# Patient Record
Sex: FEMALE | Race: BLACK OR AFRICAN AMERICAN | NOT HISPANIC OR LATINO | Employment: UNEMPLOYED | URBAN - METROPOLITAN AREA
[De-identification: names, ages, dates, MRNs, and addresses within clinical notes are randomized per-mention and may not be internally consistent; named-entity substitution may affect disease eponyms.]

---

## 2017-04-16 ENCOUNTER — APPOINTMENT (EMERGENCY)
Dept: CT IMAGING | Facility: HOSPITAL | Age: 37
End: 2017-04-16
Payer: COMMERCIAL

## 2017-04-16 ENCOUNTER — HOSPITAL ENCOUNTER (EMERGENCY)
Facility: HOSPITAL | Age: 37
Discharge: HOME | End: 2017-04-16
Admitting: EMERGENCY MEDICINE
Payer: COMMERCIAL

## 2017-04-16 VITALS
OXYGEN SATURATION: 100 % | HEART RATE: 100 BPM | DIASTOLIC BLOOD PRESSURE: 79 MMHG | BODY MASS INDEX: 44.6 KG/M2 | WEIGHT: 293 LBS | TEMPERATURE: 97.6 F | SYSTOLIC BLOOD PRESSURE: 157 MMHG | RESPIRATION RATE: 16 BRPM

## 2017-04-16 DIAGNOSIS — N39.0 UTI (URINARY TRACT INFECTION): Primary | ICD-10-CM

## 2017-04-16 LAB
BACTERIA UR QL AUTO: ABNORMAL /HPF
BILIRUB UR QL STRIP: ABNORMAL
CLARITY UR: ABNORMAL
CLARITY, POC: NORMAL
COLOR UR: ABNORMAL
COLOR, POC: NORMAL
GLUCOSE UR STRIP-MCNC: NEGATIVE MG/DL
HCG UR QL: NEGATIVE
HGB UR QL STRIP.AUTO: ABNORMAL
KETONES UR STRIP-MCNC: NEGATIVE MG/DL
LEUKOCYTE ESTERASE UR QL STRIP: ABNORMAL
NITRITE UR QL STRIP: NEGATIVE
NON-SQ EPI CELLS URNS QL MICRO: ABNORMAL /HPF
PH UR STRIP.AUTO: 5.5 [PH] (ref 4.5–8)
PROT UR STRIP-MCNC: >=300 MG/DL
RBC #/AREA URNS AUTO: ABNORMAL /HPF
SP GR UR STRIP.AUTO: >=1.03 (ref 1–1.03)
UROBILINOGEN UR QL STRIP.AUTO: 1 E.U./DL
WBC #/AREA URNS AUTO: ABNORMAL /HPF

## 2017-04-16 PROCEDURE — 74176 CT ABD & PELVIS W/O CONTRAST: CPT

## 2017-04-16 PROCEDURE — 81025 URINE PREGNANCY TEST: CPT | Performed by: PHYSICIAN ASSISTANT

## 2017-04-16 PROCEDURE — 99284 EMERGENCY DEPT VISIT MOD MDM: CPT

## 2017-04-16 PROCEDURE — 81002 URINALYSIS NONAUTO W/O SCOPE: CPT | Performed by: PHYSICIAN ASSISTANT

## 2017-04-16 PROCEDURE — 87086 URINE CULTURE/COLONY COUNT: CPT

## 2017-04-16 PROCEDURE — 87491 CHLMYD TRACH DNA AMP PROBE: CPT | Performed by: PHYSICIAN ASSISTANT

## 2017-04-16 PROCEDURE — 81001 URINALYSIS AUTO W/SCOPE: CPT

## 2017-04-16 PROCEDURE — 87591 N.GONORRHOEAE DNA AMP PROB: CPT | Performed by: PHYSICIAN ASSISTANT

## 2017-04-16 RX ORDER — CEPHALEXIN 500 MG/1
500 CAPSULE ORAL 2 TIMES DAILY
Qty: 20 CAPSULE | Refills: 0 | Status: SHIPPED | OUTPATIENT
Start: 2017-04-16 | End: 2017-04-26 | Stop reason: HOSPADM

## 2017-04-17 LAB
CHLAMYDIA DNA CVX QL NAA+PROBE: NORMAL
N GONORRHOEA DNA GENITAL QL NAA+PROBE: NORMAL

## 2017-04-18 LAB — BACTERIA UR CULT: NORMAL

## 2017-04-25 ENCOUNTER — APPOINTMENT (EMERGENCY)
Dept: CT IMAGING | Facility: HOSPITAL | Age: 37
DRG: 139 | End: 2017-04-25
Payer: COMMERCIAL

## 2017-04-25 ENCOUNTER — HOSPITAL ENCOUNTER (INPATIENT)
Facility: HOSPITAL | Age: 37
LOS: 1 days | Discharge: HOME | DRG: 139 | End: 2017-04-26
Attending: EMERGENCY MEDICINE | Admitting: INTERNAL MEDICINE
Payer: COMMERCIAL

## 2017-04-25 DIAGNOSIS — R06.02 SOB (SHORTNESS OF BREATH): ICD-10-CM

## 2017-04-25 DIAGNOSIS — J45.901 ASTHMA EXACERBATION: Primary | ICD-10-CM

## 2017-04-25 DIAGNOSIS — R06.83 SNORING: ICD-10-CM

## 2017-04-25 DIAGNOSIS — J18.9 PNEUMONIA: ICD-10-CM

## 2017-04-25 LAB
ALBUMIN SERPL BCP-MCNC: 3.4 G/DL (ref 3.5–5)
ALP SERPL-CCNC: 63 U/L (ref 46–116)
ALT SERPL W P-5'-P-CCNC: 29 U/L (ref 12–78)
ANION GAP SERPL CALCULATED.3IONS-SCNC: 9 MMOL/L (ref 4–13)
ARTERIAL PATENCY WRIST A: YES
AST SERPL W P-5'-P-CCNC: 27 U/L (ref 5–45)
BASE EXCESS BLDA CALC-SCNC: -3.7 MMOL/L
BASOPHILS # BLD MANUAL: 0 THOUSAND/UL (ref 0–0.1)
BASOPHILS NFR MAR MANUAL: 0 % (ref 0–1)
BILIRUB SERPL-MCNC: 0.29 MG/DL (ref 0.2–1)
BUN SERPL-MCNC: 7 MG/DL (ref 5–25)
CALCIUM SERPL-MCNC: 8.6 MG/DL (ref 8.3–10.1)
CHLORIDE SERPL-SCNC: 104 MMOL/L (ref 100–108)
CO2 SERPL-SCNC: 26 MMOL/L (ref 21–32)
CREAT SERPL-MCNC: 0.88 MG/DL (ref 0.6–1.3)
EOSINOPHIL # BLD MANUAL: 0 THOUSAND/UL (ref 0–0.4)
EOSINOPHIL NFR BLD MANUAL: 0 % (ref 0–6)
ERYTHROCYTE [DISTWIDTH] IN BLOOD BY AUTOMATED COUNT: 13.6 % (ref 11.6–15.1)
GFR SERPL CREATININE-BSD FRML MDRD: >60 ML/MIN/1.73SQ M
GLUCOSE SERPL-MCNC: 130 MG/DL (ref 65–140)
HCG SERPL QL: NEGATIVE
HCO3 BLDA-SCNC: 18.3 MMOL/L (ref 22–28)
HCT VFR BLD AUTO: 34.7 % (ref 34.8–46.1)
HGB BLD-MCNC: 11.7 G/DL (ref 11.5–15.4)
L PNEUMO1 AG UR QL IA.RAPID: NEGATIVE
LACTATE SERPL-SCNC: 2.1 MMOL/L (ref 0.5–2)
LYMPHOCYTES # BLD AUTO: 18 % (ref 14–44)
LYMPHOCYTES # BLD AUTO: 2.08 THOUSAND/UL (ref 0.6–4.47)
MCH RBC QN AUTO: 31.5 PG (ref 26.8–34.3)
MCHC RBC AUTO-ENTMCNC: 33.7 G/DL (ref 31.4–37.4)
MCV RBC AUTO: 93 FL (ref 82–98)
MONOCYTES # BLD AUTO: 0.58 THOUSAND/UL (ref 0–1.22)
MONOCYTES NFR BLD: 5 % (ref 4–12)
NEUTROPHILS # BLD MANUAL: 8.68 THOUSAND/UL (ref 1.85–7.62)
NEUTS SEG NFR BLD AUTO: 75 % (ref 43–75)
NON VENT ROOM AIR: 21 %
NRBC BLD AUTO-RTO: 0 /100 WBCS
O2 CT BLDA-SCNC: 17.1 ML/DL (ref 16–23)
OXYHGB MFR BLDA: 95.5 % (ref 94–97)
PCO2 BLDA: 25.3 MM HG (ref 36–44)
PH BLDA: 7.48 [PH] (ref 7.35–7.45)
PLATELET # BLD AUTO: 314 THOUSANDS/UL (ref 149–390)
PLATELET BLD QL SMEAR: ADEQUATE
PMV BLD AUTO: 9.6 FL (ref 8.9–12.7)
PO2 BLDA: 89 MM HG (ref 75–129)
POTASSIUM SERPL-SCNC: 3.2 MMOL/L (ref 3.5–5.3)
PROT SERPL-MCNC: 7.5 G/DL (ref 6.4–8.2)
RBC # BLD AUTO: 3.72 MILLION/UL (ref 3.81–5.12)
RBC MORPH BLD: NORMAL
S PNEUM AG UR QL: NEGATIVE
SODIUM SERPL-SCNC: 139 MMOL/L (ref 136–145)
SPECIMEN SOURCE: ABNORMAL
TOTAL CELLS COUNTED SPEC: 100
VARIANT LYMPHS # BLD AUTO: 2 %
WBC # BLD AUTO: 11.57 THOUSAND/UL (ref 4.31–10.16)

## 2017-04-25 PROCEDURE — 82805 BLOOD GASES W/O2 SATURATION: CPT | Performed by: EMERGENCY MEDICINE

## 2017-04-25 PROCEDURE — 83605 ASSAY OF LACTIC ACID: CPT | Performed by: EMERGENCY MEDICINE

## 2017-04-25 PROCEDURE — 96365 THER/PROPH/DIAG IV INF INIT: CPT

## 2017-04-25 PROCEDURE — 96375 TX/PRO/DX INJ NEW DRUG ADDON: CPT

## 2017-04-25 PROCEDURE — 36600 WITHDRAWAL OF ARTERIAL BLOOD: CPT

## 2017-04-25 PROCEDURE — 85007 BL SMEAR W/DIFF WBC COUNT: CPT | Performed by: EMERGENCY MEDICINE

## 2017-04-25 PROCEDURE — 87147 CULTURE TYPE IMMUNOLOGIC: CPT | Performed by: PHYSICIAN ASSISTANT

## 2017-04-25 PROCEDURE — 87449 NOS EACH ORGANISM AG IA: CPT | Performed by: PHYSICIAN ASSISTANT

## 2017-04-25 PROCEDURE — 94640 AIRWAY INHALATION TREATMENT: CPT

## 2017-04-25 PROCEDURE — 36415 COLL VENOUS BLD VENIPUNCTURE: CPT | Performed by: EMERGENCY MEDICINE

## 2017-04-25 PROCEDURE — 85027 COMPLETE CBC AUTOMATED: CPT | Performed by: EMERGENCY MEDICINE

## 2017-04-25 PROCEDURE — 99285 EMERGENCY DEPT VISIT HI MDM: CPT

## 2017-04-25 PROCEDURE — 87205 SMEAR GRAM STAIN: CPT | Performed by: PHYSICIAN ASSISTANT

## 2017-04-25 PROCEDURE — 80053 COMPREHEN METABOLIC PANEL: CPT | Performed by: EMERGENCY MEDICINE

## 2017-04-25 PROCEDURE — 87147 CULTURE TYPE IMMUNOLOGIC: CPT | Performed by: EMERGENCY MEDICINE

## 2017-04-25 PROCEDURE — 87040 BLOOD CULTURE FOR BACTERIA: CPT | Performed by: EMERGENCY MEDICINE

## 2017-04-25 PROCEDURE — 96361 HYDRATE IV INFUSION ADD-ON: CPT

## 2017-04-25 PROCEDURE — 87070 CULTURE OTHR SPECIMN AEROBIC: CPT | Performed by: PHYSICIAN ASSISTANT

## 2017-04-25 PROCEDURE — 71275 CT ANGIOGRAPHY CHEST: CPT

## 2017-04-25 PROCEDURE — 84703 CHORIONIC GONADOTROPIN ASSAY: CPT | Performed by: EMERGENCY MEDICINE

## 2017-04-25 RX ORDER — MAGNESIUM SULFATE HEPTAHYDRATE 40 MG/ML
2 INJECTION, SOLUTION INTRAVENOUS ONCE
Status: COMPLETED | OUTPATIENT
Start: 2017-04-25 | End: 2017-04-25

## 2017-04-25 RX ORDER — METHOCARBAMOL 500 MG/1
750 TABLET, FILM COATED ORAL EVERY 6 HOURS PRN
Status: DISCONTINUED | OUTPATIENT
Start: 2017-04-25 | End: 2017-04-26 | Stop reason: HOSPADM

## 2017-04-25 RX ORDER — IPRATROPIUM BROMIDE AND ALBUTEROL SULFATE 2.5; .5 MG/3ML; MG/3ML
SOLUTION RESPIRATORY (INHALATION)
Status: COMPLETED
Start: 2017-04-25 | End: 2017-04-25

## 2017-04-25 RX ORDER — IPRATROPIUM BROMIDE AND ALBUTEROL SULFATE 2.5; .5 MG/3ML; MG/3ML
3 SOLUTION RESPIRATORY (INHALATION) EVERY 6 HOURS PRN
Status: DISCONTINUED | OUTPATIENT
Start: 2017-04-25 | End: 2017-04-26 | Stop reason: HOSPADM

## 2017-04-25 RX ORDER — IPRATROPIUM BROMIDE AND ALBUTEROL SULFATE 2.5; .5 MG/3ML; MG/3ML
3 SOLUTION RESPIRATORY (INHALATION)
Status: DISCONTINUED | OUTPATIENT
Start: 2017-04-25 | End: 2017-04-25

## 2017-04-25 RX ORDER — LEVALBUTEROL INHALATION SOLUTION 0.63 MG/3ML
0.63 SOLUTION RESPIRATORY (INHALATION) EVERY 8 HOURS PRN
Status: DISCONTINUED | OUTPATIENT
Start: 2017-04-25 | End: 2017-04-25

## 2017-04-25 RX ORDER — LEVOFLOXACIN 5 MG/ML
750 INJECTION, SOLUTION INTRAVENOUS EVERY 24 HOURS
Status: DISCONTINUED | OUTPATIENT
Start: 2017-04-26 | End: 2017-04-26 | Stop reason: SDUPTHER

## 2017-04-25 RX ORDER — POTASSIUM CHLORIDE 20 MEQ/1
40 TABLET, EXTENDED RELEASE ORAL ONCE
Status: COMPLETED | OUTPATIENT
Start: 2017-04-25 | End: 2017-04-25

## 2017-04-25 RX ORDER — LEVOFLOXACIN 5 MG/ML
750 INJECTION, SOLUTION INTRAVENOUS ONCE
Status: COMPLETED | OUTPATIENT
Start: 2017-04-25 | End: 2017-04-25

## 2017-04-25 RX ORDER — METHYLPREDNISOLONE SODIUM SUCCINATE 40 MG/ML
40 INJECTION, POWDER, LYOPHILIZED, FOR SOLUTION INTRAMUSCULAR; INTRAVENOUS EVERY 12 HOURS SCHEDULED
Status: DISCONTINUED | OUTPATIENT
Start: 2017-04-25 | End: 2017-04-25

## 2017-04-25 RX ORDER — ONDANSETRON 2 MG/ML
4 INJECTION INTRAMUSCULAR; INTRAVENOUS ONCE AS NEEDED
Status: DISCONTINUED | OUTPATIENT
Start: 2017-04-25 | End: 2017-04-26 | Stop reason: HOSPADM

## 2017-04-25 RX ORDER — ACETAMINOPHEN 325 MG/1
650 TABLET ORAL EVERY 6 HOURS PRN
Status: DISCONTINUED | OUTPATIENT
Start: 2017-04-25 | End: 2017-04-26 | Stop reason: HOSPADM

## 2017-04-25 RX ORDER — LORAZEPAM 1 MG/1
1 TABLET ORAL EVERY 8 HOURS PRN
Status: DISCONTINUED | OUTPATIENT
Start: 2017-04-25 | End: 2017-04-26 | Stop reason: HOSPADM

## 2017-04-25 RX ORDER — METHYLPREDNISOLONE SODIUM SUCCINATE 125 MG/2ML
125 INJECTION, POWDER, LYOPHILIZED, FOR SOLUTION INTRAMUSCULAR; INTRAVENOUS ONCE
Status: COMPLETED | OUTPATIENT
Start: 2017-04-25 | End: 2017-04-25

## 2017-04-25 RX ORDER — KETOROLAC TROMETHAMINE 30 MG/ML
30 INJECTION, SOLUTION INTRAMUSCULAR; INTRAVENOUS ONCE
Status: COMPLETED | OUTPATIENT
Start: 2017-04-25 | End: 2017-04-25

## 2017-04-25 RX ORDER — ALBUTEROL SULFATE 2.5 MG/3ML
10 SOLUTION RESPIRATORY (INHALATION) ONCE
Status: COMPLETED | OUTPATIENT
Start: 2017-04-25 | End: 2017-04-25

## 2017-04-25 RX ORDER — TRAMADOL HYDROCHLORIDE 50 MG/1
50 TABLET ORAL EVERY 6 HOURS PRN
Status: DISCONTINUED | OUTPATIENT
Start: 2017-04-25 | End: 2017-04-26 | Stop reason: HOSPADM

## 2017-04-25 RX ADMIN — ALBUTEROL SULFATE 10 MG: 2.5 SOLUTION RESPIRATORY (INHALATION) at 02:20

## 2017-04-25 RX ADMIN — MAGNESIUM SULFATE HEPTAHYDRATE 2 G: 40 INJECTION, SOLUTION INTRAVENOUS at 02:18

## 2017-04-25 RX ADMIN — IPRATROPIUM BROMIDE 1 MG: 0.5 SOLUTION RESPIRATORY (INHALATION) at 02:20

## 2017-04-25 RX ADMIN — IPRATROPIUM BROMIDE AND ALBUTEROL SULFATE 3 ML: .5; 3 SOLUTION RESPIRATORY (INHALATION) at 20:08

## 2017-04-25 RX ADMIN — IPRATROPIUM BROMIDE AND ALBUTEROL SULFATE 3 ML: .5; 3 SOLUTION RESPIRATORY (INHALATION) at 01:53

## 2017-04-25 RX ADMIN — IOHEXOL 85 ML: 350 INJECTION, SOLUTION INTRAVENOUS at 04:17

## 2017-04-25 RX ADMIN — FLUTICASONE PROPIONATE AND SALMETEROL 1 PUFF: 50; 250 POWDER RESPIRATORY (INHALATION) at 21:59

## 2017-04-25 RX ADMIN — SODIUM CHLORIDE 1000 ML: 0.9 INJECTION, SOLUTION INTRAVENOUS at 02:18

## 2017-04-25 RX ADMIN — POTASSIUM CHLORIDE 40 MEQ: 1500 TABLET, EXTENDED RELEASE ORAL at 03:39

## 2017-04-25 RX ADMIN — IPRATROPIUM BROMIDE AND ALBUTEROL SULFATE 3 ML: .5; 3 SOLUTION RESPIRATORY (INHALATION) at 08:30

## 2017-04-25 RX ADMIN — FLUTICASONE PROPIONATE AND SALMETEROL 1 PUFF: 50; 250 POWDER RESPIRATORY (INHALATION) at 11:58

## 2017-04-25 RX ADMIN — KETOROLAC TROMETHAMINE 30 MG: 30 INJECTION, SOLUTION INTRAMUSCULAR at 03:39

## 2017-04-25 RX ADMIN — METHYLPREDNISOLONE SODIUM SUCCINATE 125 MG: 125 INJECTION, POWDER, FOR SOLUTION INTRAMUSCULAR; INTRAVENOUS at 02:18

## 2017-04-25 RX ADMIN — METHYLPREDNISOLONE SODIUM SUCCINATE 40 MG: 40 INJECTION, POWDER, FOR SOLUTION INTRAMUSCULAR; INTRAVENOUS at 08:55

## 2017-04-25 RX ADMIN — ENOXAPARIN SODIUM 40 MG: 40 INJECTION SUBCUTANEOUS at 09:02

## 2017-04-25 RX ADMIN — METHOCARBAMOL 750 MG: 500 TABLET ORAL at 10:47

## 2017-04-25 RX ADMIN — LEVOFLOXACIN 750 MG: 5 INJECTION, SOLUTION INTRAVENOUS at 05:23

## 2017-04-25 RX ADMIN — ACETAMINOPHEN 650 MG: 325 TABLET, FILM COATED ORAL at 12:58

## 2017-04-25 RX ADMIN — LORAZEPAM 1 MG: 1 TABLET ORAL at 22:18

## 2017-04-26 VITALS
HEART RATE: 84 BPM | BODY MASS INDEX: 35.87 KG/M2 | TEMPERATURE: 98 F | SYSTOLIC BLOOD PRESSURE: 157 MMHG | WEIGHT: 250 LBS | DIASTOLIC BLOOD PRESSURE: 78 MMHG | RESPIRATION RATE: 21 BRPM | OXYGEN SATURATION: 100 %

## 2017-04-26 LAB
ANION GAP SERPL CALCULATED.3IONS-SCNC: 7 MMOL/L (ref 4–13)
BUN SERPL-MCNC: 10 MG/DL (ref 5–25)
CALCIUM SERPL-MCNC: 8.4 MG/DL (ref 8.3–10.1)
CHLORIDE SERPL-SCNC: 107 MMOL/L (ref 100–108)
CO2 SERPL-SCNC: 25 MMOL/L (ref 21–32)
CREAT SERPL-MCNC: 0.81 MG/DL (ref 0.6–1.3)
GFR SERPL CREATININE-BSD FRML MDRD: >60 ML/MIN/1.73SQ M
GLUCOSE SERPL-MCNC: 128 MG/DL (ref 65–140)
MAGNESIUM SERPL-MCNC: 2.1 MG/DL (ref 1.6–2.6)
POTASSIUM SERPL-SCNC: 3.8 MMOL/L (ref 3.5–5.3)
SODIUM SERPL-SCNC: 139 MMOL/L (ref 136–145)

## 2017-04-26 PROCEDURE — 94762 N-INVAS EAR/PLS OXIMTRY CONT: CPT

## 2017-04-26 PROCEDURE — 94640 AIRWAY INHALATION TREATMENT: CPT

## 2017-04-26 PROCEDURE — 80048 BASIC METABOLIC PNL TOTAL CA: CPT | Performed by: INTERNAL MEDICINE

## 2017-04-26 PROCEDURE — 83735 ASSAY OF MAGNESIUM: CPT | Performed by: INTERNAL MEDICINE

## 2017-04-26 RX ORDER — LEVOFLOXACIN 750 MG/1
750 TABLET ORAL EVERY 24 HOURS
Status: DISCONTINUED | OUTPATIENT
Start: 2017-04-27 | End: 2017-04-26 | Stop reason: HOSPADM

## 2017-04-26 RX ORDER — ALBUTEROL SULFATE 90 UG/1
2 AEROSOL, METERED RESPIRATORY (INHALATION) EVERY 6 HOURS PRN
Qty: 1 INHALER | Refills: 0 | Status: SHIPPED | OUTPATIENT
Start: 2017-04-26 | End: 2017-04-26 | Stop reason: HOSPADM

## 2017-04-26 RX ORDER — NICOTINE 21 MG/24HR
1 PATCH, TRANSDERMAL 24 HOURS TRANSDERMAL EVERY 24 HOURS
Qty: 30 PATCH | Refills: 0 | Status: SHIPPED | OUTPATIENT
Start: 2017-04-26 | End: 2017-10-05 | Stop reason: ALTCHOICE

## 2017-04-26 RX ORDER — LEVOFLOXACIN 750 MG/1
750 TABLET ORAL EVERY 24 HOURS
Qty: 10 TABLET | Refills: 0 | Status: SHIPPED | OUTPATIENT
Start: 2017-04-27 | End: 2017-05-07

## 2017-04-26 RX ADMIN — LEVOFLOXACIN 750 MG: 5 INJECTION, SOLUTION INTRAVENOUS at 04:31

## 2017-04-26 RX ADMIN — FLUTICASONE PROPIONATE AND SALMETEROL 1 PUFF: 50; 250 POWDER RESPIRATORY (INHALATION) at 08:01

## 2017-04-26 RX ADMIN — IPRATROPIUM BROMIDE AND ALBUTEROL SULFATE 3 ML: .5; 3 SOLUTION RESPIRATORY (INHALATION) at 08:10

## 2017-04-26 RX ADMIN — ENOXAPARIN SODIUM 40 MG: 40 INJECTION SUBCUTANEOUS at 08:01

## 2017-04-27 LAB
BACTERIA SPT RESP CULT: NORMAL
GRAM STN SPEC: NORMAL

## 2017-04-28 LAB
BACTERIA BLD CULT: NORMAL
BACTERIA BLD CULT: NORMAL
GRAM STN SPEC: NORMAL

## 2017-04-30 LAB — BACTERIA BLD CULT: NORMAL

## 2017-07-12 ENCOUNTER — HOSPITAL ENCOUNTER (EMERGENCY)
Facility: HOSPITAL | Age: 37
Discharge: HOME | End: 2017-07-13
Payer: COMMERCIAL

## 2017-07-12 VITALS
OXYGEN SATURATION: 100 % | RESPIRATION RATE: 18 BRPM | TEMPERATURE: 98.5 F | HEART RATE: 89 BPM | SYSTOLIC BLOOD PRESSURE: 138 MMHG | DIASTOLIC BLOOD PRESSURE: 84 MMHG

## 2017-07-12 DIAGNOSIS — L02.419 ABSCESS, AXILLA: Primary | ICD-10-CM

## 2017-07-12 RX ORDER — LIDOCAINE HYDROCHLORIDE AND EPINEPHRINE 10; 10 MG/ML; UG/ML
1 INJECTION, SOLUTION INFILTRATION; PERINEURAL ONCE
Status: COMPLETED | OUTPATIENT
Start: 2017-07-13 | End: 2017-07-12

## 2017-07-12 RX ADMIN — LIDOCAINE HYDROCHLORIDE,EPINEPHRINE BITARTRATE 1 ML: 10; .01 INJECTION, SOLUTION INFILTRATION; PERINEURAL at 23:49

## 2017-07-13 PROCEDURE — 99282 EMERGENCY DEPT VISIT SF MDM: CPT

## 2017-07-13 RX ORDER — OXYCODONE HYDROCHLORIDE AND ACETAMINOPHEN 5; 325 MG/1; MG/1
1 TABLET ORAL EVERY 4 HOURS PRN
Qty: 15 TABLET | Refills: 0 | Status: SHIPPED | OUTPATIENT
Start: 2017-07-13 | End: 2017-10-05 | Stop reason: ALTCHOICE

## 2017-07-13 RX ORDER — SULFAMETHOXAZOLE AND TRIMETHOPRIM 800; 160 MG/1; MG/1
1 TABLET ORAL EVERY 12 HOURS SCHEDULED
Qty: 20 TABLET | Refills: 0 | Status: SHIPPED | OUTPATIENT
Start: 2017-07-13 | End: 2017-07-23

## 2017-07-13 RX ORDER — OXYCODONE HYDROCHLORIDE AND ACETAMINOPHEN 5; 325 MG/1; MG/1
1 TABLET ORAL ONCE
Status: COMPLETED | OUTPATIENT
Start: 2017-07-13 | End: 2017-07-13

## 2017-07-13 RX ORDER — CEPHALEXIN 250 MG/1
250 CAPSULE ORAL EVERY 6 HOURS SCHEDULED
Qty: 40 CAPSULE | Refills: 0 | Status: SHIPPED | OUTPATIENT
Start: 2017-07-13 | End: 2017-07-23

## 2017-07-13 RX ADMIN — OXYCODONE HYDROCHLORIDE AND ACETAMINOPHEN 1 TABLET: 5; 325 TABLET ORAL at 00:14

## 2017-10-05 ENCOUNTER — APPOINTMENT (EMERGENCY)
Dept: CT IMAGING | Facility: HOSPITAL | Age: 37
End: 2017-10-05
Payer: COMMERCIAL

## 2017-10-05 ENCOUNTER — HOSPITAL ENCOUNTER (EMERGENCY)
Facility: HOSPITAL | Age: 37
Discharge: HOME | End: 2017-10-05
Attending: EMERGENCY MEDICINE | Admitting: EMERGENCY MEDICINE
Payer: COMMERCIAL

## 2017-10-05 VITALS
TEMPERATURE: 99.6 F | HEART RATE: 95 BPM | RESPIRATION RATE: 20 BRPM | SYSTOLIC BLOOD PRESSURE: 150 MMHG | OXYGEN SATURATION: 99 % | BODY MASS INDEX: 31.57 KG/M2 | WEIGHT: 220 LBS | DIASTOLIC BLOOD PRESSURE: 73 MMHG

## 2017-10-05 DIAGNOSIS — J45.901 ASTHMA EXACERBATION: Primary | ICD-10-CM

## 2017-10-05 DIAGNOSIS — R07.9 CHEST PAIN: ICD-10-CM

## 2017-10-05 DIAGNOSIS — R09.1 PLEURISY: ICD-10-CM

## 2017-10-05 LAB
ALBUMIN SERPL BCP-MCNC: 3.6 G/DL (ref 3.5–5)
ALP SERPL-CCNC: 66 U/L (ref 46–116)
ALT SERPL W P-5'-P-CCNC: 25 U/L (ref 12–78)
ANION GAP SERPL CALCULATED.3IONS-SCNC: 6 MMOL/L (ref 4–13)
APTT PPP: 28 SECONDS (ref 23–35)
AST SERPL W P-5'-P-CCNC: 29 U/L (ref 5–45)
BACTERIA UR QL AUTO: ABNORMAL /HPF
BASOPHILS # BLD AUTO: 0.03 THOUSANDS/ΜL (ref 0–0.1)
BASOPHILS NFR BLD AUTO: 0 % (ref 0–1)
BILIRUB SERPL-MCNC: 0.15 MG/DL (ref 0.2–1)
BILIRUB UR QL STRIP: NEGATIVE
BUN SERPL-MCNC: 7 MG/DL (ref 5–25)
CALCIUM SERPL-MCNC: 9.2 MG/DL (ref 8.3–10.1)
CHLORIDE SERPL-SCNC: 104 MMOL/L (ref 100–108)
CLARITY UR: CLEAR
CO2 SERPL-SCNC: 28 MMOL/L (ref 21–32)
COLOR UR: YELLOW
CREAT SERPL-MCNC: 0.9 MG/DL (ref 0.6–1.3)
EOSINOPHIL # BLD AUTO: 0.33 THOUSAND/ΜL (ref 0–0.61)
EOSINOPHIL NFR BLD AUTO: 5 % (ref 0–6)
ERYTHROCYTE [DISTWIDTH] IN BLOOD BY AUTOMATED COUNT: 14.3 % (ref 11.6–15.1)
EXT PREG TEST URINE: NEGATIVE
GFR SERPL CREATININE-BSD FRML MDRD: 94 ML/MIN/1.73SQ M
GLUCOSE SERPL-MCNC: 121 MG/DL (ref 65–140)
GLUCOSE UR STRIP-MCNC: NEGATIVE MG/DL
HCG SERPL QL: NEGATIVE
HCT VFR BLD AUTO: 37.8 % (ref 34.8–46.1)
HGB BLD-MCNC: 12.6 G/DL (ref 11.5–15.4)
HGB UR QL STRIP.AUTO: NEGATIVE
INR PPP: 0.91 (ref 0.86–1.16)
KETONES UR STRIP-MCNC: NEGATIVE MG/DL
LACTATE SERPL-SCNC: 0.8 MMOL/L (ref 0.5–2)
LEUKOCYTE ESTERASE UR QL STRIP: ABNORMAL
LYMPHOCYTES # BLD AUTO: 2.19 THOUSANDS/ΜL (ref 0.6–4.47)
LYMPHOCYTES NFR BLD AUTO: 32 % (ref 14–44)
MCH RBC QN AUTO: 30.7 PG (ref 26.8–34.3)
MCHC RBC AUTO-ENTMCNC: 33.3 G/DL (ref 31.4–37.4)
MCV RBC AUTO: 92 FL (ref 82–98)
MONOCYTES # BLD AUTO: 0.37 THOUSAND/ΜL (ref 0.17–1.22)
MONOCYTES NFR BLD AUTO: 5 % (ref 4–12)
MUCOUS THREADS UR QL AUTO: ABNORMAL
NEUTROPHILS # BLD AUTO: 3.95 THOUSANDS/ΜL (ref 1.85–7.62)
NEUTS SEG NFR BLD AUTO: 58 % (ref 43–75)
NITRITE UR QL STRIP: NEGATIVE
NON-SQ EPI CELLS URNS QL MICRO: ABNORMAL /HPF
NRBC BLD AUTO-RTO: 0 /100 WBCS
PH UR STRIP.AUTO: 5.5 [PH] (ref 4.5–8)
PLATELET # BLD AUTO: 328 THOUSANDS/UL (ref 149–390)
PMV BLD AUTO: 9.7 FL (ref 8.9–12.7)
POTASSIUM SERPL-SCNC: 3.4 MMOL/L (ref 3.5–5.3)
PROT SERPL-MCNC: 8 G/DL (ref 6.4–8.2)
PROT UR STRIP-MCNC: NEGATIVE MG/DL
PROTHROMBIN TIME: 12.3 SECONDS (ref 12.1–14.4)
RBC # BLD AUTO: 4.11 MILLION/UL (ref 3.81–5.12)
RBC #/AREA URNS AUTO: ABNORMAL /HPF
SODIUM SERPL-SCNC: 138 MMOL/L (ref 136–145)
SP GR UR STRIP.AUTO: 1.02 (ref 1–1.03)
UROBILINOGEN UR QL STRIP.AUTO: 0.2 E.U./DL
WBC # BLD AUTO: 6.87 THOUSAND/UL (ref 4.31–10.16)
WBC #/AREA URNS AUTO: ABNORMAL /HPF

## 2017-10-05 PROCEDURE — 96374 THER/PROPH/DIAG INJ IV PUSH: CPT

## 2017-10-05 PROCEDURE — 36415 COLL VENOUS BLD VENIPUNCTURE: CPT | Performed by: EMERGENCY MEDICINE

## 2017-10-05 PROCEDURE — 81001 URINALYSIS AUTO W/SCOPE: CPT

## 2017-10-05 PROCEDURE — 94640 AIRWAY INHALATION TREATMENT: CPT

## 2017-10-05 PROCEDURE — 96375 TX/PRO/DX INJ NEW DRUG ADDON: CPT

## 2017-10-05 PROCEDURE — 85730 THROMBOPLASTIN TIME PARTIAL: CPT | Performed by: EMERGENCY MEDICINE

## 2017-10-05 PROCEDURE — 81025 URINE PREGNANCY TEST: CPT | Performed by: EMERGENCY MEDICINE

## 2017-10-05 PROCEDURE — 99284 EMERGENCY DEPT VISIT MOD MDM: CPT

## 2017-10-05 PROCEDURE — 81002 URINALYSIS NONAUTO W/O SCOPE: CPT | Performed by: EMERGENCY MEDICINE

## 2017-10-05 PROCEDURE — 71275 CT ANGIOGRAPHY CHEST: CPT

## 2017-10-05 PROCEDURE — 96361 HYDRATE IV INFUSION ADD-ON: CPT

## 2017-10-05 PROCEDURE — 80053 COMPREHEN METABOLIC PANEL: CPT | Performed by: EMERGENCY MEDICINE

## 2017-10-05 PROCEDURE — 85025 COMPLETE CBC W/AUTO DIFF WBC: CPT | Performed by: EMERGENCY MEDICINE

## 2017-10-05 PROCEDURE — 87040 BLOOD CULTURE FOR BACTERIA: CPT | Performed by: EMERGENCY MEDICINE

## 2017-10-05 PROCEDURE — 83605 ASSAY OF LACTIC ACID: CPT | Performed by: EMERGENCY MEDICINE

## 2017-10-05 PROCEDURE — 84703 CHORIONIC GONADOTROPIN ASSAY: CPT | Performed by: EMERGENCY MEDICINE

## 2017-10-05 PROCEDURE — 85610 PROTHROMBIN TIME: CPT | Performed by: EMERGENCY MEDICINE

## 2017-10-05 RX ORDER — METHYLPREDNISOLONE SODIUM SUCCINATE 125 MG/2ML
125 INJECTION, POWDER, LYOPHILIZED, FOR SOLUTION INTRAMUSCULAR; INTRAVENOUS ONCE
Status: COMPLETED | OUTPATIENT
Start: 2017-10-05 | End: 2017-10-05

## 2017-10-05 RX ORDER — ALBUTEROL SULFATE 2.5 MG/3ML
2.5 SOLUTION RESPIRATORY (INHALATION) EVERY 4 HOURS PRN
Qty: 75 ML | Refills: 0 | Status: SHIPPED | OUTPATIENT
Start: 2017-10-05 | End: 2017-11-04

## 2017-10-05 RX ORDER — KETOROLAC TROMETHAMINE 30 MG/ML
30 INJECTION, SOLUTION INTRAMUSCULAR; INTRAVENOUS ONCE
Status: COMPLETED | OUTPATIENT
Start: 2017-10-05 | End: 2017-10-05

## 2017-10-05 RX ORDER — ALBUTEROL SULFATE 2.5 MG/3ML
10 SOLUTION RESPIRATORY (INHALATION) ONCE
Status: COMPLETED | OUTPATIENT
Start: 2017-10-05 | End: 2017-10-05

## 2017-10-05 RX ORDER — PREDNISONE 20 MG/1
40 TABLET ORAL DAILY
Qty: 10 TABLET | Refills: 0 | Status: SHIPPED | OUTPATIENT
Start: 2017-10-05 | End: 2017-10-10

## 2017-10-05 RX ADMIN — IPRATROPIUM BROMIDE 1 MG: 0.5 SOLUTION RESPIRATORY (INHALATION) at 01:12

## 2017-10-05 RX ADMIN — ALBUTEROL SULFATE 10 MG: 2.5 SOLUTION RESPIRATORY (INHALATION) at 01:13

## 2017-10-05 RX ADMIN — IOHEXOL 85 ML: 350 INJECTION, SOLUTION INTRAVENOUS at 02:37

## 2017-10-05 RX ADMIN — METHYLPREDNISOLONE SODIUM SUCCINATE 125 MG: 125 INJECTION, POWDER, FOR SOLUTION INTRAMUSCULAR; INTRAVENOUS at 01:10

## 2017-10-05 RX ADMIN — KETOROLAC TROMETHAMINE 30 MG: 30 INJECTION, SOLUTION INTRAMUSCULAR at 02:44

## 2017-10-05 RX ADMIN — SODIUM CHLORIDE 1000 ML: 0.9 INJECTION, SOLUTION INTRAVENOUS at 01:09

## 2017-10-05 NOTE — ED NOTES
Pt unable to provide urine sample at this time  Toradol held until urine preg can be preformed  Dr Ashley Latif aware        Kathy Scherer, RN  10/05/17 3816

## 2017-10-05 NOTE — ED PROVIDER NOTES
History  Chief Complaint   Patient presents with    Asthma     upper back pain  "asthma is acting up as well, but with this pain i feel like its more than that, and like my pump at home helped but i still feel tight" recient pnuemonia admission     42 yo female with well controlled asthma presents stating her asthma has been acting up for past 3 days, today she used her alb pump with minimal relief in am but then began with pleuritic L sided CP that "shoots from the front to the same spot in the back"  + loose nonproductive cough  History provided by:  Patient   used: No    Asthma   Severity:  Moderate  Onset quality:  Gradual  Duration:  3 days  Timing:  Constant  Progression:  Worsening  Chronicity:  New  Associated symptoms: congestion, cough, fatigue, shortness of breath and wheezing    Associated symptoms: no abdominal pain, no diarrhea, no headaches, no nausea, no rash, no sore throat and no vomiting  Chest pain: tightness  Fever: subjective with chills  Prior to Admission Medications   Prescriptions Last Dose Informant Patient Reported? Taking? albuterol (2 5 mg/3 mL) 0 083 % nebulizer solution   No Yes   Sig: Take 3 mL by nebulization every 6 (six) hours as needed for wheezing   albuterol (PROVENTIL HFA,VENTOLIN HFA) 90 mcg/act inhaler   Yes Yes   Sig: Inhale 2 puffs every 6 (six) hours as needed for wheezing        Facility-Administered Medications: None       Past Medical History:   Diagnosis Date    Asthma     Dental abscess     Hypertension        Past Surgical History:   Procedure Laterality Date    ECTOPIC PREGNANCY SURGERY      INCISION AND DRAINAGE INTRA ORAL ABSCESS Left 3/21/2016    Procedure: INCISION AND DRAINAGE  (I&D) WOUND ORAL Left  Space;  Surgeon: Chano Puga MD;  [facility];  Service:     MULTIPLE TOOTH EXTRACTIONS N/A 3/21/2016    Procedure: EXTRACTION TEETH MULTIPLE; 17,18,19,11,12,4,5,28,32;  Surgeon: Antonella Mckoy Xavier Leung MD;  [facility];  Service:     SALPINGECTOMY         Family History   Problem Relation Age of Onset    Diabetes Mother     Heart failure Mother     Cancer Neg Hx      I have reviewed and agree with the history as documented  Social History   Substance Use Topics    Smoking status: Current Every Day Smoker     Packs/day: 0 50     Years: 10 00    Smokeless tobacco: Never Used    Alcohol use Yes      Comment: Social        Review of Systems   Constitutional: Positive for fatigue  Negative for appetite change and chills  Fever: subjective with chills  HENT: Positive for congestion  Negative for sore throat  Eyes: Negative for visual disturbance  Respiratory: Positive for cough, shortness of breath and wheezing  Cardiovascular: Negative for palpitations  Chest pain: tightness  Gastrointestinal: Negative for abdominal pain, diarrhea, nausea and vomiting  Genitourinary: Negative for dysuria, frequency, vaginal bleeding and vaginal discharge  Musculoskeletal: Negative for back pain, neck pain and neck stiffness  Skin: Negative for pallor and rash  Allergic/Immunologic: Negative for immunocompromised state  Neurological: Negative for light-headedness and headaches  Psychiatric/Behavioral: Negative for confusion  All other systems reviewed and are negative  Physical Exam  ED Triage Vitals [10/05/17 0034]   Temperature Pulse Respirations Blood Pressure SpO2   99 6 °F (37 6 °C) 95 (!) 24 (!) 174/91 99 %      Temp Source Heart Rate Source Patient Position - Orthostatic VS BP Location FiO2 (%)   Oral Monitor Sitting Right arm --      Pain Score       7           Physical Exam   Constitutional: She is oriented to person, place, and time  She appears well-developed and well-nourished  No distress  obese   HENT:   Head: Normocephalic and atraumatic  Mouth/Throat: Oropharynx is clear and moist    Eyes: EOM are normal  Pupils are equal, round, and reactive to light  Neck: Normal range of motion  Neck supple  Cardiovascular: Normal rate and regular rhythm  No murmur heard  Pulmonary/Chest: She is in respiratory distress (mild tachypnea, decreased throughout with mild end exp wheezes nd prolonged I:E ratio)  She exhibits no tenderness  Abdominal: Soft  Bowel sounds are normal    Musculoskeletal: Normal range of motion  Neurological: She is alert and oriented to person, place, and time  Skin: Skin is warm  No rash noted  No pallor  Psychiatric: She has a normal mood and affect  Her behavior is normal    Nursing note and vitals reviewed  ED Medications  Medications   sodium chloride 0 9 % bolus 1,000 mL (0 mL Intravenous Stopped 10/5/17 0247)   ketorolac (TORADOL) 30 mg/mL injection 30 mg (30 mg Intravenous Given 10/5/17 0244)   methylPREDNISolone sodium succinate (Solu-MEDROL) injection 125 mg (125 mg Intravenous Given 10/5/17 0110)   albuterol inhalation solution 10 mg (10 mg Nebulization Given 10/5/17 0113)   ipratropium (ATROVENT) 0 02 % inhalation solution 1 mg (1 mg Nebulization Given 10/5/17 0112)   iohexol (OMNIPAQUE) 350 MG/ML injection (SINGLE-DOSE) 85 mL (85 mL Intravenous Given 10/5/17 0237)       Diagnostic Studies  Labs Reviewed   COMPREHENSIVE METABOLIC PANEL - Abnormal        Result Value Ref Range Status    Potassium 3 4 (*) 3 5 - 5 3 mmol/L Final    Total Bilirubin 0 15 (*) 0 20 - 1 00 mg/dL Final    Sodium 138  136 - 145 mmol/L Final    Chloride 104  100 - 108 mmol/L Final    CO2 28  21 - 32 mmol/L Final    Anion Gap 6  4 - 13 mmol/L Final    BUN 7  5 - 25 mg/dL Final    Creatinine 0 90  0 60 - 1 30 mg/dL Final    Comment: Standardized to IDMS reference method    Glucose 121  65 - 140 mg/dL Final    Comment:   If the patient is fasting, the ADA then defines impaired fasting glucose as > 100 mg/dL and diabetes as > or equal to 123 mg/dL    Specimen collection should occur prior to Sulfasalazine administration due to the potential for falsely depressed results  Specimen collection should occur prior to Sulfapyridine administration due to the potential for falsely elevated results  Calcium 9 2  8 3 - 10 1 mg/dL Final    AST 29  5 - 45 U/L Final    Comment:   Specimen collection should occur prior to Sulfasalazine administration due to the potential for falsely depressed results  ALT 25  12 - 78 U/L Final    Comment:   Specimen collection should occur prior to Sulfasalazine administration due to the potential for falsely depressed results  Alkaline Phosphatase 66  46 - 116 U/L Final    Total Protein 8 0  6 4 - 8 2 g/dL Final    Albumin 3 6  3 5 - 5 0 g/dL Final    eGFR 94  ml/min/1 73sq m Final    Narrative:     National Kidney Disease Education Program recommendations are as follows:  GFR calculation is accurate only with a steady state creatinine  Chronic Kidney disease less than 60 ml/min/1 73 sq  meters  Kidney failure less than 15 ml/min/1 73 sq  meters     CBC AND DIFFERENTIAL - Normal    WBC 6 87  4 31 - 10 16 Thousand/uL Final    RBC 4 11  3 81 - 5 12 Million/uL Final    Hemoglobin 12 6  11 5 - 15 4 g/dL Final    Hematocrit 37 8  34 8 - 46 1 % Final    MCV 92  82 - 98 fL Final    MCH 30 7  26 8 - 34 3 pg Final    MCHC 33 3  31 4 - 37 4 g/dL Final    RDW 14 3  11 6 - 15 1 % Final    MPV 9 7  8 9 - 12 7 fL Final    Platelets 248  265 - 390 Thousands/uL Final    nRBC 0  /100 WBCs Final    Neutrophils Relative 58  43 - 75 % Final    Lymphocytes Relative 32  14 - 44 % Final    Monocytes Relative 5  4 - 12 % Final    Eosinophils Relative 5  0 - 6 % Final    Basophils Relative 0  0 - 1 % Final    Neutrophils Absolute 3 95  1 85 - 7 62 Thousands/µL Final    Lymphocytes Absolute 2 19  0 60 - 4 47 Thousands/µL Final    Monocytes Absolute 0 37  0 17 - 1 22 Thousand/µL Final    Eosinophils Absolute 0 33  0 00 - 0 61 Thousand/µL Final    Basophils Absolute 0 03  0 00 - 0 10 Thousands/µL Final   LACTIC ACID, PLASMA - Normal    LACTIC ACID 0 8  0 5 - 2 0 mmol/L Final    Narrative:     Result may be elevated if tourniquet was used during collection  PROTIME-INR - Normal    Protime 12 3  12 1 - 14 4 seconds Final    INR 0 91  0 86 - 1 16 Final   APTT - Normal    PTT 28  23 - 35 seconds Final    Narrative: Therapeutic Heparin Range = 60-90 seconds   PREGNANCY TEST (HCG QUALITATIVE) - Normal    Preg, Serum Negative  Negative Final   BLOOD CULTURE   BLOOD CULTURE   POCT URINALYSIS DIPSTICK   POCT PREGNANCY, URINE       CTA ED chest PE study    (Results Pending)       Procedures  Procedures      Phone Contacts  ED Phone Contact    ED Course  ED Course as of Oct 05 0321   Thu Oct 05, 2017   0033 Pt seen and examined  44 yo female with well controlled asthma presents stating her asthma has been acting up for past 3 days, today she used her alb pump with minimal relief in am but then began with pleuritic L sided CP that "shoots from the front to the same spot in the back"  + loose nonproductive cough  Plan to give IVF, RUIZ neb, solumedrol, toradol and check labs, CT chest r/o PE      0113 Pt medicated, RUIZ neb started  Awaiting lab results  0216 Pt feeling better after RUIZ neb  Moving better air with more wheezing  Awaiting HCG and if neg will go for CT chest     0311 CT neg for PE or consolidation  Pt feels great and wants to go home  Requests more alb sol'n  MDM  CritCare Time    Disposition  Final diagnoses:   Asthma exacerbation   Chest pain   Pleurisy     ED Disposition     ED Disposition Condition Comment    Discharge  [facility] discharge to home/self care  Condition at discharge: Good        Follow-up Information     Follow up With Specialties Details Why Mayte Navarro MD  Call  [address]    [address]          Patient's Medications   Discharge Prescriptions    ALBUTEROL (2 5 MG/3 ML) 0 083 % NEBULIZER SOLUTION    Take 3 mL by nebulization every 4 (four) hours as needed for wheezing for up to 30 days       Start Date: 10/5/2017 End Date: 11/4/2017       Order Dose: 2 5 mg       Quantity: 75 mL    Refills: 0    PREDNISONE 20 MG TABLET    Take 2 tablets by mouth daily for 5 days       Start Date: 10/5/2017 End Date: 10/10/2017       Order Dose: 40 mg       Quantity: 10 tablet    Refills: 0     No discharge procedures on file      ED Provider  Electronically Signed by       Linda Mendez DO  10/05/17 6846

## 2017-10-05 NOTE — DISCHARGE INSTRUCTIONS
Asthma   WHAT YOU NEED TO KNOW:   Asthma is a lung disease that makes breathing difficult  Chronic inflammation and reactions to triggers narrow the airways in the lungs  Asthma can become life-threatening if it is not managed  DISCHARGE INSTRUCTIONS:   Seek care immediately if:   · You have severe shortness of breath  · Your lips or nails turn blue or gray  · The skin around your neck and ribs pulls in with each breath  · You have shortness of breath, even after you take your short-term medicine as directed  · Your peak flow numbers are in the red zone of your AAP  Contact your healthcare provider if:   · You run out of medicine before your next refill is due  · Your symptoms get worse  · You need to take more medicine than usual to control your symptoms  · You have questions or concerns about your condition or care  Medicines:   · Medicines  decrease inflammation, open airways, and make it easier to breathe  Medicines may be inhaled, taken as a pill, or injected  Short-term medicines relieve your symptoms quickly  Long-term medicines are used to prevent future attacks  You may also need medicine to help control your allergies  Ask your healthcare provider for more information about the medicine you are given and how to take it safely  · Take your medicine as directed  Contact your healthcare provider if you think your medicine is not helping or if you have side effects  Tell him or her if you are allergic to any medicine  Keep a list of the medicines, vitamins, and herbs you take  Include the amounts, and when and why you take them  Bring the list or the pill bottles to follow-up visits  Carry your medicine list with you in case of an emergency  Follow up with your healthcare provider as directed: You will need to return to make sure your medicine is working and your symptoms are controlled   You may be referred to an asthma specialist  [facility] may be asked to keep a record of your peak flow values and bring it with you to your appointments  Write down your questions so you remember to ask them  Manage your symptoms and prevent future attacks:   · Follow your Asthma Action Plan (AAP)  This is a written plan that you and your healthcare provider create  It explains which medicine you need and when to change doses if necessary  It also explains how you can monitor symptoms and use a peak flow meter  The meter measures how well your lungs are working  · Manage other health conditions , such as allergies, acid reflux, and sleep apnea  · Identify and avoid triggers  These may include pets, dust mites, mold, and cockroaches  · Do not smoke or be around others who smoke  Nicotine and other chemicals in cigarettes and cigars can cause lung damage  Ask your healthcare provider for information if you currently smoke and need help to quit  E-cigarettes or smokeless tobacco still contain nicotine  Talk to your healthcare provider before you use these products  · Ask about the flu vaccine  The flu can make your asthma worse  You may need a yearly flu shot  © 2017 [address] [facility] Information is for End User's use only and may not be sold, redistributed or otherwise used for commercial purposes  All illustrations and images included in CareNotes® are the copyrighted property of [employer]  or Ritchie Garay  The above information is an  only  It is not intended as medical advice for individual conditions or treatments  Talk to your doctor, nurse or pharmacist before following any medical regimen to see if it is safe and effective for you

## 2017-10-10 LAB
BACTERIA BLD CULT: NORMAL
BACTERIA BLD CULT: NORMAL

## 2017-10-13 ENCOUNTER — HOSPITAL ENCOUNTER (EMERGENCY)
Facility: HOSPITAL | Age: 37
Discharge: HOME | End: 2017-10-13
Attending: EMERGENCY MEDICINE | Admitting: EMERGENCY MEDICINE
Payer: COMMERCIAL

## 2017-10-13 VITALS
DIASTOLIC BLOOD PRESSURE: 88 MMHG | WEIGHT: 293 LBS | OXYGEN SATURATION: 100 % | RESPIRATION RATE: 18 BRPM | SYSTOLIC BLOOD PRESSURE: 162 MMHG | BODY MASS INDEX: 43.37 KG/M2 | HEART RATE: 106 BPM | TEMPERATURE: 99.4 F

## 2017-10-13 DIAGNOSIS — N61.0 MASTITIS: Primary | ICD-10-CM

## 2017-10-13 PROCEDURE — 99283 EMERGENCY DEPT VISIT LOW MDM: CPT

## 2017-10-13 RX ORDER — IBUPROFEN 600 MG/1
600 TABLET ORAL EVERY 6 HOURS PRN
Qty: 30 TABLET | Refills: 0 | Status: SHIPPED | OUTPATIENT
Start: 2017-10-13 | End: 2019-04-19

## 2017-10-13 RX ORDER — SULFAMETHOXAZOLE AND TRIMETHOPRIM 800; 160 MG/1; MG/1
1 TABLET ORAL ONCE
Status: COMPLETED | OUTPATIENT
Start: 2017-10-13 | End: 2017-10-13

## 2017-10-13 RX ORDER — TRAMADOL HYDROCHLORIDE 50 MG/1
50 TABLET ORAL EVERY 6 HOURS PRN
Qty: 30 TABLET | Refills: 0 | Status: SHIPPED | OUTPATIENT
Start: 2017-10-13 | End: 2017-10-23

## 2017-10-13 RX ORDER — SULFAMETHOXAZOLE AND TRIMETHOPRIM 800; 160 MG/1; MG/1
1 TABLET ORAL 2 TIMES DAILY
Qty: 13 TABLET | Refills: 0 | Status: SHIPPED | OUTPATIENT
Start: 2017-10-13 | End: 2017-10-20

## 2017-10-13 RX ADMIN — SULFAMETHOXAZOLE AND TRIMETHOPRIM 1 TABLET: 800; 160 TABLET ORAL at 18:36

## 2017-10-13 NOTE — ED NOTES
States x2-3 days ago started with right breast tenderness later developed a lump, did a have milky discharge from nipple when it first started c/o lump, redness and swelling near nipple     Kallie Guzman RN  10/13/17 0223

## 2017-10-13 NOTE — ED PROVIDER NOTES
History  Chief Complaint   Patient presents with    Breast Pain     Pt reports lump R breast near nipple, states redness, tender to palpation, denies discharge, worsening x 3 days, subjective fever  43 y o F presents for evaluation of right breast pain  3 days ago she noticed redness and swelling above the nipple  It has progressively gotten worse  Reports nausea with vomiting and subjective fever  Denies breast feeding, recent abx, or trauma  Milky discharge from the nipple yesterday  No personal history of breast malignancy  History provided by:  Patient      Prior to Admission Medications   Prescriptions Last Dose Informant Patient Reported? Taking? albuterol (2 5 mg/3 mL) 0 083 % nebulizer solution   No No   Sig: Take 3 mL by nebulization every 6 (six) hours as needed for wheezing   albuterol (2 5 mg/3 mL) 0 083 % nebulizer solution   No No   Sig: Take 3 mL by nebulization every 4 (four) hours as needed for wheezing for up to 30 days   albuterol (PROVENTIL HFA,VENTOLIN HFA) 90 mcg/act inhaler   Yes No   Sig: Inhale 2 puffs every 6 (six) hours as needed for wheezing        Facility-Administered Medications: None       Past Medical History:   Diagnosis Date    Asthma     Dental abscess     Hypertension        Past Surgical History:   Procedure Laterality Date    CHOLECYSTECTOMY      ECTOPIC PREGNANCY SURGERY      INCISION AND DRAINAGE INTRA ORAL ABSCESS Left 3/21/2016    Procedure: INCISION AND DRAINAGE  (I&D) WOUND ORAL Left  Space;  Surgeon: Vito Meehan MD;  [facility];  Service:     MULTIPLE TOOTH EXTRACTIONS N/A 3/21/2016    Procedure: EXTRACTION TEETH MULTIPLE; 17,18,19,11,12,4,5,28,32;  Surgeon: Vito Meehan MD;  [facility];  Service:     SALPINGECTOMY         Family History   Problem Relation Age of Onset    Diabetes Mother     Heart failure Mother     Cancer Neg Hx      I have reviewed and agree with the history as documented  Social History   Substance Use Topics    Smoking status: Current Every Day Smoker     Packs/day: 0 50     Years: 10 00     Types: Cigarettes    Smokeless tobacco: Never Used    Alcohol use Yes      Comment: Social        Review of Systems   Constitutional: Positive for fever  Negative for chills  Respiratory: Negative for shortness of breath  Skin: Positive for color change  All other systems reviewed and are negative  Physical Exam  ED Triage Vitals [10/13/17 1750]   Temperature Pulse Respirations Blood Pressure SpO2   99 4 °F (37 4 °C) (!) 106 18 162/88 100 %      Temp Source Heart Rate Source Patient Position - Orthostatic VS BP Location FiO2 (%)   Oral Monitor Sitting Right arm --      Pain Score       5           Physical Exam   Constitutional: She is oriented to person, place, and time  She appears well-developed and well-nourished  HENT:   Head: Normocephalic and atraumatic  Eyes: EOM are normal    Cardiovascular: Normal rate, regular rhythm and normal heart sounds  Pulmonary/Chest: Effort normal  She has wheezes  Musculoskeletal: She exhibits no edema  Neurological: She is alert and oriented to person, place, and time  Skin: Skin is warm  There is erythema  Right breast redness 3 cm by 6 cm  From 9 to 3 oclock  Indurated, warm, and tender  No abscess present  Psychiatric: She has a normal mood and affect  Nursing note and vitals reviewed        ED Medications  Medications   sulfamethoxazole-trimethoprim (BACTRIM DS) 800-160 mg per tablet 1 tablet (1 tablet Oral Given 10/13/17 1836)       Diagnostic Studies  Labs Reviewed - No data to display    No orders to display       Procedures  Procedures      Phone Consults  ED Phone Contact    ED Course  ED Course                                MDM  CritCare Time    Disposition  Final diagnoses:   Mastitis     ED Disposition     ED Disposition Condition Comment    Discharge  [facility] discharge to home/self care     Condition at discharge: Good        Follow-up Information     Follow up With Specialties Details Why [address]  Schedule an appointment as soon as possible for a visit in 1 week  [address] Lindsey Mullen MD    [address] [facility]  [address]           Discharge Medication List as of 10/13/2017  6:38 PM      START taking these medications    Details   ibuprofen (MOTRIN) 600 mg tablet Take 1 tablet by mouth every 6 (six) hours as needed for mild pain, Starting Fri 10/13/2017, Print      sulfamethoxazole-trimethoprim (BACTRIM DS) 800-160 mg per tablet Take 1 tablet by mouth 2 (two) times a day for 7 days smx-tmp DS (BACTRIM) 800-160 mg tabs (1tab q12 D10), Starting Fri 10/13/2017, Until Fri 10/20/2017, Print      traMADol (ULTRAM) 50 mg tablet Take 1 tablet by mouth every 6 (six) hours as needed for moderate pain for up to 10 days, Starting Fri 10/13/2017, Until Mon 10/23/2017, Print         CONTINUE these medications which have NOT CHANGED    Details   !! albuterol (2 5 mg/3 mL) 0 083 % nebulizer solution Take 3 mL by nebulization every 6 (six) hours as needed for wheezing, Starting 11/8/2016, Until Discontinued, Print      !! albuterol (2 5 mg/3 mL) 0 083 % nebulizer solution Take 3 mL by nebulization every 4 (four) hours as needed for wheezing for up to 30 days, Starting u 10/5/2017, Until Sat 11/4/2017, Print      albuterol (PROVENTIL HFA,VENTOLIN HFA) 90 mcg/act inhaler Inhale 2 puffs every 6 (six) hours as needed for wheezing , Until Discontinued, Historical Med       !! - Potential duplicate medications found  Please discuss with provider  No discharge procedures on file  ED Provider  Attending physically available and evaluated Judy Rhodes  FLETCHER managed the patient along with the ED Attending      Electronically Signed by       Anil Jiménez MD  Resident  10/13/17 Danny Motta

## 2017-10-13 NOTE — DISCHARGE INSTRUCTIONS
Mastitis   AMBULATORY CARE:   Mastitis  is an infection of breast tissue that most often occurs in women who breastfeed  It can happen any time during breastfeeding, but usually occurs within the first 3 months after giving birth  Usually only one breast is affected  Common signs and symptoms include the following:   · Chills and fever    · Breast swelling, redness, warmth, and tenderness     · Tenderness under your arm    · Fatigue and body aches  Contact your healthcare provider if:   · Your symptoms do not get better within 2 days  · You have a painful lump in your breast      · You have swollen and tender lymph nodes in your armpit on the same side as the affected breast     · You have questions or concerns about your condition or care  Treatment:  You can continue to breastfeed your baby while you are being treated for mastitis  Breastfeeding when you have mastitis may help speed your recovery  You may need any of the following:  · Antibiotics  help treat or prevent a bacterial infection  · Acetaminophen  decreases pain and fever  It is available without a doctor's order  Ask how much to take and how often to take it  Follow directions  Acetaminophen can cause liver damage if not taken correctly  · NSAIDs , such as ibuprofen, help decrease swelling, pain, and fever  This medicine is available with or without a doctor's order  NSAIDs can cause stomach bleeding or kidney problems in certain people  If you take blood thinner medicine, always ask your healthcare provider if NSAIDs are safe for you  Always read the medicine label and follow directions  · Incision and drainage  may be needed if the swelling does not go away and an abscess forms  Your healthcare provider will make a small incision in your breast to drain the pus  Manage your symptoms:   · Continue to breastfeed from the affected breast   This will help to prevent an abscess from forming   Breastfeed your baby on the affected side first  Apply a warm, wet cloth on your breast or take a warm shower before you feed your baby  This can help increase your milk flow  If it is painful when you breastfeed from the affected breast, feed your baby from the other breast first  Pump the affected side to completely drain your breast after breastfeeding, if needed  You may save the pumped milk to feed your baby  · Use different positions to breastfeed  Change the position of your baby during feedings  This may help to relieve your discomfort  · Apply heat on your breast for 20 to 30 minutes every 2 hours for as many days as directed  Heat helps decrease pain  · Apply ice after feedings  Apply ice on your breast for 15 to 20 minutes every hour or as directed  Use an ice pack, or put crushed ice in a plastic bag  Cover it with a towel  Ice helps prevent tissue damage and decreases swelling and pain  · Massage your breast   Gently massage your breast before and during breastfeeding to help drain your milk  · Drink liquids as directed  Ask how much liquid to drink each day and which liquids are best for you  · Rest as needed  Do not sleep on your stomach until your infection is gone  Prevent mastitis:   · Breastfeed every 2 or 3 hours to prevent engorgement  Breast engorgement develops when too much milk builds up in your breast  Take your time when you breastfeed to allow your baby to empty your breast  Try not to switch breasts too early  Express or pump after you breastfeed if your baby is not emptying your breasts when he feeds  · Prevent sore and cracked nipples  A good latch prevents sore and cracked nipples  If you have sore nipples after breastfeeding, your baby may not be latched on properly  Gently break suction and reposition if your baby is only sucking on the nipple  Talk to a lactation consultant if you need help with your baby's latch  · Care for your breasts    Keep your nipples clean and dry between feedings  Check them for cracks, blisters, or other irritated areas  Ask a lactation specialist or your healthcare provider how to treat sore and cracked nipples  Wash your hands before and after you breastfeed your baby or pump your breasts  Wear a comfortable nursing bra that supports your breasts but is not too tight  Follow up with your healthcare provider as directed:  Write down your questions so you remember to ask them during your visits  © 2017 [facility] Information is for End User's use only and may not be sold, redistributed or otherwise used for commercial purposes  All illustrations and images included in CareNotes® are the copyrighted property of A D A M , Inc  or Ritchie Garay  The above information is an  only  It is not intended as medical advice for individual conditions or treatments  Talk to your doctor, nurse or pharmacist before following any medical regimen to see if it is safe and effective for you

## 2017-10-13 NOTE — ED ATTENDING ATTESTATION
Olegario Foster MD, saw and evaluated the patient  I have discussed the patient with the resident/non-physician practitioner and agree with the resident's/non-physician practitioner's findings, Plan of Care, and MDM as documented in the resident's/non-physician practitioner's note, except where noted  All available labs and Radiology studies were reviewed  At this point I agree with the current assessment done in the Emergency Department  I have conducted an independent evaluation of this patient a history and physical is as follows:      Critical Care Time  CritCare Time     C/o R breast pain for 3 days  She had some discharge yest  From the nipple  +subjective fever, +nausea, pt  Hasn't  in a year       Well-appearing, no distress, RRR, exp  wheezing, R breast redness around 3-4cm from 9 0'clock to about 3 o'clock, no abscess, indurated , tender

## 2018-09-30 ENCOUNTER — APPOINTMENT (EMERGENCY)
Dept: RADIOLOGY | Facility: HOSPITAL | Age: 38
DRG: 133 | End: 2018-09-30
Payer: COMMERCIAL

## 2018-09-30 ENCOUNTER — HOSPITAL ENCOUNTER (INPATIENT)
Facility: HOSPITAL | Age: 38
LOS: 3 days | Discharge: HOME | DRG: 133 | End: 2018-10-03
Attending: EMERGENCY MEDICINE | Admitting: INTERNAL MEDICINE
Payer: COMMERCIAL

## 2018-09-30 DIAGNOSIS — Z72.0 TOBACCO ABUSE: ICD-10-CM

## 2018-09-30 DIAGNOSIS — J45.901 ASTHMA EXACERBATION: Primary | ICD-10-CM

## 2018-09-30 DIAGNOSIS — J96.01 ACUTE RESPIRATORY FAILURE WITH HYPOXIA (HCC): ICD-10-CM

## 2018-09-30 DIAGNOSIS — J45.51 SEVERE PERSISTENT ASTHMA WITH EXACERBATION: ICD-10-CM

## 2018-09-30 LAB
ALBUMIN SERPL BCP-MCNC: 3.6 G/DL (ref 3.5–5)
ALP SERPL-CCNC: 68 U/L (ref 46–116)
ALT SERPL W P-5'-P-CCNC: 23 U/L (ref 12–78)
ANION GAP SERPL CALCULATED.3IONS-SCNC: 10 MMOL/L (ref 4–13)
AST SERPL W P-5'-P-CCNC: 15 U/L (ref 5–45)
ATRIAL RATE: 98 BPM
BASOPHILS # BLD AUTO: 0.03 THOUSANDS/ΜL (ref 0–0.1)
BASOPHILS NFR BLD AUTO: 0 % (ref 0–1)
BILIRUB DIRECT SERPL-MCNC: 0.1 MG/DL (ref 0–0.2)
BILIRUB SERPL-MCNC: 0.3 MG/DL (ref 0.2–1)
BUN SERPL-MCNC: 6 MG/DL (ref 5–25)
CALCIUM SERPL-MCNC: 9.1 MG/DL (ref 8.3–10.1)
CHLORIDE SERPL-SCNC: 104 MMOL/L (ref 100–108)
CO2 SERPL-SCNC: 26 MMOL/L (ref 21–32)
CREAT SERPL-MCNC: 0.9 MG/DL (ref 0.6–1.3)
EOSINOPHIL # BLD AUTO: 0.33 THOUSAND/ΜL (ref 0–0.61)
EOSINOPHIL NFR BLD AUTO: 3 % (ref 0–6)
ERYTHROCYTE [DISTWIDTH] IN BLOOD BY AUTOMATED COUNT: 13.1 % (ref 11.6–15.1)
GFR SERPL CREATININE-BSD FRML MDRD: 93 ML/MIN/1.73SQ M
GLUCOSE SERPL-MCNC: 114 MG/DL (ref 65–140)
HCT VFR BLD AUTO: 38.1 % (ref 34.8–46.1)
HGB BLD-MCNC: 12.5 G/DL (ref 11.5–15.4)
IMM GRANULOCYTES # BLD AUTO: 0.06 THOUSAND/UL (ref 0–0.2)
IMM GRANULOCYTES NFR BLD AUTO: 1 % (ref 0–2)
LACTATE SERPL-SCNC: 5.8 MMOL/L (ref 0.5–2)
LACTATE SERPL-SCNC: 6.1 MMOL/L (ref 0.5–2)
LYMPHOCYTES # BLD AUTO: 2.57 THOUSANDS/ΜL (ref 0.6–4.47)
LYMPHOCYTES NFR BLD AUTO: 26 % (ref 14–44)
MAGNESIUM SERPL-MCNC: 2 MG/DL (ref 1.6–2.6)
MCH RBC QN AUTO: 31.2 PG (ref 26.8–34.3)
MCHC RBC AUTO-ENTMCNC: 32.8 G/DL (ref 31.4–37.4)
MCV RBC AUTO: 95 FL (ref 82–98)
MONOCYTES # BLD AUTO: 0.5 THOUSAND/ΜL (ref 0.17–1.22)
MONOCYTES NFR BLD AUTO: 5 % (ref 4–12)
NEUTROPHILS # BLD AUTO: 6.44 THOUSANDS/ΜL (ref 1.85–7.62)
NEUTS SEG NFR BLD AUTO: 65 % (ref 43–75)
NRBC BLD AUTO-RTO: 0 /100 WBCS
P AXIS: 76 DEGREES
PLATELET # BLD AUTO: 368 THOUSANDS/UL (ref 149–390)
PMV BLD AUTO: 9 FL (ref 8.9–12.7)
POTASSIUM SERPL-SCNC: 3.6 MMOL/L (ref 3.5–5.3)
PR INTERVAL: 122 MS
PROCALCITONIN SERPL-MCNC: <0.05 NG/ML
PROT SERPL-MCNC: 8.5 G/DL (ref 6.4–8.2)
QRS AXIS: 40 DEGREES
QRSD INTERVAL: 94 MS
QT INTERVAL: 364 MS
QTC INTERVAL: 464 MS
RBC # BLD AUTO: 4.01 MILLION/UL (ref 3.81–5.12)
SODIUM SERPL-SCNC: 140 MMOL/L (ref 136–145)
T WAVE AXIS: 60 DEGREES
VENTRICULAR RATE: 98 BPM
WBC # BLD AUTO: 9.93 THOUSAND/UL (ref 4.31–10.16)

## 2018-09-30 PROCEDURE — 96365 THER/PROPH/DIAG IV INF INIT: CPT

## 2018-09-30 PROCEDURE — 99285 EMERGENCY DEPT VISIT HI MDM: CPT

## 2018-09-30 PROCEDURE — 84145 PROCALCITONIN (PCT): CPT | Performed by: INTERNAL MEDICINE

## 2018-09-30 PROCEDURE — 80076 HEPATIC FUNCTION PANEL: CPT | Performed by: EMERGENCY MEDICINE

## 2018-09-30 PROCEDURE — 96372 THER/PROPH/DIAG INJ SC/IM: CPT

## 2018-09-30 PROCEDURE — 36415 COLL VENOUS BLD VENIPUNCTURE: CPT | Performed by: EMERGENCY MEDICINE

## 2018-09-30 PROCEDURE — 80048 BASIC METABOLIC PNL TOTAL CA: CPT | Performed by: EMERGENCY MEDICINE

## 2018-09-30 PROCEDURE — 87449 NOS EACH ORGANISM AG IA: CPT | Performed by: INTERNAL MEDICINE

## 2018-09-30 PROCEDURE — 83735 ASSAY OF MAGNESIUM: CPT | Performed by: EMERGENCY MEDICINE

## 2018-09-30 PROCEDURE — 87040 BLOOD CULTURE FOR BACTERIA: CPT | Performed by: EMERGENCY MEDICINE

## 2018-09-30 PROCEDURE — 94645 CONT INHLJ TX EACH ADDL HOUR: CPT

## 2018-09-30 PROCEDURE — 83605 ASSAY OF LACTIC ACID: CPT | Performed by: INTERNAL MEDICINE

## 2018-09-30 PROCEDURE — 87631 RESP VIRUS 3-5 TARGETS: CPT | Performed by: INTERNAL MEDICINE

## 2018-09-30 PROCEDURE — 94644 CONT INHLJ TX 1ST HOUR: CPT

## 2018-09-30 PROCEDURE — 99222 1ST HOSP IP/OBS MODERATE 55: CPT | Performed by: INTERNAL MEDICINE

## 2018-09-30 PROCEDURE — 71045 X-RAY EXAM CHEST 1 VIEW: CPT

## 2018-09-30 PROCEDURE — 85025 COMPLETE CBC W/AUTO DIFF WBC: CPT | Performed by: EMERGENCY MEDICINE

## 2018-09-30 PROCEDURE — 93005 ELECTROCARDIOGRAM TRACING: CPT

## 2018-09-30 PROCEDURE — 93010 ELECTROCARDIOGRAM REPORT: CPT | Performed by: INTERNAL MEDICINE

## 2018-09-30 PROCEDURE — 96375 TX/PRO/DX INJ NEW DRUG ADDON: CPT

## 2018-09-30 RX ORDER — METHYLPREDNISOLONE SODIUM SUCCINATE 40 MG/ML
40 INJECTION, POWDER, LYOPHILIZED, FOR SOLUTION INTRAMUSCULAR; INTRAVENOUS EVERY 8 HOURS SCHEDULED
Status: DISCONTINUED | OUTPATIENT
Start: 2018-09-30 | End: 2018-10-02

## 2018-09-30 RX ORDER — METHYLPREDNISOLONE SODIUM SUCCINATE 40 MG/ML
40 INJECTION, POWDER, LYOPHILIZED, FOR SOLUTION INTRAMUSCULAR; INTRAVENOUS EVERY 8 HOURS SCHEDULED
Status: DISCONTINUED | OUTPATIENT
Start: 2018-10-01 | End: 2018-09-30

## 2018-09-30 RX ORDER — EPINEPHRINE 1 MG/ML
0.3 INJECTION, SOLUTION, CONCENTRATE INTRAVENOUS ONCE
Status: COMPLETED | OUTPATIENT
Start: 2018-09-30 | End: 2018-09-30

## 2018-09-30 RX ORDER — ALBUTEROL SULFATE 2.5 MG/3ML
5 SOLUTION RESPIRATORY (INHALATION) ONCE
Status: COMPLETED | OUTPATIENT
Start: 2018-09-30 | End: 2018-09-30

## 2018-09-30 RX ORDER — LEVALBUTEROL 1.25 MG/.5ML
1.25 SOLUTION, CONCENTRATE RESPIRATORY (INHALATION)
Status: DISCONTINUED | OUTPATIENT
Start: 2018-10-01 | End: 2018-10-03 | Stop reason: HOSPADM

## 2018-09-30 RX ORDER — METHYLPREDNISOLONE SODIUM SUCCINATE 125 MG/2ML
125 INJECTION, POWDER, LYOPHILIZED, FOR SOLUTION INTRAMUSCULAR; INTRAVENOUS ONCE
Status: COMPLETED | OUTPATIENT
Start: 2018-09-30 | End: 2018-09-30

## 2018-09-30 RX ORDER — ACETAMINOPHEN 325 MG/1
650 TABLET ORAL EVERY 6 HOURS PRN
Status: DISCONTINUED | OUTPATIENT
Start: 2018-09-30 | End: 2018-10-02

## 2018-09-30 RX ORDER — ALPRAZOLAM 0.25 MG/1
0.25 TABLET ORAL 3 TIMES DAILY PRN
Status: DISCONTINUED | OUTPATIENT
Start: 2018-09-30 | End: 2018-10-03 | Stop reason: HOSPADM

## 2018-09-30 RX ORDER — METHYLPREDNISOLONE SODIUM SUCCINATE 125 MG/2ML
INJECTION, POWDER, LYOPHILIZED, FOR SOLUTION INTRAMUSCULAR; INTRAVENOUS
Status: COMPLETED
Start: 2018-09-30 | End: 2018-09-30

## 2018-09-30 RX ORDER — ALBUTEROL SULFATE 90 UG/1
2 AEROSOL, METERED RESPIRATORY (INHALATION) EVERY 4 HOURS PRN
Status: DISCONTINUED | OUTPATIENT
Start: 2018-09-30 | End: 2018-10-03 | Stop reason: HOSPADM

## 2018-09-30 RX ORDER — MAGNESIUM SULFATE HEPTAHYDRATE 40 MG/ML
2 INJECTION, SOLUTION INTRAVENOUS ONCE
Status: COMPLETED | OUTPATIENT
Start: 2018-09-30 | End: 2018-09-30

## 2018-09-30 RX ORDER — SODIUM CHLORIDE FOR INHALATION 0.9 %
12 VIAL, NEBULIZER (ML) INHALATION ONCE
Status: COMPLETED | OUTPATIENT
Start: 2018-09-30 | End: 2018-09-30

## 2018-09-30 RX ORDER — LEVALBUTEROL 1.25 MG/.5ML
SOLUTION, CONCENTRATE RESPIRATORY (INHALATION)
Status: DISPENSED
Start: 2018-09-30 | End: 2018-10-01

## 2018-09-30 RX ORDER — SODIUM CHLORIDE 9 MG/ML
100 INJECTION, SOLUTION INTRAVENOUS CONTINUOUS
Status: DISCONTINUED | OUTPATIENT
Start: 2018-09-30 | End: 2018-10-01

## 2018-09-30 RX ORDER — BENZONATATE 100 MG/1
100 CAPSULE ORAL 3 TIMES DAILY PRN
Status: DISCONTINUED | OUTPATIENT
Start: 2018-09-30 | End: 2018-10-02

## 2018-09-30 RX ORDER — IPRATROPIUM BROMIDE AND ALBUTEROL SULFATE 2.5; .5 MG/3ML; MG/3ML
3 SOLUTION RESPIRATORY (INHALATION)
Status: DISCONTINUED | OUTPATIENT
Start: 2018-09-30 | End: 2018-09-30

## 2018-09-30 RX ORDER — ONDANSETRON 2 MG/ML
4 INJECTION INTRAMUSCULAR; INTRAVENOUS EVERY 6 HOURS PRN
Status: DISCONTINUED | OUTPATIENT
Start: 2018-09-30 | End: 2018-10-03 | Stop reason: HOSPADM

## 2018-09-30 RX ORDER — ALBUTEROL SULFATE 2.5 MG/3ML
SOLUTION RESPIRATORY (INHALATION)
Status: COMPLETED
Start: 2018-09-30 | End: 2018-09-30

## 2018-09-30 RX ADMIN — EPINEPHRINE 0.3 MG: 1 INJECTION, SOLUTION, CONCENTRATE INTRAVENOUS at 13:59

## 2018-09-30 RX ADMIN — IPRATROPIUM BROMIDE 1 MG: 0.5 SOLUTION RESPIRATORY (INHALATION) at 15:46

## 2018-09-30 RX ADMIN — ALBUTEROL SULFATE 10 MG: 2.5 SOLUTION RESPIRATORY (INHALATION) at 12:24

## 2018-09-30 RX ADMIN — ALBUTEROL SULFATE 10 MG: 2.5 SOLUTION RESPIRATORY (INHALATION) at 13:54

## 2018-09-30 RX ADMIN — ALBUTEROL SULFATE 10 MG: 2.5 SOLUTION RESPIRATORY (INHALATION) at 15:46

## 2018-09-30 RX ADMIN — METHYLPREDNISOLONE SODIUM SUCCINATE 125 MG: 125 INJECTION, POWDER, LYOPHILIZED, FOR SOLUTION INTRAMUSCULAR; INTRAVENOUS at 12:18

## 2018-09-30 RX ADMIN — ISODIUM CHLORIDE 12 ML: 0.03 SOLUTION RESPIRATORY (INHALATION) at 15:46

## 2018-09-30 RX ADMIN — ISODIUM CHLORIDE 12 ML: 0.03 SOLUTION RESPIRATORY (INHALATION) at 12:25

## 2018-09-30 RX ADMIN — METHYLPREDNISOLONE SODIUM SUCCINATE 125 MG: 125 INJECTION, POWDER, FOR SOLUTION INTRAMUSCULAR; INTRAVENOUS at 12:18

## 2018-09-30 RX ADMIN — ALBUTEROL SULFATE 5 MG: 2.5 SOLUTION RESPIRATORY (INHALATION) at 12:11

## 2018-09-30 RX ADMIN — ALPRAZOLAM 0.25 MG: 0.25 TABLET ORAL at 20:23

## 2018-09-30 RX ADMIN — ISODIUM CHLORIDE 3 ML: 0.03 SOLUTION RESPIRATORY (INHALATION) at 13:54

## 2018-09-30 RX ADMIN — IPRATROPIUM BROMIDE 1 MG: 0.5 SOLUTION RESPIRATORY (INHALATION) at 12:25

## 2018-09-30 RX ADMIN — SODIUM CHLORIDE 2500 ML: 0.9 INJECTION, SOLUTION INTRAVENOUS at 21:34

## 2018-09-30 RX ADMIN — Medication 0.5 MG: at 12:11

## 2018-09-30 RX ADMIN — ACETAMINOPHEN 650 MG: 325 TABLET, FILM COATED ORAL at 17:31

## 2018-09-30 RX ADMIN — AZITHROMYCIN MONOHYDRATE 500 MG: 500 INJECTION, POWDER, LYOPHILIZED, FOR SOLUTION INTRAVENOUS at 17:39

## 2018-09-30 RX ADMIN — BENZONATATE 100 MG: 100 CAPSULE ORAL at 17:31

## 2018-09-30 RX ADMIN — ALBUTEROL SULFATE 2 PUFF: 90 AEROSOL, METERED RESPIRATORY (INHALATION) at 22:37

## 2018-09-30 RX ADMIN — SODIUM CHLORIDE 100 ML/HR: 0.9 INJECTION, SOLUTION INTRAVENOUS at 18:49

## 2018-09-30 RX ADMIN — METHYLPREDNISOLONE SODIUM SUCCINATE 40 MG: 40 INJECTION, POWDER, FOR SOLUTION INTRAMUSCULAR; INTRAVENOUS at 20:23

## 2018-09-30 RX ADMIN — ACETAMINOPHEN 650 MG: 325 TABLET, FILM COATED ORAL at 23:33

## 2018-09-30 RX ADMIN — CEFTRIAXONE 1000 MG: 1 INJECTION, POWDER, FOR SOLUTION INTRAMUSCULAR; INTRAVENOUS at 16:44

## 2018-09-30 RX ADMIN — MAGNESIUM SULFATE HEPTAHYDRATE 2 G: 40 INJECTION, SOLUTION INTRAVENOUS at 12:21

## 2018-09-30 RX ADMIN — IPRATROPIUM BROMIDE 0.5 MG: 0.5 SOLUTION RESPIRATORY (INHALATION) at 12:11

## 2018-09-30 RX ADMIN — IPRATROPIUM BROMIDE 1 MG: 0.5 SOLUTION RESPIRATORY (INHALATION) at 13:53

## 2018-09-30 RX ADMIN — SODIUM CHLORIDE 1000 ML: 0.9 INJECTION, SOLUTION INTRAVENOUS at 12:20

## 2018-09-30 NOTE — ED NOTES
After breathing treatment, pt remains tachypnic with work of breathing similar to when she arrived  Leopoldo Grumbling made aware  Second RUIZ neb treatment ordered, respiratory aware  Pt's O2 saturation maintaining  Per pt, never intubated, and last admission was to ICU within the past 6 months        Britney Villalobos RN  09/30/18 7473

## 2018-09-30 NOTE — ED NOTES
Pt's lung sounds slightly improved, however, when breathing treatment is completed, pt begins having difficulty again        Margaux Wade, SAMY  09/30/18 5474

## 2018-09-30 NOTE — LETTER
[address]  Dept: [phone]    October 3, 2018     Patient: Veronica Castillo   YOB: 1980   Date of Visit: 9/30/2018       To Whom it May Concern:    Esa Gonzalez is under our professional care  She was seen in the hospital from 9/30/2018   to 10/03/18  If you have any questions or concerns, please don't hesitate to call           Sincerely,          Hayden Khoury  [phone]

## 2018-09-30 NOTE — ED PROVIDER NOTES
History  Chief Complaint   Patient presents with    Shortness of Breath     SOB for two days  Has gotten progressively worse  History of asthma  Tried taking her pump but was not helpful  43 YO female with Hx of asthma presents with worsening shortness of breath for the last 2 days  States she has had increased wheezing, chest tightness  Pt has been using her albuterol (pump and nebulizer) without alleviation of her symptoms  Pt states this is similar to previous asthma exacerbations  She denies fever or chills, has had a productive cough  Denies sick contacts currently  Pt states her breathing has progressed to the point where she needed to come to the ED  States she was admitted a few months ago for same  Pt denies F/C/N/V/D/C, no dysuria, burning on urination or blood in urine  History provided by:  Patient   used: No    Shortness of Breath   Severity:  Severe  Onset quality:  Gradual  Duration:  3 days  Timing:  Constant  Progression:  Worsening  Chronicity:  Recurrent  Relieved by:  Nothing  Worsened by:  Nothing  Ineffective treatments:  Inhaler  Associated symptoms: cough and wheezing    Associated symptoms: no abdominal pain, no chest pain, no diaphoresis, no fever, no rash, no sore throat, no syncope and no vomiting    Cough:     Cough characteristics:  Productive    Sputum characteristics:  Nondescript    Severity:  Moderate    Onset quality:  Gradual    Duration:  3 days    Timing:  Constant    Progression:  Worsening    Chronicity:  Recurrent  Wheezing:     Severity:  Moderate    Onset quality:  Gradual    Duration:  3 days    Timing:  Constant    Progression:  Worsening    Chronicity:  Recurrent      Prior to Admission Medications   Prescriptions Last Dose Informant Patient Reported? Taking?    albuterol (2 5 mg/3 mL) 0 083 % nebulizer solution   No Yes   Sig: Take 3 mL by nebulization every 6 (six) hours as needed for wheezing   albuterol (PROVENTIL HFA,VENTOLIN HFA) 90 mcg/act inhaler   Yes Yes   Sig: Inhale 2 puffs every 6 (six) hours as needed for wheezing  ibuprofen (MOTRIN) 600 mg tablet   No No   Sig: Take 1 tablet by mouth every 6 (six) hours as needed for mild pain      Facility-Administered Medications: None       Past Medical History:   Diagnosis Date    Asthma     Dental abscess     Hypertension        Past Surgical History:   Procedure Laterality Date    CHOLECYSTECTOMY      ECTOPIC PREGNANCY SURGERY      INCISION AND DRAINAGE INTRA ORAL ABSCESS Left 3/21/2016    Procedure: INCISION AND DRAINAGE  (I&D) WOUND ORAL Left  Space;  Surgeon: Bonny Cabot, MD;  Location: [facility];  Service:     MULTIPLE TOOTH EXTRACTIONS N/A 3/21/2016    Procedure: EXTRACTION TEETH MULTIPLE; 17,18,19,11,12,4,5,28,32;  Surgeon: Bonny Cabot, MD;  Location: [facility];  Service:     SALPINGECTOMY         Family History   Problem Relation Age of Onset    Diabetes Mother     Heart failure Mother     Cancer Neg Hx      I have reviewed and agree with the history as documented  Social History   Substance Use Topics    Smoking status: Current Every Day Smoker     Packs/day: 0 50     Years: 10 00     Types: Cigarettes    Smokeless tobacco: Never Used    Alcohol use Yes      Comment: Social        Review of Systems   Constitutional: Negative for chills, diaphoresis, fatigue and fever  HENT: Negative for dental problem and sore throat  Eyes: Negative for visual disturbance  Respiratory: Positive for cough, shortness of breath and wheezing  Cardiovascular: Negative for chest pain and syncope  Gastrointestinal: Negative for abdominal pain, diarrhea and vomiting  Genitourinary: Negative for dysuria and frequency  Musculoskeletal: Negative for arthralgias  Skin: Negative for rash  Neurological: Negative for dizziness, weakness and light-headedness  Psychiatric/Behavioral: Negative for agitation, behavioral problems and confusion     All other systems reviewed and are negative  Physical Exam  Physical Exam   Constitutional: She is oriented to person, place, and time  She appears well-developed and well-nourished  HENT:   Head: Normocephalic and atraumatic  Eyes: Pupils are equal, round, and reactive to light  EOM are normal    Neck: Normal range of motion  Cardiovascular: Normal rate, regular rhythm and normal heart sounds  Pulmonary/Chest: She is in respiratory distress  She has wheezes  Abdominal: Soft  Musculoskeletal: Normal range of motion  Neurological: She is alert and oriented to person, place, and time  Skin: Skin is warm and dry  Psychiatric: She has a normal mood and affect  Her behavior is normal  Thought content normal    Nursing note and vitals reviewed        Vital Signs  ED Triage Vitals [09/30/18 1205]   Temperature Pulse Respirations Blood Pressure SpO2   98 7 °F (37 1 °C) 102 (!) 38 150/93 98 %      Temp src Heart Rate Source Patient Position - Orthostatic VS BP Location FiO2 (%)   -- Monitor Sitting Right leg --      Pain Score       5           Vitals:    09/30/18 1205 09/30/18 1348 09/30/18 1615 09/30/18 1715   BP: 150/93 137/69 147/80 131/96   Pulse: 102 93 (!) 112 (!) 120   Patient Position - Orthostatic VS: Sitting Sitting Sitting Sitting       Visual Acuity      ED Medications  Medications   ondansetron (ZOFRAN) injection 4 mg (not administered)   nicotine (NICODERM CQ) 7 mg/24hr TD 24 hr patch 1 patch (not administered)   enoxaparin (LOVENOX) subcutaneous injection 40 mg (not administered)   acetaminophen (TYLENOL) tablet 650 mg (650 mg Oral Given 9/30/18 1731)   cefTRIAXone (ROCEPHIN) 1,000 mg in dextrose 5 % 50 mL IVPB (not administered)   azithromycin (ZITHROMAX) 500 mg in sodium chloride 0 9 % 250 mL IVPB (500 mg Intravenous New Bag 9/30/18 1739)   benzonatate (TESSALON PERLES) capsule 100 mg (100 mg Oral Given 9/30/18 1731)   methylPREDNISolone sodium succinate (Solu-MEDROL) injection 40 mg (not administered)   ipratropium (ATROVENT) 0 02 % inhalation solution 0 5 mg (not administered)   levalbuterol (XOPENEX) inhalation solution 1 25 mg (not administered)   albuterol (PROVENTIL HFA,VENTOLIN HFA) inhaler 2 puff (not administered)   sodium chloride 0 9 % infusion (100 mL/hr Intravenous New Bag 9/30/18 1849)   albuterol inhalation solution 5 mg (5 mg Nebulization Given 9/30/18 1211)   ipratropium (ATROVENT) 0 02 % inhalation solution 0 5 mg (0 5 mg Nebulization Given 9/30/18 1211)   magnesium sulfate 2 g/50 mL IVPB (premix) 2 g (0 g Intravenous Stopped 9/30/18 1335)   albuterol inhalation solution 10 mg (10 mg Nebulization Given 9/30/18 1224)   ipratropium (ATROVENT) 0 02 % inhalation solution 1 mg (1 mg Nebulization Given 9/30/18 1225)   sodium chloride 0 9 % inhalation solution 12 mL (12 mL Nebulization Given 9/30/18 1225)   sodium chloride 0 9 % bolus 1,000 mL (0 mL Intravenous Stopped 9/30/18 1401)   methylPREDNISolone sodium succinate (Solu-MEDROL) injection 125 mg (125 mg Intravenous Given 9/30/18 1218)   albuterol inhalation solution 10 mg (10 mg Nebulization Given 9/30/18 1354)   ipratropium (ATROVENT) 0 02 % inhalation solution 1 mg (1 mg Nebulization Given 9/30/18 1353)   sodium chloride 0 9 % inhalation solution 12 mL (3 mL Nebulization Given 9/30/18 1354)   EPINEPHrine PF (ADRENALIN) 1 mg/mL injection 0 3 mg (0 3 mg Subcutaneous Given 9/30/18 1359)   albuterol inhalation solution 10 mg (10 mg Nebulization Given 9/30/18 1546)   ipratropium (ATROVENT) 0 02 % inhalation solution 1 mg (1 mg Nebulization Given 9/30/18 1546)   sodium chloride 0 9 % inhalation solution 12 mL (12 mL Nebulization Given 9/30/18 1546)   ceftriaxone (ROCEPHIN) 1 g/50 mL in dextrose IVPB (1,000 mg Intravenous New Bag 9/30/18 1644)       Diagnostic Studies  Results Reviewed     Procedure Component Value Units Date/Time    Lactic acid, plasma [[id number]]  (Abnormal) Collected:  09/30/18 1147    Lab Status:  Final result Specimen: Blood from Arm, Left Updated:  09/30/18 1818     LACTIC ACID 5 8 (HH) mmol/L     Narrative:         Result may be elevated if tourniquet was used during collection  Procalcitonin [[id number]] Collected:  09/30/18 1741    Lab Status: In process Specimen:  Blood from Arm, Left Updated:  09/30/18 1749    Influenza A/B and RSV by PCR (Indicated for patients > 2 mo of age) [[id number]] Collected:  09/30/18 1742    Lab Status: In process Specimen:  Nasopharyngeal from Nasopharyngeal Swab Updated:  09/30/18 1749    Blood culture #1 [[id number]] Collected:  09/30/18 1643    Lab Status: In process Specimen:  Blood from Arm, Left Updated:  09/30/18 1648    Blood culture #2 [[id number]] Collected:  09/30/18 1636    Lab Status:   In process Specimen:  Blood from Hand, Left Updated:  09/30/18 1648    Legionella antigen, urine [[id number]]     Lab Status:  No result Specimen:  Urine     Strep Pneumoniae, Urine [[id number]]     Lab Status:  No result Specimen:  Urine     Sputum culture and Gram stain [[id number]]     Lab Status:  No result Specimen:  Sputum     Hepatic function panel [[id number]]  (Abnormal) Collected:  09/30/18 1218    Lab Status:  Final result Specimen:  Blood from Arm, Right Updated:  09/30/18 1238     Total Bilirubin 0 30 mg/dL      Bilirubin, Direct 0 10 mg/dL      Alkaline Phosphatase 68 U/L      AST 15 U/L      ALT 23 U/L      Total Protein 8 5 (H) g/dL      Albumin 3 6 g/dL     Basic metabolic panel [[id number]] Collected:  09/30/18 1218    Lab Status:  Final result Specimen:  Blood from Arm, Right Updated:  09/30/18 1238     Sodium 140 mmol/L      Potassium 3 6 mmol/L      Chloride 104 mmol/L      CO2 26 mmol/L      ANION GAP 10 mmol/L      BUN 6 mg/dL      Creatinine 0 90 mg/dL      Glucose 114 mg/dL      Calcium 9 1 mg/dL      eGFR 93 ml/min/1 73sq m     Narrative:         National Kidney Disease Education Program recommendations are as follows:  GFR calculation is accurate only with a steady state creatinine  Chronic Kidney disease less than 60 ml/min/1 73 sq  meters  Kidney failure less than 15 ml/min/1 73 sq  meters      Magnesium [[id number]]  (Normal) Collected:  09/30/18 1218    Lab Status:  Final result Specimen:  Blood from Arm, Right Updated:  09/30/18 1238     Magnesium 2 0 mg/dL     CBC and differential [[id number]] Collected:  09/30/18 1218    Lab Status:  Final result Specimen:  Blood from Arm, Right Updated:  09/30/18 1223     WBC 9 93 Thousand/uL      RBC 4 01 Million/uL      Hemoglobin 12 5 g/dL      Hematocrit 38 1 %      MCV 95 fL      MCH 31 2 pg      MCHC 32 8 g/dL      RDW 13 1 %      MPV 9 0 fL      Platelets 242 Thousands/uL      nRBC 0 /100 WBCs      Neutrophils Relative 65 %      Immat GRANS % 1 %      Lymphocytes Relative 26 %      Monocytes Relative 5 %      Eosinophils Relative 3 %      Basophils Relative 0 %      Neutrophils Absolute 6 44 Thousands/µL      Immature Grans Absolute 0 06 Thousand/uL      Lymphocytes Absolute 2 57 Thousands/µL      Monocytes Absolute 0 50 Thousand/µL      Eosinophils Absolute 0 33 Thousand/µL      Basophils Absolute 0 03 Thousands/µL                  XR chest 1 view portable    (Results Pending)              Procedures  CriticalCare Time  Performed by: Blake Galan  Authorized by: Aleja FERMIN     Critical care provider statement:     Critical care time (minutes):  35    Critical care time was exclusive of:  Separately billable procedures and treating other patients and teaching time    Critical care was time spent personally by me on the following activities:  Blood draw for specimens, obtaining history from patient or surrogate, development of treatment plan with patient or surrogate, discussions with consultants, evaluation of patient's response to treatment, examination of patient, interpretation of cardiac output measurements, ordering and performing treatments and interventions, ordering and review of laboratory studies, ordering and review of radiographic studies, re-evaluation of patient's condition and review of old charts  Comments:      Hypoxic respiratory failure require oxygen and RUIZ nebs  Phone Contacts  ED Phone Contact    ED Course  ED Course as of Sep 30 1851   Sun Sep 30, 2018   1254 Pt appears better, states feeling better at this time  1407 Pt again with difficulty breathing, will repeat RUIZ neb and give epi  Initial Sepsis Screening     Row Name 09/30/18 8642                Is the patient's history suggestive of a new or worsening infection? No   Similar to previous asthma exacerbations  -        Suspected source of infection          Are two or more of the following signs & symptoms of infection both present and new to the patient?         Indicate SIRS criteria          If the answer is yes to both questions, suspicion of sepsis is present          If severe sepsis is present AND tissue hypoperfusion perists in the hour after fluid resuscitation or lactate > 4, the patient meets criteria for SEPTIC SHOCK          Are any of the following organ dysfunction criteria present within 6 hours of suspected infection and SIRS criteria that are NOT considered to be chronic conditions?         Organ dysfunction          Date of presentation of severe sepsis          Time of presentation of severe sepsis          Tissue hypoperfusion persists in the hour after crystalloid fluid administration, evidenced, by either:          Was hypotension present within one hour of the conclusion of crystalloid fluid administration?         Date of presentation of septic shock          Time of presentation of septic shock            User Key  (r) = Recorded By, (t) = Taken By, (c) = Cosigned By    [address] Name Provider Type    Michael Gold MD Physician                  MDM  Number of Diagnoses or Management Options  Asthma exacerbation: new and requires workup  Diagnosis management comments: 1  Asthma exacerbation - Pt in distress on entering room, she does show improvement with albuterol Tx that is in progress however  Will check CXR, RUIZ neb, Mg and solumedrol, reassess  Amount and/or Complexity of Data Reviewed  Clinical lab tests: ordered and reviewed  Tests in the radiology section of CPT®: ordered and reviewed  Discuss the patient with other providers: yes  Independent visualization of images, tracings, or specimens: yes    Patient Progress  Patient progress: stable    CritCare Time    Disposition  Final diagnoses:   Asthma exacerbation     Time reflects when diagnosis was documented in both MDM as applicable and the Disposition within this note     Time User Action Codes Description Comment    9/30/2018  3:45 PM [address], [address] Asthma exacerbation       ED Disposition     ED Disposition Condition Comment    Admit  Case was discussed with Dr Rubio Jacobs and the patient's admission status was agreed to be Admission Status: observation status to the service of Dr Rubio Jacobs   Follow-up Information    None         Current Discharge Medication List      CONTINUE these medications which have NOT CHANGED    Details   albuterol (2 5 mg/3 mL) 0 083 % nebulizer solution Take 3 mL by nebulization every 6 (six) hours as needed for wheezing  Qty: 75 mL, Refills: 0      albuterol (PROVENTIL HFA,VENTOLIN HFA) 90 mcg/act inhaler Inhale 2 puffs every 6 (six) hours as needed for wheezing  ibuprofen (MOTRIN) 600 mg tablet Take 1 tablet by mouth every 6 (six) hours as needed for mild pain  Qty: 30 tablet, Refills: 0           No discharge procedures on file      ED Provider  Electronically Signed by           Maria Antonia Elizondo MD  09/30/18 9737

## 2018-09-30 NOTE — LETTER
[address] [place] [address]  Dept: [phone]    October 2, 2018     Patient: Ramona Otto   YOB: 1980   Date of Visit: 9/30/2018       To Whom it May Concern:    Román Mimed is under our professional care  She was seen in the hospital from 9/30/2018   to 10/02/18  If you have any questions or concerns, please don't hesitate to call           Sincerely,          Hayden Khoury     [phone]

## 2018-10-01 PROBLEM — J96.01 ACUTE RESPIRATORY FAILURE WITH HYPOXIA (HCC): Status: ACTIVE | Noted: 2018-10-01

## 2018-10-01 PROBLEM — G44.89 OTHER HEADACHE SYNDROME: Status: ACTIVE | Noted: 2018-10-01

## 2018-10-01 PROBLEM — Z72.0 TOBACCO ABUSE: Status: ACTIVE | Noted: 2018-10-01

## 2018-10-01 LAB
ALBUMIN SERPL BCP-MCNC: 3 G/DL (ref 3.5–5)
ALP SERPL-CCNC: 57 U/L (ref 46–116)
ALT SERPL W P-5'-P-CCNC: 18 U/L (ref 12–78)
ANION GAP SERPL CALCULATED.3IONS-SCNC: 12 MMOL/L (ref 4–13)
ANION GAP SERPL CALCULATED.3IONS-SCNC: 9 MMOL/L (ref 4–13)
AST SERPL W P-5'-P-CCNC: 10 U/L (ref 5–45)
BASOPHILS # BLD AUTO: 0.01 THOUSANDS/ΜL (ref 0–0.1)
BASOPHILS NFR BLD AUTO: 0 % (ref 0–1)
BILIRUB SERPL-MCNC: 0.19 MG/DL (ref 0.2–1)
BUN SERPL-MCNC: 6 MG/DL (ref 5–25)
BUN SERPL-MCNC: 6 MG/DL (ref 5–25)
CALCIUM SERPL-MCNC: 7.9 MG/DL (ref 8.3–10.1)
CALCIUM SERPL-MCNC: 8 MG/DL (ref 8.3–10.1)
CHLORIDE SERPL-SCNC: 106 MMOL/L (ref 100–108)
CHLORIDE SERPL-SCNC: 107 MMOL/L (ref 100–108)
CO2 SERPL-SCNC: 22 MMOL/L (ref 21–32)
CO2 SERPL-SCNC: 22 MMOL/L (ref 21–32)
CREAT SERPL-MCNC: 0.74 MG/DL (ref 0.6–1.3)
CREAT SERPL-MCNC: 0.86 MG/DL (ref 0.6–1.3)
EOSINOPHIL # BLD AUTO: 0 THOUSAND/ΜL (ref 0–0.61)
EOSINOPHIL NFR BLD AUTO: 0 % (ref 0–6)
ERYTHROCYTE [DISTWIDTH] IN BLOOD BY AUTOMATED COUNT: 13.1 % (ref 11.6–15.1)
FLUAV AG SPEC QL: NORMAL
FLUBV AG SPEC QL: NORMAL
GFR SERPL CREATININE-BSD FRML MDRD: 118 ML/MIN/1.73SQ M
GFR SERPL CREATININE-BSD FRML MDRD: 98 ML/MIN/1.73SQ M
GLUCOSE SERPL-MCNC: 172 MG/DL (ref 65–140)
GLUCOSE SERPL-MCNC: 176 MG/DL (ref 65–140)
HCT VFR BLD AUTO: 33.8 % (ref 34.8–46.1)
HGB BLD-MCNC: 11.1 G/DL (ref 11.5–15.4)
IMM GRANULOCYTES # BLD AUTO: 0.17 THOUSAND/UL (ref 0–0.2)
IMM GRANULOCYTES NFR BLD AUTO: 1 % (ref 0–2)
L PNEUMO1 AG UR QL IA.RAPID: NEGATIVE
LACTATE SERPL-SCNC: 3.4 MMOL/L (ref 0.5–2)
LYMPHOCYTES # BLD AUTO: 0.94 THOUSANDS/ΜL (ref 0.6–4.47)
LYMPHOCYTES NFR BLD AUTO: 7 % (ref 14–44)
MCH RBC QN AUTO: 31.1 PG (ref 26.8–34.3)
MCHC RBC AUTO-ENTMCNC: 32.8 G/DL (ref 31.4–37.4)
MCV RBC AUTO: 95 FL (ref 82–98)
MONOCYTES # BLD AUTO: 0.31 THOUSAND/ΜL (ref 0.17–1.22)
MONOCYTES NFR BLD AUTO: 2 % (ref 4–12)
NEUTROPHILS # BLD AUTO: 11.88 THOUSANDS/ΜL (ref 1.85–7.62)
NEUTS SEG NFR BLD AUTO: 90 % (ref 43–75)
NRBC BLD AUTO-RTO: 0 /100 WBCS
PLATELET # BLD AUTO: 335 THOUSANDS/UL (ref 149–390)
PMV BLD AUTO: 9.3 FL (ref 8.9–12.7)
POTASSIUM SERPL-SCNC: 3.8 MMOL/L (ref 3.5–5.3)
POTASSIUM SERPL-SCNC: 4.1 MMOL/L (ref 3.5–5.3)
PROT SERPL-MCNC: 7.5 G/DL (ref 6.4–8.2)
RBC # BLD AUTO: 3.57 MILLION/UL (ref 3.81–5.12)
RSV B RNA SPEC QL NAA+PROBE: NORMAL
S PNEUM AG UR QL: NEGATIVE
SODIUM SERPL-SCNC: 138 MMOL/L (ref 136–145)
SODIUM SERPL-SCNC: 140 MMOL/L (ref 136–145)
WBC # BLD AUTO: 13.31 THOUSAND/UL (ref 4.31–10.16)

## 2018-10-01 PROCEDURE — 80048 BASIC METABOLIC PNL TOTAL CA: CPT | Performed by: NURSE PRACTITIONER

## 2018-10-01 PROCEDURE — 85025 COMPLETE CBC W/AUTO DIFF WBC: CPT | Performed by: INTERNAL MEDICINE

## 2018-10-01 PROCEDURE — 80053 COMPREHEN METABOLIC PANEL: CPT | Performed by: INTERNAL MEDICINE

## 2018-10-01 PROCEDURE — 83605 ASSAY OF LACTIC ACID: CPT | Performed by: INTERNAL MEDICINE

## 2018-10-01 PROCEDURE — 94640 AIRWAY INHALATION TREATMENT: CPT

## 2018-10-01 PROCEDURE — 99232 SBSQ HOSP IP/OBS MODERATE 35: CPT | Performed by: FAMILY MEDICINE

## 2018-10-01 RX ORDER — ALBUTEROL SULFATE 90 UG/1
2 AEROSOL, METERED RESPIRATORY (INHALATION) EVERY 6 HOURS PRN
Status: DISCONTINUED | OUTPATIENT
Start: 2018-10-01 | End: 2018-10-02

## 2018-10-01 RX ORDER — ALBUTEROL SULFATE 2.5 MG/3ML
2.5 SOLUTION RESPIRATORY (INHALATION) EVERY 6 HOURS PRN
Status: DISCONTINUED | OUTPATIENT
Start: 2018-10-01 | End: 2018-10-03 | Stop reason: HOSPADM

## 2018-10-01 RX ORDER — IBUPROFEN 600 MG/1
600 TABLET ORAL EVERY 6 HOURS PRN
Status: DISCONTINUED | OUTPATIENT
Start: 2018-10-01 | End: 2018-10-02

## 2018-10-01 RX ORDER — BUTALBITAL, ACETAMINOPHEN AND CAFFEINE 50; 325; 40 MG/1; MG/1; MG/1
1 TABLET ORAL EVERY 4 HOURS PRN
Status: DISCONTINUED | OUTPATIENT
Start: 2018-10-01 | End: 2018-10-02

## 2018-10-01 RX ADMIN — IPRATROPIUM BROMIDE 0.5 MG: 0.5 SOLUTION RESPIRATORY (INHALATION) at 13:29

## 2018-10-01 RX ADMIN — METHYLPREDNISOLONE SODIUM SUCCINATE 40 MG: 40 INJECTION, POWDER, FOR SOLUTION INTRAMUSCULAR; INTRAVENOUS at 05:45

## 2018-10-01 RX ADMIN — IPRATROPIUM BROMIDE 0.5 MG: 0.5 SOLUTION RESPIRATORY (INHALATION) at 00:01

## 2018-10-01 RX ADMIN — BENZONATATE 100 MG: 100 CAPSULE ORAL at 00:18

## 2018-10-01 RX ADMIN — LEVALBUTEROL HYDROCHLORIDE 1.25 MG: 1.25 SOLUTION, CONCENTRATE RESPIRATORY (INHALATION) at 19:15

## 2018-10-01 RX ADMIN — LEVALBUTEROL HYDROCHLORIDE 1.25 MG: 1.25 SOLUTION, CONCENTRATE RESPIRATORY (INHALATION) at 13:29

## 2018-10-01 RX ADMIN — BUTALBITAL, ACETAMINOPHEN, AND CAFFEINE 1 TABLET: 50; 325; 40 TABLET ORAL at 23:16

## 2018-10-01 RX ADMIN — LEVALBUTEROL HYDROCHLORIDE 1.25 MG: 1.25 SOLUTION, CONCENTRATE RESPIRATORY (INHALATION) at 07:20

## 2018-10-01 RX ADMIN — SODIUM CHLORIDE 100 ML/HR: 0.9 INJECTION, SOLUTION INTRAVENOUS at 04:22

## 2018-10-01 RX ADMIN — BUTALBITAL, ACETAMINOPHEN, AND CAFFEINE 1 TABLET: 50; 325; 40 TABLET ORAL at 18:38

## 2018-10-01 RX ADMIN — BUTALBITAL, ACETAMINOPHEN, AND CAFFEINE 1 TABLET: 50; 325; 40 TABLET ORAL at 06:51

## 2018-10-01 RX ADMIN — METHYLPREDNISOLONE SODIUM SUCCINATE 40 MG: 40 INJECTION, POWDER, FOR SOLUTION INTRAMUSCULAR; INTRAVENOUS at 23:16

## 2018-10-01 RX ADMIN — LEVALBUTEROL HYDROCHLORIDE 1.25 MG: 1.25 SOLUTION, CONCENTRATE RESPIRATORY (INHALATION) at 00:02

## 2018-10-01 RX ADMIN — IPRATROPIUM BROMIDE 0.5 MG: 0.5 SOLUTION RESPIRATORY (INHALATION) at 19:15

## 2018-10-01 RX ADMIN — METHYLPREDNISOLONE SODIUM SUCCINATE 40 MG: 40 INJECTION, POWDER, FOR SOLUTION INTRAMUSCULAR; INTRAVENOUS at 15:30

## 2018-10-01 RX ADMIN — IPRATROPIUM BROMIDE 0.5 MG: 0.5 SOLUTION RESPIRATORY (INHALATION) at 07:20

## 2018-10-01 RX ADMIN — ENOXAPARIN SODIUM 40 MG: 40 INJECTION SUBCUTANEOUS at 08:46

## 2018-10-01 NOTE — SOCIAL WORK
CM met with the patient to do a general SW assessment  The patient lives in a two story home with her fiance and children  She is independent with adls and ambulation  She drives  No hx of VNA or STR  PCP identified as Dr Alma Delia Perkins  [facility] uses [facility] on [address] for rx needs  She works at the [employer]  She requests an excuse note for work - will type one up on the day of discharge  Following for any discharge needs

## 2018-10-01 NOTE — PLAN OF CARE
METABOLIC, FLUID AND ELECTROLYTES - ADULT     Electrolytes maintained within normal limits Progressing     Fluid balance maintained Progressing     Glucose maintained within target range Progressing        RESPIRATORY - ADULT     Achieves optimal ventilation and oxygenation Progressing

## 2018-10-01 NOTE — PLAN OF CARE

## 2018-10-01 NOTE — ASSESSMENT & PLAN NOTE
Improving  Continue IV Solu-Medrol 40 mg IV Q 8 hours, Xopenex/Atrovent nebs q 6 hours, O2 by nasal cannula p r n  Consider adding Breo Ellipta daily  Patient will need outpatient pulmonology follow-up upon discharge  Will transfer the patient out of step-down to medical-surgical unit today  Antibiotics were discontinued  Will recheck chest x-ray PA and lateral tomorrow kristen Kwan

## 2018-10-01 NOTE — ASSESSMENT & PLAN NOTE
Patient complaining of headaches due to high-dose nebs given to the patient overnight  Continue Fioricet p r n

## 2018-10-01 NOTE — PROGRESS NOTES
Progress Note - Jillian Pascal 1980, 42 y o  female MRN: [id number]    Unit/Bed#: ICU 03 Encounter: [id number]    Primary Care Provider: Laci Negrete MD   Date and time admitted to hospital: 9/30/2018 12:02 PM        * Acute respiratory failure with hypoxia [facility])   Assessment & Plan    Improving with IV steroids, Xopenex/Atrovent nebs, O2 by nasal cannula as needed  Patient will also need to quit smoking and this was discussed with the patient  Severe asthma with exacerbation   Assessment & Plan    Improving  Continue IV Solu-Medrol 40 mg IV Q 8 hours, Xopenex/Atrovent nebs q 6 hours, O2 by nasal cannula p r n  Consider adding Breo Ellipta daily  Patient will need outpatient pulmonology follow-up upon discharge  Will transfer the patient out of step-down to medical-surgical unit today  Antibiotics were discontinued  Will recheck chest x-ray PA and lateral tomorrow a m  Other headache syndrome   Assessment & Plan    Patient complaining of headaches due to high-dose nebs given to the patient overnight  Continue Fioricet p r n  Tobacco abuse   Assessment & Plan    Continue nicotine patches  Patient states that she has quit smoking as of this admission  VTE Pharmacologic Prophylaxis:   Pharmacologic: Enoxaparin (Lovenox)  Mechanical VTE Prophylaxis in Place: No    Patient Centered Rounds: I have performed bedside rounds with nursing staff today  Discussions with Specialists or Other Care Team Provider:  Yes    Education and Discussions with Family / Patient:  Yes    Time Spent for Care: 20 minutes  More than 50% of total time spent on counseling and coordination of care as described above  Current Length of Stay: 1 day(s)    Current Patient Status: Inpatient   Certification Statement: The patient will continue to require additional inpatient hospital stay due to Severe asthma exacerbation    Discharge Plan:  Pending resolution of severe asthma exacerbation      Code Status: Level 1 - Full Code      Subjective:   Patient states that her shortness of breath is much better  She denies any cough or wheezing currently  She is however complaining of a bad headache which resolved with Fioricet but is now returning  Objective:     Vitals:   Temp (24hrs), Av 1 °F (36 7 °C), Min:96 5 °F (35 8 °C), Max:99 1 °F (37 3 °C)    HR:  [] 84  Resp:  [14-27] 23  BP: (119-156)/(62-89) 119/63  SpO2:  [98 %-100 %] 100 %  Body mass index is 42 56 kg/m²  Input and Output Summary (last 24 hours): Intake/Output Summary (Last 24 hours) at 10/01/18 1728  Last data filed at 10/01/18 0600   Gross per 24 hour   Intake          3618 33 ml   Output              800 ml   Net          2818 33 ml       Physical Exam:     Physical Exam   Constitutional: She is oriented to person, place, and time  She appears well-developed and well-nourished  No distress  Morbidly obese  HENT:   Head: Normocephalic and atraumatic  Eyes: Pupils are equal, round, and reactive to light  Neck: Normal range of motion  Neck supple  Cardiovascular: Normal rate, regular rhythm and normal heart sounds  Pulmonary/Chest: Breath sounds normal  No respiratory distress  She has no wheezes  Abdominal: Soft  Bowel sounds are normal    Musculoskeletal: She exhibits no edema  Neurological: She is alert and oriented to person, place, and time  Skin: Skin is warm and dry  She is not diaphoretic         Additional Data:     Labs:      Results from last 7 days  Lab Units 10/01/18  0544   WBC Thousand/uL 13 31*   HEMOGLOBIN g/dL 11 1*   HEMATOCRIT % 33 8*   PLATELETS Thousands/uL 335   NEUTROS PCT % 90*   LYMPHS PCT % 7*   MONOS PCT % 2*   EOS PCT % 0       Results from last 7 days  Lab Units 10/01/18  0544   SODIUM mmol/L 138   POTASSIUM mmol/L 4 1   CHLORIDE mmol/L 107   CO2 mmol/L 22   BUN mg/dL 6   CREATININE mg/dL 0 74   ANION GAP mmol/L 9   CALCIUM mg/dL 7 9*   ALBUMIN g/dL 3 0*   TOTAL BILIRUBIN mg/dL 0 19*   ALK PHOS U/L 57   ALT U/L 18   AST U/L 10                   Results from last 7 days  Lab Units 10/01/18  0014 09/30/18 2006 09/30/18  1741   LACTIC ACID mmol/L 3 4* 6 1* 5 8*   PROCALCITONIN ng/ml  --   --  <0 05           * I Have Reviewed All Lab Data Listed Above  * Additional Pertinent Lab Tests Reviewed: [address] Admission Reviewed    Imaging:    Imaging Reports Reviewed Today Include:  Portable chest x-ray  Imaging Personally Reviewed by Myself Includes:  None    Recent Cultures (last 7 days):       Results from last 7 days  Lab Units 09/30/18  2132 09/30/18  1742   INFLUENZA A PCR   --  None Detected   INFLUENZA B PCR   --  None Detected   RSV PCR   --  None Detected   LEGIONELLA URINARY ANTIGEN  Negative  --        Last 24 Hours Medication List:     Current Facility-Administered Medications:  acetaminophen 650 mg Oral Q6H PRN Estephania Maldonado MD   albuterol 2 puff Inhalation Q4H PRN Estephania Maldonado MD   ALPRAZolam 0 25 mg Oral TID PRN NICO Cobb   benzonatate 100 mg Oral TID PRN Estephania Maldonado MD   butalbital-acetaminophen-caffeine 1 tablet Oral Q4H PRN NICO Cobb   enoxaparin 40 mg Subcutaneous Daily Estephania Maldonado MD   ipratropium 0 5 mg Nebulization Q6H Estephania Maldonado MD   levalbuterol 1 25 mg Nebulization Q6H Estephania Maldonado MD   methylPREDNISolone sodium succinate 40 mg Intravenous Q8H Rehan Tatum MD   nicotine 1 patch Transdermal Daily Estephania Maldonado MD   ondansetron 4 mg Intravenous Q6H PRN Estephania Maldonado MD        Today, Patient Was Seen By: Maddie Valenzuela MD    ** Please Note: Dictation voice to text software may have been used in the creation of this document   **

## 2018-10-01 NOTE — ASSESSMENT & PLAN NOTE
Improving with IV steroids, Xopenex/Atrovent nebs, O2 by nasal cannula as needed  Patient will also need to quit smoking and this was discussed with the patient

## 2018-10-01 NOTE — CASE MANAGEMENT
Initial Clinical Review    Admission: Date/Time/Statement: 9/30/18 @ 1619     Orders Placed This Encounter   Procedures    Inpatient Admission (expected length of stay for this patient is greater than two midnights)     Standing Status:   Standing     Number of Occurrences:   1     Order Specific Question:   Admitting Physician     Answer:   Brennan Angela     Order Specific Question:   Level of Care     Answer:   Level 1 Stepdown [13]     Order Specific Question:   Estimated length of stay     Answer:   More than 2 Midnights     Order Specific Question:   Certification     Answer:   I certify that inpatient services are medically necessary for this patient for a duration of greater than two midnights  See H&P and MD Progress Notes for additional information about the patient's course of treatment  ED: Date/Time/Mode of Arrival:   ED Arrival Information     Expected Arrival Acuity Means of Arrival Escorted By Service Admission Type    - 9/30/2018 12:00 Emergent Walk-In Self Hospitalist Elective    Arrival Complaint    SOB          Chief Complaint:   Chief Complaint   Patient presents with    Shortness of Breath     SOB for two days  Has gotten progressively worse  History of asthma  Tried taking her pump but was not helpful  History of Illness: Sandra Ryan is a 42 y o  female who presents with dyspnea  Patient has past medical significant for asthma on albuterol as needed at home patient states that she only needed occasionally and sometimes has weeks to months for she does not use her inhaler at all  Patient is since Friday she has been feeling worsening dyspnea, chest tightness  She has been using her albuterol without relief  She states she denies 0 to fever and chills at home  She also had a productive cough of green phlegm  She also complains of nausea but denies any vomiting  Complaining of diffuse bilateral chest pain, worse with deep inspiration and coughing    Denies any dysuria, hematuria    Upon arrival to the ED, patient found to be tachycardic, tachypneic, treated with Solu-Medrol 125 mg IV, Mag sulfate 2 g IV, nebulizer treatments x5, epinephrine without significant improvement    ED Vital Signs:   ED Triage Vitals   Temperature Pulse Respirations Blood Pressure SpO2   09/30/18 1205 09/30/18 1205 09/30/18 1205 09/30/18 1205 09/30/18 1205   98 7 °F (37 1 °C) 102 (!) 38 150/93 98 %      Temp Source Heart Rate Source Patient Position - Orthostatic VS BP Location FiO2 (%)   09/30/18 2021 09/30/18 1205 09/30/18 1205 09/30/18 1205 --   Temporal Monitor Sitting Right leg       Pain Score       09/30/18 1205       5        Wt Readings from Last 1 Encounters:   10/01/18 (!) 138 kg (305 lb 1 9 oz)       Vital Signs (abnormal):   above    Abnormal Labs/Diagnostic Test Results:   Labs:  Unremarkable  CXR;  NAD    ED Treatment:   Medication Administration from 09/30/2018 1200 to 09/30/2018 1714       Date/Time Order Dose Route Action Action by Comments     09/30/2018 1211 albuterol inhalation solution 5 mg 5 mg Nebulization Given Nadeem Jules, SAMY      09/30/2018 1211 ipratropium (ATROVENT) 0 02 % inhalation solution 0 5 mg 0 5 mg Nebulization Given Nadeem Jules, SAMY      09/30/2018 1335 magnesium sulfate 2 g/50 mL IVPB (premix) 2 g 0 g Intravenous Stopped Immanuel Deleon RN      09/30/2018 1221 magnesium sulfate 2 g/50 mL IVPB (premix) 2 g 2 g Intravenous [address] Nadeem Jules, SAMY      09/30/2018 1224 albuterol inhalation solution 10 mg 10 mg Nebulization Given Pamula Terry, RT      09/30/2018 1225 ipratropium (ATROVENT) 0 02 % inhalation solution 1 mg 1 mg Nebulization Given Pamula Terry, RT      09/30/2018 1225 sodium chloride 0 9 % inhalation solution 12 mL 12 mL Nebulization Given Pamula Terry, RT      09/30/2018 1401 sodium chloride 0 9 % bolus 1,000 mL 0 mL Intravenous Stopped Immanuel Deleon RN      09/30/2018 1220 sodium chloride 0 9 % bolus 1,000 mL 1,000 mL Intravenous [address] Zunilda Paul RN      09/30/2018 1218 methylPREDNISolone sodium succinate (Solu-MEDROL) injection 125 mg 125 mg Intravenous Given Nelly Loving RN      09/30/2018 1354 albuterol inhalation solution 10 mg 10 mg Nebulization Given Elizabeth Nagel, RT      09/30/2018 1353 ipratropium (ATROVENT) 0 02 % inhalation solution 1 mg 1 mg Nebulization Given Elizabeth Nagel, RT      09/30/2018 1354 sodium chloride 0 9 % inhalation solution 12 mL 3 mL Nebulization Given Elizabeth Nagel, RT      09/30/2018 1359 EPINEPHrine PF (ADRENALIN) 1 mg/mL injection 0 3 mg 0 3 mg Subcutaneous Given Nelly Loving RN      09/30/2018 1546 albuterol inhalation solution 10 mg 10 mg Nebulization Given Elizabeth Nagel, RT      09/30/2018 1546 ipratropium (ATROVENT) 0 02 % inhalation solution 1 mg 1 mg Nebulization Given Elizabeth Nagel, RT      09/30/2018 1546 sodium chloride 0 9 % inhalation solution 12 mL 12 mL Nebulization Given Elizabeth Nagel, RT      09/30/2018 1714 ceftriaxone (ROCEPHIN) 1 g/50 mL in dextrose IVPB 0 mg Intravenous Stopped Jaimee Parry RN      09/30/2018 1644 ceftriaxone (ROCEPHIN) 1 g/50 mL in dextrose IVPB 1,000 mg Intravenous New Bag Nelly Loving RN           Past Medical/Surgical History:    Active Ambulatory Problems     Diagnosis Date Noted    Severe asthma with exacerbation 03/19/2016    Dental abscess 03/19/2016    Pneumonia 04/25/2017     Resolved Ambulatory Problems     Diagnosis Date Noted    Facial cellulitis 03/19/2016    Dental abscess      Past Medical History:   Diagnosis Date    Asthma     Dental abscess     Hypertension        Admitting Diagnosis: Asthma [J45 909]  Asthma exacerbation [J45 901]    Age/Sex: 42 y o  female    Assessment/Plan: Acute respiratory failure secondary to severe asthma exacerbation  -possibly secondary to community-acquired pneumonia   -will start patient on empiric antibiotics ceftriaxone and Zithromax  -follow-up sputum culture, urine Legionella, urine strep pneumo, influenza  -Solu-Medrol 40 mg IV q 8 hours  -nebulizers  -close monitoring in step-down  -discussed with ICU team, patient will need close monitoring for possible need for intubation     2 ) Sinus Tachycardia  -secondary to dyspnea     3 ) Tobacco abuse  -nicotine patch  -counseled on smoking cessation  Anticipated Length of Stay:  Patient will be admitted on an Inpatient basis with an anticipated length of stay of  Greater than 2 midnights  Justification for Hospital Stay: Asthma exacerbation       Admission Orders:    IP  9/30  @   1620  Scheduled Meds:   Current Facility-Administered Medications:  acetaminophen 650 mg Oral Q6H PRN Annmarie Swift MD    albuterol 2 puff Inhalation Q4H PRN Annmarie Swift MD    ALPRAZolam 0 25 mg Oral TID PRN NICO Lynn    azithromycin 500 mg Intravenous Q24H Annmarie Swift MD Last Rate: 500 mg (09/30/18 1739)   benzonatate 100 mg Oral TID PRN Annmarie Swift MD    butalbital-acetaminophen-caffeine 1 tablet Oral Q4H PRN NICO Lynn    cefTRIAXone 1,000 mg Intravenous Q24H Annmarie Swift MD    enoxaparin 40 mg Subcutaneous Daily Annmarie Swift MD    ipratropium 0 5 mg Nebulization Q6H Annmarie Swift MD    levalbuterol        levalbuterol 1 25 mg Nebulization Q6H Annmarie Swift MD    methylPREDNISolone sodium succinate 40 mg Intravenous Q8H [address] Annmarie Swift MD    nicotine 1 patch Transdermal Daily Annmarie Swift MD    ondansetron 4 mg Intravenous Q6H PRN Annmarie Swift MD    sodium chloride 100 mL/hr Intravenous Continuous Annmarie Swift MD Last Rate: 100 mL/hr (10/01/18 0422)     Continuous Infusions:   sodium chloride 100 mL/hr Last Rate: 100 mL/hr (10/01/18 0422)     PRN Meds:   acetaminophen    albuterol    ALPRAZolam    benzonatate    butalbital-acetaminophen-caffeine    ondansetron     Tele  BC  Sputum c/s  Reg  Diet    Thank you,  [address] Utilization Review Department  Phone: [phone];  Fax [phone]  ATTENTION: Please call with any questions or concerns to [phone]  and carefully follow the prompts so that you are directed to the right person  Send all requests for admission clinical reviews, approved or denied determinations and any other requests to fax [phone]   All voicemails are confidential

## 2018-10-02 PROBLEM — E87.20 LACTIC ACIDOSIS: Status: ACTIVE | Noted: 2018-10-02

## 2018-10-02 PROBLEM — E66.01 MORBID OBESITY WITH BMI OF 40.0-44.9, ADULT (HCC): Status: ACTIVE | Noted: 2018-10-02

## 2018-10-02 PROBLEM — E87.2 LACTIC ACIDOSIS: Status: ACTIVE | Noted: 2018-10-02

## 2018-10-02 LAB
EST. AVERAGE GLUCOSE BLD GHB EST-MCNC: 126 MG/DL
HBA1C MFR BLD: 6 % (ref 4.2–6.3)
LACTATE SERPL-SCNC: 1.9 MMOL/L (ref 0.5–2)

## 2018-10-02 PROCEDURE — 99255 IP/OBS CONSLTJ NEW/EST HI 80: CPT | Performed by: INTERNAL MEDICINE

## 2018-10-02 PROCEDURE — 99232 SBSQ HOSP IP/OBS MODERATE 35: CPT | Performed by: FAMILY MEDICINE

## 2018-10-02 PROCEDURE — 83605 ASSAY OF LACTIC ACID: CPT | Performed by: PHYSICIAN ASSISTANT

## 2018-10-02 PROCEDURE — 94760 N-INVAS EAR/PLS OXIMETRY 1: CPT

## 2018-10-02 PROCEDURE — 83036 HEMOGLOBIN GLYCOSYLATED A1C: CPT | Performed by: PHYSICIAN ASSISTANT

## 2018-10-02 PROCEDURE — 94640 AIRWAY INHALATION TREATMENT: CPT

## 2018-10-02 RX ORDER — KETOROLAC TROMETHAMINE 30 MG/ML
30 INJECTION, SOLUTION INTRAMUSCULAR; INTRAVENOUS EVERY 6 HOURS SCHEDULED
Status: COMPLETED | OUTPATIENT
Start: 2018-10-02 | End: 2018-10-03

## 2018-10-02 RX ORDER — BENZONATATE 100 MG/1
200 CAPSULE ORAL 3 TIMES DAILY
Status: DISCONTINUED | OUTPATIENT
Start: 2018-10-02 | End: 2018-10-03 | Stop reason: HOSPADM

## 2018-10-02 RX ORDER — HYDROCODONE POLISTIREX AND CHLORPHENIRAMINE POLISTIREX 10; 8 MG/5ML; MG/5ML
5 SUSPENSION, EXTENDED RELEASE ORAL EVERY 12 HOURS PRN
Status: DISCONTINUED | OUTPATIENT
Start: 2018-10-02 | End: 2018-10-03 | Stop reason: HOSPADM

## 2018-10-02 RX ORDER — METHYLPREDNISOLONE SODIUM SUCCINATE 40 MG/ML
40 INJECTION, POWDER, LYOPHILIZED, FOR SOLUTION INTRAMUSCULAR; INTRAVENOUS EVERY 12 HOURS SCHEDULED
Status: DISCONTINUED | OUTPATIENT
Start: 2018-10-02 | End: 2018-10-03

## 2018-10-02 RX ORDER — BUDESONIDE 0.5 MG/2ML
0.5 INHALANT ORAL
Status: DISCONTINUED | OUTPATIENT
Start: 2018-10-02 | End: 2018-10-03 | Stop reason: HOSPADM

## 2018-10-02 RX ORDER — GUAIFENESIN 600 MG
600 TABLET, EXTENDED RELEASE 12 HR ORAL EVERY 12 HOURS SCHEDULED
Status: DISCONTINUED | OUTPATIENT
Start: 2018-10-02 | End: 2018-10-03 | Stop reason: HOSPADM

## 2018-10-02 RX ORDER — AZITHROMYCIN 250 MG/1
250 TABLET, FILM COATED ORAL DAILY
Status: DISCONTINUED | OUTPATIENT
Start: 2018-10-02 | End: 2018-10-03 | Stop reason: HOSPADM

## 2018-10-02 RX ORDER — BUTALBITAL, ACETAMINOPHEN AND CAFFEINE 50; 325; 40 MG/1; MG/1; MG/1
1 TABLET ORAL EVERY 4 HOURS PRN
Status: DISCONTINUED | OUTPATIENT
Start: 2018-10-02 | End: 2018-10-03 | Stop reason: HOSPADM

## 2018-10-02 RX ADMIN — AZITHROMYCIN MONOHYDRATE 250 MG: 250 TABLET ORAL at 12:01

## 2018-10-02 RX ADMIN — KETOROLAC TROMETHAMINE 30 MG: 30 INJECTION, SOLUTION INTRAMUSCULAR at 11:44

## 2018-10-02 RX ADMIN — LEVALBUTEROL HYDROCHLORIDE 1.25 MG: 1.25 SOLUTION, CONCENTRATE RESPIRATORY (INHALATION) at 01:15

## 2018-10-02 RX ADMIN — LEVALBUTEROL HYDROCHLORIDE 1.25 MG: 1.25 SOLUTION, CONCENTRATE RESPIRATORY (INHALATION) at 13:03

## 2018-10-02 RX ADMIN — METHYLPREDNISOLONE SODIUM SUCCINATE 40 MG: 40 INJECTION, POWDER, FOR SOLUTION INTRAMUSCULAR; INTRAVENOUS at 20:14

## 2018-10-02 RX ADMIN — IPRATROPIUM BROMIDE 0.5 MG: 0.5 SOLUTION RESPIRATORY (INHALATION) at 19:31

## 2018-10-02 RX ADMIN — BENZONATATE 200 MG: 100 CAPSULE ORAL at 20:13

## 2018-10-02 RX ADMIN — KETOROLAC TROMETHAMINE 30 MG: 30 INJECTION, SOLUTION INTRAMUSCULAR at 18:12

## 2018-10-02 RX ADMIN — ENOXAPARIN SODIUM 40 MG: 40 INJECTION SUBCUTANEOUS at 08:07

## 2018-10-02 RX ADMIN — BENZONATATE 200 MG: 100 CAPSULE ORAL at 17:07

## 2018-10-02 RX ADMIN — LEVALBUTEROL HYDROCHLORIDE 1.25 MG: 1.25 SOLUTION, CONCENTRATE RESPIRATORY (INHALATION) at 07:50

## 2018-10-02 RX ADMIN — GUAIFENESIN 600 MG: 600 TABLET, EXTENDED RELEASE ORAL at 20:13

## 2018-10-02 RX ADMIN — METHYLPREDNISOLONE SODIUM SUCCINATE 40 MG: 40 INJECTION, POWDER, FOR SOLUTION INTRAMUSCULAR; INTRAVENOUS at 05:53

## 2018-10-02 RX ADMIN — ALBUTEROL SULFATE 2 PUFF: 90 AEROSOL, METERED RESPIRATORY (INHALATION) at 05:53

## 2018-10-02 RX ADMIN — BUTALBITAL, ACETAMINOPHEN, AND CAFFEINE 1 TABLET: 50; 325; 40 TABLET ORAL at 20:13

## 2018-10-02 RX ADMIN — BUTALBITAL, ACETAMINOPHEN, AND CAFFEINE 1 TABLET: 50; 325; 40 TABLET ORAL at 08:06

## 2018-10-02 RX ADMIN — BUDESONIDE 0.5 MG: 0.5 INHALANT RESPIRATORY (INHALATION) at 19:31

## 2018-10-02 RX ADMIN — BENZONATATE 200 MG: 100 CAPSULE ORAL at 11:44

## 2018-10-02 RX ADMIN — IPRATROPIUM BROMIDE 0.5 MG: 0.5 SOLUTION RESPIRATORY (INHALATION) at 13:03

## 2018-10-02 RX ADMIN — GUAIFENESIN 600 MG: 600 TABLET, EXTENDED RELEASE ORAL at 11:45

## 2018-10-02 RX ADMIN — IPRATROPIUM BROMIDE 0.5 MG: 0.5 SOLUTION RESPIRATORY (INHALATION) at 07:50

## 2018-10-02 RX ADMIN — IPRATROPIUM BROMIDE 0.5 MG: 0.5 SOLUTION RESPIRATORY (INHALATION) at 01:15

## 2018-10-02 RX ADMIN — LEVALBUTEROL HYDROCHLORIDE 1.25 MG: 1.25 SOLUTION, CONCENTRATE RESPIRATORY (INHALATION) at 19:31

## 2018-10-02 NOTE — ASSESSMENT & PLAN NOTE
· Continue IV Solu-Medrol but weaned down to 40 mg IV Q 12 hours, continue Xopenex/Atrovent nebs q 6 hours  · Since patient has never been established with pulmonologist and was ill enough to be hospitalized, I would recommend an inpatient pulmonology consultation    Patient will need outpatient pulmonology follow-up upon discharge for pulmonary function tests as she may also have some component of tobacco related lung disease rather than straightforward asthma  · At discharge patient will require new prescriptions for nebulizers and inhalers  · I will also augment her cough medication regimen as she is still noting significant chest discomfort

## 2018-10-02 NOTE — ASSESSMENT & PLAN NOTE
· BMI 42 56  · Recommend outpatient assessment for sleep apnea  · Recommend A1c to assess for undiagnosed diabetes

## 2018-10-02 NOTE — ASSESSMENT & PLAN NOTE
· Patient does not feel she needs nicotine patches  · Patient states that she has quit smoking as of this admission

## 2018-10-02 NOTE — ASSESSMENT & PLAN NOTE
· Patient complaining of headache during this admission but states that she does not typically experience headaches prior to arrival and is not requiring any frequent medications for such at home  · Would add 3 doses of IV Toradol  · Continue Fioricet p r n --explained to the patient that this can be used sparingly so as to avoid rebound headache    She does report it was quite effective when provided once yesterday

## 2018-10-02 NOTE — PROGRESS NOTES
Progress Note - Marco Mckeon 1980, 42 y o  female MRN: [id number]    Unit/Bed#: E5 -01 Encounter: [id number]    Primary Care Provider: Marielos Bates MD   Date and time admitted to hospital: 9/30/2018 12:02 PM    * Acute respiratory failure with hypoxia ([facility] [address] )   Assessment & Plan    · Patient admitted with dyspnea associated with tachypnea, tachycardia, wheezing and cough--felt to be due to asthma exacerbation  · Clinically improving, can discontinue telemetry  · Procalcitonin level normal and chest x-ray without any evidence of infiltrate  Influenza negative  No indication for any antibiotics or antiviral treatment at this time    · see below for further recommendations     Severe asthma with exacerbation   Assessment & Plan    · Continue IV Solu-Medrol but weaned down to 40 mg IV Q 12 hours, continue Xopenex/Atrovent nebs q 6 hours  · Since patient has never been established with pulmonologist and was ill enough to be hospitalized, I would recommend an inpatient pulmonology consultation  Patient will need outpatient pulmonology follow-up upon discharge for pulmonary function tests as she may also have some component of tobacco related lung disease rather than straightforward asthma  · At discharge patient will require new prescriptions for nebulizers and inhalers  · I will also augment her cough medication regimen as she is still noting significant chest discomfort       Tobacco abuse   Assessment & Plan    · Patient does not feel she needs nicotine patches  · Patient states that she has quit smoking as of this admission       Other headache syndrome   Assessment & Plan    · Patient complaining of headache during this admission but states that she does not typically experience headaches prior to arrival and is not requiring any frequent medications for such at home  · Would add 3 doses of IV Toradol  · Continue Fioricet p r n --explained to the patient that this can be used sparingly so as to avoid rebound headache  She does report it was quite effective when provided once yesterday     Morbid obesity with BMI of 40 0-44 9, adult ([facility] )   Assessment & Plan    · BMI 42 56  · Recommend outpatient assessment for sleep apnea  · Recommend A1c to assess for undiagnosed diabetes     Lactic acidosis   Assessment & Plan    · Was noted to be improving but should be recheck to ensure normalization         VTE Pharmacologic Prophylaxis:   Pharmacologic: Enoxaparin (Lovenox)  Mechanical VTE Prophylaxis in Place: No    Patient Centered Rounds:    Discussions with Specialists or Other Care Team Provider:     Education and Discussions with Family / Patient:     Time Spent for Care: 30 minutes  More than 50% of total time spent on counseling and coordination of care as described above  Current Length of Stay: 2 day(s)    Current Patient Status: Inpatient   Certification Statement: The patient will continue to require additional inpatient hospital stay due to Need for pulmonary consultation, IV steroids, and treatment for headache and chest discomfort    Discharge Plan:  Hopefully discharge to home tomorrow; patient will require a work note and also refills of her medications as she ran out of her nebulizer medication/inhalers    Code Status: Level 1 - Full Code      Subjective:   Patient reports she is not 100% better but does feel about 75% better overall  Her biggest issue at this time is she is still having headache which does not go away with Tylenol  She states when she got a dose of Fioricet yesterday it did help quite a lot  She does not typically get headaches or require any frequent medications at home  She reports that she is still having quite a lot of chest discomfort when she takes a deep breath and tightness and cough  She has some wheezing but less overall      Regarding her past medical history she states she has never been established with a pulmonologist   She has smoked for about 20 years with intermittent times of quitting and feels completely ready to quit at this time because she was quite scared when she came into the hospital   She has never been intubated before but was admitted 1 other time for similar circumstances which were associated with pneumonia as well at that time  Objective:     Vitals:   Temp (24hrs), Av 1 °F (36 7 °C), Min:97 5 °F (36 4 °C), Max:99 1 °F (37 3 °C)    HR:  [65-84] 65  Resp:  [18-29] 18  BP: (119-157)/(62-94) 157/74  SpO2:  [98 %-100 %] 99 %  Body mass index is 42 56 kg/m²  Input and Output Summary (last 24 hours): Intake/Output Summary (Last 24 hours) at 10/02/18 1051  Last data filed at 10/01/18 2101   Gross per 24 hour   Intake          1491 67 ml   Output             1000 ml   Net           491 67 ml       Physical Exam:     Physical Exam   Constitutional: She appears well-developed and well-nourished  No distress  HENT:   Head: Normocephalic and atraumatic  Eyes: Conjunctivae are normal  Right eye exhibits no discharge  Left eye exhibits no discharge  No scleral icterus  Cardiovascular: Normal rate, regular rhythm and normal heart sounds  Exam reveals no gallop and no friction rub  No murmur heard  Mild sinus tachycardia in the 90 range   Pulmonary/Chest: Effort normal  No stridor  No respiratory distress  She has wheezes (Faint wheezes in the right upper lobe)  She has no rales  Patient with decreased distant breath sounds in all lung fields  One episode of tight coughing noted  Otherwise no tachypnea or dyspnea noted during my evaluation  Not requiring any supplemental oxygen at this time and not noted to be in any distress   Abdominal: Soft  Soft obese abdomen   Musculoskeletal: She exhibits no edema  Neurological: She is alert  Awake alert and interactive she is a good historian with no focal deficits noted  No dysarthria or aphasia  Skin: Skin is warm and dry  No rash noted  She is not diaphoretic  No erythema  No pallor  Psychiatric: She has a normal mood and affect  Her behavior is normal  Judgment and thought content normal    Extremely pleasant and cooperative   Vitals reviewed  Additional Data:     Labs:      Results from last 7 days  Lab Units 10/01/18  0544   WBC Thousand/uL 13 31*   HEMOGLOBIN g/dL 11 1*   HEMATOCRIT % 33 8*   PLATELETS Thousands/uL 335   NEUTROS PCT % 90*   LYMPHS PCT % 7*   MONOS PCT % 2*   EOS PCT % 0       Results from last 7 days  Lab Units 10/01/18  0544   SODIUM mmol/L 138   POTASSIUM mmol/L 4 1   CHLORIDE mmol/L 107   CO2 mmol/L 22   BUN mg/dL 6   CREATININE mg/dL 0 74   ANION GAP mmol/L 9   CALCIUM mg/dL 7 9*   ALBUMIN g/dL 3 0*   TOTAL BILIRUBIN mg/dL 0 19*   ALK PHOS U/L 57   ALT U/L 18   AST U/L 10                   Results from last 7 days  Lab Units 10/01/18  0014 09/30/18  2006 09/30/18  1741   LACTIC ACID mmol/L 3 4* 6 1* 5 8*   PROCALCITONIN ng/ml  --   --  <0 05     * I Have Reviewed All Lab Data Listed Above  * Additional Pertinent Lab Tests Reviewed: [address] Admission Reviewed    Imaging:    Imaging Reports Reviewed Today Include:  Chest x-ray  Imaging Personally Reviewed by Myself Includes:      Recent Cultures (last 7 days):       Results from last 7 days  Lab Units 09/30/18  2132 09/30/18  1742 09/30/18  1643 09/30/18  1636   BLOOD CULTURE   --   --  No Growth at 24 hrs  No Growth at 24 hrs     INFLUENZA A PCR   --  None Detected  --   --    INFLUENZA B PCR   --  None Detected  --   --    RSV PCR   --  None Detected  --   --    LEGIONELLA URINARY ANTIGEN  Negative  --   --   --        Last 24 Hours Medication List:     Current Facility-Administered Medications:  albuterol 2 puff Inhalation Q4H PRN Ainsley Hatchet, MD   albuterol 2 5 mg Nebulization Q6H PRN NICO Carney   ALPRAZolam 0 25 mg Oral TID PRN NICO Carney   benzonatate 200 mg Oral TID Beba Tavera PA-C   butalbital-acetaminophen-caffeine 1 tablet Oral Q4H PRN Angela Verduzco PA-C enoxaparin 40 mg Subcutaneous Daily Janneth Daley MD   guaiFENesin 600 mg Oral Q12H [address] Beba Tavera PA-C   hydrocodone-chlorpheniramine polistirex 5 mL Oral Q12H PRN Beba Tavera PA-C   ipratropium 0 5 mg Nebulization Q6H Janneth Daley MD   ketorolac 30 mg Intravenous Q6H [address] Beba Tavera PA-C   levalbuterol 1 25 mg Nebulization Q6H Janneth Daley MD   methylPREDNISolone sodium succinate 40 mg Intravenous Q12H [address] Beba Tavera PA-C   nicotine 1 patch Transdermal Daily Janneth Daley MD   ondansetron 4 mg Intravenous Q6H PRN Janneth Daley MD        Today, Patient Was Seen By: Justin Wheat PA-C    ** Please Note: Dictation voice to text software may have been used in the creation of this document   **

## 2018-10-02 NOTE — CONSULTS
Pulmonary Consultation   Lee Villaseñor 42 y o  female MRN: [id number]  Unit/Bed#: E5 -01 Encounter: [id number]      Reason for consultation: severe persistent asthma with exacerbation, acute respiratory failure with hypoxia, tobacco abuse    Requesting physician:     Carlos Enrique Turner    Impressions/Recommendations:    1  Respiratory insufficiency  1  No documentation of hypoxia since admission  2  Continue titrate oxygen to maintain SpO2 greater than equal to 90%  3  Pulmonary toilet:  Increasing ambulation as tolerated  2  Mild intermittent asthma with acute exacerbation likely secondary to acute bronchitis  1  Will add zithromax 250mg x 4 days to complete course  2  CXR negative for PNA  3  Add budesonide BID  4  Continue xopenex/atrovent Q 6 hrs  5  Continue mucinex for secretion clearance  6  Would likely benefit from Symbicort BID at time of discharge, albuterol MDI and albuterol nebulizer Q 6 hrs PRN-prescriptions placed in discharge   7  Recommend outpatient follow up  3  Obesity  4  Headache-likely secondary to increased cough  1  Tylenol PRN  2  Tessalon scheduled, Tussinex PRN  3  Fioricet PRN  5  Tobacco abuse  1  Complete cessation encouraged-[facility] appears ready and determined to have complete cessation  2  Refused NRT    History of Present Illness   HPI:  Lee Villaseñor is a 42 y o  female who presented to the ED with increasing shortness of breath, chest tightness and wheezing  She has past medical history significant for asthma diagnosed as a child, obesity and hypertension  She reports her symptoms began on 09/25 which time she she was developing a cold with increasing shortness of breath and fatigue  Her symptoms progressed and she presented to the ED on 09/30 with severe chest tightness, shortness of breath and wheezing  She also reported an increasing cough with light yellow to green sputum production, perceived fevers and chills        She was started on bronchodilators, Solu-Medrol initially cover for possible pneumonia  Chest x-ray, procalcitonin and white blood cell count negative  From pulmonary standpoint she does not follow patient with a pulmonologist, but does have a diagnosis of asthma diagnosed as a child  She does not have a daily inhaler nebulizer regimen  She reports her last exacerbation was was in April of 2017 which time she was admitted to the [facility] ICU secondary to pneumonia and asthma exacerbation  She denies prior intubations due to her asthma  April 2017 with the last time she required use of her nebulizer or rescue inhaler  Prior to admission she did use her albuterol nebulizer rescue inhaler multiple times a day without perceived benefit  She denies symptoms of dysphagia, obstructive sleep apnea or GERD  Denies recent sick contacts or travel  She does report to having 1 cat at home  She currently reports minimal improvement in symptoms  Continues to report chest tightness, cough with green/yellow sputum, and severe headache  Currently denies: fevers, chills night sweats, nausea, vomiting, diarrhea, dizziness, bronchospasm, or hemoptysis      Review of systems:  12 point review of systems was completed and was otherwise negative except as listed in HPI        Historical Information   Past Medical History:   Diagnosis Date    Asthma     Dental abscess     Hypertension      Past Surgical History:   Procedure Laterality Date    CHOLECYSTECTOMY      ECTOPIC PREGNANCY SURGERY      INCISION AND DRAINAGE INTRA ORAL ABSCESS Left 3/21/2016    Procedure: INCISION AND DRAINAGE  (I&D) WOUND ORAL Left  Space;  Surgeon: Mellisa Schilling MD;  Location: [facility];  Service:     MULTIPLE TOOTH EXTRACTIONS N/A 3/21/2016    Procedure: EXTRACTION TEETH MULTIPLE; 17,18,19,11,12,4,5,28,32;  Surgeon: Mellisa Schilling MD;  Location: [facility];  Service:     SALPINGECTOMY       Family History   Problem Relation Age of Onset    Diabetes Mother  Heart failure Mother     Cancer Neg Hx        Occupational history: employee at [employer]    Tobacco history: current 1/2 ppd smoker with 5 pack year smoking history    Meds/Allergies   Current Facility-Administered Medications   Medication Dose Route Frequency    albuterol (PROVENTIL HFA,VENTOLIN HFA) inhaler 2 puff  2 puff Inhalation Q4H PRN    albuterol inhalation solution 2 5 mg  2 5 mg Nebulization Q6H PRN    ALPRAZolam (XANAX) tablet 0 25 mg  0 25 mg Oral TID PRN    benzonatate (TESSALON PERLES) capsule 200 mg  200 mg Oral TID    butalbital-acetaminophen-caffeine (FIORICET,ESGIC) -40 mg per tablet 1 tablet  1 tablet Oral Q4H PRN    enoxaparin (LOVENOX) subcutaneous injection 40 mg  40 mg Subcutaneous Daily    guaiFENesin (MUCINEX) 12 hr tablet 600 mg  600 mg Oral Q12H [facility]    hydrocodone-chlorpheniramine polistirex (TUSSIONEX) 10-8 mg/5 mL ER suspension 5 mL  5 mL Oral Q12H PRN    ipratropium (ATROVENT) 0 02 % inhalation solution 0 5 mg  0 5 mg Nebulization Q6H    ketorolac (TORADOL) injection 30 mg  30 mg Intravenous Q6H ADAL    levalbuterol (XOPENEX) inhalation solution 1 25 mg  1 25 mg Nebulization Q6H    methylPREDNISolone sodium succinate (Solu-MEDROL) injection 40 mg  40 mg Intravenous Q12H ADAL    nicotine (NICODERM CQ) 7 mg/24hr TD 24 hr patch 1 patch  1 patch Transdermal Daily    ondansetron (ZOFRAN) injection 4 mg  4 mg Intravenous Q6H PRN     Prescriptions Prior to Admission   Medication    albuterol (2 5 mg/3 mL) 0 083 % nebulizer solution    albuterol (PROVENTIL HFA,VENTOLIN HFA) 90 mcg/act inhaler    ibuprofen (MOTRIN) 600 mg tablet     No Known Allergies    Vitals: Blood pressure 157/74, pulse 65, temperature 97 6 °F (36 4 °C), temperature source Temporal, resp  rate 18, height 5' 11" (1 803 m), weight (!) 138 kg (305 lb 1 9 oz), SpO2 99 % , RA, Body mass index is 42 56 kg/m²        Intake/Output Summary (Last 24 hours) at 10/02/18 1137  Last data filed at 10/01/18 2101   Gross per 24 hour   Intake          1491 67 ml   Output             1000 ml   Net           491 67 ml       Physical exam:    General Appearance:    Alert, cooperative, no conversational dyspnea or accessory     muscle use       Head/eyes:    Normocephalic, without obvious abnormality, atraumatic,         PERRL, extraocular muscles intact, no scleral icterus    Nose:   Nares normal, septum midline, mucosa normal, no drainage    or sinus tenderness   Throat:   Moist mucous membranes, no thrush   Neck:   Supple, trachea midline, no adenopathy; no carotid    bruit or JVD   Lungs:     Decreased air movement with expiratory wheezes, no rhonchi or rales   Chest Wall:    No tenderness or deformity    Heart:    Regular rate and rhythm, S1 and S2 normal, no murmur, rub   or gallop   Abdomen:     Obese, Soft, non-tender, bowel sounds active all four quadrants,     no masses, no organomegaly   Extremities:   Extremities normal, atraumatic, no cyanosis or edema   Skin:   Warm, dry, turgor normal, no rashes or lesions   Lymph nodes:   Cervical and supraclavicular nodes normal   Neurologic:   CNII-XII intact, normal strength, non-focal         Labs: I have personally reviewed pertinent lab results  ,  None today    Imaging and other studies: I have personally reviewed pertinent reports  and I have personally reviewed pertinent films in PACS     CXR 9/30/2018: No acute cardiopulmonary disease    Pulmonary function testing: none      EKG, Pathology, and Other Studies: I have personally reviewed pertinent reports       EKG 10/2/2018: NSR    Code Status: Level 1 - Full Code      NICO Dickson

## 2018-10-02 NOTE — ASSESSMENT & PLAN NOTE
· Patient admitted with dyspnea associated with tachypnea, tachycardia, wheezing and cough--felt to be due to asthma exacerbation  · Clinically improving, can discontinue telemetry  · Procalcitonin level normal and chest x-ray without any evidence of infiltrate  Influenza negative    No indication for any antibiotics or antiviral treatment at this time    · see below for further recommendations

## 2018-10-02 NOTE — PROGRESS NOTES
Report given to Harper Martinez RN  Patient transported via wheelchair on room air by nursing staff with belongings  Patient offers no complaints at this time

## 2018-10-03 VITALS
HEIGHT: 71 IN | TEMPERATURE: 97.6 F | HEART RATE: 82 BPM | DIASTOLIC BLOOD PRESSURE: 90 MMHG | SYSTOLIC BLOOD PRESSURE: 163 MMHG | WEIGHT: 293 LBS | BODY MASS INDEX: 41.02 KG/M2 | OXYGEN SATURATION: 98 % | RESPIRATION RATE: 18 BRPM

## 2018-10-03 PROBLEM — E87.2 LACTIC ACIDOSIS: Status: RESOLVED | Noted: 2018-10-02 | Resolved: 2018-10-03

## 2018-10-03 PROBLEM — E87.20 LACTIC ACIDOSIS: Status: RESOLVED | Noted: 2018-10-02 | Resolved: 2018-10-03

## 2018-10-03 LAB
ANION GAP SERPL CALCULATED.3IONS-SCNC: 9 MMOL/L (ref 4–13)
BUN SERPL-MCNC: 18 MG/DL (ref 5–25)
CALCIUM SERPL-MCNC: 8.8 MG/DL (ref 8.3–10.1)
CHLORIDE SERPL-SCNC: 103 MMOL/L (ref 100–108)
CO2 SERPL-SCNC: 25 MMOL/L (ref 21–32)
CREAT SERPL-MCNC: 0.83 MG/DL (ref 0.6–1.3)
GFR SERPL CREATININE-BSD FRML MDRD: 103 ML/MIN/1.73SQ M
GLUCOSE SERPL-MCNC: 142 MG/DL (ref 65–140)
POTASSIUM SERPL-SCNC: 4 MMOL/L (ref 3.5–5.3)
SODIUM SERPL-SCNC: 137 MMOL/L (ref 136–145)

## 2018-10-03 PROCEDURE — 94760 N-INVAS EAR/PLS OXIMETRY 1: CPT

## 2018-10-03 PROCEDURE — 90686 IIV4 VACC NO PRSV 0.5 ML IM: CPT | Performed by: FAMILY MEDICINE

## 2018-10-03 PROCEDURE — 99239 HOSP IP/OBS DSCHRG MGMT >30: CPT | Performed by: FAMILY MEDICINE

## 2018-10-03 PROCEDURE — 80048 BASIC METABOLIC PNL TOTAL CA: CPT | Performed by: FAMILY MEDICINE

## 2018-10-03 PROCEDURE — 99232 SBSQ HOSP IP/OBS MODERATE 35: CPT | Performed by: NURSE PRACTITIONER

## 2018-10-03 PROCEDURE — 94640 AIRWAY INHALATION TREATMENT: CPT

## 2018-10-03 RX ORDER — GUAIFENESIN 600 MG
600 TABLET, EXTENDED RELEASE 12 HR ORAL EVERY 12 HOURS SCHEDULED
Qty: 14 TABLET | Refills: 0 | Status: SHIPPED | OUTPATIENT
Start: 2018-10-03 | End: 2019-04-19

## 2018-10-03 RX ORDER — AZITHROMYCIN 250 MG/1
250 TABLET, FILM COATED ORAL DAILY
Qty: 2 TABLET | Refills: 0 | Status: SHIPPED | OUTPATIENT
Start: 2018-10-04 | End: 2018-10-06

## 2018-10-03 RX ORDER — PREDNISONE 20 MG/1
40 TABLET ORAL DAILY
Status: DISCONTINUED | OUTPATIENT
Start: 2018-10-04 | End: 2018-10-03 | Stop reason: HOSPADM

## 2018-10-03 RX ORDER — ALBUTEROL SULFATE 90 UG/1
2 AEROSOL, METERED RESPIRATORY (INHALATION) EVERY 6 HOURS PRN
Qty: 1 INHALER | Refills: 0 | Status: ON HOLD | OUTPATIENT
Start: 2018-10-03 | End: 2021-10-11 | Stop reason: SDUPTHER

## 2018-10-03 RX ORDER — PREDNISONE 10 MG/1
TABLET ORAL
Qty: 30 TABLET | Refills: 0 | Status: SHIPPED | OUTPATIENT
Start: 2018-10-03 | End: 2021-10-11 | Stop reason: HOSPADM

## 2018-10-03 RX ORDER — ALBUTEROL SULFATE 2.5 MG/3ML
2.5 SOLUTION RESPIRATORY (INHALATION) EVERY 6 HOURS PRN
Qty: 75 ML | Refills: 0 | Status: ON HOLD | OUTPATIENT
Start: 2018-10-03 | End: 2021-10-11 | Stop reason: SDUPTHER

## 2018-10-03 RX ORDER — FLUTICASONE PROPIONATE AND SALMETEROL 113; 14 UG/1; UG/1
1 POWDER, METERED RESPIRATORY (INHALATION) DAILY
Qty: 1 EACH | Refills: 0 | Status: SHIPPED | OUTPATIENT
Start: 2018-10-03 | End: 2019-04-19

## 2018-10-03 RX ADMIN — LEVALBUTEROL HYDROCHLORIDE 1.25 MG: 1.25 SOLUTION, CONCENTRATE RESPIRATORY (INHALATION) at 01:36

## 2018-10-03 RX ADMIN — ENOXAPARIN SODIUM 40 MG: 40 INJECTION SUBCUTANEOUS at 08:27

## 2018-10-03 RX ADMIN — LEVALBUTEROL HYDROCHLORIDE 1.25 MG: 1.25 SOLUTION, CONCENTRATE RESPIRATORY (INHALATION) at 08:10

## 2018-10-03 RX ADMIN — GUAIFENESIN 600 MG: 600 TABLET, EXTENDED RELEASE ORAL at 08:27

## 2018-10-03 RX ADMIN — IPRATROPIUM BROMIDE 0.5 MG: 0.5 SOLUTION RESPIRATORY (INHALATION) at 08:10

## 2018-10-03 RX ADMIN — METHYLPREDNISOLONE SODIUM SUCCINATE 40 MG: 40 INJECTION, POWDER, FOR SOLUTION INTRAMUSCULAR; INTRAVENOUS at 08:27

## 2018-10-03 RX ADMIN — INFLUENZA VIRUS VACCINE 0.5 ML: 15; 15; 15; 15 SUSPENSION INTRAMUSCULAR at 12:01

## 2018-10-03 RX ADMIN — BUDESONIDE 0.5 MG: 0.5 INHALANT RESPIRATORY (INHALATION) at 08:09

## 2018-10-03 RX ADMIN — IPRATROPIUM BROMIDE 0.5 MG: 0.5 SOLUTION RESPIRATORY (INHALATION) at 01:36

## 2018-10-03 RX ADMIN — BUTALBITAL, ACETAMINOPHEN, AND CAFFEINE 1 TABLET: 50; 325; 40 TABLET ORAL at 08:30

## 2018-10-03 RX ADMIN — AZITHROMYCIN MONOHYDRATE 250 MG: 250 TABLET ORAL at 08:27

## 2018-10-03 RX ADMIN — KETOROLAC TROMETHAMINE 30 MG: 30 INJECTION, SOLUTION INTRAMUSCULAR at 00:01

## 2018-10-03 RX ADMIN — BENZONATATE 200 MG: 100 CAPSULE ORAL at 08:27

## 2018-10-03 NOTE — DISCHARGE INSTRUCTIONS
Continue taking your prednisone until you have no more  Continue taking your azithromycin until you have no more  You will need to follow up with the pulmonologist in the office  You will be given prescriptions for albuterol nebulizer treatments, albuterol inhaler, and Symbicort inhaler  Continue taking Mucinex to help decrease secretions

## 2018-10-03 NOTE — ASSESSMENT & PLAN NOTE
· Patient admitted with dyspnea associated with tachypnea, tachycardia, wheezing and cough--felt to be due to asthma exacerbation  · Initially required monitoring in ICU as there was concern if patient would need intubation  · Fortunately patient was not intubated and improved with IV steroids, nebulizers, and cough regimen  Patient was transferred to Med surge  · Procalcitonin level normal and chest x-ray without any evidence of infiltrate  · Influenza negative

## 2018-10-03 NOTE — PLAN OF CARE
DISCHARGE PLANNING - CARE MANAGEMENT     Discharge to post-acute care or home with appropriate resources Progressing        METABOLIC, FLUID AND ELECTROLYTES - ADULT     Electrolytes maintained within normal limits Progressing     Fluid balance maintained Progressing     Glucose maintained within target range Progressing        Potential for Falls     Patient will remain free of falls Progressing        RESPIRATORY - ADULT     Achieves optimal ventilation and oxygenation Progressing

## 2018-10-03 NOTE — PROGRESS NOTES
Progress Note - Pulmonary   Lee Rank 42 y o  female MRN: [id number]  Unit/Bed#: E5 -01 Encounter: [id number]      Assessment/Plan:    1  Mild intermittent asthma with acute exacerbation-improving  1  Will discontinue Solu-Medrol today and transition to prednisone taper tomorrow, will start at 40 mg x3 days with reduction by 10 mg every 3 days  2  Continue budesonide b i d  while hospitalized, can transition to Symbicort inhaler discharge  3  Continue Xopenex/Atrovent q 6 hours  4  Continue Mucinex for secretion clearance  5  Outpatient follow-up  6  Outpatient regimen will consist of Symbicort b i d , with albuterol in nebulizer q 6 hours p r n  And albuterol MDI PRN  2  Morbid obesity  1  Weight loss encouraged  3  Tobacco abuse  1  Complete cessation again encouraged  4  Headache  1  Tylenol, Fioricet PRN  2  Tessalon and Tussinex   5  Costochondritis  1  Heating pad and PRN NSAIDS      * appropriate for discharge from pulmonary standpoint, will check with case management concerning follow-up as patient has [employer] which is not covered in our outpatient office      Subjective:     Gregorio Fox was seen lying in bed upon entering the room  She denies acute overnight events  She reports that her breathing is mildly improved today  She  continues to have a nonproductive cough, with likely costochondritis and pain with deep inspiration and coughing  She denies fevers, chills, night sweats, nausea, vomiting diarrhea, headache, dizziness bronchospasm hemoptysis    Objective:         Vitals: Blood pressure 163/90, pulse 82, temperature 97 6 °F (36 4 °C), temperature source Temporal, resp  rate 18, height 5' 11" (1 803 m), weight (!) 138 kg (305 lb 1 9 oz), SpO2 98 % , RA, Body mass index is 42 56 kg/m²      No intake or output data in the 24 hours ending 10/03/18 0933      Physical Exam  Gen: Awake, alert, oriented x 3, no acute distress  HEENT: Mucous membranes moist, no oral lesions, no thrush  NECK: No accessory muscle use, JVP not elevated  Cardiac: Regular, single S1, single S2, no murmurs, no rubs, no gallops  Lungs: decreased lung sounds bilaterally  Abdomen: normoactive bowel sounds, soft nontender, nondistended, no rebound or rigidity, no guarding  Extremities: no cyanosis, no clubbing, no edema    Labs: I have personally reviewed pertinent lab results  , CBC: No results found for: WBC, HGB, HCT, MCV, PLT, ADJUSTEDWBC, MCH, MCHC, RDW, MPV, NRBC, CMP:   Lab Results   Component Value Date     10/03/2018    K 4 0 10/03/2018     10/03/2018    CO2 25 10/03/2018    BUN 18 10/03/2018    CREATININE 0 83 10/03/2018    CALCIUM 8 8 10/03/2018    EGFR 103 10/03/2018     Imaging and other studies: none      Olena Guevara

## 2018-10-03 NOTE — DISCHARGE SUMMARY
Discharge- Miquel Jean 1980, 42 y o  female MRN: [id number]    Unit/Bed#: E5 -01 Encounter: [id number]    Primary Care Provider: Alan Britton MD   Date and time admitted to hospital: 9/30/2018 12:02 PM   Discharge Date: 10/3/18        Severe asthma with exacerbation   Assessment & Plan    · Improved on IV Solu-Medrol but weaned down to 40 mg IV Q 12 hours  · Received Xopenex/Atrovent nebs q 6 hours, azithromycin  · Since patient has never been established with pulmonologist and was ill enough to be hospitalized, patient was seen and evaluated by pulmonology while inpatient  · Will need outpatient pulmonology follow-up upon discharge for pulmonary function tests as she may also have some component of tobacco related lung disease rather than straightforward asthma  · Discharge medication includes prednisone taper, azithromycin x5 day course, albuterol nebulizer treatment, albuterol meter dose inhaler, advair       * Acute respiratory failure with hypoxia ([address] )   Assessment & Plan    · Patient admitted with dyspnea associated with tachypnea, tachycardia, wheezing and cough--felt to be due to asthma exacerbation  · Initially required monitoring in ICU as there was concern if patient would need intubation  · Fortunately patient was not intubated and improved with IV steroids, nebulizers, and cough regimen  Patient was transferred to Med surge  · Procalcitonin level normal and chest x-ray without any evidence of infiltrate  · Influenza negative  Lactic acid acidosis   Assessment & Plan    · Resolved     Morbid obesity with BMI of 40 0-44 9, adult (HCC)   Assessment & Plan    · BMI 42 56  · Recommend outpatient assessment for sleep apnea  · Recommend A1c to assess for undiagnosed diabetes  · Follow up with primary care doctor to address     Tobacco abuse   Assessment & Plan    · Patient does not feel she needs nicotine patches    · Patient states that she has quit smoking as of this admission  · Counseled on cessation  · Offered nicotine patches for discharge however patient declined         Discharge Summary - [facility]    Patient Information: Lee Villaseñor 42 y o  female MRN: [id number]  Unit/Bed#: E5 -01 Encounter: [id number]    Discharging Physician / Practitioner: Boom Abrams PA-C  PCP: Zechariah Rodriguez MD  Admission Date: 9/30/2018  Discharge Date: 10/03/18    Disposition:     Home    Reason for Admission:  Acute asthma exacerbation / respiratory failure with hypoxia    Discharge Diagnoses:     Principal Problem:    Acute respiratory failure with hypoxia ([facility] )  Active Problems:    Severe asthma with exacerbation    Other headache syndrome    Tobacco abuse    Morbid obesity with BMI of 40 0-44 9, adult ([facility] )  Resolved Problems:    * No resolved hospital problems  *      Consultations During Hospital Stay:  · Pulmonology-Dr John Olmstead    Procedures Performed:   · 9/30/18:  Chest x-ray:  No acute cardiopulmonary disease  Significant Findings / Test Results:   · Acute asthma exacerbation  · Acute respiratory failure with hypoxia secondary to above    Incidental Findings:   · None     Test Results Pending at Discharge (will require follow up): · None     Outpatient follow-up Requested:  · PCP-1 week  · Pulmonology -2 weeks    Complications:  None    Discharge summary:    Lee Villaseñor is a 42 y o  female patient who originally presented to the hospital on 9/30/2018 due to shortness of breath, wheezing, and cough  She was found to have an acute asthma exacerbation and placed in ICU for further monitoring  Fortunately patient did not require intubation and was transferred to [facility] floor  Patient improved clinically on IV Solu-Medrol, Atrovent and Xopenex nebulizers, albuterol inhaler, cough regimen, supplemental oxygen, and course of azithromycin    Patient will be discharged with prescriptions for albuterol nebulizer treatments, albuterol inhaler, and Symbicort  While inpatient she did complain of chest pain however this was felt to be musculoskeletal as it was tender to palpation and improved with NSAIDs, heating pad  Troponin normal and no evidence of ischemia on telemetry  She will need to follow up with a pulmonologist in the office as an outpatient upon discharge  She will be offered a follow-up with PCP for further health maintenance  In conclusion, patient is stable for discharge at this time  Please see hospital course as listed above for further details  Condition at Discharge: stable     Discharge Day Visit / Exam:     Subjective:  Patient resting comfortably in bed upon arrival   Currently on room air  She notes her breathing has improved since arrival   She is able to walk without becoming short of breath  No additional wheezing  She feels well on her current breathing regimen  She will be given scripts for albuterol nebulizer treatments and inhalers  She will be asked to follow up with PCP as well as a pulmonologist as an outpatient  Denies any chest pain, chest pressure, palpitations, nausea vomiting  No fevers or chills  Vitals: Blood Pressure: 163/90 (10/03/18 0734)  Pulse: 82 (10/03/18 0734)  Temperature: 97 6 °F (36 4 °C) (10/03/18 0734)  Temp Source: Temporal (10/03/18 0734)  Respirations: 18 (10/03/18 0734)  Height: 5' 11" (180 3 cm) (09/30/18 2125)  Weight - Scale: (!) 138 kg (305 lb 1 9 oz) (10/01/18 0547)  SpO2: 98 % (10/03/18 0810)  Exam:   Physical Exam   Constitutional: She is oriented to person, place, and time  She appears well-developed and well-nourished  HENT:   Head: Normocephalic and atraumatic  Eyes: Conjunctivae are normal    Cardiovascular: Normal rate, regular rhythm and normal heart sounds  Pulmonary/Chest: Effort normal and breath sounds normal  No respiratory distress  She has no wheezes  She has no rales  She exhibits no tenderness  Abdominal: Soft   Bowel sounds are normal  She exhibits no distension and no mass  There is no tenderness  There is no rebound and no guarding  Morbidly obese   Musculoskeletal: She exhibits no edema  Neurological: She is alert and oriented to person, place, and time  Skin: Skin is warm and dry  Psychiatric: She has a normal mood and affect  Her behavior is normal    Nursing note and vitals reviewed  Discharge instructions/Information to patient and family:   See after visit summary for information provided to patient and family  Provisions for Follow-Up Care:  See after visit summary for information related to follow-up care and any pertinent home health orders  Planned Readmission: no     Discharge Statement:  I spent 45 minutes discharging the patient  This time was spent on the day of discharge  I had direct contact with the patient on the day of discharge  Greater than 50% of the total time was spent examining patient, answering all patient questions, arranging and discussing plan of care with patient as well as directly providing post-discharge instructions  Additional time then spent on discharge activities  Discharge Medications:  See after visit summary for reconciled discharge medications provided to patient and family        ** Please Note: This note has been constructed using a voice recognition system **

## 2018-10-03 NOTE — ASSESSMENT & PLAN NOTE
· Improved on IV Solu-Medrol but weaned down to 40 mg IV Q 12 hours  · Received Xopenex/Atrovent nebs q 6 hours, azithromycin  · Since patient has never been established with pulmonologist and was ill enough to be hospitalized, patient was seen and evaluated by pulmonology while inpatient      · Will need outpatient pulmonology follow-up upon discharge for pulmonary function tests as she may also have some component of tobacco related lung disease rather than straightforward asthma  · Discharge medication includes prednisone taper, azithromycin x5 day course, albuterol nebulizer treatment, albuterol meter dose inhaler, advair

## 2018-10-03 NOTE — ASSESSMENT & PLAN NOTE
· BMI 42 56  · Recommend outpatient assessment for sleep apnea  · Recommend A1c to assess for undiagnosed diabetes  · Follow up with primary care doctor to address

## 2018-10-03 NOTE — SOCIAL WORK
CM provided pt with a [facility] to have her schedule a follow up appointment since we are not able to accept her insurance  Cm also provided pt with a Dr  Note to excuse her from work

## 2018-10-03 NOTE — ASSESSMENT & PLAN NOTE
· Patient does not feel she needs nicotine patches  · Patient states that she has quit smoking as of this admission  · Counseled on cessation    · Offered nicotine patches for discharge however patient declined

## 2018-10-05 LAB
BACTERIA BLD CULT: NORMAL
BACTERIA BLD CULT: NORMAL

## 2019-04-19 ENCOUNTER — HOSPITAL ENCOUNTER (EMERGENCY)
Facility: HOSPITAL | Age: 39
Discharge: HOME | End: 2019-04-19
Attending: EMERGENCY MEDICINE | Admitting: EMERGENCY MEDICINE
Payer: COMMERCIAL

## 2019-04-19 VITALS
OXYGEN SATURATION: 100 % | HEART RATE: 85 BPM | DIASTOLIC BLOOD PRESSURE: 98 MMHG | TEMPERATURE: 98 F | RESPIRATION RATE: 16 BRPM | WEIGHT: 293 LBS | SYSTOLIC BLOOD PRESSURE: 163 MMHG | BODY MASS INDEX: 42.43 KG/M2

## 2019-04-19 DIAGNOSIS — N39.0 UTI (URINARY TRACT INFECTION): Primary | ICD-10-CM

## 2019-04-19 LAB
BACTERIA UR QL AUTO: ABNORMAL /HPF
BILIRUB UR QL STRIP: NEGATIVE
CLARITY UR: ABNORMAL
CLARITY, POC: NORMAL
COLOR UR: YELLOW
COLOR, POC: YELLOW
EXT PREG TEST URINE: NEGATIVE
GLUCOSE UR STRIP-MCNC: NEGATIVE MG/DL
HGB UR QL STRIP.AUTO: ABNORMAL
KETONES UR STRIP-MCNC: NEGATIVE MG/DL
LEUKOCYTE ESTERASE UR QL STRIP: ABNORMAL
NITRITE UR QL STRIP: POSITIVE
NON-SQ EPI CELLS URNS QL MICRO: ABNORMAL /HPF
PH UR STRIP.AUTO: 6 [PH] (ref 4.5–8)
PROT UR STRIP-MCNC: ABNORMAL MG/DL
RBC #/AREA URNS AUTO: ABNORMAL /HPF
SP GR UR STRIP.AUTO: >=1.03 (ref 1–1.03)
UROBILINOGEN UR QL STRIP.AUTO: 0.2 E.U./DL
WBC #/AREA URNS AUTO: ABNORMAL /HPF

## 2019-04-19 PROCEDURE — 99283 EMERGENCY DEPT VISIT LOW MDM: CPT | Performed by: EMERGENCY MEDICINE

## 2019-04-19 PROCEDURE — 87077 CULTURE AEROBIC IDENTIFY: CPT

## 2019-04-19 PROCEDURE — 81025 URINE PREGNANCY TEST: CPT | Performed by: EMERGENCY MEDICINE

## 2019-04-19 PROCEDURE — 87186 SC STD MICRODIL/AGAR DIL: CPT

## 2019-04-19 PROCEDURE — 87086 URINE CULTURE/COLONY COUNT: CPT

## 2019-04-19 PROCEDURE — 81001 URINALYSIS AUTO W/SCOPE: CPT

## 2019-04-19 PROCEDURE — 99283 EMERGENCY DEPT VISIT LOW MDM: CPT

## 2019-04-19 RX ORDER — CEPHALEXIN 500 MG/1
500 CAPSULE ORAL 4 TIMES DAILY
Qty: 40 CAPSULE | Refills: 0 | Status: SHIPPED | OUTPATIENT
Start: 2019-04-19 | End: 2019-04-29

## 2019-04-19 RX ORDER — PHENAZOPYRIDINE HYDROCHLORIDE 100 MG/1
100 TABLET, FILM COATED ORAL ONCE
Status: COMPLETED | OUTPATIENT
Start: 2019-04-19 | End: 2019-04-19

## 2019-04-19 RX ORDER — CEPHALEXIN 250 MG/1
500 CAPSULE ORAL ONCE
Status: COMPLETED | OUTPATIENT
Start: 2019-04-19 | End: 2019-04-19

## 2019-04-19 RX ORDER — PHENAZOPYRIDINE HYDROCHLORIDE 200 MG/1
200 TABLET, FILM COATED ORAL 3 TIMES DAILY
Qty: 6 TABLET | Refills: 0 | Status: SHIPPED | OUTPATIENT
Start: 2019-04-19

## 2019-04-19 RX ADMIN — CEPHALEXIN 500 MG: 250 CAPSULE ORAL at 01:30

## 2019-04-19 RX ADMIN — PHENAZOPYRIDINE HYDROCHLORIDE 100 MG: 100 TABLET ORAL at 01:31

## 2019-04-21 LAB — BACTERIA UR CULT: ABNORMAL

## 2020-10-19 ENCOUNTER — HOSPITAL ENCOUNTER (EMERGENCY)
Facility: HOSPITAL | Age: 40
Discharge: HOME | End: 2020-10-19
Attending: EMERGENCY MEDICINE
Payer: COMMERCIAL

## 2020-10-19 VITALS
SYSTOLIC BLOOD PRESSURE: 156 MMHG | WEIGHT: 293 LBS | OXYGEN SATURATION: 100 % | TEMPERATURE: 98.8 F | RESPIRATION RATE: 18 BRPM | BODY MASS INDEX: 44.89 KG/M2 | HEART RATE: 79 BPM | DIASTOLIC BLOOD PRESSURE: 86 MMHG

## 2020-10-19 DIAGNOSIS — R51.9 ACUTE HEADACHE: Primary | ICD-10-CM

## 2020-10-19 DIAGNOSIS — R03.0 ELEVATED BLOOD PRESSURE READING: ICD-10-CM

## 2020-10-19 PROCEDURE — 96374 THER/PROPH/DIAG INJ IV PUSH: CPT

## 2020-10-19 PROCEDURE — 99284 EMERGENCY DEPT VISIT MOD MDM: CPT | Performed by: EMERGENCY MEDICINE

## 2020-10-19 PROCEDURE — 99283 EMERGENCY DEPT VISIT LOW MDM: CPT

## 2020-10-19 PROCEDURE — 96375 TX/PRO/DX INJ NEW DRUG ADDON: CPT

## 2020-10-19 RX ORDER — METOCLOPRAMIDE HYDROCHLORIDE 5 MG/ML
10 INJECTION INTRAMUSCULAR; INTRAVENOUS ONCE
Status: COMPLETED | OUTPATIENT
Start: 2020-10-19 | End: 2020-10-19

## 2020-10-19 RX ORDER — KETOROLAC TROMETHAMINE 30 MG/ML
15 INJECTION, SOLUTION INTRAMUSCULAR; INTRAVENOUS ONCE
Status: COMPLETED | OUTPATIENT
Start: 2020-10-19 | End: 2020-10-19

## 2020-10-19 RX ADMIN — KETOROLAC TROMETHAMINE 15 MG: 30 INJECTION, SOLUTION INTRAMUSCULAR at 18:47

## 2020-10-19 RX ADMIN — METOCLOPRAMIDE 10 MG: 5 INJECTION, SOLUTION INTRAMUSCULAR; INTRAVENOUS at 18:48

## 2021-10-10 ENCOUNTER — APPOINTMENT (OUTPATIENT)
Dept: CT IMAGING | Facility: HOSPITAL | Age: 41
End: 2021-10-10
Payer: COMMERCIAL

## 2021-10-10 ENCOUNTER — HOSPITAL ENCOUNTER (OUTPATIENT)
Facility: HOSPITAL | Age: 41
Setting detail: OBSERVATION
Discharge: HOME | End: 2021-10-11
Attending: EMERGENCY MEDICINE | Admitting: STUDENT IN AN ORGANIZED HEALTH CARE EDUCATION/TRAINING PROGRAM
Payer: COMMERCIAL

## 2021-10-10 ENCOUNTER — APPOINTMENT (EMERGENCY)
Dept: RADIOLOGY | Facility: HOSPITAL | Age: 41
End: 2021-10-10
Payer: COMMERCIAL

## 2021-10-10 DIAGNOSIS — I10 PRIMARY HYPERTENSION: ICD-10-CM

## 2021-10-10 DIAGNOSIS — J45.51 SEVERE PERSISTENT ASTHMA WITH EXACERBATION: ICD-10-CM

## 2021-10-10 DIAGNOSIS — J45.901 ASTHMA EXACERBATION: Primary | ICD-10-CM

## 2021-10-10 LAB
ALBUMIN SERPL BCP-MCNC: 4.2 G/DL (ref 3–5.2)
ALP SERPL-CCNC: 71 U/L (ref 43–122)
ALT SERPL W P-5'-P-CCNC: 20 U/L
ANION GAP SERPL CALCULATED.3IONS-SCNC: 5 MMOL/L (ref 5–14)
AST SERPL W P-5'-P-CCNC: 27 U/L (ref 14–36)
BASOPHILS # BLD AUTO: 0 THOUSANDS/ΜL (ref 0–0.1)
BASOPHILS NFR BLD AUTO: 1 % (ref 0–1)
BILIRUB SERPL-MCNC: 0.44 MG/DL
BUN SERPL-MCNC: 6 MG/DL (ref 5–25)
CALCIUM SERPL-MCNC: 9.2 MG/DL (ref 8.4–10.2)
CHLORIDE SERPL-SCNC: 105 MMOL/L (ref 97–108)
CO2 SERPL-SCNC: 29 MMOL/L (ref 22–30)
CREAT SERPL-MCNC: 0.71 MG/DL (ref 0.6–1.2)
EOSINOPHIL # BLD AUTO: 0.4 THOUSAND/ΜL (ref 0–0.4)
EOSINOPHIL NFR BLD AUTO: 5 % (ref 0–6)
ERYTHROCYTE [DISTWIDTH] IN BLOOD BY AUTOMATED COUNT: 15.3 %
GFR SERPL CREATININE-BSD FRML MDRD: 121 ML/MIN/1.73SQ M
GLUCOSE SERPL-MCNC: 131 MG/DL (ref 70–99)
HCT VFR BLD AUTO: 37.3 % (ref 36–46)
HGB BLD-MCNC: 12.4 G/DL (ref 12–16)
LYMPHOCYTES # BLD AUTO: 1.5 THOUSANDS/ΜL (ref 0.5–4)
LYMPHOCYTES NFR BLD AUTO: 20 % (ref 25–45)
MAGNESIUM SERPL-MCNC: 1.9 MG/DL (ref 1.6–2.3)
MCH RBC QN AUTO: 30.5 PG (ref 26–34)
MCHC RBC AUTO-ENTMCNC: 33.3 G/DL (ref 31–36)
MCV RBC AUTO: 92 FL (ref 80–100)
MONOCYTES # BLD AUTO: 0.4 THOUSAND/ΜL (ref 0.2–0.9)
MONOCYTES NFR BLD AUTO: 5 % (ref 1–10)
NEUTROPHILS # BLD AUTO: 5.3 THOUSANDS/ΜL (ref 1.8–7.8)
NEUTS SEG NFR BLD AUTO: 70 % (ref 45–65)
NT-PROBNP SERPL-MCNC: 50.2 PG/ML (ref 0–299)
PLATELET # BLD AUTO: 356 THOUSANDS/UL (ref 150–450)
PMV BLD AUTO: 7.8 FL (ref 8.9–12.7)
POTASSIUM SERPL-SCNC: 3.5 MMOL/L (ref 3.6–5)
PROT SERPL-MCNC: 7.9 G/DL (ref 5.9–8.4)
RBC # BLD AUTO: 4.07 MILLION/UL (ref 4–5.2)
SARS-COV-2 RNA RESP QL NAA+PROBE: NEGATIVE
SODIUM SERPL-SCNC: 139 MMOL/L (ref 137–147)
TROPONIN I SERPL-MCNC: <0.01 NG/ML (ref 0–0.03)
WBC # BLD AUTO: 7.6 THOUSAND/UL (ref 4.5–11)

## 2021-10-10 PROCEDURE — 71045 X-RAY EXAM CHEST 1 VIEW: CPT

## 2021-10-10 PROCEDURE — 94760 N-INVAS EAR/PLS OXIMETRY 1: CPT

## 2021-10-10 PROCEDURE — 99285 EMERGENCY DEPT VISIT HI MDM: CPT | Performed by: EMERGENCY MEDICINE

## 2021-10-10 PROCEDURE — 94640 AIRWAY INHALATION TREATMENT: CPT

## 2021-10-10 PROCEDURE — 36415 COLL VENOUS BLD VENIPUNCTURE: CPT | Performed by: EMERGENCY MEDICINE

## 2021-10-10 PROCEDURE — 93005 ELECTROCARDIOGRAM TRACING: CPT

## 2021-10-10 PROCEDURE — 96365 THER/PROPH/DIAG IV INF INIT: CPT

## 2021-10-10 PROCEDURE — 83735 ASSAY OF MAGNESIUM: CPT | Performed by: EMERGENCY MEDICINE

## 2021-10-10 PROCEDURE — 85025 COMPLETE CBC W/AUTO DIFF WBC: CPT | Performed by: EMERGENCY MEDICINE

## 2021-10-10 PROCEDURE — 84484 ASSAY OF TROPONIN QUANT: CPT | Performed by: EMERGENCY MEDICINE

## 2021-10-10 PROCEDURE — U0005 INFEC AGEN DETEC AMPLI PROBE: HCPCS | Performed by: EMERGENCY MEDICINE

## 2021-10-10 PROCEDURE — 80053 COMPREHEN METABOLIC PANEL: CPT | Performed by: EMERGENCY MEDICINE

## 2021-10-10 PROCEDURE — 99285 EMERGENCY DEPT VISIT HI MDM: CPT

## 2021-10-10 PROCEDURE — 83880 ASSAY OF NATRIURETIC PEPTIDE: CPT | Performed by: EMERGENCY MEDICINE

## 2021-10-10 PROCEDURE — 71275 CT ANGIOGRAPHY CHEST: CPT

## 2021-10-10 PROCEDURE — U0003 INFECTIOUS AGENT DETECTION BY NUCLEIC ACID (DNA OR RNA); SEVERE ACUTE RESPIRATORY SYNDROME CORONAVIRUS 2 (SARS-COV-2) (CORONAVIRUS DISEASE [COVID-19]), AMPLIFIED PROBE TECHNIQUE, MAKING USE OF HIGH THROUGHPUT TECHNOLOGIES AS DESCRIBED BY CMS-2020-01-R: HCPCS | Performed by: EMERGENCY MEDICINE

## 2021-10-10 PROCEDURE — 74174 CTA ABD&PLVS W/CONTRAST: CPT

## 2021-10-10 PROCEDURE — 99219 PR INITIAL OBSERVATION CARE/DAY 50 MINUTES: CPT | Performed by: STUDENT IN AN ORGANIZED HEALTH CARE EDUCATION/TRAINING PROGRAM

## 2021-10-10 RX ORDER — MAGNESIUM SULFATE HEPTAHYDRATE 40 MG/ML
2 INJECTION, SOLUTION INTRAVENOUS ONCE
Status: COMPLETED | OUTPATIENT
Start: 2021-10-10 | End: 2021-10-10

## 2021-10-10 RX ORDER — ALBUTEROL SULFATE 90 UG/1
2 AEROSOL, METERED RESPIRATORY (INHALATION) EVERY 6 HOURS PRN
Status: DISCONTINUED | OUTPATIENT
Start: 2021-10-10 | End: 2021-10-11 | Stop reason: HOSPADM

## 2021-10-10 RX ORDER — ACETAMINOPHEN 325 MG/1
650 TABLET ORAL EVERY 6 HOURS PRN
Status: DISCONTINUED | OUTPATIENT
Start: 2021-10-10 | End: 2021-10-11 | Stop reason: HOSPADM

## 2021-10-10 RX ORDER — IBUPROFEN 400 MG/1
400 TABLET ORAL EVERY 6 HOURS PRN
Status: DISCONTINUED | OUTPATIENT
Start: 2021-10-10 | End: 2021-10-11 | Stop reason: HOSPADM

## 2021-10-10 RX ORDER — LEVALBUTEROL 1.25 MG/.5ML
1.25 SOLUTION, CONCENTRATE RESPIRATORY (INHALATION)
Status: DISCONTINUED | OUTPATIENT
Start: 2021-10-10 | End: 2021-10-10

## 2021-10-10 RX ORDER — HYDRALAZINE HYDROCHLORIDE 20 MG/ML
10 INJECTION INTRAMUSCULAR; INTRAVENOUS EVERY 6 HOURS PRN
Status: DISCONTINUED | OUTPATIENT
Start: 2021-10-10 | End: 2021-10-11 | Stop reason: HOSPADM

## 2021-10-10 RX ORDER — METHYLPREDNISOLONE SODIUM SUCCINATE 125 MG/2ML
80 INJECTION, POWDER, LYOPHILIZED, FOR SOLUTION INTRAMUSCULAR; INTRAVENOUS ONCE
Status: COMPLETED | OUTPATIENT
Start: 2021-10-10 | End: 2021-10-10

## 2021-10-10 RX ORDER — MAGNESIUM SULFATE 1 G/100ML
1 INJECTION INTRAVENOUS ONCE
Status: COMPLETED | OUTPATIENT
Start: 2021-10-10 | End: 2021-10-10

## 2021-10-10 RX ORDER — IPRATROPIUM BROMIDE AND ALBUTEROL SULFATE 2.5; .5 MG/3ML; MG/3ML
3 SOLUTION RESPIRATORY (INHALATION)
Status: DISCONTINUED | OUTPATIENT
Start: 2021-10-10 | End: 2021-10-11

## 2021-10-10 RX ORDER — LABETALOL 20 MG/4 ML (5 MG/ML) INTRAVENOUS SYRINGE
10 EVERY 4 HOURS PRN
Status: DISCONTINUED | OUTPATIENT
Start: 2021-10-10 | End: 2021-10-10

## 2021-10-10 RX ORDER — PHENAZOPYRIDINE HYDROCHLORIDE 100 MG/1
200 TABLET, FILM COATED ORAL 3 TIMES DAILY
Status: DISCONTINUED | OUTPATIENT
Start: 2021-10-10 | End: 2021-10-11 | Stop reason: HOSPADM

## 2021-10-10 RX ORDER — IPRATROPIUM BROMIDE AND ALBUTEROL SULFATE 2.5; .5 MG/3ML; MG/3ML
SOLUTION RESPIRATORY (INHALATION)
Status: COMPLETED
Start: 2021-10-10 | End: 2021-10-10

## 2021-10-10 RX ORDER — IPRATROPIUM BROMIDE AND ALBUTEROL SULFATE 2.5; .5 MG/3ML; MG/3ML
3 SOLUTION RESPIRATORY (INHALATION)
Status: DISCONTINUED | OUTPATIENT
Start: 2021-10-10 | End: 2021-10-10

## 2021-10-10 RX ORDER — PREDNISONE 20 MG/1
40 TABLET ORAL DAILY
Status: DISCONTINUED | OUTPATIENT
Start: 2021-10-11 | End: 2021-10-11 | Stop reason: HOSPADM

## 2021-10-10 RX ORDER — NICOTINE 21 MG/24HR
1 PATCH, TRANSDERMAL 24 HOURS TRANSDERMAL DAILY
Status: DISCONTINUED | OUTPATIENT
Start: 2021-10-11 | End: 2021-10-11 | Stop reason: HOSPADM

## 2021-10-10 RX ORDER — EPINEPHRINE 1 MG/ML
0.3 INJECTION, SOLUTION, CONCENTRATE INTRAVENOUS ONCE
Status: COMPLETED | OUTPATIENT
Start: 2021-10-10 | End: 2021-10-10

## 2021-10-10 RX ORDER — SODIUM CHLORIDE FOR INHALATION 0.9 %
12 VIAL, NEBULIZER (ML) INHALATION ONCE
Status: COMPLETED | OUTPATIENT
Start: 2021-10-10 | End: 2021-10-10

## 2021-10-10 RX ADMIN — DEXAMETHASONE SODIUM PHOSPHATE 10 MG: 10 INJECTION, SOLUTION INTRAMUSCULAR; INTRAVENOUS at 11:41

## 2021-10-10 RX ADMIN — IPRATROPIUM BROMIDE AND ALBUTEROL SULFATE 3 ML: .5; 3 SOLUTION RESPIRATORY (INHALATION) at 19:22

## 2021-10-10 RX ADMIN — MAGNESIUM SULFATE IN WATER 2 G: 40 INJECTION, SOLUTION INTRAVENOUS at 17:26

## 2021-10-10 RX ADMIN — IPRATROPIUM BROMIDE 1 MG: 0.5 SOLUTION RESPIRATORY (INHALATION) at 11:42

## 2021-10-10 RX ADMIN — ALBUTEROL SULFATE 10 MG: 2.5 SOLUTION RESPIRATORY (INHALATION) at 11:42

## 2021-10-10 RX ADMIN — EPINEPHRINE 0.3 MG: 1 INJECTION, SOLUTION, CONCENTRATE INTRAVENOUS at 17:21

## 2021-10-10 RX ADMIN — IPRATROPIUM BROMIDE 1 MG: 0.5 SOLUTION RESPIRATORY (INHALATION) at 16:10

## 2021-10-10 RX ADMIN — MAGNESIUM SULFATE 1 G: 1 INJECTION INTRAVENOUS at 12:30

## 2021-10-10 RX ADMIN — ALBUTEROL SULFATE 10 MG: 2.5 SOLUTION RESPIRATORY (INHALATION) at 16:10

## 2021-10-10 RX ADMIN — ENOXAPARIN SODIUM 40 MG: 40 INJECTION SUBCUTANEOUS at 18:43

## 2021-10-10 RX ADMIN — ISODIUM CHLORIDE 12 ML: 0.03 SOLUTION RESPIRATORY (INHALATION) at 16:10

## 2021-10-10 RX ADMIN — METHYLPREDNISOLONE SODIUM SUCCINATE 80 MG: 125 INJECTION, POWDER, FOR SOLUTION INTRAMUSCULAR; INTRAVENOUS at 16:09

## 2021-10-10 RX ADMIN — IBUPROFEN 400 MG: 400 TABLET ORAL at 23:06

## 2021-10-10 RX ADMIN — ACETAMINOPHEN 650 MG: 325 TABLET ORAL at 19:55

## 2021-10-10 RX ADMIN — ISODIUM CHLORIDE 12 ML: 0.03 SOLUTION RESPIRATORY (INHALATION) at 11:42

## 2021-10-10 RX ADMIN — IOHEXOL 125 ML: 350 INJECTION, SOLUTION INTRAVENOUS at 17:02

## 2021-10-11 VITALS
OXYGEN SATURATION: 98 % | BODY MASS INDEX: 48.82 KG/M2 | HEIGHT: 65 IN | RESPIRATION RATE: 18 BRPM | DIASTOLIC BLOOD PRESSURE: 90 MMHG | WEIGHT: 293 LBS | TEMPERATURE: 96.7 F | HEART RATE: 85 BPM | SYSTOLIC BLOOD PRESSURE: 140 MMHG

## 2021-10-11 LAB
ATRIAL RATE: 102 BPM
ATRIAL RATE: 103 BPM
P AXIS: 66 DEGREES
P AXIS: 68 DEGREES
PR INTERVAL: 134 MS
PR INTERVAL: 134 MS
QRS AXIS: 1 DEGREES
QRS AXIS: 7 DEGREES
QRSD INTERVAL: 102 MS
QRSD INTERVAL: 98 MS
QT INTERVAL: 370 MS
QT INTERVAL: 376 MS
QTC INTERVAL: 482 MS
QTC INTERVAL: 492 MS
T WAVE AXIS: 42 DEGREES
T WAVE AXIS: 68 DEGREES
VENTRICULAR RATE: 102 BPM
VENTRICULAR RATE: 103 BPM

## 2021-10-11 PROCEDURE — 94640 AIRWAY INHALATION TREATMENT: CPT

## 2021-10-11 PROCEDURE — 93010 ELECTROCARDIOGRAM REPORT: CPT

## 2021-10-11 PROCEDURE — 94760 N-INVAS EAR/PLS OXIMETRY 1: CPT

## 2021-10-11 PROCEDURE — 94664 DEMO&/EVAL PT USE INHALER: CPT

## 2021-10-11 PROCEDURE — 94150 VITAL CAPACITY TEST: CPT

## 2021-10-11 PROCEDURE — 99217 PR OBSERVATION CARE DISCHARGE MANAGEMENT: CPT | Performed by: INTERNAL MEDICINE

## 2021-10-11 RX ORDER — LEVALBUTEROL 1.25 MG/.5ML
1.25 SOLUTION, CONCENTRATE RESPIRATORY (INHALATION)
Status: DISCONTINUED | OUTPATIENT
Start: 2021-10-11 | End: 2021-10-11 | Stop reason: HOSPADM

## 2021-10-11 RX ORDER — BENZONATATE 200 MG/1
200 CAPSULE ORAL 3 TIMES DAILY PRN
Qty: 30 CAPSULE | Refills: 0 | Status: SHIPPED | OUTPATIENT
Start: 2021-10-11

## 2021-10-11 RX ORDER — IPRATROPIUM BROMIDE AND ALBUTEROL SULFATE 2.5; .5 MG/3ML; MG/3ML
3 SOLUTION RESPIRATORY (INHALATION) EVERY 4 HOURS PRN
Status: DISCONTINUED | OUTPATIENT
Start: 2021-10-11 | End: 2021-10-11 | Stop reason: HOSPADM

## 2021-10-11 RX ORDER — AMLODIPINE BESYLATE 10 MG/1
10 TABLET ORAL DAILY
Qty: 60 TABLET | Refills: 1 | Status: SHIPPED | OUTPATIENT
Start: 2021-10-11

## 2021-10-11 RX ORDER — ALBUTEROL SULFATE 90 UG/1
2 AEROSOL, METERED RESPIRATORY (INHALATION) EVERY 6 HOURS PRN
Qty: 18 G | Refills: 2 | Status: SHIPPED | OUTPATIENT
Start: 2021-10-11

## 2021-10-11 RX ORDER — POTASSIUM CHLORIDE 20 MEQ/1
40 TABLET, EXTENDED RELEASE ORAL ONCE
Status: COMPLETED | OUTPATIENT
Start: 2021-10-11 | End: 2021-10-11

## 2021-10-11 RX ORDER — PREDNISONE 20 MG/1
40 TABLET ORAL DAILY
Qty: 10 TABLET | Refills: 0 | Status: SHIPPED | OUTPATIENT
Start: 2021-10-11 | End: 2021-10-16

## 2021-10-11 RX ORDER — ALBUTEROL SULFATE 2.5 MG/3ML
2.5 SOLUTION RESPIRATORY (INHALATION) EVERY 6 HOURS PRN
Qty: 75 ML | Refills: 2 | Status: SHIPPED | OUTPATIENT
Start: 2021-10-11

## 2021-10-11 RX ADMIN — ENOXAPARIN SODIUM 40 MG: 40 INJECTION SUBCUTANEOUS at 08:30

## 2021-10-11 RX ADMIN — PREDNISONE 40 MG: 20 TABLET ORAL at 08:30

## 2021-10-11 RX ADMIN — IBUPROFEN 400 MG: 400 TABLET ORAL at 08:29

## 2021-10-11 RX ADMIN — HYDRALAZINE HYDROCHLORIDE 10 MG: 20 INJECTION INTRAMUSCULAR; INTRAVENOUS at 09:13

## 2021-10-11 RX ADMIN — ACETAMINOPHEN 650 MG: 325 TABLET ORAL at 11:45

## 2021-10-11 RX ADMIN — POTASSIUM CHLORIDE 40 MEQ: 1500 TABLET, EXTENDED RELEASE ORAL at 08:30

## 2021-10-11 RX ADMIN — PHENAZOPYRIDINE HYDROCHLORIDE 200 MG: 100 TABLET ORAL at 08:29

## 2021-10-11 RX ADMIN — IPRATROPIUM BROMIDE AND ALBUTEROL SULFATE 3 ML: .5; 3 SOLUTION RESPIRATORY (INHALATION) at 07:08

## 2021-10-11 RX ADMIN — IPRATROPIUM BROMIDE AND ALBUTEROL SULFATE 3 ML: .5; 3 SOLUTION RESPIRATORY (INHALATION) at 00:50

## 2022-04-12 ENCOUNTER — HOSPITAL ENCOUNTER (EMERGENCY)
Facility: HOSPITAL | Age: 42
Discharge: HOME | End: 2022-04-12
Attending: EMERGENCY MEDICINE
Payer: COMMERCIAL

## 2022-04-12 VITALS
HEART RATE: 83 BPM | TEMPERATURE: 97.9 F | WEIGHT: 293 LBS | BODY MASS INDEX: 51.8 KG/M2 | DIASTOLIC BLOOD PRESSURE: 80 MMHG | OXYGEN SATURATION: 99 % | RESPIRATION RATE: 20 BRPM | SYSTOLIC BLOOD PRESSURE: 144 MMHG

## 2022-04-12 DIAGNOSIS — E86.0 DEHYDRATION: ICD-10-CM

## 2022-04-12 DIAGNOSIS — E11.9 DIABETES (HCC): Primary | ICD-10-CM

## 2022-04-12 DIAGNOSIS — N39.0 UTI (URINARY TRACT INFECTION): ICD-10-CM

## 2022-04-12 LAB
ALBUMIN SERPL BCP-MCNC: 4.3 G/DL (ref 3–5.2)
ALP SERPL-CCNC: 89 U/L (ref 43–122)
ALT SERPL W P-5'-P-CCNC: 16 U/L
ANION GAP SERPL CALCULATED.3IONS-SCNC: 9 MMOL/L (ref 5–14)
AST SERPL W P-5'-P-CCNC: 20 U/L (ref 14–36)
ATRIAL RATE: 87 BPM
B-HCG SERPL-ACNC: <3 MIU/ML
BACTERIA UR QL AUTO: ABNORMAL /HPF
BASE EX.OXY STD BLDV CALC-SCNC: 38.1 % (ref 60–80)
BASE EXCESS BLDV CALC-SCNC: -0.2 MMOL/L
BILIRUB SERPL-MCNC: 0.54 MG/DL
BILIRUB UR QL STRIP: NEGATIVE
BUN SERPL-MCNC: 10 MG/DL (ref 5–25)
CALCIUM SERPL-MCNC: 9.3 MG/DL (ref 8.4–10.2)
CARDIAC TROPONIN I PNL SERPL HS: 4 NG/L
CHLORIDE SERPL-SCNC: 99 MMOL/L (ref 97–108)
CLARITY UR: ABNORMAL
CO2 SERPL-SCNC: 28 MMOL/L (ref 22–30)
COLOR UR: ABNORMAL
CREAT SERPL-MCNC: 0.69 MG/DL (ref 0.6–1.2)
ERYTHROCYTE [DISTWIDTH] IN BLOOD BY AUTOMATED COUNT: 12.8 % (ref 11.6–15.1)
EST. AVERAGE GLUCOSE BLD GHB EST-MCNC: 240 MG/DL
EXT PREG TEST URINE: NEGATIVE
EXT. CONTROL ED NAV: NORMAL
GFR SERPL CREATININE-BSD FRML MDRD: 106 ML/MIN/1.73SQ M
GLUCOSE SERPL-MCNC: 345 MG/DL (ref 65–140)
GLUCOSE SERPL-MCNC: 414 MG/DL (ref 70–99)
GLUCOSE SERPL-MCNC: 415 MG/DL (ref 65–140)
GLUCOSE UR STRIP-MCNC: ABNORMAL MG/DL
HBA1C MFR BLD: 10 %
HCO3 BLDV-SCNC: 26.3 MMOL/L (ref 24–30)
HCT VFR BLD AUTO: 40.1 % (ref 34.8–46.1)
HGB BLD-MCNC: 12.7 G/DL (ref 11.5–15.4)
HGB UR QL STRIP.AUTO: 25
KETONES UR STRIP-MCNC: ABNORMAL MG/DL
LEUKOCYTE ESTERASE UR QL STRIP: 100
LIPASE SERPL-CCNC: 200 U/L (ref 23–300)
MCH RBC QN AUTO: 29.8 PG (ref 26.8–34.3)
MCHC RBC AUTO-ENTMCNC: 31.7 G/DL (ref 31.4–37.4)
MCV RBC AUTO: 94 FL (ref 82–98)
NITRITE UR QL STRIP: POSITIVE
NON-SQ EPI CELLS URNS QL MICRO: ABNORMAL /HPF
O2 CT BLDV-SCNC: 7.1 ML/DL
P AXIS: 61 DEGREES
PCO2 BLDV: 50.4 MM HG (ref 42–50)
PH BLDV: 7.33 [PH] (ref 7.3–7.4)
PH UR STRIP.AUTO: 5 [PH]
PLATELET # BLD AUTO: 333 THOUSANDS/UL (ref 149–390)
PMV BLD AUTO: 9.8 FL (ref 8.9–12.7)
PO2 BLDV: 25.1 MM HG (ref 35–45)
POTASSIUM SERPL-SCNC: 3.8 MMOL/L (ref 3.6–5)
PR INTERVAL: 136 MS
PROT SERPL-MCNC: 8.3 G/DL (ref 5.9–8.4)
PROT UR STRIP-MCNC: NEGATIVE MG/DL
QRS AXIS: 6 DEGREES
QRSD INTERVAL: 104 MS
QT INTERVAL: 396 MS
QTC INTERVAL: 476 MS
RBC # BLD AUTO: 4.26 MILLION/UL (ref 3.81–5.12)
RBC #/AREA URNS AUTO: ABNORMAL /HPF
SODIUM SERPL-SCNC: 136 MMOL/L (ref 137–147)
SP GR UR STRIP.AUTO: 1.02 (ref 1–1.04)
T WAVE AXIS: 39 DEGREES
UROBILINOGEN UA: NEGATIVE MG/DL
VENTRICULAR RATE: 87 BPM
WBC # BLD AUTO: 6.36 THOUSAND/UL (ref 4.31–10.16)
WBC #/AREA URNS AUTO: ABNORMAL /HPF

## 2022-04-12 PROCEDURE — 87660 TRICHOMONAS VAGIN DIR PROBE: CPT

## 2022-04-12 PROCEDURE — 93005 ELECTROCARDIOGRAM TRACING: CPT

## 2022-04-12 PROCEDURE — 93010 ELECTROCARDIOGRAM REPORT: CPT | Performed by: INTERNAL MEDICINE

## 2022-04-12 PROCEDURE — 96360 HYDRATION IV INFUSION INIT: CPT

## 2022-04-12 PROCEDURE — 99284 EMERGENCY DEPT VISIT MOD MDM: CPT

## 2022-04-12 PROCEDURE — 82948 REAGENT STRIP/BLOOD GLUCOSE: CPT

## 2022-04-12 PROCEDURE — 96361 HYDRATE IV INFUSION ADD-ON: CPT

## 2022-04-12 PROCEDURE — 84702 CHORIONIC GONADOTROPIN TEST: CPT

## 2022-04-12 PROCEDURE — 36415 COLL VENOUS BLD VENIPUNCTURE: CPT

## 2022-04-12 PROCEDURE — 81025 URINE PREGNANCY TEST: CPT

## 2022-04-12 PROCEDURE — 80053 COMPREHEN METABOLIC PANEL: CPT

## 2022-04-12 PROCEDURE — 83690 ASSAY OF LIPASE: CPT

## 2022-04-12 PROCEDURE — 81001 URINALYSIS AUTO W/SCOPE: CPT

## 2022-04-12 PROCEDURE — 81003 URINALYSIS AUTO W/O SCOPE: CPT

## 2022-04-12 PROCEDURE — 84484 ASSAY OF TROPONIN QUANT: CPT

## 2022-04-12 PROCEDURE — 87480 CANDIDA DNA DIR PROBE: CPT

## 2022-04-12 PROCEDURE — 83036 HEMOGLOBIN GLYCOSYLATED A1C: CPT

## 2022-04-12 PROCEDURE — 85027 COMPLETE CBC AUTOMATED: CPT

## 2022-04-12 PROCEDURE — 82805 BLOOD GASES W/O2 SATURATION: CPT

## 2022-04-12 PROCEDURE — 87510 GARDNER VAG DNA DIR PROBE: CPT

## 2022-04-12 RX ORDER — SODIUM CHLORIDE 9 MG/ML
500 INJECTION, SOLUTION INTRAVENOUS CONTINUOUS
Status: DISCONTINUED | OUTPATIENT
Start: 2022-04-12 | End: 2022-04-12 | Stop reason: HOSPADM

## 2022-04-12 RX ORDER — SODIUM CHLORIDE 9 MG/ML
3 INJECTION INTRAVENOUS
Status: DISCONTINUED | OUTPATIENT
Start: 2022-04-12 | End: 2022-04-12 | Stop reason: HOSPADM

## 2022-04-12 RX ORDER — SODIUM CHLORIDE 9 MG/ML
250 INJECTION, SOLUTION INTRAVENOUS CONTINUOUS
Status: DISCONTINUED | OUTPATIENT
Start: 2022-04-12 | End: 2022-04-12 | Stop reason: HOSPADM

## 2022-04-12 RX ORDER — CEPHALEXIN 500 MG/1
500 CAPSULE ORAL ONCE
Status: COMPLETED | OUTPATIENT
Start: 2022-04-12 | End: 2022-04-12

## 2022-04-12 RX ORDER — CEPHALEXIN 500 MG/1
500 CAPSULE ORAL EVERY 12 HOURS SCHEDULED
Qty: 14 CAPSULE | Refills: 0 | Status: SHIPPED | OUTPATIENT
Start: 2022-04-12 | End: 2022-04-12 | Stop reason: SDUPTHER

## 2022-04-12 RX ORDER — SODIUM CHLORIDE 9 MG/ML
2000 INJECTION, SOLUTION INTRAVENOUS CONTINUOUS
Status: DISPENSED | OUTPATIENT
Start: 2022-04-12 | End: 2022-04-12

## 2022-04-12 RX ORDER — CEPHALEXIN 500 MG/1
500 CAPSULE ORAL EVERY 12 HOURS SCHEDULED
Qty: 14 CAPSULE | Refills: 0 | Status: SHIPPED | OUTPATIENT
Start: 2022-04-12 | End: 2022-04-19

## 2022-04-12 RX ORDER — FLUCONAZOLE 200 MG/1
200 TABLET ORAL ONCE
Qty: 1 TABLET | Refills: 0 | Status: SHIPPED | OUTPATIENT
Start: 2022-04-12 | End: 2022-04-12

## 2022-04-12 RX ADMIN — SODIUM CHLORIDE 250 ML/HR: 0.9 INJECTION, SOLUTION INTRAVENOUS at 14:03

## 2022-04-12 RX ADMIN — CEPHALEXIN 500 MG: 500 CAPSULE ORAL at 12:21

## 2022-04-12 RX ADMIN — SODIUM CHLORIDE 500 ML/HR: 0.9 INJECTION, SOLUTION INTRAVENOUS at 11:40

## 2022-04-12 RX ADMIN — SODIUM CHLORIDE 250 ML/HR: 0.9 INJECTION, SOLUTION INTRAVENOUS at 12:43

## 2022-04-12 RX ADMIN — SODIUM CHLORIDE 2000 ML/HR: 0.9 INJECTION, SOLUTION INTRAVENOUS at 10:33

## 2022-04-12 NOTE — ED PROVIDER NOTES
History  Chief Complaint   Patient presents with    Dizziness     Pt c/o dizziness, frequent urination, constant thirst, vaginal itching, and nausea without vomiting  Blood sugar in triage is 415  Patient is a 39-year-old female coming in for complaint of dizziness, frequent urination, constant thirst, vaginal itching, nausea  Patient states that she is not diabetic, however she does have family members who are  Patient was able to walk into the emergency department with no issues  Patient has no chest pain, shortness of breath      History provided by:  Patient   used: No    Dizziness  Quality:  Lightheadedness  Severity:  Mild  Timing:  Constant  Chronicity:  New  Associated symptoms: no chest pain, no diarrhea, no headaches, no shortness of breath, no vomiting and no weakness        Prior to Admission Medications   Prescriptions Last Dose Informant Patient Reported? Taking?    albuterol (2 5 mg/3 mL) 0 083 % nebulizer solution Unknown at Unknown time  No No   Sig: Take 3 mL (2 5 mg total) by nebulization every 6 (six) hours as needed for wheezing or shortness of breath   Patient not taking: Reported on 4/12/2022    albuterol (PROVENTIL HFA,VENTOLIN HFA) 90 mcg/act inhaler Unknown at Unknown time  No No   Sig: Inhale 2 puffs every 6 (six) hours as needed for wheezing or shortness of breath   Patient not taking: Reported on 4/12/2022    amLODIPine (NORVASC) 10 mg tablet 4/11/2022 at Unknown time  No Yes   Sig: Take 1 tablet (10 mg total) by mouth daily   benzonatate (TESSALON) 200 MG capsule Not Taking at Unknown time  No No   Sig: Take 1 capsule (200 mg total) by mouth 3 (three) times a day as needed for cough   Patient not taking: Reported on 4/12/2022    phenazopyridine (PYRIDIUM) 200 mg tablet Not Taking at Unknown time  No No   Sig: Take 1 tablet (200 mg total) by mouth 3 (three) times a day   Patient not taking: Reported on 4/12/2022       Facility-Administered Medications: None Past Medical History:   Diagnosis Date    Asthma     Dental abscess     Hypertension        Past Surgical History:   Procedure Laterality Date    CHOLECYSTECTOMY      ECTOPIC PREGNANCY SURGERY      INCISION AND DRAINAGE INTRA ORAL ABSCESS Left 3/21/2016    Procedure: INCISION AND DRAINAGE  (I&D) WOUND ORAL Left  Space;  Surgeon: Orville Acosta MD;  Location: [facility];  Service:     MULTIPLE TOOTH EXTRACTIONS N/A 3/21/2016    Procedure: EXTRACTION TEETH MULTIPLE; 17,18,19,11,12,4,5,28,32;  Surgeon: Orville Acosta MD;  Location: [facility];  Service:     SALPINGECTOMY         Family History   Problem Relation Age of Onset    Diabetes Mother     Heart failure Mother     Cancer Neg Hx      I have reviewed and agree with the history as documented  E-Cigarette/Vaping     E-Cigarette/Vaping Substances     Social History     Tobacco Use    Smoking status: Current Every Day Smoker     Packs/day: 0 50     Years: 10 00     Pack years: 5 00     Types: Cigarettes    Smokeless tobacco: Never Used    Tobacco comment: PT was educated on smoking and the effect is has on her asthma  Substance Use Topics    Alcohol use: Yes     Comment: Social    Drug use: No       Review of Systems   Constitutional: Negative  Negative for activity change, appetite change, fatigue and fever  HENT: Negative  Negative for ear pain  Eyes: Negative for pain, redness and visual disturbance  Respiratory: Negative  Negative for chest tightness and shortness of breath  Cardiovascular: Negative  Negative for chest pain  Gastrointestinal: Negative  Negative for abdominal pain, diarrhea and vomiting  Endocrine: Positive for polydipsia and polyuria  Genitourinary: Negative  Negative for difficulty urinating and dysuria  Vaginal itching   Musculoskeletal: Negative  Skin: Negative  Neurological: Positive for dizziness  Negative for weakness and headaches     Psychiatric/Behavioral: Negative  Physical Exam  Physical Exam  Vitals reviewed  Constitutional:       Appearance: Normal appearance  She is normal weight  HENT:      Head: Normocephalic and atraumatic  Right Ear: External ear normal       Left Ear: External ear normal       Nose: Nose normal    Eyes:      General:         Right eye: No discharge  Left eye: No discharge  Conjunctiva/sclera: Conjunctivae normal    Cardiovascular:      Rate and Rhythm: Normal rate  Pulses: Normal pulses  Pulmonary:      Effort: Pulmonary effort is normal    Abdominal:      Palpations: Abdomen is soft  Tenderness: There is no abdominal tenderness  There is no guarding  Musculoskeletal:         General: Normal range of motion  Cervical back: Normal range of motion  Skin:     General: Skin is warm and dry  Neurological:      Mental Status: She is alert           Vital Signs  ED Triage Vitals   Temperature Pulse Respirations Blood Pressure SpO2   04/12/22 1016 04/12/22 1016 04/12/22 1016 04/12/22 1016 04/12/22 1016   97 9 °F (36 6 °C) 95 20 146/84 98 %      Temp Source Heart Rate Source Patient Position - Orthostatic VS BP Location FiO2 (%)   04/12/22 1016 04/12/22 1016 04/12/22 1016 04/12/22 1016 --   Oral Monitor Lying Left arm       Pain Score       04/12/22 1100       No Pain           Vitals:    04/12/22 1130 04/12/22 1200 04/12/22 1230 04/12/22 1315   BP:  133/84 144/80    Pulse: 88 84 86 83   Patient Position - Orthostatic VS:  Lying Lying          Visual Acuity      ED Medications  Medications   sodium chloride (PF) 0 9 % injection 3 mL (has no administration in time range)   sodium chloride 0 9 % infusion (0 mL/hr Intravenous Stopped 4/12/22 1219)     Followed by   sodium chloride 0 9 % infusion (0 mL/hr Intravenous Stopped 4/12/22 1242)     Followed by   sodium chloride 0 9 % infusion (0 mL/hr Intravenous Stopped 4/12/22 1417)   cephalexin (KEFLEX) capsule 500 mg (500 mg Oral Given 4/12/22 1221) Diagnostic Studies  Results Reviewed     Procedure Component Value Units Date/Time    Fingerstick Glucose (POCT) [[id number]]  (Abnormal) Collected: 04/12/22 1403    Lab Status: Final result Updated: 04/12/22 1405     POC Glucose 345 mg/dl     VAGINOSIS DNA PROBE (AFFIRM) [[id number]] Collected: 04/12/22 1257    Lab Status:  In process Specimen: Genital from Vaginal Updated: 04/12/22 1311    HS Troponin 0hr (reflex protocol) [[id number]]  (Normal) Collected: 04/12/22 1031    Lab Status: Final result Specimen: Blood from Arm, Right Updated: 04/12/22 1151     hs TnI 0hr 4 ng/L     pregnancy hCG, quantitative [[id number]]  (Normal) Collected: 04/12/22 1031    Lab Status: Final result Specimen: Blood from Arm, Right Updated: 04/12/22 1112     HCG, Quant <3 mIU/mL     Narrative:       Expected Ranges:     Approximate               Approximate HCG  Gestation age          Concentration ( mIU/mL)  _____________          ______________________   [place]                      HCG values  0 2-1                       5-50  1-2                           2-3                         100-5000  3-4                         500-95713  4-5                         1000-67804  5-6                         [id number]  6-8                         49985-186132  8-12                        94484-361590      Urine Microscopic [[id number]]  (Abnormal) Collected: 04/12/22 1037    Lab Status: Final result Specimen: Urine, Clean Catch Updated: 04/12/22 1101     RBC, UA 0-1 /hpf      WBC, UA 2-4 /hpf      Epithelial Cells Moderate /hpf      Bacteria, UA Moderate /hpf     Lipase [[id number]]  (Normal) Collected: 04/12/22 1031    Lab Status: Final result Specimen: Blood from Arm, Right Updated: 04/12/22 1056     Lipase 200 u/L     Comprehensive metabolic panel [[id number]]  (Abnormal) Collected: 04/12/22 1031    Lab Status: Final result Specimen: Blood from Arm, Right Updated: 04/12/22 1055     Sodium 136 mmol/L      Potassium 3 8 mmol/L Chloride 99 mmol/L      CO2 28 mmol/L      ANION GAP 9 mmol/L      BUN 10 mg/dL      Creatinine 0 69 mg/dL      Glucose 414 mg/dL      Calcium 9 3 mg/dL      AST 20 U/L      ALT 16 U/L      Alkaline Phosphatase 89 U/L      Total Protein 8 3 g/dL      Albumin 4 3 g/dL      Total Bilirubin 0 54 mg/dL      eGFR 106 ml/min/1 73sq m     Narrative:      [place] guidelines for Chronic Kidney Disease (CKD):     Stage 1 with normal or high GFR (GFR > 90 mL/min/1 73 square meters)    Stage 2 Mild CKD (GFR = 60-89 mL/min/1 73 square meters)    Stage 3A Moderate CKD (GFR = 45-59 mL/min/1 73 square meters)    Stage 3B Moderate CKD (GFR = 30-44 mL/min/1 73 square meters)    Stage 4 Severe CKD (GFR = 15-29 mL/min/1 73 square meters)    Stage 5 End Stage CKD (GFR <15 mL/min/1 73 square meters)  Note: GFR calculation is accurate only with a steady state creatinine    UA w Reflex to Microscopic w Reflex to Culture [[id number]]  (Abnormal) Collected: 04/12/22 1037    Lab Status: Final result Specimen: Urine, Clean Catch Updated: 04/12/22 1053     Color, UA Straw     Clarity, UA Slightly Cloudy     Specific [place], UA 1 020     pH, UA 5 0     Leukocytes,  0     Nitrite, UA Positive     Protein, UA Negative mg/dl      Glucose, UA >=1000 (1%) mg/dl      Ketones, UA 5 (Trace) mg/dl      Bilirubin, UA Negative     Blood, UA 25 0     UROBILINOGEN UA Negative mg/dL     Blood gas, venous [[id number]]  (Abnormal) Collected: 04/12/22 1031    Lab Status: Final result Specimen: Blood from Arm, Right Updated: 04/12/22 1047     pH, [place] 7 335     pCO2, [place] 50 4 mm Hg      pO2, [place] 25 1 mm Hg      HCO3, [place] 26 3 mmol/L      Base Excess, [place] -0 2 mmol/L      O2 Content, [place] 7 1 ml/dL      O2 HGB, VENOUS 38 1 %     CBC [[id number]]  (Normal) Collected: 04/12/22 1031    Lab Status: Final result Specimen: Blood from Arm, Right Updated: 04/12/22 1044     WBC 6 36 Thousand/uL      RBC 4 26 Million/uL      Hemoglobin 12 7 g/dL      Hematocrit 40 1 %      MCV 94 fL      MCH 29 8 pg      MCHC 31 7 g/dL      RDW 12 8 %      Platelets 771 Thousands/uL      MPV 9 8 fL     Hemoglobin A1c w/EAG Estimation [[id number]] Collected: 04/12/22 1031    Lab Status: In process Specimen: Blood from Arm, Right Updated: 04/12/22 1041    POCT pregnancy, urine [[id number]]  (Normal) Resulted: 04/12/22 1039    Lab Status: Final result Specimen: Urine Updated: 04/12/22 1040     EXT PREG TEST UR (Ref: Negative) negative     Control valid    Fingerstick Glucose (POCT) [[id number]]  (Abnormal) Collected: 04/12/22 1013    Lab Status: Final result Updated: 04/12/22 1015     POC Glucose 415 mg/dl                  No orders to display              Procedures  Procedures         ED Course  ED Course as of 04/12/22 1430   Tue Apr 12, 2022   1102 Bacteria, UA(!): Moderate   1102 Nitrite, UA(!): Positive   1119 HCG QUANTITATIVE: <3   1149 Ventricular rate 87 beats per minute  KS interval 136 milliseconds  QRS duration 104 milliseconds  QT//476 milliseconds  PRT axes 61, 6, 39,    ECG interpretation-"normal sinus rhythm, possible left atrial enlargement, incomplete right bundle-branch block, cannot rule rule out anterior infarct, age undetermined, abnormal ECG"     1152 hs TnI 0hr: 4   1314 Patient states she feels roughly the same                               SBIRT 20yo+      Most Recent Value   SBIRT (22 yo +)    In order to provide better care to our patients, we are screening all of our patients for alcohol and drug use  Would it be okay to ask you these screening questions? No Filed at: 04/12/2022 1019                    MDM  Number of Diagnoses or Management Options  Dehydration: new and does not require workup  Diabetes [facility]): new and requires workup  UTI (urinary tract infection): new and does not require workup  Diagnosis management comments: Patient presents with polyuria polydipsia over last days as well as dizziness    Patient did have an elevated glucose on exam, EKG was normal, troponin is less than 5, heart score less than 3  Patient no acute distress, dizziness most likely due to dehydration from diabetes  Patient was given almost 3 L emergency department and discharged    Counseling: I had a detailed discussion with the patient and/or guardian regarding: the historical points, exam findings, and any diagnostic results supporting the discharge diagnosis, lab results, radiology results, discharge instructions reviewed with patient and/or family/caregiver and understanding was verbalized  Instructions given to return to the emergency department if symptoms worsen or persist, or if there are any questions or concerns that arise at home       All labs reviewed and utilized in the medical decision making process     All radiology studies independently viewed by me and interpreted by the radiologist     Portions of the record may have been created with voice recognition software   Occasional wrong word or "sound a like" substitutions may have occurred due to the inherent limitations of voice recognition software   Read the chart carefully and recognize, using context, where substitutions have occurred           Amount and/or Complexity of Data Reviewed  Clinical lab tests: ordered and reviewed    Risk of Complications, Morbidity, and/or Mortality  Presenting problems: low  Diagnostic procedures: minimal  Management options: minimal    Patient Progress  Patient progress: stable      Disposition  Final diagnoses:   Diabetes ([facility] [email or web address] )   Dehydration   UTI (urinary tract infection)     Time reflects when diagnosis was documented in both MDM as applicable and the Disposition within this note     Time User Action Codes Description Comment    4/12/2022  2:05 PM Franciso Stain Add [E11 9] Diabetes ([address] )     4/12/2022  2:05 PM Franciso Stain Add [E86 0] Dehydration     4/12/2022  2:06 PM Franciso Stain Add [N39 0] UTI (urinary tract infection)       ED Disposition ED Disposition Condition Date/Time Comment    Discharge Stable Tue Apr 12, 2022  2:05 PM Shashank Cornell discharge to home/self care  Follow-up Information     Follow up With Specialties Details Why Contact Info Additional Information    Edith Mondragon MD Family Medicine   [facility] [address]  [address]  [place]  [address]  [phone]  [address] [facility] Emergency Medicine  As needed, If symptoms worsen [address] [phone] [address] [facility] Emergency Department          Discharge Medication List as of 4/12/2022  2:08 PM      START taking these medications    Details   cephalexin (KEFLEX) 500 mg capsule Take 1 capsule (500 mg total) by mouth every 12 (twelve) hours for 7 days, Starting Tue 4/12/2022, Until Tue 4/19/2022, Normal         CONTINUE these medications which have NOT CHANGED    Details   amLODIPine (NORVASC) 10 mg tablet Take 1 tablet (10 mg total) by mouth daily, Starting Mon 10/11/2021, Normal      albuterol (2 5 mg/3 mL) 0 083 % nebulizer solution Take 3 mL (2 5 mg total) by nebulization every 6 (six) hours as needed for wheezing or shortness of breath, Starting Mon 10/11/2021, Normal      albuterol (PROVENTIL HFA,VENTOLIN HFA) 90 mcg/act inhaler Inhale 2 puffs every 6 (six) hours as needed for wheezing or shortness of breath, Starting Mon 10/11/2021, Normal      benzonatate (TESSALON) 200 MG capsule Take 1 capsule (200 mg total) by mouth 3 (three) times a day as needed for cough, Starting Mon 10/11/2021, Normal      phenazopyridine (PYRIDIUM) 200 mg tablet Take 1 tablet (200 mg total) by mouth 3 (three) times a day, Starting Fri 4/19/2019, Print             No discharge procedures on file      PDMP Review     None          ED Provider  Electronically Signed by           Artie Ferrara PA-C  04/12/22 8930

## 2022-04-12 NOTE — Clinical Note
Eva Ca was seen and treated in our emergency department on 4/12/2022  No restrictions            Diagnosis:     Janine    She may return on this date: If you have any questions or concerns, please don't hesitate to call        Sheree Cerda PA-C    ______________________________           _______________          _______________  Hospital Representative                              Date                                Time

## 2022-04-13 LAB
CANDIDA RRNA VAG QL PROBE: NEGATIVE
G VAGINALIS RRNA GENITAL QL PROBE: POSITIVE
T VAGINALIS RRNA GENITAL QL PROBE: POSITIVE

## 2022-10-19 ENCOUNTER — HOSPITAL ENCOUNTER (EMERGENCY)
Facility: HOSPITAL | Age: 42
Discharge: HOME | End: 2022-10-19
Attending: EMERGENCY MEDICINE
Payer: COMMERCIAL

## 2022-10-19 VITALS
TEMPERATURE: 99.9 F | RESPIRATION RATE: 18 BRPM | BODY MASS INDEX: 52.61 KG/M2 | WEIGHT: 293 LBS | SYSTOLIC BLOOD PRESSURE: 143 MMHG | OXYGEN SATURATION: 99 % | DIASTOLIC BLOOD PRESSURE: 73 MMHG | HEART RATE: 96 BPM

## 2022-10-19 DIAGNOSIS — K04.7 DENTAL ABSCESS: Primary | ICD-10-CM

## 2022-10-19 PROBLEM — E78.00 HIGH CHOLESTEROL: Status: ACTIVE | Noted: 2022-10-19

## 2022-10-19 PROCEDURE — 99282 EMERGENCY DEPT VISIT SF MDM: CPT

## 2022-10-19 PROCEDURE — 99284 EMERGENCY DEPT VISIT MOD MDM: CPT

## 2022-10-19 RX ORDER — AMOXICILLIN AND CLAVULANATE POTASSIUM 875; 125 MG/1; MG/1
1 TABLET, FILM COATED ORAL EVERY 12 HOURS
Qty: 14 TABLET | Refills: 0 | Status: SHIPPED | OUTPATIENT
Start: 2022-10-19 | End: 2022-10-19 | Stop reason: SDUPTHER

## 2022-10-19 RX ORDER — AMOXICILLIN AND CLAVULANATE POTASSIUM 875; 125 MG/1; MG/1
1 TABLET, FILM COATED ORAL ONCE
Status: COMPLETED | OUTPATIENT
Start: 2022-10-19 | End: 2022-10-19

## 2022-10-19 RX ORDER — AMOXICILLIN AND CLAVULANATE POTASSIUM 875; 125 MG/1; MG/1
1 TABLET, FILM COATED ORAL EVERY 12 HOURS
Qty: 14 TABLET | Refills: 0 | Status: SHIPPED | OUTPATIENT
Start: 2022-10-19 | End: 2022-10-26

## 2022-10-19 RX ADMIN — AMOXICILLIN AND CLAVULANATE POTASSIUM 1 TABLET: 875; 125 TABLET, FILM COATED ORAL at 20:21

## 2022-10-19 NOTE — Clinical Note
Elizabeth Peraza was seen and treated in our emergency department on 10/19/2022  No restrictions            Diagnosis:     Janine    She may return on this date: If you have any questions or concerns, please don't hesitate to call        Rehan Delaney PA-C    ______________________________           _______________          _______________  Hospital Representative                              Date                                Time

## 2022-10-20 NOTE — ED PROVIDER NOTES
History  Chief Complaint   Patient presents with   • Abscess     Pt states they have an abscess on left side of mouth for 3 days      Patient is a 49-year-old female coming in for complaint of abscess on the left side of her mouth last 3 days, patient does not currently see a dentist, does have some facial swelling and tenderness  No nausea, fever, or any other systemic symptoms at this time      History provided by:  Patient   used: No    Abscess  Location:  Mouth  Mouth abscess location:  L inner cheek  Size:  2 cm  Abscess quality: painful and redness    Abscess quality: not draining and no fluctuance    Duration:  3 days  Progression:  Worsening  Chronicity:  New  Associated symptoms: no anorexia, no fatigue, no fever, no headaches, no nausea and no vomiting        Prior to Admission Medications   Prescriptions Last Dose Informant Patient Reported? Taking?    albuterol (2 5 mg/3 mL) 0 083 % nebulizer solution Not Taking at Unknown time  No No   Sig: Take 3 mL (2 5 mg total) by nebulization every 6 (six) hours as needed for wheezing or shortness of breath   Patient not taking: No sig reported   albuterol (PROVENTIL HFA,VENTOLIN HFA) 90 mcg/act inhaler Not Taking at Unknown time  No No   Sig: Inhale 2 puffs every 6 (six) hours as needed for wheezing or shortness of breath   Patient not taking: No sig reported   amLODIPine (NORVASC) 10 mg tablet Unknown at Unknown time  No No   Sig: Take 1 tablet (10 mg total) by mouth daily   benzonatate (TESSALON) 200 MG capsule Not Taking at Unknown time  No No   Sig: Take 1 capsule (200 mg total) by mouth 3 (three) times a day as needed for cough   Patient not taking: No sig reported   phenazopyridine (PYRIDIUM) 200 mg tablet Not Taking at Unknown time  No No   Sig: Take 1 tablet (200 mg total) by mouth 3 (three) times a day   Patient not taking: No sig reported      Facility-Administered Medications: None       Past Medical History:   Diagnosis Date   • Asthma    • Dental abscess    • Diabetes mellitus ([facility] [address] )    • High cholesterol    • Hypertension        Past Surgical History:   Procedure Laterality Date   • CHOLECYSTECTOMY     • ECTOPIC PREGNANCY SURGERY     • INCISION AND DRAINAGE INTRA ORAL ABSCESS Left 3/21/2016    Procedure: INCISION AND DRAINAGE  (I&D) WOUND ORAL Left  Space;  Surgeon: Yoandy Blood MD;  Location: [facility];  Service:    • MULTIPLE TOOTH EXTRACTIONS N/A 3/21/2016    Procedure: EXTRACTION TEETH MULTIPLE; 17,18,19,11,12,4,5,28,32;  Surgeon: Yoandy Blood MD;  Location: [facility];  Service:    • SALPINGECTOMY         Family History   Problem Relation Age of Onset   • Diabetes Mother    • Heart failure Mother    • Cancer Neg Hx      I have reviewed and agree with the history as documented  E-Cigarette/Vaping     E-Cigarette/Vaping Substances     Social History     Tobacco Use   • Smoking status: Current Every Day Smoker     Packs/day: 0 50     Years: 10 00     Pack years: 5 00     Types: Cigarettes   • Smokeless tobacco: Never Used   • Tobacco comment: PT was educated on smoking and the effect is has on her asthma  Substance Use Topics   • Alcohol use: Yes     Comment: Social   • Drug use: No       Review of Systems   Constitutional: Negative  Negative for chills, fatigue and fever  HENT: Positive for dental problem  Negative for drooling and ear discharge  Eyes: Negative  Respiratory: Negative  Cardiovascular: Negative  Gastrointestinal: Negative  Negative for anorexia, nausea and vomiting  Genitourinary: Negative  Musculoskeletal: Negative  Skin: Negative  Neurological: Negative  Negative for headaches  Psychiatric/Behavioral: Negative  Physical Exam  Physical Exam  Vitals reviewed  Constitutional:       Appearance: Normal appearance  She is normal weight  HENT:      Head: Normocephalic and atraumatic        Right Ear: External ear normal       Left Ear: External ear normal  Nose: Nose normal       Mouth/Throat:      Dentition: Gingival swelling, dental caries and dental abscesses present  Comments: Patient does have dental caries, in the above indicated to, patient does have swelling on the lateral side of the gum, as well as some erythema  No discernable drainable abscess at this time  Eyes:      Conjunctiva/sclera: Conjunctivae normal    Cardiovascular:      Rate and Rhythm: Normal rate  Pulmonary:      Effort: Pulmonary effort is normal    Musculoskeletal:         General: Normal range of motion  Cervical back: Normal range of motion  Skin:     General: Skin is warm and dry  Neurological:      Mental Status: She is alert  Vital Signs  ED Triage Vitals [10/19/22 1958]   Temperature Pulse Respirations Blood Pressure SpO2   99 9 °F (37 7 °C) 96 18 143/73 99 %      Temp Source Heart Rate Source Patient Position - Orthostatic VS BP Location FiO2 (%)   Tympanic Monitor Sitting Left arm --      Pain Score       --           Vitals:    10/19/22 1958   BP: 143/73   Pulse: 96   Patient Position - Orthostatic VS: Sitting         Visual Acuity      ED Medications  Medications   amoxicillin-clavulanate (AUGMENTIN) 875-125 mg per tablet 1 tablet (1 tablet Oral Given 10/19/22 2021)       Diagnostic Studies  Results Reviewed     None                 No orders to display              Procedures  Procedures         ED Course                               SBIRT 20yo+    Flowsheet Row Most Recent Value   SBIRT (25 yo +)    In order to provide better care to our patients, we are screening all of our patients for alcohol and drug use  Would it be okay to ask you these screening questions? No Filed at: 10/19/2022 2023                    MDM  Number of Diagnoses or Management Options  Dental abscess: new and does not require workup  Diagnosis management comments: Patient comes in for evaluation of a dental abscess for the last 3 days  Patient is in no acute distress    No systemic symptoms  Patient started on Augmentin, discharged home    Counseling: I had a detailed discussion with the patient and/or guardian regarding: the historical points, exam findings, and any diagnostic results supporting the discharge diagnosis, lab results, radiology results, discharge instructions reviewed with patient and/or family/caregiver and understanding was verbalized  Instructions given to return to the emergency department if symptoms worsen or persist, or if there are any questions or concerns that arise at home       All labs reviewed and utilized in the medical decision making process     All radiology studies independently viewed by me and interpreted by the radiologist     Portions of the record may have been created with voice recognition software   Occasional wrong word or "sound a like" substitutions may have occurred due to the inherent limitations of voice recognition software   Read the chart carefully and recognize, using context, where substitutions have occurred  Risk of Complications, Morbidity, and/or Mortality  Presenting problems: minimal  Diagnostic procedures: minimal  Management options: minimal    Patient Progress  Patient progress: stable      Disposition  Final diagnoses:   Dental abscess     Time reflects when diagnosis was documented in both MDM as applicable and the Disposition within this note     Time User Action Codes Description Comment    10/19/2022  8:18 PM Ginette Saint Add [K04 7] Dental abscess       ED Disposition     ED Disposition   Discharge    Condition   Stable    Date/Time   Wed Oct 19, 2022  8:18 PM    Comment   Zofia Julien discharge to home/self care                 Follow-up Information     Follow up With Specialties Details Why Contact Info Additional 200 Khris Mullen MD Family Medicine   [address] [place] [address]  Marin Arreola   [phone]  [phone]  [facility] Emergency Department Emergency Medicine  As needed, If symptoms worsen [address] [phone] [address] [facility] Emergency Department          Discharge Medication List as of 10/19/2022  8:19 PM      START taking these medications    Details   amoxicillin-clavulanate (AUGMENTIN) 875-125 mg per tablet Take 1 tablet by mouth every 12 (twelve) hours for 7 days, Starting Wed 10/19/2022, Until Wed 10/26/2022, Normal         CONTINUE these medications which have NOT CHANGED    Details   albuterol (2 5 mg/3 mL) 0 083 % nebulizer solution Take 3 mL (2 5 mg total) by nebulization every 6 (six) hours as needed for wheezing or shortness of breath, Starting Mon 10/11/2021, Normal      albuterol (PROVENTIL HFA,VENTOLIN HFA) 90 mcg/act inhaler Inhale 2 puffs every 6 (six) hours as needed for wheezing or shortness of breath, Starting Mon 10/11/2021, Normal      amLODIPine (NORVASC) 10 mg tablet Take 1 tablet (10 mg total) by mouth daily, Starting Mon 10/11/2021, Normal      benzonatate (TESSALON) 200 MG capsule Take 1 capsule (200 mg total) by mouth 3 (three) times a day as needed for cough, Starting Mon 10/11/2021, Normal      phenazopyridine (PYRIDIUM) 200 mg tablet Take 1 tablet (200 mg total) by mouth 3 (three) times a day, Starting Fri 4/19/2019, Print             No discharge procedures on file      PDMP Review     None          ED Provider  Electronically Signed by           Adithya Granados PA-C  10/19/22 2490

## 2022-10-20 NOTE — ED ATTENDING ATTESTATION
I was the attending physician on duty at the time the patient visited the emergency department  The patient was evaluated by the Advanced Practitioner  I was personally available for consultation; however the patient’s care, medical decisions and disposition fell within the Advanced Practitioner’s scope of practice to independently manage the patient, unless otherwise noted      Do Arrieta MD

## 2022-11-25 ENCOUNTER — HOSPITAL ENCOUNTER (INPATIENT)
Facility: HOSPITAL | Age: 42
LOS: 6 days | Discharge: HOME | End: 2022-12-01
Attending: EMERGENCY MEDICINE | Admitting: INTERNAL MEDICINE

## 2022-11-25 ENCOUNTER — APPOINTMENT (EMERGENCY)
Dept: RADIOLOGY | Facility: HOSPITAL | Age: 42
End: 2022-11-25

## 2022-11-25 DIAGNOSIS — R06.2 WHEEZING: ICD-10-CM

## 2022-11-25 DIAGNOSIS — E11.9 TYPE 2 DIABETES MELLITUS, WITHOUT LONG-TERM CURRENT USE OF INSULIN (HCC): ICD-10-CM

## 2022-11-25 DIAGNOSIS — J45.901 ACUTE ASTHMA EXACERBATION: Primary | ICD-10-CM

## 2022-11-25 DIAGNOSIS — R06.00 DYSPNEA: ICD-10-CM

## 2022-11-25 LAB
ANION GAP SERPL CALCULATED.3IONS-SCNC: 10 MMOL/L (ref 4–13)
ATRIAL RATE: 104 BPM
BASE EX.OXY STD BLDV CALC-SCNC: 96.3 % (ref 60–80)
BASE EXCESS BLDV CALC-SCNC: -2.8 MMOL/L
BASOPHILS # BLD AUTO: 0.03 THOUSANDS/ÂΜL (ref 0–0.1)
BASOPHILS NFR BLD AUTO: 0 % (ref 0–1)
BUN SERPL-MCNC: 7 MG/DL (ref 5–25)
CALCIUM SERPL-MCNC: 9.4 MG/DL (ref 8.3–10.1)
CARDIAC TROPONIN I PNL SERPL HS: 2 NG/L
CHLORIDE SERPL-SCNC: 99 MMOL/L (ref 96–108)
CO2 SERPL-SCNC: 28 MMOL/L (ref 21–32)
CREAT SERPL-MCNC: 0.67 MG/DL (ref 0.6–1.3)
EOSINOPHIL # BLD AUTO: 0.19 THOUSAND/ÂΜL (ref 0–0.61)
EOSINOPHIL NFR BLD AUTO: 3 % (ref 0–6)
ERYTHROCYTE [DISTWIDTH] IN BLOOD BY AUTOMATED COUNT: 13.4 % (ref 11.6–15.1)
FLUAV RNA RESP QL NAA+PROBE: NEGATIVE
FLUBV RNA RESP QL NAA+PROBE: NEGATIVE
GFR SERPL CREATININE-BSD FRML MDRD: 107 ML/MIN/1.73SQ M
GLUCOSE SERPL-MCNC: 285 MG/DL (ref 65–140)
GLUCOSE SERPL-MCNC: 343 MG/DL (ref 65–140)
HCO3 BLDV-SCNC: 20.6 MMOL/L (ref 24–30)
HCT VFR BLD AUTO: 38 % (ref 34.8–46.1)
HGB BLD-MCNC: 12.5 G/DL (ref 11.5–15.4)
IMM GRANULOCYTES # BLD AUTO: 0.02 THOUSAND/UL (ref 0–0.2)
IMM GRANULOCYTES NFR BLD AUTO: 0 % (ref 0–2)
LYMPHOCYTES # BLD AUTO: 2.09 THOUSANDS/ÂΜL (ref 0.6–4.47)
LYMPHOCYTES NFR BLD AUTO: 30 % (ref 14–44)
MCH RBC QN AUTO: 30.4 PG (ref 26.8–34.3)
MCHC RBC AUTO-ENTMCNC: 32.9 G/DL (ref 31.4–37.4)
MCV RBC AUTO: 93 FL (ref 82–98)
MONOCYTES # BLD AUTO: 0.25 THOUSAND/ÂΜL (ref 0.17–1.22)
MONOCYTES NFR BLD AUTO: 4 % (ref 4–12)
NEUTROPHILS # BLD AUTO: 4.35 THOUSANDS/ÂΜL (ref 1.85–7.62)
NEUTS SEG NFR BLD AUTO: 63 % (ref 43–75)
NRBC BLD AUTO-RTO: 0 /100 WBCS
O2 CT BLDV-SCNC: 17.1 ML/DL
P AXIS: 72 DEGREES
PCO2 BLDV: 31.4 MM HG (ref 42–50)
PH BLDV: 7.43 [PH] (ref 7.3–7.4)
PLATELET # BLD AUTO: 286 THOUSANDS/UL (ref 149–390)
PMV BLD AUTO: 10.3 FL (ref 8.9–12.7)
PO2 BLDV: 125.2 MM HG (ref 35–45)
POTASSIUM SERPL-SCNC: 3.4 MMOL/L (ref 3.5–5.3)
PR INTERVAL: 124 MS
QRS AXIS: 34 DEGREES
QRSD INTERVAL: 98 MS
QT INTERVAL: 368 MS
QTC INTERVAL: 483 MS
RBC # BLD AUTO: 4.11 MILLION/UL (ref 3.81–5.12)
RSV RNA RESP QL NAA+PROBE: NEGATIVE
SARS-COV-2 RNA RESP QL NAA+PROBE: NEGATIVE
SODIUM SERPL-SCNC: 137 MMOL/L (ref 135–147)
T WAVE AXIS: 43 DEGREES
VENTRICULAR RATE: 104 BPM
WBC # BLD AUTO: 6.93 THOUSAND/UL (ref 4.31–10.16)

## 2022-11-25 PROCEDURE — 5A09357 ASSISTANCE WITH RESPIRATORY VENTILATION, LESS THAN 24 CONSECUTIVE HOURS, CONTINUOUS POSITIVE AIRWAY PRESSURE: ICD-10-PCS | Performed by: INTERNAL MEDICINE

## 2022-11-25 RX ORDER — IPRATROPIUM BROMIDE AND ALBUTEROL SULFATE 2.5; .5 MG/3ML; MG/3ML
SOLUTION RESPIRATORY (INHALATION)
COMMUNITY
Start: 2022-06-09

## 2022-11-25 RX ORDER — INSULIN GLARGINE 100 [IU]/ML
10 INJECTION, SOLUTION SUBCUTANEOUS
Status: DISCONTINUED | OUTPATIENT
Start: 2022-11-25 | End: 2022-11-26

## 2022-11-25 RX ORDER — METHYLPREDNISOLONE SOD SUCC 125 MG
1 VIAL (EA) INJECTION ONCE
Status: COMPLETED | OUTPATIENT
Start: 2022-11-25 | End: 2022-11-25

## 2022-11-25 RX ORDER — SODIUM CHLORIDE FOR INHALATION 0.9 %
3 VIAL, NEBULIZER (ML) INHALATION ONCE
Status: COMPLETED | OUTPATIENT
Start: 2022-11-25 | End: 2022-11-25

## 2022-11-25 RX ORDER — IPRATROPIUM BROMIDE AND ALBUTEROL SULFATE .5; 3 MG/3ML; MG/3ML
2 SOLUTION RESPIRATORY (INHALATION) ONCE
Status: COMPLETED | OUTPATIENT
Start: 2022-11-25 | End: 2022-11-25

## 2022-11-25 RX ORDER — ALBUTEROL SULFATE 2.5 MG/3ML
5 SOLUTION RESPIRATORY (INHALATION) ONCE
Status: COMPLETED | OUTPATIENT
Start: 2022-11-25 | End: 2022-11-25

## 2022-11-25 RX ORDER — INSULIN LISPRO 100 [IU]/ML
2-12 INJECTION, SOLUTION INTRAVENOUS; SUBCUTANEOUS
Status: DISCONTINUED | OUTPATIENT
Start: 2022-11-25 | End: 2022-11-27

## 2022-11-25 RX ORDER — LORAZEPAM 2 MG/ML
0.5 INJECTION INTRAMUSCULAR ONCE
Status: COMPLETED | OUTPATIENT
Start: 2022-11-25 | End: 2022-11-25

## 2022-11-25 RX ORDER — GLIPIZIDE AND METFORMIN HCL 2.5; 5 MG/1; MG/1
2 TABLET, FILM COATED ORAL 2 TIMES DAILY
COMMUNITY
Start: 2022-09-23 | End: 2022-12-22

## 2022-11-25 RX ORDER — MAGNESIUM SULFATE HEPTAHYDRATE 40 MG/ML
2 INJECTION, SOLUTION INTRAVENOUS ONCE
Status: COMPLETED | OUTPATIENT
Start: 2022-11-25 | End: 2022-11-26

## 2022-11-25 RX ORDER — ATORVASTATIN CALCIUM 20 MG/1
20 TABLET, FILM COATED ORAL DAILY
COMMUNITY
Start: 2022-08-03

## 2022-11-25 RX ORDER — ALBUTEROL SULFATE 2.5 MG/3ML
1 SOLUTION RESPIRATORY (INHALATION) ONCE
Status: COMPLETED | OUTPATIENT
Start: 2022-11-25 | End: 2022-11-25

## 2022-11-25 RX ADMIN — INSULIN GLARGINE 10 UNITS: 100 INJECTION, SOLUTION SUBCUTANEOUS at 23:55

## 2022-11-25 RX ADMIN — INSULIN LISPRO 8 UNITS: 100 INJECTION, SOLUTION INTRAVENOUS; SUBCUTANEOUS at 23:55

## 2022-11-25 RX ADMIN — ISODIUM CHLORIDE 3 ML: 0.03 SOLUTION RESPIRATORY (INHALATION) at 20:07

## 2022-11-25 RX ADMIN — IPRATROPIUM BROMIDE 1 MG: 0.5 SOLUTION RESPIRATORY (INHALATION) at 20:30

## 2022-11-25 RX ADMIN — ALBUTEROL SULFATE 10 MG: 2.5 SOLUTION RESPIRATORY (INHALATION) at 19:32

## 2022-11-25 RX ADMIN — LORAZEPAM 0.5 MG: 2 INJECTION INTRAMUSCULAR; INTRAVENOUS at 21:28

## 2022-11-25 RX ADMIN — ALBUTEROL SULFATE 5 MG: 2.5 SOLUTION RESPIRATORY (INHALATION) at 22:41

## 2022-11-25 RX ADMIN — MAGNESIUM SULFATE HEPTAHYDRATE 2 G: 40 INJECTION, SOLUTION INTRAVENOUS at 23:22

## 2022-11-25 RX ADMIN — IPRATROPIUM BROMIDE 0.5 MG: 0.5 SOLUTION RESPIRATORY (INHALATION) at 22:41

## 2022-11-26 LAB
ANION GAP SERPL CALCULATED.3IONS-SCNC: 13 MMOL/L (ref 4–13)
BUN SERPL-MCNC: 9 MG/DL (ref 5–25)
CALCIUM SERPL-MCNC: 9.5 MG/DL (ref 8.3–10.1)
CARDIAC TROPONIN I PNL SERPL HS: <2 NG/L (ref 8–18)
CHLORIDE SERPL-SCNC: 97 MMOL/L (ref 96–108)
CO2 SERPL-SCNC: 24 MMOL/L (ref 21–32)
CREAT SERPL-MCNC: 1.12 MG/DL (ref 0.6–1.3)
ERYTHROCYTE [DISTWIDTH] IN BLOOD BY AUTOMATED COUNT: 13.8 % (ref 11.6–15.1)
GFR SERPL CREATININE-BSD FRML MDRD: 59 ML/MIN/1.73SQ M
GLUCOSE SERPL-MCNC: 346 MG/DL (ref 65–140)
GLUCOSE SERPL-MCNC: 354 MG/DL (ref 65–140)
GLUCOSE SERPL-MCNC: 356 MG/DL (ref 65–140)
GLUCOSE SERPL-MCNC: 476 MG/DL (ref 65–140)
GLUCOSE SERPL-MCNC: 615 MG/DL (ref 65–140)
HCT VFR BLD AUTO: 40.4 % (ref 34.8–46.1)
HGB BLD-MCNC: 12.7 G/DL (ref 11.5–15.4)
MCH RBC QN AUTO: 30.1 PG (ref 26.8–34.3)
MCHC RBC AUTO-ENTMCNC: 31.4 G/DL (ref 31.4–37.4)
MCV RBC AUTO: 96 FL (ref 82–98)
PLATELET # BLD AUTO: 298 THOUSANDS/UL (ref 149–390)
PMV BLD AUTO: 10.4 FL (ref 8.9–12.7)
POTASSIUM SERPL-SCNC: 5 MMOL/L (ref 3.5–5.3)
RBC # BLD AUTO: 4.22 MILLION/UL (ref 3.81–5.12)
SODIUM SERPL-SCNC: 134 MMOL/L (ref 135–147)
WBC # BLD AUTO: 11.72 THOUSAND/UL (ref 4.31–10.16)

## 2022-11-26 RX ORDER — LEVALBUTEROL 1.25 MG/.5ML
1.25 SOLUTION, CONCENTRATE RESPIRATORY (INHALATION)
Status: DISCONTINUED | OUTPATIENT
Start: 2022-11-26 | End: 2022-12-01 | Stop reason: HOSPADM

## 2022-11-26 RX ORDER — GUAIFENESIN 600 MG/1
600 TABLET, EXTENDED RELEASE ORAL EVERY 12 HOURS SCHEDULED
Status: DISCONTINUED | OUTPATIENT
Start: 2022-11-26 | End: 2022-12-01 | Stop reason: HOSPADM

## 2022-11-26 RX ORDER — HYDROXYZINE HYDROCHLORIDE 25 MG/1
25 TABLET, FILM COATED ORAL EVERY 6 HOURS PRN
Status: DISCONTINUED | OUTPATIENT
Start: 2022-11-26 | End: 2022-12-01 | Stop reason: HOSPADM

## 2022-11-26 RX ORDER — ACETAMINOPHEN 325 MG/1
650 TABLET ORAL EVERY 6 HOURS PRN
Status: DISCONTINUED | OUTPATIENT
Start: 2022-11-26 | End: 2022-12-01 | Stop reason: HOSPADM

## 2022-11-26 RX ORDER — FLUTICASONE PROPIONATE 110 UG/1
2 AEROSOL, METERED RESPIRATORY (INHALATION)
Status: DISCONTINUED | OUTPATIENT
Start: 2022-11-26 | End: 2022-12-01 | Stop reason: HOSPADM

## 2022-11-26 RX ORDER — ATORVASTATIN CALCIUM 20 MG/1
20 TABLET, FILM COATED ORAL DAILY
Status: DISCONTINUED | OUTPATIENT
Start: 2022-11-26 | End: 2022-12-01 | Stop reason: HOSPADM

## 2022-11-26 RX ORDER — AMLODIPINE BESYLATE 10 MG/1
10 TABLET ORAL DAILY
Status: DISCONTINUED | OUTPATIENT
Start: 2022-11-26 | End: 2022-12-01 | Stop reason: HOSPADM

## 2022-11-26 RX ORDER — MAGNESIUM HYDROXIDE/ALUMINUM HYDROXICE/SIMETHICONE 120; 1200; 1200 MG/30ML; MG/30ML; MG/30ML
30 SUSPENSION ORAL EVERY 6 HOURS PRN
Status: DISCONTINUED | OUTPATIENT
Start: 2022-11-26 | End: 2022-12-01 | Stop reason: HOSPADM

## 2022-11-26 RX ORDER — INSULIN LISPRO 100 [IU]/ML
5 INJECTION, SOLUTION INTRAVENOUS; SUBCUTANEOUS
Status: DISCONTINUED | OUTPATIENT
Start: 2022-11-26 | End: 2022-11-27

## 2022-11-26 RX ORDER — SODIUM CHLORIDE FOR INHALATION 0.9 %
3 VIAL, NEBULIZER (ML) INHALATION
Status: DISCONTINUED | OUTPATIENT
Start: 2022-11-26 | End: 2022-11-26

## 2022-11-26 RX ORDER — INSULIN LISPRO 100 [IU]/ML
5 INJECTION, SOLUTION INTRAVENOUS; SUBCUTANEOUS ONCE
Status: DISCONTINUED | OUTPATIENT
Start: 2022-11-26 | End: 2022-11-26 | Stop reason: SDUPTHER

## 2022-11-26 RX ORDER — METHYLPREDNISOLONE SODIUM SUCCINATE 40 MG/ML
40 INJECTION, POWDER, LYOPHILIZED, FOR SOLUTION INTRAMUSCULAR; INTRAVENOUS EVERY 6 HOURS SCHEDULED
Status: DISCONTINUED | OUTPATIENT
Start: 2022-11-26 | End: 2022-11-28

## 2022-11-26 RX ORDER — INSULIN GLARGINE 100 [IU]/ML
15 INJECTION, SOLUTION SUBCUTANEOUS
Status: DISCONTINUED | OUTPATIENT
Start: 2022-11-26 | End: 2022-11-27

## 2022-11-26 RX ORDER — LORAZEPAM 2 MG/ML
0.5 INJECTION INTRAMUSCULAR ONCE
Status: COMPLETED | OUTPATIENT
Start: 2022-11-26 | End: 2022-11-26

## 2022-11-26 RX ORDER — ONDANSETRON 2 MG/ML
4 INJECTION INTRAMUSCULAR; INTRAVENOUS EVERY 6 HOURS PRN
Status: DISCONTINUED | OUTPATIENT
Start: 2022-11-26 | End: 2022-12-01 | Stop reason: HOSPADM

## 2022-11-26 RX ORDER — ENOXAPARIN SODIUM 100 MG/ML
40 INJECTION SUBCUTANEOUS DAILY
Status: DISCONTINUED | OUTPATIENT
Start: 2022-11-26 | End: 2022-12-01 | Stop reason: HOSPADM

## 2022-11-26 RX ADMIN — AMLODIPINE BESYLATE 10 MG: 10 TABLET ORAL at 08:05

## 2022-11-26 RX ADMIN — INSULIN LISPRO 8 UNITS: 100 INJECTION, SOLUTION INTRAVENOUS; SUBCUTANEOUS at 12:02

## 2022-11-26 RX ADMIN — HYDROXYZINE HYDROCHLORIDE 25 MG: 25 TABLET ORAL at 21:17

## 2022-11-26 RX ADMIN — METHYLPREDNISOLONE SODIUM SUCCINATE 40 MG: 40 INJECTION, POWDER, FOR SOLUTION INTRAMUSCULAR; INTRAVENOUS at 23:17

## 2022-11-26 RX ADMIN — LEVALBUTEROL 1.25 MG: 1.25 SOLUTION, CONCENTRATE RESPIRATORY (INHALATION) at 16:43

## 2022-11-26 RX ADMIN — IPRATROPIUM BROMIDE 0.5 MG: 0.5 SOLUTION RESPIRATORY (INHALATION) at 20:07

## 2022-11-26 RX ADMIN — IPRATROPIUM BROMIDE 0.5 MG: 0.5 SOLUTION RESPIRATORY (INHALATION) at 01:09

## 2022-11-26 RX ADMIN — INSULIN LISPRO 12 UNITS: 100 INJECTION, SOLUTION INTRAVENOUS; SUBCUTANEOUS at 07:33

## 2022-11-26 RX ADMIN — METHYLPREDNISOLONE SODIUM SUCCINATE 40 MG: 40 INJECTION, POWDER, FOR SOLUTION INTRAMUSCULAR; INTRAVENOUS at 17:02

## 2022-11-26 RX ADMIN — METHYLPREDNISOLONE SODIUM SUCCINATE 40 MG: 40 INJECTION, POWDER, FOR SOLUTION INTRAMUSCULAR; INTRAVENOUS at 12:02

## 2022-11-26 RX ADMIN — ENOXAPARIN SODIUM 40 MG: 40 INJECTION SUBCUTANEOUS at 08:05

## 2022-11-26 RX ADMIN — LORAZEPAM 0.5 MG: 2 INJECTION INTRAMUSCULAR; INTRAVENOUS at 01:45

## 2022-11-26 RX ADMIN — FLUTICASONE PROPIONATE 2 PUFF: 110 AEROSOL, METERED RESPIRATORY (INHALATION) at 21:20

## 2022-11-26 RX ADMIN — INSULIN LISPRO 5 UNITS: 100 INJECTION, SOLUTION INTRAVENOUS; SUBCUTANEOUS at 12:02

## 2022-11-26 RX ADMIN — ATORVASTATIN CALCIUM 20 MG: 20 TABLET, FILM COATED ORAL at 08:05

## 2022-11-26 RX ADMIN — LEVALBUTEROL 1.25 MG: 1.25 SOLUTION, CONCENTRATE RESPIRATORY (INHALATION) at 12:21

## 2022-11-26 RX ADMIN — SODIUM CHLORIDE 500 ML: 0.9 INJECTION, SOLUTION INTRAVENOUS at 09:27

## 2022-11-26 RX ADMIN — LEVALBUTEROL 1.25 MG: 1.25 SOLUTION, CONCENTRATE RESPIRATORY (INHALATION) at 20:07

## 2022-11-26 RX ADMIN — METHYLPREDNISOLONE SODIUM SUCCINATE 40 MG: 40 INJECTION, POWDER, FOR SOLUTION INTRAMUSCULAR; INTRAVENOUS at 05:12

## 2022-11-26 RX ADMIN — INSULIN GLARGINE 15 UNITS: 100 INJECTION, SOLUTION SUBCUTANEOUS at 21:15

## 2022-11-26 RX ADMIN — INSULIN LISPRO 10 UNITS: 100 INJECTION, SOLUTION INTRAVENOUS; SUBCUTANEOUS at 17:02

## 2022-11-26 RX ADMIN — ISODIUM CHLORIDE 3 ML: 0.03 SOLUTION RESPIRATORY (INHALATION) at 01:09

## 2022-11-26 RX ADMIN — ACETAMINOPHEN 650 MG: 325 TABLET, FILM COATED ORAL at 07:33

## 2022-11-26 RX ADMIN — IPRATROPIUM BROMIDE 0.5 MG: 0.5 SOLUTION RESPIRATORY (INHALATION) at 12:21

## 2022-11-26 RX ADMIN — INSULIN LISPRO 5 UNITS: 100 INJECTION, SOLUTION INTRAVENOUS; SUBCUTANEOUS at 09:27

## 2022-11-26 RX ADMIN — GUAIFENESIN 600 MG: 600 TABLET ORAL at 21:10

## 2022-11-26 RX ADMIN — LEVALBUTEROL 1.25 MG: 1.25 SOLUTION, CONCENTRATE RESPIRATORY (INHALATION) at 07:33

## 2022-11-26 RX ADMIN — INSULIN LISPRO 5 UNITS: 100 INJECTION, SOLUTION INTRAVENOUS; SUBCUTANEOUS at 17:04

## 2022-11-26 RX ADMIN — ALUMINUM HYDROXIDE, MAGNESIUM HYDROXIDE, AND DIMETHICONE 30 ML: 200; 20; 200 SUSPENSION ORAL at 07:32

## 2022-11-26 RX ADMIN — IPRATROPIUM BROMIDE 0.5 MG: 0.5 SOLUTION RESPIRATORY (INHALATION) at 16:43

## 2022-11-26 RX ADMIN — INSULIN LISPRO 8 UNITS: 100 INJECTION, SOLUTION INTRAVENOUS; SUBCUTANEOUS at 21:21

## 2022-11-26 RX ADMIN — LEVALBUTEROL 1.25 MG: 1.25 SOLUTION, CONCENTRATE RESPIRATORY (INHALATION) at 01:09

## 2022-11-26 RX ADMIN — GUAIFENESIN 600 MG: 600 TABLET ORAL at 08:05

## 2022-11-26 RX ADMIN — IPRATROPIUM BROMIDE 0.5 MG: 0.5 SOLUTION RESPIRATORY (INHALATION) at 07:33

## 2022-11-26 NOTE — ED NOTES
Pt requesting to eat    Pt bipap removed and pt placed on [address] [place], RN  11/25/22 2691

## 2022-11-26 NOTE — ASSESSMENT & PLAN NOTE
Patient reports with asthma exacerbation  On admission required BiPAP  She was monitored off for about 15 minutes before noticing increased work of breathing and was replaced on BiPAP    Will continue scheduled nebs and IV Solu-Medrol q 6 hours  Will follow-up VBG  Case discussed with critical care on admission okay to remain on step-down level 2  Low threshold for upgraded level of care if patient were to decompensate

## 2022-11-26 NOTE — PROGRESS NOTES
[facility]  Progress Note - Eliseo [address] 1980, 38 y o  female MRN: [id number]  Unit/Bed#: [place] [address] -01 Encounter: [id number]  Primary Care Provider: Christian Clemens MD   Date and time admitted to hospital: 11/25/2022  7:15 PM    Type 2 diabetes mellitus, without long-term current use of insulin [facility])  Assessment & Plan  Lab Results   Component Value Date    HGBA1C 10 0 (H) 04/12/2022       Recent Labs     11/25/22  2330 11/26/22  0728   POCGLU 343* 476*       Blood Sugar Average: Last 72 hrs:  (P) 409 5   Poorly controlled type 2 diabetes  Now elevated in setting of steroid use  Increase Lantus to 15 units q h s  and added 5 units NovoLog t i d  with meals   Sliding scale algorithm for t i d  With meals and before bedtime  Titrate as tolerated  Carb controlled diet    Hypertension  Assessment & Plan  Continue amlodipine    Morbid obesity with BMI of 40 0-44 9, adult ([facility] [address] )  Assessment & Plan  - pt with BMI of  52 6  - pt counseled on risks associated with obesity and it's contribution to other health issues  - advise portion control, healthy food choices, drinking water instead of juice/soda  - advise beginning light exercise program (i e  Walking 30min 4-5x/wk)  - advise keeping food diary to help identify problem foods/times/stressors  - pt advised that weight loss of ~ 1lb/wk is a good goal and not to become frustrated if loss is slower      Acute respiratory failure with hypoxia (HCC)  Assessment & Plan  Currently requiring BiPAP on admission  VBG results noted  Titrate oxygen to maintain saturations greater than 88%    * Asthma exacerbation  Assessment & Plan  Patient reports with asthma exacerbation  On admission required BiPAP  She was monitored off for about 15 minutes before noticing increased work of breathing and was replaced on BiPAP    Will continue scheduled nebs and IV Solu-Medrol q 6 hours  Will follow-up VBG  Step-down to med surge; low threshold to upgrade as needed  Currently saturating well on 3 L; may have some component of anxiety and requests BiPAP sporadically      VTE Pharmacologic Prophylaxis:   Pharmacologic: Enoxaparin (Lovenox)  Mechanical VTE Prophylaxis in Place: Yes    Patient Centered Rounds: I have performed bedside rounds with nursing staff today  Discussions with Specialists or Other Care Team Provider:     Education and Discussions with Family / Patient: Discussed treatment plan with family and patient who agree with current plan; encouraged to ask questions and participate  Time Spent for Care: 45 minutes  More than 50% of total time spent on counseling and coordination of care as described above  Current Length of Stay: 1 day(s)    Current Patient Status: Inpatient   Certification Statement: The patient will continue to require additional inpatient hospital stay due to Treatment of asthma exacerbation    Discharge Plan: To be determined; likely 48-72 hours    Code Status: Level 1 - Full Code      Subjective:   Patient seen examined bedside, was on BiPAP, but noted to have good saturation on 3 L shortly before requesting to be placed on BiPAP  Continue steroids, breathing treatments, and other therapies; monitor for improvement  Have increased insulin regimen as blood sugars elevated in setting of steroid use  Otherwise labs and vital stable; discussed with patient's family at bedside  Hydroxyzine given for anxiety  Objective:     Vitals:   Temp (24hrs), Av 2 °F (36 8 °C), Min:98 1 °F (36 7 °C), Max:98 4 °F (36 9 °C)    Temp:  [98 1 °F (36 7 °C)-98 4 °F (36 9 °C)] 98 1 °F (36 7 °C)  HR:  [] 99  Resp:  [18-40] 22  BP: (116-147)/() 141/84  SpO2:  [92 %-100 %] 98 %  Body mass index is 52 61 kg/m²  Input and Output Summary (last 24 hours):        Intake/Output Summary (Last 24 hours) at 2022 1334  Last data filed at 2022 1112  Gross per 24 hour   Intake 290 ml   Output 1400 ml   Net -1110 ml Physical Exam:     Physical Exam  Vitals and nursing note reviewed  Constitutional:       General: She is not in acute distress  Appearance: She is well-developed  She is obese  HENT:      Head: Normocephalic and atraumatic  Eyes:      Conjunctiva/sclera: Conjunctivae normal    Cardiovascular:      Rate and Rhythm: Normal rate and regular rhythm  Heart sounds: No murmur heard  Pulmonary:      Effort: No respiratory distress  Breath sounds: Wheezing present  Abdominal:      Palpations: Abdomen is soft  Tenderness: There is no abdominal tenderness  Musculoskeletal:         General: No swelling  Cervical back: Neck supple  Skin:     General: Skin is warm and dry  Neurological:      Mental Status: She is alert  Psychiatric:         Mood and Affect: Mood normal       Comments: Anxious           Additional Data:     Labs:    Results from last 7 days   Lab Units 11/26/22  0503 11/25/22  1929   WBC Thousand/uL 11 72* 6 93   HEMOGLOBIN g/dL 12 7 12 5   HEMATOCRIT % 40 4 38 0   PLATELETS Thousands/uL 298 286   NEUTROS PCT %  --  63   LYMPHS PCT %  --  30   MONOS PCT %  --  4   EOS PCT %  --  3     Results from last 7 days   Lab Units 11/26/22  0503   SODIUM mmol/L 134*   POTASSIUM mmol/L 5 0   CHLORIDE mmol/L 97   CO2 mmol/L 24   BUN mg/dL 9   CREATININE mg/dL 1 12   ANION GAP mmol/L 13   CALCIUM mg/dL 9 5   GLUCOSE RANDOM mg/dL 615*         Results from last 7 days   Lab Units 11/26/22  1055 11/26/22  0728 11/25/22  2330   POC GLUCOSE mg/dl 354* 476* 343*                   * I Have Reviewed All Lab Data Listed Above  * Additional Pertinent Lab Tests Reviewed:  All Labs Within Last 24 Hours Reviewed    Imaging:    Imaging Reports Reviewed Today Include:  Chest x-ray  Imaging Personally Reviewed by Myself Includes:      Recent Cultures (last 7 days):           Last 24 Hours Medication List:   Current Facility-Administered Medications   Medication Dose Route Frequency Provider Last Rate   • acetaminophen  650 mg Oral Q6H PRN Meridith Babinski, DO     • aluminum-magnesium hydroxide-simethicone  30 mL Oral Q6H PRN Meridith Babinski, DO     • amLODIPine  10 mg Oral Daily Meridith Babinski, DO     • atorvastatin  20 mg Oral Daily Meridith Babinski, DO     • enoxaparin  40 mg Subcutaneous Daily Meridith Babinski, DO     • fluticasone  2 puff Inhalation BID Meridith Babinski, DO     • guaiFENesin  600 mg Oral Q12H [address] Meridith Babinski, DO     • hydrOXYzine HCL  25 mg Oral Q6H PRN Douglas Davis MD     • insulin glargine  15 Units Subcutaneous HS Douglas Davis MD     • insulin lispro  2-12 Units Subcutaneous 4x Daily (AC & HS) Meridith Babinski, DO     • insulin lispro  5 Units Subcutaneous TID With Meals Douglas Davis MD     • ipratropium  0 5 mg Nebulization 4x Daily Meridith Babinski, DO     • levalbuterol  1 25 mg Nebulization 4x Daily Meridith Babinski, DO     • methylPREDNISolone sodium succinate  40 mg Intravenous Q6H [address], DO     • ondansetron  4 mg Intravenous Q6H PRN Meridith Babinski, DO          Today, Patient Was Seen By: Blanca Chairez MD    ** Please Note: Dictation voice to text software may have been used in the creation of this document   **

## 2022-11-26 NOTE — ASSESSMENT & PLAN NOTE
- pt with BMI of  52 6  - pt counseled on risks associated with obesity and it's contribution to other health issues  - advise portion control, healthy food choices, drinking water instead of juice/soda  - advise beginning light exercise program (i e  Walking 30min 4-5x/wk)  - advise keeping food diary to help identify problem foods/times/stressors  - pt advised that weight loss of ~ 1lb/wk is a good goal and not to become frustrated if loss is slower

## 2022-11-26 NOTE — PLAN OF CARE
Problem: PAIN - ADULT  Goal: Verbalizes/displays adequate comfort level or baseline comfort level  Description: Interventions:  - Encourage patient to monitor pain and request assistance  - Assess pain using appropriate pain scale  - Administer analgesics based on type and severity of pain and evaluate response  - Implement non-pharmacological measures as appropriate and evaluate response  - Consider cultural and social influences on pain and pain management  - Notify physician/advanced practitioner if interventions unsuccessful or patient reports new pain  Outcome: Progressing     Problem: INFECTION - ADULT  Goal: Absence or prevention of progression during hospitalization  Description: INTERVENTIONS:  - Assess and monitor for signs and symptoms of infection  - Monitor lab/diagnostic results  - Monitor all insertion sites, i e  indwelling lines, tubes, and drains  - Monitor endotracheal if appropriate and nasal secretions for changes in amount and color  - [place] appropriate cooling/warming therapies per order  - Administer medications as ordered  - Instruct and encourage patient and family to use good hand hygiene technique  - Identify and instruct in appropriate isolation precautions for identified infection/condition  Outcome: Progressing  Goal: Absence of fever/infection during neutropenic period  Description: INTERVENTIONS:  - Monitor WBC    Outcome: Progressing     Problem: SAFETY ADULT  Goal: Patient will remain free of falls  Description: INTERVENTIONS:  - Educate patient/family on patient safety including physical limitations  - Instruct patient to call for assistance with activity   - Consult OT/PT to assist with strengthening/mobility   - Keep Call bell within reach  - Keep bed low and locked with side rails adjusted as appropriate  - Keep care items and personal belongings within reach  - Initiate and maintain comfort rounds  - Make Fall Risk Sign visible to staff  - Apply yellow socks and bracelet for high fall risk patients  - Consider moving patient to room near nurses station  Outcome: Progressing  Goal: Maintain or return to baseline ADL function  Description: INTERVENTIONS:  -  Assess patient's ability to carry out ADLs; assess patient's baseline for ADL function and identify physical deficits which impact ability to perform ADLs (bathing, care of mouth/teeth, toileting, grooming, dressing, etc )  - Assess/evaluate cause of self-care deficits   - Assess range of motion  - Assess patient's mobility; develop plan if impaired  - Assess patient's need for assistive devices and provide as appropriate  - Encourage maximum independence but intervene and supervise when necessary  - Involve family in performance of ADLs  - Assess for home care needs following discharge   - Consider OT consult to assist with ADL evaluation and planning for discharge  - Provide patient education as appropriate  Outcome: Progressing  Goal: Maintains/Returns to pre admission functional level  Description: INTERVENTIONS:  - Perform BMAT or MOVE assessment daily    - Set and communicate daily mobility goal to care team and patient/family/caregiver  - Collaborate with rehabilitation services on mobility goals if consulted  - Perform Range of Motion 3 times a day  - Reposition patient every 2 hours    - Dangle patient 3 times a day  - Ambulate patient 3 times a day  - Out of bed to chair 3 times a day   - Out of bed for meals 3 times a day  - Out of bed for toileting  - Record patient progress and toleration of activity level   Outcome: Progressing     Problem: DISCHARGE PLANNING  Goal: Discharge to home or other facility with appropriate resources  Description: INTERVENTIONS:  - Identify barriers to discharge w/patient and caregiver  - Arrange for needed discharge resources and transportation as appropriate  - Identify discharge learning needs (meds, wound care, etc )  - Arrange for interpretive services to assist at discharge as needed  - Refer to Case Management Department for coordinating discharge planning if the patient needs post-hospital services based on physician/advanced practitioner order or complex needs related to functional status, cognitive ability, or social support system  Outcome: Progressing     Problem: Knowledge Deficit  Goal: Patient/family/caregiver demonstrates understanding of disease process, treatment plan, medications, and discharge instructions  Description: Complete learning assessment and assess knowledge base  Interventions:  - Provide teaching at level of understanding  - Provide teaching via preferred learning methods  Outcome: Progressing     Problem: RESPIRATORY - ADULT  Goal: Achieves optimal ventilation and oxygenation  Description: INTERVENTIONS:  - Assess for changes in respiratory status  - Assess for changes in mentation and behavior  - Position to facilitate oxygenation and minimize respiratory effort  - Oxygen administered by appropriate delivery if ordered  - Initiate smoking cessation education as indicated  - Encourage broncho-pulmonary hygiene including cough, deep breathe, Incentive Spirometry  - Assess the need for suctioning and aspirate as needed  - Assess and instruct to report SOB or any respiratory difficulty  - Respiratory Therapy support as indicated  Outcome: Progressing     Problem: Nutrition/Hydration-ADULT  Goal: Nutrient/Hydration intake appropriate for improving, restoring or maintaining nutritional needs  Description: Monitor and assess patient's nutrition/hydration status for malnutrition  Collaborate with interdisciplinary team and initiate plan and interventions as ordered  Monitor patient's weight and dietary intake as ordered or per policy  Utilize nutrition screening tool and intervene as necessary  Determine patient's food preferences and provide high-protein, high-caloric foods as appropriate       INTERVENTIONS:  - Monitor oral intake, urinary output, labs, and treatment plans  - Assess nutrition and hydration status and recommend course of action  - Evaluate amount of meals eaten  - Assist patient with eating if necessary   - Allow adequate time for meals  - Recommend/ encourage appropriate diets, oral nutritional supplements, and vitamin/mineral supplements  - Order, calculate, and assess calorie counts as needed  - Recommend, monitor, and adjust tube feedings and TPN/PPN based on assessed needs  - Assess need for intravenous fluids  - Provide specific nutrition/hydration education as appropriate  - Include patient/family/caregiver in decisions related to nutrition  Outcome: Progressing     Problem: Potential for Falls  Goal: Patient will remain free of falls  Description: INTERVENTIONS:  - Educate patient/family on patient safety including physical limitations  - Instruct patient to call for assistance with activity   - Consult OT/PT to assist with strengthening/mobility   - Keep Call bell within reach  - Keep bed low and locked with side rails adjusted as appropriate  - Keep care items and personal belongings within reach  - Initiate and maintain comfort rounds  - Make Fall Risk Sign visible to staff  - Apply yellow socks and bracelet for high fall risk patients  - Consider moving patient to room near nurses station  Outcome: Progressing

## 2022-11-26 NOTE — RESPIRATORY THERAPY NOTE
Pt is requesting to come off the bipap  Pt states she needs a break  Oxygen sat is 99%  Pt is placed on 3L NC by RN  Will continue to monitor

## 2022-11-26 NOTE — PLAN OF CARE
Problem: PAIN - ADULT  Goal: Verbalizes/displays adequate comfort level or baseline comfort level  Description: Interventions:  - Encourage patient to monitor pain and request assistance  - Assess pain using appropriate pain scale  - Administer analgesics based on type and severity of pain and evaluate response  - Implement non-pharmacological measures as appropriate and evaluate response  - Consider cultural and social influences on pain and pain management  - Notify physician/advanced practitioner if interventions unsuccessful or patient reports new pain  Outcome: Progressing     Problem: INFECTION - ADULT  Goal: Absence or prevention of progression during hospitalization  Description: INTERVENTIONS:  - Assess and monitor for signs and symptoms of infection  - Monitor lab/diagnostic results  - Monitor all insertion sites, i e  indwelling lines, tubes, and drains  - Monitor endotracheal if appropriate and nasal secretions for changes in amount and color  - [place] appropriate cooling/warming therapies per order  - Administer medications as ordered  - Instruct and encourage patient and family to use good hand hygiene technique  - Identify and instruct in appropriate isolation precautions for identified infection/condition  Outcome: Progressing  Goal: Absence of fever/infection during neutropenic period  Description: INTERVENTIONS:  - Monitor WBC    Outcome: Progressing     Problem: SAFETY ADULT  Goal: Patient will remain free of falls  Description: INTERVENTIONS:  - Educate patient/family on patient safety including physical limitations  - Instruct patient to call for assistance with activity   - Consult OT/PT to assist with strengthening/mobility   - Keep Call bell within reach  - Keep bed low and locked with side rails adjusted as appropriate  - Keep care items and personal belongings within reach  - Initiate and maintain comfort rounds  - Make Fall Risk Sign visible to staff  - Offer Toileting every  Hours, in advance of need  - Initiate/Maintain alarm  - Obtain necessary fall risk management equipment:   - Apply yellow socks and bracelet for high fall risk patients  - Consider moving patient to room near nurses station  Outcome: Progressing  Goal: Maintain or return to baseline ADL function  Description: INTERVENTIONS:  -  Assess patient's ability to carry out ADLs; assess patient's baseline for ADL function and identify physical deficits which impact ability to perform ADLs (bathing, care of mouth/teeth, toileting, grooming, dressing, etc )  - Assess/evaluate cause of self-care deficits   - Assess range of motion  - Assess patient's mobility; develop plan if impaired  - Assess patient's need for assistive devices and provide as appropriate  - Encourage maximum independence but intervene and supervise when necessary  - Involve family in performance of ADLs  - Assess for home care needs following discharge   - Consider OT consult to assist with ADL evaluation and planning for discharge  - Provide patient education as appropriate  Outcome: Progressing  Goal: Maintains/Returns to pre admission functional level  Description: INTERVENTIONS:  - Perform BMAT or MOVE assessment daily    - Set and communicate daily mobility goal to care team and patient/family/caregiver  - Collaborate with rehabilitation services on mobility goals if consulted  - Perform Range of Motion  times a day  - Reposition patient every  hours    - Dangle patient  times a day  - Stand patient  times a day  - Ambulate patient  times a day  - Out of bed to chair  times a day   - Out of bed for  times a day  - Out of bed for toileting  - Record patient progress and toleration of activity level   Outcome: Progressing     Problem: DISCHARGE PLANNING  Goal: Discharge to home or other facility with appropriate resources  Description: INTERVENTIONS:  - Identify barriers to discharge w/patient and caregiver  - Arrange for needed discharge resources and transportation as appropriate  - Identify discharge learning needs (meds, wound care, etc )  - Arrange for interpretive services to assist at discharge as needed  - Refer to Case Management Department for coordinating discharge planning if the patient needs post-hospital services based on physician/advanced practitioner order or complex needs related to functional status, cognitive ability, or social support system  Outcome: Progressing     Problem: Knowledge Deficit  Goal: Patient/family/caregiver demonstrates understanding of disease process, treatment plan, medications, and discharge instructions  Description: Complete learning assessment and assess knowledge base    Interventions:  - Provide teaching at level of understanding  - Provide teaching via preferred learning methods  Outcome: Progressing     Problem: RESPIRATORY - ADULT  Goal: Achieves optimal ventilation and oxygenation  Description: INTERVENTIONS:  - Assess for changes in respiratory status  - Assess for changes in mentation and behavior  - Position to facilitate oxygenation and minimize respiratory effort  - Oxygen administered by appropriate delivery if ordered  - Initiate smoking cessation education as indicated  - Encourage broncho-pulmonary hygiene including cough, deep breathe, Incentive Spirometry  - Assess the need for suctioning and aspirate as needed  - Assess and instruct to report SOB or any respiratory difficulty  - Respiratory Therapy support as indicated  Outcome: Progressing

## 2022-11-26 NOTE — ASSESSMENT & PLAN NOTE
Lab Results   Component Value Date    HGBA1C 10 0 (H) 04/12/2022       Recent Labs     11/25/22  2330 11/26/22  0728   POCGLU 343* 476*       Blood Sugar Average: Last 72 hrs:  (P) 409 5   Poorly controlled type 2 diabetes  Now elevated in setting of steroid use  Increase Lantus to 15 units q h s  and added 5 units NovoLog t i d  with meals   Sliding scale algorithm for t i d   With meals and before bedtime  Titrate as tolerated  Carb controlled diet

## 2022-11-26 NOTE — ED PROVIDER NOTES
History  Chief Complaint   Patient presents with   • Chest Pain   • Shortness of Breath     Pt with hx of asthma  Pt with symptoms for 4 days     37 y/o female with hx of asthma, HTN, HLD, and DM presents to the ED via EMS for evaluation of dyspnea and wheezing x 4 days  She states that her symptoms feels similar to prior acute asthma exacerbations and she has been trying to manage her symptoms at home with albuterol inhaler and neb treatments  She notes that her symptoms started 4 days ago with subjective fever and cough  She has never been intubated for asthma in the past and was last on steroids several months ago during a previous asthma exacerbation  She received DuoNeb and 125 mg Solu-Medrol from EMS prior to arrival   She also notes chest tightness secondary to her wheezing and dyspnea  No other symptoms or complaints  Prior to Admission Medications   Prescriptions Last Dose Informant Patient Reported? Taking? albuterol (2 5 mg/3 mL) 0 083 % nebulizer solution   No Yes   Sig: Take 3 mL (2 5 mg total) by nebulization every 6 (six) hours as needed for wheezing or shortness of breath   albuterol (PROVENTIL HFA,VENTOLIN HFA) 90 mcg/act inhaler   No Yes   Sig: Inhale 2 puffs every 6 (six) hours as needed for wheezing or shortness of breath   amLODIPine (NORVASC) 10 mg tablet   No Yes   Sig: Take 1 tablet (10 mg total) by mouth daily   atorvastatin (LIPITOR) 20 mg tablet   Yes Yes   Sig: Take 20 mg by mouth daily   glipiZIDE-metFORMIN (METAGLIP) 2 5-500 MG per tablet   Yes Yes   Sig: Take 2 tablets by mouth 2 (two) times a day   ipratropium-albuterol (DUO-NEB) 0 5-2 5 mg/3 mL nebulizer solution   Yes Yes   Sig: INHALE THE CONTENTS OF 1 VIAL VIA NEBULIZER 4 (FOUR) TIMES A DAY AS NEEDED FOR WHEEZING        Facility-Administered Medications: None       Past Medical History:   Diagnosis Date   • Asthma    • Dental abscess    • Diabetes mellitus ([facility] [address] )    • High cholesterol    • Hypertension        Past Surgical History:   Procedure Laterality Date   • CHOLECYSTECTOMY     • ECTOPIC PREGNANCY SURGERY     • INCISION AND DRAINAGE INTRA ORAL ABSCESS Left 3/21/2016    Procedure: INCISION AND DRAINAGE  (I&D) WOUND ORAL Left  Space;  Surgeon: Orville Acosta MD;  Location: [facility];  Service:    • MULTIPLE TOOTH EXTRACTIONS N/A 3/21/2016    Procedure: EXTRACTION TEETH MULTIPLE; 17,18,19,11,12,4,5,28,32;  Surgeon: Orville Acosta MD;  Location: [facility];  Service:    • SALPINGECTOMY         Family History   Problem Relation Age of Onset   • Diabetes Mother    • Heart failure Mother    • Cancer Neg Hx      I have reviewed and agree with the history as documented  E-Cigarette/Vaping     E-Cigarette/Vaping Substances     Social History     Tobacco Use   • Smoking status: Every Day     Packs/day: 0 50     Years: 10 00     Pack years: 5 00     Types: Cigarettes   • Smokeless tobacco: Never   • Tobacco comments:     PT was educated on smoking and the effect is has on her asthma  Substance Use Topics   • Alcohol use: Yes     Comment: Social   • Drug use: No        Review of Systems   Constitutional: Positive for fever (Subjective fever)  Negative for chills  HENT: Negative for congestion and sore throat  Respiratory: Positive for cough, chest tightness, shortness of breath and wheezing  Cardiovascular: Negative for chest pain, palpitations and leg swelling  Gastrointestinal: Negative for abdominal pain, diarrhea, nausea and vomiting  Genitourinary: Negative for dysuria and hematuria  Musculoskeletal: Negative for back pain and neck pain  Neurological: Negative for dizziness, weakness, light-headedness, numbness and headaches  All other systems reviewed and are negative        Physical Exam  ED Triage Vitals   Temperature Pulse Respirations Blood Pressure SpO2   11/25/22 1925 11/25/22 1917 11/25/22 1917 11/25/22 1917 11/25/22 1930   98 4 °F (36 9 °C) 104 (!) 40 147/83 100 %      Temp Source Heart Rate Source Patient Position - Orthostatic VS BP Location FiO2 (%)   11/25/22 1925 -- 11/25/22 1930 -- --   Oral  Lying        Pain Score       --                    Orthostatic Vital Signs  Vitals:    11/25/22 1930 11/25/22 2023 11/25/22 2024 11/25/22 2100   BP:  132/73 132/73 116/66   Pulse: 104 (!) 107 102 90   Patient Position - Orthostatic VS: Lying  Lying        Physical Exam  Vitals and nursing note reviewed  Constitutional:       General: She is in acute distress  Appearance: Normal appearance  She is well-developed  She is obese  Comments: Adult female sitting in bed, appears in mild distress due to respiratory symptoms  HENT:      Head: Normocephalic and atraumatic  Right Ear: External ear normal       Left Ear: External ear normal       Nose: Nose normal  No congestion or rhinorrhea  Mouth/Throat:      Mouth: Mucous membranes are moist       Pharynx: Oropharynx is clear  No oropharyngeal exudate or posterior oropharyngeal erythema  Eyes:      Extraocular Movements: Extraocular movements intact  Conjunctiva/sclera: Conjunctivae normal       Pupils: Pupils are equal, round, and reactive to light  Cardiovascular:      Rate and Rhythm: Normal rate and regular rhythm  Pulses: Normal pulses  Heart sounds: Normal heart sounds  No murmur heard  Pulmonary:      Effort: Tachypnea present  Breath sounds: No stridor  Decreased breath sounds and wheezing present  No rhonchi or rales  Comments: Tachypnea  Short phrase conversational dyspnea  Diminished air movement with bilateral wheezing on auscultation  Chest:      Chest wall: No tenderness  Abdominal:      General: Abdomen is flat  Bowel sounds are normal  There is no distension  Palpations: Abdomen is soft  Tenderness: There is no abdominal tenderness  There is no right CVA tenderness, left CVA tenderness or guarding  Musculoskeletal:         General: No swelling or tenderness  Normal range of motion  Cervical back: Normal range of motion and neck supple  No tenderness  Skin:     General: Skin is warm and dry  Capillary Refill: Capillary refill takes less than 2 seconds  Neurological:      General: No focal deficit present  Mental Status: She is alert and oriented to person, place, and time           ED Medications  Medications   insulin glargine (LANTUS) subcutaneous injection 10 Units 0 1 mL (has no administration in time range)   insulin lispro (HumaLOG) 100 units/mL subcutaneous injection 2-12 Units (has no administration in time range)   magnesium sulfate 2 g/50 mL IVPB (premix) 2 g (has no administration in time range)   albuterol (FOR EMS ONLY) (2 5 mg/3 mL) 0 083 % inhalation solution 2 5 mg (0 mg Does not apply Given to EMS 11/25/22 1920)   ipratropium-albuterol (FOR EMS ONLY) (DUO-NEB) 0 5-2 5 mg/3 mL inhalation solution 6 mL (0 mL Does not apply Given to EMS 11/25/22 1921)   methylPREDNISolone sodium succinate (FOR EMS ONLY) (Solu-MEDROL) 125 MG injection 125 mg (0 mg Does not apply Given to EMS 11/25/22 1921)   albuterol inhalation solution 10 mg (10 mg Nebulization Given 11/25/22 1932)     And   ipratropium (ATROVENT) 0 02 % inhalation solution 1 mg (1 mg Nebulization Given 11/25/22 2030)     And   sodium chloride 0 9 % inhalation solution 3 mL (3 mL Nebulization Given 11/25/22 2007)   LORazepam (ATIVAN) injection 0 5 mg (0 5 mg Intravenous Given 11/25/22 2128)   albuterol inhalation solution 5 mg (5 mg Nebulization Given 11/25/22 2241)   ipratropium (ATROVENT) 0 02 % inhalation solution 0 5 mg (0 5 mg Nebulization Given 11/25/22 2241)       Diagnostic Studies  Results Reviewed     Procedure Component Value Units Date/Time    Blood gas, venous [[id number]]     Lab Status: No result Specimen: Blood     FLU/RSV/COVID - if FLU/RSV clinically relevant [[id number]]  (Normal) Collected: 11/25/22 1929    Lab Status: Final result Specimen: Nares from Nose Updated: 11/25/22 2053     SARS-CoV-2 Negative     INFLUENZA A PCR Negative     INFLUENZA B PCR Negative     RSV PCR Negative    Narrative:      FOR PEDIATRIC PATIENTS - copy/paste COVID Guidelines URL to browser: [email or web address]    SARS-CoV-2 assay is a Nucleic Acid Amplification assay intended for the  qualitative detection of nucleic acid from SARS-CoV-2 in nasopharyngeal  swabs  Results are for the presumptive identification of SARS-CoV-2 RNA  Positive results are indicative of infection with SARS-CoV-2, the virus  causing COVID-19, but do not rule out bacterial infection or co-infection  with other viruses  Laboratories within the [place] and its  territories are required to report all positive results to the appropriate  public health authorities  Negative results do not preclude SARS-CoV-2  infection and should not be used as the sole basis for treatment or other  patient management decisions  Negative results must be combined with  clinical observations, patient history, and epidemiological information  This test has not been FDA cleared or approved  This test has been authorized by FDA under an Emergency Use Authorization  (EUA)  This test is only authorized for the duration of time the  declaration that circumstances exist justifying the authorization of the  emergency use of an in vitro diagnostic tests for detection of SARS-CoV-2  virus and/or diagnosis of COVID-19 infection under section 564(b)(1) of  the Act, 21 U  S C  968DJW-5(O)(3), unless the authorization is terminated  or revoked sooner  The test has been validated but independent review by FDA  and CLIA is pending  Test performed using [employer] GeneXpert: This RT-PCR assay targets N2,  a region unique to SARS-CoV-2  A conserved region in the E-gene was chosen  for pan-Sarbecovirus detection which includes SARS-CoV-2      According to CMS-2020-01-R, this platform meets the definition of high-throughput technology      HS Troponin 0hr (reflex protocol) [[id number]]  (Normal) Collected: 11/25/22 1929    Lab Status: Final result Specimen: Blood from Arm, Right Updated: 11/25/22 2040     hs TnI 0hr 2 ng/L     Basic metabolic panel [[id number]]  (Abnormal) Collected: 11/25/22 1929    Lab Status: Final result Specimen: Blood from Arm, Right Updated: 11/25/22 2025     Sodium 137 mmol/L      Potassium 3 4 mmol/L      Chloride 99 mmol/L      CO2 28 mmol/L      ANION GAP 10 mmol/L      BUN 7 mg/dL      Creatinine 0 67 mg/dL      Glucose 285 mg/dL      Calcium 9 4 mg/dL      eGFR 107 ml/min/1 73sq m     Narrative:      [place] guidelines for Chronic Kidney Disease (CKD):   •  Stage 1 with normal or high GFR (GFR > 90 mL/min/1 73 square meters)  •  Stage 2 Mild CKD (GFR = 60-89 mL/min/1 73 square meters)  •  Stage 3A Moderate CKD (GFR = 45-59 mL/min/1 73 square meters)  •  Stage 3B Moderate CKD (GFR = 30-44 mL/min/1 73 square meters)  •  Stage 4 Severe CKD (GFR = 15-29 mL/min/1 73 square meters)  •  Stage 5 End Stage CKD (GFR <15 mL/min/1 73 square meters)  Note: GFR calculation is accurate only with a steady state creatinine    CBC and differential [[id number]] Collected: 11/25/22 1929    Lab Status: Final result Specimen: Blood from Arm, Right Updated: 11/25/22 2015     WBC 6 93 Thousand/uL      RBC 4 11 Million/uL      Hemoglobin 12 5 g/dL      Hematocrit 38 0 %      MCV 93 fL      MCH 30 4 pg      MCHC 32 9 g/dL      RDW 13 4 %      MPV 10 3 fL      Platelets 791 Thousands/uL      nRBC 0 /100 WBCs      Neutrophils Relative 63 %      Immat GRANS % 0 %      Lymphocytes Relative 30 %      Monocytes Relative 4 %      Eosinophils Relative 3 %      Basophils Relative 0 %      Neutrophils Absolute 4 35 Thousands/µL      Immature Grans Absolute 0 02 Thousand/uL      Lymphocytes Absolute 2 09 Thousands/µL      Monocytes Absolute 0 25 Thousand/µL      Eosinophils Absolute 0 19 Thousand/µL Basophils Absolute 0 03 Thousands/µL                  XR chest 1 view portable   ED Interpretation by Matt Rosenberg MD (11/25 2047)   No acute cardiopulmonary disease process on my interpretation  Procedures  Procedures      ED Course  ED Course as of 11/25/22 2306 Fri Nov 25, 2022   0973 Procedure Note: EKG  Date/Time: 11/25/22 7:23 PM   Interpreted by: Matt Rosenberg MD  Indications / Diagnosis: Dyspnea  ECG reviewed by me, the ED Physician: yes   The EKG demonstrates:  Rhythm: sinus tachycardia 104 bpm  Intervals: normal intervals  Axis: normal axis  QRS/Blocks: normal QRS  ST Changes: No acute ST Changes, no STD/WESTLEY  No STEMI  No significant change compared to ECG from 04/12/2022  SBIRT 22yo+    Flowsheet Row Most Recent Value   SBIRT (23 yo +)    In order to provide better care to our patients, we are screening all of our patients for alcohol and drug use  Would it be okay to ask you these screening questions? Unable to answer at this time Filed at: 11/25/2022 1927                MDM  Number of Diagnoses or Management Options  Acute asthma exacerbation  Dyspnea  Wheezing  Diagnosis management comments: 37 y/o F with hx of asthma presenting for evaluation of worsening dyspnea and wheezing over the last 4 days  Started with a subjective cough and fever 4 days ago, with worsening wheezing over the last 4 days  Used home inhaler/neb without relief  No hx of intubation for asthma  Given DuoNeb and Solumedrol 125 mg pre hospital by EMS  Placed on BiPAP initially on arrival for work of breathing, SpO2 in the upper 90s, anticipate patient being able to be weaned off BiPAP overnight  Suspect component of anxiety as well  CXR negative  Viral testing negative  Case discussed with KOBI for admission        Disposition  Final diagnoses:   Acute asthma exacerbation   Dyspnea   Wheezing     Time reflects when diagnosis was documented in both MDM as applicable and the Disposition within this note     Time User Action Codes Description Comment    11/25/2022 10:30 PM Selina Dates Add [J48 801] Acute asthma exacerbation     11/25/2022 10:30 PM Selina Dates Add [R06 00] Dyspnea     11/25/2022 10:30 PM Selina Dates Add [R06 2] Wheezing       ED Disposition     ED Disposition   Admit    Condition   Stable    Date/Time   Fri Nov 25, 2022 10:29 PM    Comment   Case was discussed with Dr Christie Alvarez, and the patient's admission status was agreed to be Admission Status: inpatient status to the service of [facility]  Follow-up Information    None         Patient's Medications   Discharge Prescriptions    No medications on file     No discharge procedures on file  PDMP Review     None           ED Provider  Attending physically available and evaluated Carie Lam I managed the patient along with the ED Attending      Electronically Signed by         Manual Shone, MD  11/25/22 3321

## 2022-11-26 NOTE — ED NOTES
Pt place back on bipap due to work of breathing and pt request     Olegario Zelaya RN  11/25/22 2110

## 2022-11-26 NOTE — UTILIZATION REVIEW
Initial Clinical Review    Admission: Date/Time/Statement:   Admission Orders (From admission, onward)     Ordered        11/25/22 2230  INPATIENT ADMISSION  Once                      Orders Placed This Encounter   Procedures   • INPATIENT ADMISSION     Standing Status:   Standing     Number of Occurrences:   1     Order Specific Question:   Level of Care     Answer:   Level 2 Stepdown / HOT [14]     Order Specific Question:   Estimated length of stay     Answer:   More than 2 Midnights     Order Specific Question:   Certification     Answer:   I certify that inpatient services are medically necessary for this patient for a duration of greater than two midnights  See H&P and MD Progress Notes for additional information about the patient's course of treatment  ED Arrival Information     Expected   -    Arrival   11/25/2022 19:15    Acuity   Emergent            Means of arrival   Ambulance    Escorted by   [facility] ([facility])    Service   Hospitalist    Admission type   Emergency            Arrival complaint   asthma           Chief Complaint   Patient presents with   • Chest Pain   • Shortness of Breath     Pt with hx of asthma  Pt with symptoms for 4 days       Initial Presentation: 38 y o  female who presented by EMS to [address] [address] ED  Inpatient admission for evaluation and treatment of asthma exacerbation  PMHx: asthma, T2DM, HTN  Presented w/ dyspnea and wheezing for 4 days, noting her children have URIs  On exam, tachycardic, wheezing, increased work of breathing  Requiring BiPAP, trialed off for 15 minutes w/ increased work of breathing, so replaced on BiPAP  Plan: Supplemental O2, weaned as tolerated; nebs, IV solu-medrol q6h, SSI w/ accuchecks ACHS, start lantus 10 units in setting of steroid use, continue PTA meds  Date: 11/26/22   Day 2: Intermittently off BiPAP and on 3L NC  On exam, wheezing, anxiety   Plan: start hydroxyzine for anxiety, increase Lantus to 15 units, SSI w/ 5 units novolog TID w/ meals, continue PTA meds, Supplemental O2, weaned as tolerated; nebs, IV solumedrol  Trend labs, replete electrolytes as needed      ED Triage Vitals   Temperature Pulse Respirations Blood Pressure SpO2   11/25/22 1925 11/25/22 1917 11/25/22 1917 11/25/22 1917 11/25/22 1930   98 4 °F (36 9 °C) 104 (!) 40 147/83 100 %      Temp Source Heart Rate Source Patient Position - Orthostatic VS BP Location FiO2 (%)   11/25/22 1925 -- 11/25/22 1930 11/26/22 0000 11/26/22 0734   Oral  Lying Right leg 35      Pain Score       11/26/22 0104       10 - Worst Possible Pain          Wt Readings from Last 1 Encounters:   10/19/22 (!) 143 kg (316 lb 2 2 oz)     Additional Vital Signs:   Date/Time Temp Pulse Resp BP MAP (mmHg) SpO2 FiO2 (%) Calculated FIO2 (%) - Nasal Cannula Nasal Cannula O2 Flow Rate (L/min) O2 Device O2 Interface Device   11/26/22 21:19:25 97 9 °F (36 6 °C) 95 22 128/83 98 95 % -- 32 3 L/min Nasal cannula --   11/26/22 2011 -- -- -- -- -- 94 % -- -- -- -- --   11/26/22 1647 -- -- -- -- -- -- -- -- -- -- Full face mask   11/26/22 16:24:07 -- 92 21 111/72 85 93 % 35 -- -- BiPAP --   11/26/22 1308 -- -- -- -- -- -- 35 -- -- BiPAP --   11/26/22 10:55:50 -- 99 22 141/84 103 98 % -- -- -- -- --   11/26/22 0734 -- -- -- -- -- -- 35 -- -- BiPAP --   11/26/22 07:23:52 98 1 °F (36 7 °C) 85 18 145/101 Abnormal  116 92 % -- 32 3 L/min Nasal cannula Full face mask   11/26/22 0505 -- -- -- -- -- 99 % -- -- -- -- Full face mask   11/26/22 0449 -- 91 20 -- -- 97 % -- -- -- BiPAP --   11/26/22 0109 -- -- 30 Abnormal  -- -- 99 % -- -- -- -- Full face mask   11/26/22 01:08:40 98 1 °F (36 7 °C) 98 26 Abnormal  142/78 99 99 % -- -- -- -- --   11/26/22 0000 -- 96 22 117/69 86 100 % -- -- -- BiPAP --   11/25/22 2100 -- 90 27 Abnormal  116/66 86 99 % -- -- -- None (Room air) --   11/25/22 2024 -- 102 19 132/73 95 100 % -- -- -- None (Room air) --   11/25/22 2023 -- 107 Abnormal  23 Abnormal  132/73 -- 100 % -- 32 3 L/min Nasal cannula --   11/25/22 1933 -- -- -- -- -- 100 % -- -- -- -- Full face mask   11/25/22 1930 -- 104 28 Abnormal  -- -- 100 % -- -- -- BiPAP --     Pertinent Labs/Diagnostic Test Results:   XR chest 1 view portable   ED Interpretation by Malachi Arazia MD (11/25 2047)   No acute cardiopulmonary disease process on my interpretation  Final Result by Marita De Oliveira MD (11/26 1154)      No acute cardiopulmonary disease                    Workstation performed: [id number]           Results from last 7 days   Lab Units 11/25/22 1929   SARS-COV-2  Negative     Results from last 7 days   Lab Units 11/26/22  0503 11/25/22 1929   WBC Thousand/uL 11 72* 6 93   HEMOGLOBIN g/dL 12 7 12 5   HEMATOCRIT % 40 4 38 0   PLATELETS Thousands/uL 298 286   NEUTROS ABS Thousands/µL  --  4 35         Results from last 7 days   Lab Units 11/26/22  0503 11/25/22 1929   SODIUM mmol/L 134* 137   POTASSIUM mmol/L 5 0 3 4*   CHLORIDE mmol/L 97 99   CO2 mmol/L 24 28   ANION GAP mmol/L 13 10   BUN mg/dL 9 7   CREATININE mg/dL 1 12 0 67   EGFR ml/min/1 73sq m 59 107   CALCIUM mg/dL 9 5 9 4     Results from last 7 days   Lab Units 11/26/22  1619 11/26/22  1055 11/26/22  0728 11/25/22  2330   POC GLUCOSE mg/dl 356* 354* 476* 343*     Results from last 7 days   Lab Units 11/26/22  0503 11/25/22 1929   GLUCOSE RANDOM mg/dL 615* 285*     Results from last 7 days   Lab Units 11/25/22  2320   PH BECCA  7 434*   PCO2 BECCA mm Hg 31 4*   PO2 BECCA mm Hg 125 2*   HCO3 BECCA mmol/L 20 6*   BASE EXC BECCA mmol/L -2 8   O2 CONTENT BECCA ml/dL 17 1   O2 HGB, VENOUS % 96 3*     Results from last 7 days   Lab Units 11/25/22 1929   HS TNI 0HR ng/L 2     Results from last 7 days   Lab Units 11/25/22 1929   INFLUENZA A PCR  Negative   INFLUENZA B PCR  Negative   RSV PCR  Negative       ED Treatment:   Medication Administration from 11/25/2022 1915 to 11/26/2022 0059       Date/Time Order Dose Route Action     11/25/2022 1920 EST albuterol (FOR EMS ONLY) (2 5 mg/3 mL) 0 083 % inhalation solution 2 5 mg 0 mg Does not apply Given to EMS     11/25/2022 1921 EST ipratropium-albuterol (FOR EMS ONLY) (DUO-NEB) 0 5-2 5 mg/3 mL inhalation solution 6 mL 0 mL Does not apply Given to EMS     11/25/2022 1921 EST methylPREDNISolone sodium succinate (FOR EMS ONLY) (Solu-MEDROL) 125 MG injection 125 mg 0 mg Does not apply Given to EMS     11/25/2022 1932 EST albuterol inhalation solution 10 mg 10 mg Nebulization Given     11/25/2022 2030 EST ipratropium (ATROVENT) 0 02 % inhalation solution 1 mg 1 mg Nebulization Given     11/25/2022 2007 EST sodium chloride 0 9 % inhalation solution 3 mL 3 mL Nebulization Given     11/25/2022 2128 EST LORazepam (ATIVAN) injection 0 5 mg 0 5 mg Intravenous Given     11/25/2022 2241 EST albuterol inhalation solution 5 mg 5 mg Nebulization Given     11/25/2022 2241 EST ipratropium (ATROVENT) 0 02 % inhalation solution 0 5 mg 0 5 mg Nebulization Given     11/25/2022 2355 EST insulin glargine (LANTUS) subcutaneous injection 10 Units 0 1 mL 10 Units Subcutaneous Given     11/25/2022 2355 EST insulin lispro (HumaLOG) 100 units/mL subcutaneous injection 2-12 Units 8 Units Subcutaneous Given     11/25/2022 2322 EST magnesium sulfate 2 g/50 mL IVPB (premix) 2 g 2 g Intravenous New Bag        Past Medical History:   Diagnosis Date   • Asthma    • Dental abscess    • [facility] )    • High cholesterol    • Hypertension      Present on Admission:  • Asthma exacerbation  • Acute respiratory failure with hypoxia ([facility] )  • Hypertension      Admitting Diagnosis: Wheezing [R06 2]  Dyspnea [R06 00]  Acute asthma exacerbation [J45 901]  Age/Sex: 38 y o  female  Admission Orders:  Consistent Carbohydrate Diet  Accu-checks ACHS      Scheduled Medications:  amLODIPine, 10 mg, Oral, Daily  atorvastatin, 20 mg, Oral, Daily  enoxaparin, 40 mg, Subcutaneous, Daily  fluticasone, 2 puff, Inhalation, BID  guaiFENesin, 600 mg, Oral, Q12H ADAL  insulin glargine, 15 Units, Subcutaneous, HS  insulin lispro, 2-12 Units, Subcutaneous, 4x Daily (AC & HS)  insulin lispro, 5 Units, Subcutaneous, TID With Meals  ipratropium, 0 5 mg, Nebulization, 4x Daily  levalbuterol, 1 25 mg, Nebulization, 4x Daily  methylPREDNISolone sodium succinate, 40 mg, Intravenous, Q6H [address]    Continuous IV Infusions: none    PRN Meds:  acetaminophen, 650 mg, Oral, Q6H PRN; 11/26 x1  aluminum-magnesium hydroxide-simethicone, 30 mL, Oral, Q6H PRN; 11/26 x1  hydrOXYzine HCL, 25 mg, Oral, Q6H PRN; 11/26 x1  LORazepam, 0 5 mg, Intravenous; 11/26 x1  ondansetron, 4 mg, Intravenous, Q6H PRN  sodium chloride 0 9 %, 500 mL bolus, Intravenous; 11/26 x1      Network Utilization Review Department  ATTENTION: Please call with any questions or concerns to [phone] and carefully listen to the prompts so that you are directed to the right person  All voicemails are confidential   Essie Hatfield all requests for admission clinical reviews, approved or denied determinations and any other requests to dedicated fax number below belonging to the campus where the patient is receiving treatment   List of dedicated fax numbers for the Facilities:  [address] DENIALS (Administrative/Medical Necessity) [phone]   [address] (Maternity/NICU/Pediatrics) [phone]   [address] [phone]   [facility] [phone]   [facility] [address] [phone]   [facility] [phone]

## 2022-11-26 NOTE — RESPIRATORY THERAPY NOTE
Pt requesting to come off bipap, she says she needs a break  Pt is satting 99%  Placed her on 3L NC  RN aware  Will continue to monitor

## 2022-11-26 NOTE — ASSESSMENT & PLAN NOTE
Lab Results   Component Value Date    HGBA1C 10 0 (H) 04/12/2022       No results for input(s): POCGLU in the last 72 hours  Blood Sugar Average: Last 72 hrs:     Poorly controlled type 2 diabetes  Now elevated in setting of steroid use  Start Lantus 10 units q h s  Sliding scale algorithm for t i d   With meals and before bedtime  Titrate as tolerated  Carb controlled diet

## 2022-11-26 NOTE — NURSING NOTE
Assumed care of patient at 1300  Agree with previous RN assessment  Pt laying in bed with no complaints at this time  3 NC 97% O2  Call bell within reach

## 2022-11-26 NOTE — ASSESSMENT & PLAN NOTE
Currently requiring BiPAP on admission  VBG results noted  Titrate oxygen to maintain saturations greater than 88%

## 2022-11-26 NOTE — H&P
[facility]  H&P- Marquetta Barthel 1980, 38 y o  female MRN: [id number]  Unit/Bed#: ED 17 Encounter: [id number]  Primary Care Provider: Darrell Diaz MD   Date and time admitted to hospital: 11/25/2022  7:15 PM    * Asthma exacerbation  Assessment & Plan  Patient reports with asthma exacerbation  On admission required BiPAP  She was monitored off for about 15 minutes before noticing increased work of breathing and was replaced on BiPAP  Will continue scheduled nebs and IV Solu-Medrol q 6 hours  Will follow-up VBG  Case discussed with critical care on admission okay to remain on step-down level 2  Low threshold for upgraded level of care if patient were to decompensate    Type 2 diabetes mellitus, without long-term current use of insulin ([facility] )  Assessment & Plan  Lab Results   Component Value Date    HGBA1C 10 0 (H) 04/12/2022       No results for input(s): POCGLU in the last 72 hours  Blood Sugar Average: Last 72 hrs:     Poorly controlled type 2 diabetes  Now elevated in setting of steroid use  Start Lantus 10 units q h s  Sliding scale algorithm for t i d  With meals and before bedtime  Titrate as tolerated  Carb controlled diet    Hypertension  Assessment & Plan  Continue amlodipine    Morbid obesity with BMI of 40 0-44 9, adult ([facility] )  Assessment & Plan  Noted  Recommend outpatient dietary modification    Acute respiratory failure with hypoxia (HCC)  Assessment & Plan  Currently requiring BiPAP on admission  Check VBG  Titrate oxygen to maintain saturations greater than 88%    VTE Prophylaxis:  Lovenox  Code Status:  Level 1 full code    Anticipated Length of Stay:  Patient will be admitted on an Inpatient basis with an anticipated length of stay of greater than 2 midnights     Justification for Hospital Stay:  Need for IV medications for treatment of acute asthma exacerbation, acute hypoxic respiratory failure    Total Time for Visit, including Counseling / Coordination of Care: 60 minutes  Greater than 50% of this total time spent on direct patient counseling and coordination of care  Chief Complaint:   Shortness of breath    History of Present Illness:    [place] is a 38 y o  female With past medical history of asthma, hypertension, hyperlipidemia, non-insulin-dependent type 2 diabetes presents the hospital with dyspnea and wheezing x4 days  Patient reports that she is having difficulty with the tubing of her nebulizer machine  She reports that her kids have upper respiratory tract infections  She has had a runny nose over the last few days  She has noticed worsening allergies over the last few days as well  She has never required intubation  She wishes to be level 1 full code  Patient reports her blood sugars are averaging around 200 at home  Review of Systems:    Review of Systems   Constitutional: Negative for chills and fever  HENT: Positive for congestion and postnasal drip  Negative for sore throat and trouble swallowing  Eyes: Negative for photophobia and visual disturbance  Respiratory: Positive for shortness of breath and wheezing  Cardiovascular: Negative for chest pain and palpitations  Gastrointestinal: Negative for constipation, diarrhea, nausea and vomiting  Genitourinary: Negative for difficulty urinating and dysuria  Musculoskeletal: Negative for arthralgias and myalgias  Skin: Negative for rash and wound  Neurological: Negative for dizziness, light-headedness and headaches         Past Medical and Surgical History:     Past Medical History:   Diagnosis Date   • Asthma    • Dental abscess    • Diabetes mellitus ([facility] )    • High cholesterol    • Hypertension        Past Surgical History:   Procedure Laterality Date   • CHOLECYSTECTOMY     • ECTOPIC PREGNANCY SURGERY     • INCISION AND DRAINAGE INTRA ORAL ABSCESS Left 3/21/2016    Procedure: INCISION AND DRAINAGE  (I&D) WOUND ORAL Left  Space;  Surgeon: Jack Blizzard Day Anderson MD;  Location: [facility];  Service:    • MULTIPLE TOOTH EXTRACTIONS N/A 3/21/2016    Procedure: EXTRACTION TEETH MULTIPLE; 17,18,19,11,12,4,5,28,32;  Surgeon: Shady Lawson MD;  Location: [facility];  Service:    • SALPINGECTOMY         Meds/Allergies:    Prior to Admission medications    Medication Sig Start Date End Date Taking? Authorizing Provider   albuterol (2 5 mg/3 mL) 0 083 % nebulizer solution Take 3 mL (2 5 mg total) by nebulization every 6 (six) hours as needed for wheezing or shortness of breath 10/11/21  Yes Santa Jacobo DO   albuterol (PROVENTIL HFA,VENTOLIN HFA) 90 mcg/act inhaler Inhale 2 puffs every 6 (six) hours as needed for wheezing or shortness of breath 10/11/21  Yes Santa Jacobo DO   amLODIPine (NORVASC) 10 mg tablet Take 1 tablet (10 mg total) by mouth daily 10/11/21  Yes Santa Jacobo DO   atorvastatin (LIPITOR) 20 mg tablet Take 20 mg by mouth daily 8/3/22  Yes Historical Provider, MD   glipiZIDE-metFORMIN (METAGLIP) 2 5-500 MG per tablet Take 2 tablets by mouth 2 (two) times a day 9/23/22 12/22/22 Yes Historical Provider, MD   ipratropium-albuterol (DUO-NEB) 0 5-2 5 mg/3 mL nebulizer solution INHALE THE CONTENTS OF 1 VIAL VIA NEBULIZER 4 (FOUR) TIMES A DAY AS NEEDED FOR WHEEZING  6/9/22  Yes Historical Provider, MD   benzonatate (TESSALON) 200 MG capsule Take 1 capsule (200 mg total) by mouth 3 (three) times a day as needed for cough  Patient not taking: No sig reported 10/11/21 11/25/22  Santa Jacobo DO   phenazopyridine (PYRIDIUM) 200 mg tablet Take 1 tablet (200 mg total) by mouth 3 (three) times a day  Patient not taking: No sig reported 4/19/19 11/25/22  Meme Bowen DO       Allergies:    Allergies   Allergen Reactions   • Other Rash       [place]        Social History:     Marital Status: /Civil Union   Substance Use History:   Social History     Substance and Sexual Activity   Alcohol Use Yes    Comment: Social     Social History Tobacco Use   Smoking Status Every Day   • Packs/day: 0 50   • Years: 10 00   • Pack years: 5 00   • Types: Cigarettes   Smokeless Tobacco Never   Tobacco Comments    PT was educated on smoking and the effect is has on her asthma  Social History     Substance and Sexual Activity   Drug Use No       Family History:    Pertinent family history reviewed    Physical Exam:     Vitals:   Blood Pressure: 116/66 (11/25/22 2100)  Pulse: 90 (11/25/22 2100)  Temperature: 98 4 °F (36 9 °C) (11/25/22 1925)  Temp Source: Oral (11/25/22 1925)  Respirations: (!) 27 (11/25/22 2100)  SpO2: 99 % (11/25/22 2100)    Physical Exam  Vitals reviewed  Constitutional:       General: She is not in acute distress  Appearance: She is well-developed  She is not ill-appearing, toxic-appearing or diaphoretic  HENT:      Head: Normocephalic and atraumatic  Mouth/Throat:      Mouth: Mucous membranes are moist    Eyes:      General: No scleral icterus  Extraocular Movements: Extraocular movements intact  Cardiovascular:      Rate and Rhythm: Regular rhythm  Tachycardia present  Heart sounds: Normal heart sounds  Pulmonary:      Effort: Pulmonary effort is normal  No respiratory distress  Breath sounds: Wheezing present  No rales  Comments: Increased work of breathing, speaking full sentences  Abdominal:      General: There is no distension  Palpations: Abdomen is soft  Tenderness: There is no abdominal tenderness  There is no guarding or rebound  Musculoskeletal:         General: No swelling, tenderness or deformity  Skin:     General: Skin is warm and dry  Neurological:      General: No focal deficit present  Mental Status: She is alert  Mental status is at baseline  Psychiatric:         Mood and Affect: Mood normal          Behavior: Behavior normal          Thought Content:  Thought content normal          Judgment: Judgment normal           Additional Data:     Lab Results: I have reviewed pertinent results     Results from last 7 days   Lab Units 11/25/22 1929   WBC Thousand/uL 6 93   HEMOGLOBIN g/dL 12 5   HEMATOCRIT % 38 0   PLATELETS Thousands/uL 286   NEUTROS PCT % 63   LYMPHS PCT % 30   MONOS PCT % 4   EOS PCT % 3     Results from last 7 days   Lab Units 11/25/22 1929   SODIUM mmol/L 137   POTASSIUM mmol/L 3 4*   CHLORIDE mmol/L 99   CO2 mmol/L 28   BUN mg/dL 7   CREATININE mg/dL 0 67   ANION GAP mmol/L 10   CALCIUM mg/dL 9 4   GLUCOSE RANDOM mg/dL 285*                       Imaging: I have reviewed pertinent imaging     XR chest 1 view portable   ED Interpretation by Christina Edgar MD (11/25 2047)   No acute cardiopulmonary disease process on my interpretation  EKG, Pathology, and Other Studies Reviewed on Admission:   · EKG: Reviewed     Allscripts / Epic Records Reviewed    ** Please Note: This note has been constructed using a voice recognition system   **

## 2022-11-26 NOTE — MALNUTRITION/BMI
This medical record reflects one or more clinical indicators suggestive of malnutrition and/or morbid obesity  Malnutrition Findings:                                 BMI Findings:  Adult BMI Classifications: Morbid Obesity 50-59 9        Body mass index is 52 61 kg/m²  See Nutrition note dated 11/26/2022 for additional details  Completed nutrition assessment is viewable in the nutrition documentation

## 2022-11-26 NOTE — ASSESSMENT & PLAN NOTE
Currently requiring BiPAP on admission  Check VBG  Titrate oxygen to maintain saturations greater than 88%

## 2022-11-26 NOTE — ED NOTES
Lactation Progress Note      Data:  Lactation consult for multip breast feeder. Mob states that baby is feeding well and her breasts are filling. Mob concerned that baby may have tongue tie. States that nipples are not sore at this time. Output and weight loss are also WNL. Baby currently out for circ. Action: Explained that a frenulum is not always problematic. Explained that nipple integrity and weight gain once milk is in are biggest factors. Encouraged to discuss with pediatrician. Discharge teaching done; what to expect in the first few days of life, to feed baby at first sign of hunger cue for total of 8-12 times per day after the first DOL, how to properly position and latch baby, how to know baby is getting enough, engorgement prevention and treatment, avoiding bottles and pacifiers, community resources, pumping and return to work. Encouraged to call [de-identified] or Outpatient [facility] clinic for f/u prn. Response: Verbalized understanding and comfortable with breast feeding at this time. Pt taken off bipap per request, placed on 3L NC at this time  Provider aware       Tito Henriquez, SAMY  11/25/22 2045

## 2022-11-26 NOTE — ASSESSMENT & PLAN NOTE
Patient reports with asthma exacerbation  On admission required BiPAP  She was monitored off for about 15 minutes before noticing increased work of breathing and was replaced on BiPAP    Will continue scheduled nebs and IV Solu-Medrol q 6 hours  Will follow-up VBG  Step-down to med surge; low threshold to upgrade as needed  Currently saturating well on 3 L; may have some component of anxiety and requests BiPAP sporadically

## 2022-11-27 LAB
ALBUMIN SERPL BCP-MCNC: 3.2 G/DL (ref 3.5–5)
ALP SERPL-CCNC: 78 U/L (ref 46–116)
ALT SERPL W P-5'-P-CCNC: 12 U/L (ref 12–78)
ANION GAP SERPL CALCULATED.3IONS-SCNC: 9 MMOL/L (ref 4–13)
AST SERPL W P-5'-P-CCNC: 14 U/L (ref 5–45)
BASOPHILS # BLD MANUAL: 0 THOUSAND/UL (ref 0–0.1)
BASOPHILS NFR MAR MANUAL: 0 % (ref 0–1)
BILIRUB SERPL-MCNC: 0.43 MG/DL (ref 0.2–1)
BUN SERPL-MCNC: 14 MG/DL (ref 5–25)
CALCIUM ALBUM COR SERPL-MCNC: 9.8 MG/DL (ref 8.3–10.1)
CALCIUM SERPL-MCNC: 9.2 MG/DL (ref 8.3–10.1)
CHLORIDE SERPL-SCNC: 100 MMOL/L (ref 96–108)
CO2 SERPL-SCNC: 25 MMOL/L (ref 21–32)
CREAT SERPL-MCNC: 0.89 MG/DL (ref 0.6–1.3)
EOSINOPHIL # BLD MANUAL: 0 THOUSAND/UL (ref 0–0.4)
EOSINOPHIL NFR BLD MANUAL: 0 % (ref 0–6)
ERYTHROCYTE [DISTWIDTH] IN BLOOD BY AUTOMATED COUNT: 13.8 % (ref 11.6–15.1)
GFR SERPL CREATININE-BSD FRML MDRD: 79 ML/MIN/1.73SQ M
GLUCOSE SERPL-MCNC: 272 MG/DL (ref 65–140)
GLUCOSE SERPL-MCNC: 330 MG/DL (ref 65–140)
GLUCOSE SERPL-MCNC: 342 MG/DL (ref 65–140)
GLUCOSE SERPL-MCNC: 363 MG/DL (ref 65–140)
GLUCOSE SERPL-MCNC: 451 MG/DL (ref 65–140)
GLUCOSE SERPL-MCNC: 486 MG/DL (ref 65–140)
GLUCOSE SERPL-MCNC: 491 MG/DL (ref 65–140)
HCT VFR BLD AUTO: 36.5 % (ref 34.8–46.1)
HGB BLD-MCNC: 11.8 G/DL (ref 11.5–15.4)
LYMPHOCYTES # BLD AUTO: 1.12 THOUSAND/UL (ref 0.6–4.47)
LYMPHOCYTES # BLD AUTO: 7 % (ref 14–44)
MAGNESIUM SERPL-MCNC: 2.4 MG/DL (ref 1.6–2.6)
MCH RBC QN AUTO: 30.1 PG (ref 26.8–34.3)
MCHC RBC AUTO-ENTMCNC: 32.3 G/DL (ref 31.4–37.4)
MCV RBC AUTO: 93 FL (ref 82–98)
MONOCYTES # BLD AUTO: 0.32 THOUSAND/UL (ref 0–1.22)
MONOCYTES NFR BLD: 2 % (ref 4–12)
NEUTROPHILS # BLD MANUAL: 14.61 THOUSAND/UL (ref 1.85–7.62)
NEUTS BAND NFR BLD MANUAL: 2 % (ref 0–8)
NEUTS SEG NFR BLD AUTO: 89 % (ref 43–75)
PHOSPHATE SERPL-MCNC: 3.4 MG/DL (ref 2.7–4.5)
PLATELET # BLD AUTO: 344 THOUSANDS/UL (ref 149–390)
PLATELET BLD QL SMEAR: ADEQUATE
PMV BLD AUTO: 10 FL (ref 8.9–12.7)
POTASSIUM SERPL-SCNC: 4.8 MMOL/L (ref 3.5–5.3)
PROT SERPL-MCNC: 8 G/DL (ref 6.4–8.4)
RBC # BLD AUTO: 3.92 MILLION/UL (ref 3.81–5.12)
RBC MORPH BLD: NORMAL
SODIUM SERPL-SCNC: 134 MMOL/L (ref 135–147)
WBC # BLD AUTO: 16.05 THOUSAND/UL (ref 4.31–10.16)

## 2022-11-27 RX ORDER — INSULIN GLARGINE 100 [IU]/ML
20 INJECTION, SOLUTION SUBCUTANEOUS
Status: DISCONTINUED | OUTPATIENT
Start: 2022-11-27 | End: 2022-11-27

## 2022-11-27 RX ORDER — INSULIN LISPRO 100 [IU]/ML
8 INJECTION, SOLUTION INTRAVENOUS; SUBCUTANEOUS
Status: DISCONTINUED | OUTPATIENT
Start: 2022-11-27 | End: 2022-11-27

## 2022-11-27 RX ORDER — INSULIN LISPRO 100 [IU]/ML
5 INJECTION, SOLUTION INTRAVENOUS; SUBCUTANEOUS ONCE
Status: COMPLETED | OUTPATIENT
Start: 2022-11-27 | End: 2022-11-27

## 2022-11-27 RX ADMIN — SODIUM CHLORIDE 8 UNITS/HR: 9 INJECTION, SOLUTION INTRAVENOUS at 16:37

## 2022-11-27 RX ADMIN — IPRATROPIUM BROMIDE 0.5 MG: 0.5 SOLUTION RESPIRATORY (INHALATION) at 16:39

## 2022-11-27 RX ADMIN — INSULIN LISPRO 5 UNITS: 100 INJECTION, SOLUTION INTRAVENOUS; SUBCUTANEOUS at 08:16

## 2022-11-27 RX ADMIN — METHYLPREDNISOLONE SODIUM SUCCINATE 40 MG: 40 INJECTION, POWDER, FOR SOLUTION INTRAMUSCULAR; INTRAVENOUS at 12:04

## 2022-11-27 RX ADMIN — FLUTICASONE PROPIONATE 2 PUFF: 110 AEROSOL, METERED RESPIRATORY (INHALATION) at 21:03

## 2022-11-27 RX ADMIN — IPRATROPIUM BROMIDE 0.5 MG: 0.5 SOLUTION RESPIRATORY (INHALATION) at 12:33

## 2022-11-27 RX ADMIN — ENOXAPARIN SODIUM 40 MG: 40 INJECTION SUBCUTANEOUS at 08:13

## 2022-11-27 RX ADMIN — GUAIFENESIN 600 MG: 600 TABLET ORAL at 21:03

## 2022-11-27 RX ADMIN — LEVALBUTEROL 1.25 MG: 1.25 SOLUTION, CONCENTRATE RESPIRATORY (INHALATION) at 12:33

## 2022-11-27 RX ADMIN — IPRATROPIUM BROMIDE 0.5 MG: 0.5 SOLUTION RESPIRATORY (INHALATION) at 20:50

## 2022-11-27 RX ADMIN — FLUTICASONE PROPIONATE 2 PUFF: 110 AEROSOL, METERED RESPIRATORY (INHALATION) at 08:13

## 2022-11-27 RX ADMIN — ATORVASTATIN CALCIUM 20 MG: 20 TABLET, FILM COATED ORAL at 08:13

## 2022-11-27 RX ADMIN — METHYLPREDNISOLONE SODIUM SUCCINATE 40 MG: 40 INJECTION, POWDER, FOR SOLUTION INTRAMUSCULAR; INTRAVENOUS at 18:04

## 2022-11-27 RX ADMIN — HYDROXYZINE HYDROCHLORIDE 25 MG: 25 TABLET ORAL at 10:56

## 2022-11-27 RX ADMIN — METHYLPREDNISOLONE SODIUM SUCCINATE 40 MG: 40 INJECTION, POWDER, FOR SOLUTION INTRAMUSCULAR; INTRAVENOUS at 06:00

## 2022-11-27 RX ADMIN — AMLODIPINE BESYLATE 10 MG: 10 TABLET ORAL at 08:13

## 2022-11-27 RX ADMIN — LEVALBUTEROL 1.25 MG: 1.25 SOLUTION, CONCENTRATE RESPIRATORY (INHALATION) at 20:50

## 2022-11-27 RX ADMIN — GUAIFENESIN 600 MG: 600 TABLET ORAL at 08:13

## 2022-11-27 RX ADMIN — SODIUM CHLORIDE 500 ML: 0.9 INJECTION, SOLUTION INTRAVENOUS at 08:18

## 2022-11-27 RX ADMIN — LEVALBUTEROL 1.25 MG: 1.25 SOLUTION, CONCENTRATE RESPIRATORY (INHALATION) at 07:38

## 2022-11-27 RX ADMIN — INSULIN LISPRO 8 UNITS: 100 INJECTION, SOLUTION INTRAVENOUS; SUBCUTANEOUS at 12:06

## 2022-11-27 RX ADMIN — HYDROXYZINE HYDROCHLORIDE 25 MG: 25 TABLET ORAL at 21:05

## 2022-11-27 RX ADMIN — INSULIN LISPRO 12 UNITS: 100 INJECTION, SOLUTION INTRAVENOUS; SUBCUTANEOUS at 12:04

## 2022-11-27 RX ADMIN — LEVALBUTEROL 1.25 MG: 1.25 SOLUTION, CONCENTRATE RESPIRATORY (INHALATION) at 16:39

## 2022-11-27 RX ADMIN — INSULIN LISPRO 12 UNITS: 100 INJECTION, SOLUTION INTRAVENOUS; SUBCUTANEOUS at 08:14

## 2022-11-27 RX ADMIN — IPRATROPIUM BROMIDE 0.5 MG: 0.5 SOLUTION RESPIRATORY (INHALATION) at 07:38

## 2022-11-27 NOTE — PROGRESS NOTES
[facility]  Progress Note - Eliseo Route 1980, 38 y o  female MRN: [id number]  Unit/Bed#: [place] [address] -01 Encounter: [id number]  Primary Care Provider: Christian Clemens MD   Date and time admitted to hospital: 11/25/2022  7:15 PM    Type 2 diabetes mellitus, without long-term current use of insulin [facility])  Assessment & Plan  Lab Results   Component Value Date    HGBA1C 10 0 (H) 04/12/2022       Recent Labs     11/26/22  1055 11/26/22  1619 11/26/22  2113 11/27/22  0725   POCGLU 354* 356* 346* 486*       Blood Sugar Average: Last 72 hrs:  (P) 393 5   Poorly controlled type 2 diabetes  Now elevated in setting of steroid use  Increase Lantus to 20 units q h s  and added 8 units NovoLog t i d  with meals   Sliding scale algorithm for t i d   With meals and before bedtime  Titrate as tolerated  Carb controlled diet, however patient witnessed ordering food and not compliant with diet  Consider insulin drip moving forward    Hypertension  Assessment & Plan  Continue amlodipine    Morbid obesity with BMI of 40 0-44 9, adult ([facility] )  Assessment & Plan  - pt with BMI of  52 6  - pt counseled on risks associated with obesity and it's contribution to other health issues  - advise portion control, healthy food choices, drinking water instead of juice/soda  - advise beginning light exercise program (i e  Walking 30min 4-5x/wk)  - advise keeping food diary to help identify problem foods/times/stressors  - pt advised that weight loss of ~ 1lb/wk is a good goal and not to become frustrated if loss is slower      Acute respiratory failure with hypoxia (HCC)  Assessment & Plan  Eequiring BiPAP on admission; now saturating well on 3 L nasal cannula  She does request to wear BiPAP from time to time to avoid “wheezing”  VBG results noted  Titrate oxygen to maintain saturations greater than 88%    * Asthma exacerbation  Assessment & Plan  Patient reports with asthma exacerbation  On admission required BiPAP   She was monitored off for about 15 minutes before noticing increased work of breathing and was replaced on BiPAP  Will continue scheduled nebs and IV Solu-Medrol q 6 hours  Will follow-up VBG  Step-down to med surge; low threshold to upgrade as needed  Currently saturating well on 3 L; may have some component of anxiety and requests BiPAP sporadically      VTE Pharmacologic Prophylaxis:   Pharmacologic: Enoxaparin (Lovenox)  Mechanical VTE Prophylaxis in Place: Yes    Patient Centered Rounds: I have performed bedside rounds with nursing staff today  Discussions with Specialists or Other Care Team Provider:     Education and Discussions with Family / Patient: Discussed treatment plan with family and patient who agree with current plan; encouraged to ask questions and participate  Time Spent for Care: 45 minutes  More than 50% of total time spent on counseling and coordination of care as described above  Current Length of Stay: 2 day(s)    Current Patient Status: Inpatient   Certification Statement: The patient will continue to require additional inpatient hospital stay due to Treatment of acute asthma exacerbation    Discharge Plan: To be determined    Code Status: Level 1 - Full Code      Subjective:   Patient seen examined bedside, appears much more comfortable today than yesterday  Saturating well on 2 L nasal cannula; however does request to wear BiPAP intermittently as she says she does not want hear herself wheezing  Continue current treatment plan and have added insulin drip as her blood sugars continued to remain highly elevated    Some anxiety secondary to the asthma exacerbation which she says is the “worst I have ever had”; hydroxyzine ordered q 6h     Objective:     Vitals:   Temp (24hrs), Av 5 °F (36 4 °C), Min:97 2 °F (36 2 °C), Max:97 9 °F (36 6 °C)    Temp:  [97 2 °F (36 2 °C)-97 9 °F (36 6 °C)] 97 4 °F (36 3 °C)  HR:  [89-99] 89  Resp:  [16-22] 16  BP: (111-144)/(72-87) 144/87  SpO2:  [93 %-100 %] 96 %  Body mass index is 52 61 kg/m²  Input and Output Summary (last 24 hours): Intake/Output Summary (Last 24 hours) at 11/27/2022 1555  Last data filed at 11/27/2022 0818  Gross per 24 hour   Intake 500 ml   Output --   Net 500 ml       Physical Exam:     Physical Exam  Vitals and nursing note reviewed  Constitutional:       General: She is not in acute distress  Appearance: She is well-developed  She is obese  HENT:      Head: Normocephalic and atraumatic  Eyes:      Conjunctiva/sclera: Conjunctivae normal    Cardiovascular:      Rate and Rhythm: Normal rate and regular rhythm  Heart sounds: No murmur heard  Pulmonary:      Effort: No respiratory distress  Breath sounds: Wheezing present  Abdominal:      Palpations: Abdomen is soft  Tenderness: There is no abdominal tenderness  Musculoskeletal:         General: No swelling  Cervical back: Neck supple  Skin:     General: Skin is warm and dry  Neurological:      Mental Status: She is alert  Psychiatric:         Mood and Affect: Mood normal       Comments: Anxious           Additional Data:     Labs:    Results from last 7 days   Lab Units 11/27/22  0613 11/26/22  0503 11/25/22  1929   WBC Thousand/uL 16 05*   < > 6 93   HEMOGLOBIN g/dL 11 8   < > 12 5   HEMATOCRIT % 36 5   < > 38 0   PLATELETS Thousands/uL 344   < > 286   BANDS PCT % 2  --   --    NEUTROS PCT %  --   --  63   LYMPHS PCT %  --   --  30   LYMPHO PCT % 7*  --   --    MONOS PCT %  --   --  4   MONO PCT % 2*  --   --    EOS PCT % 0  --  3    < > = values in this interval not displayed       Results from last 7 days   Lab Units 11/27/22  0613   SODIUM mmol/L 134*   POTASSIUM mmol/L 4 8   CHLORIDE mmol/L 100   CO2 mmol/L 25   BUN mg/dL 14   CREATININE mg/dL 0 89   ANION GAP mmol/L 9   CALCIUM mg/dL 9 2   ALBUMIN g/dL 3 2*   TOTAL BILIRUBIN mg/dL 0 43   ALK PHOS U/L 78   ALT U/L 12   AST U/L 14   GLUCOSE RANDOM mg/dL 451* Results from last 7 days   Lab Units 11/27/22  1102 11/27/22  0725 11/26/22  2113 11/26/22  1619 11/26/22  1055 11/26/22  0728 11/25/22  2330   POC GLUCOSE mg/dl 491* 486* 346* 356* 354* 476* 343*                   * I Have Reviewed All Lab Data Listed Above  * Additional Pertinent Lab Tests Reviewed: All Labs Within Last 24 Hours Reviewed    Imaging:    Imaging Reports Reviewed Today Include:   Imaging Personally Reviewed by Myself Includes:      Recent Cultures (last 7 days):           Last 24 Hours Medication List:   Current Facility-Administered Medications   Medication Dose Route Frequency Provider Last Rate   • acetaminophen  650 mg Oral Q6H PRN Malu Cong, DO     • aluminum-magnesium hydroxide-simethicone  30 mL Oral Q6H PRN Malu Cong, DO     • amLODIPine  10 mg Oral Daily [facility] Cong, DO     • atorvastatin  20 mg Oral Daily [facility] Cong, DO     • enoxaparin  40 mg Subcutaneous Daily Malu Cong, DO     • fluticasone  2 puff Inhalation BID Malu Cong, DO     • guaiFENesin  600 mg Oral Q12H [facility] Malu Cong, DO     • hydrOXYzine HCL  25 mg Oral Q6H PRN Megan Bang MD     • insulin glargine  20 Units Subcutaneous HS Megan Bang MD     • insulin lispro  2-12 Units Subcutaneous 4x Daily (AC & HS) Malu Cong, DO     • insulin lispro  8 Units Subcutaneous TID With Meals Megan Bang MD     • insulin regular (HumuLIN R,NovoLIN R) infusion  0 3-21 Units/hr Intravenous Titrated Megan Bang MD     • ipratropium  0 5 mg Nebulization 4x Daily Malu Cong, DO     • levalbuterol  1 25 mg Nebulization 4x Daily Malu Cong, DO     • methylPREDNISolone sodium succinate  40 mg Intravenous Q6H [facility] Cong, DO     • ondansetron  4 mg Intravenous Q6H PRN Malu DO Cong          Today, Patient Was Seen By: Wandy Herndon MD    ** Please Note: Dictation voice to text software may have been used in the creation of this document   **

## 2022-11-27 NOTE — QUICK NOTE
QUICK NOTE - Deterioration Index  India Sethi 38 y o  female MRN: [id number]  Unit/Bed#: [place] [address] 210-01 Encounter: [id number]    Date Paged: 22  Time Paged: [phone]  Room #: 210  Deterioration index score at time of page: 62 04  %  Spoke with Joaquín Betancourt RN from primary team  Need to escalate level of care: No     PROBLEMS resulting in high DI score:   • Elevated blood glucose  • Patient is on steroids    PLAN:    • Continue sliding scale insulin    Please call [phone] with any questions       Vitals:   Vitals:    22 0723 22 0900   BP:  128/83 144/87    BP Location:       Pulse:  95 99    Resp:     Temp:  97 9 °F (36 6 °C) (!) 97 2 °F (36 2 °C)    TempSrc:  Oral     SpO2: 94% 95% 100%    Height:           Respiratory:  SpO2: SpO2: 100 %, SpO2 Activity: SpO2 Activity: At Rest, SpO2 Device: O2 Device: Nasal cannula  Nasal Cannula O2 Flow Rate (L/min): 3 L/min    Temperature: Temp (24hrs), Av 6 °F (36 4 °C), Min:97 2 °F (36 2 °C), Max:97 9 °F (36 6 °C)  Current: Temperature: (!) 97 2 °F (36 2 °C)    Labs:   Results from last 7 days   Lab Units 22  0622  0503 22   WBC Thousand/uL 16 05* 11 72* 6 93   HEMOGLOBIN g/dL 11 8 12 7 12 5   HEMATOCRIT % 36 5 40 4 38 0   PLATELETS Thousands/uL 344 298 286   NEUTROS PCT %  --   --  63   MONOS PCT %  --   --  4     Results from last 7 days   Lab Units 22  0622  0503 22   SODIUM mmol/L 134* 134* 137   POTASSIUM mmol/L 4 8 5 0 3 4*   CHLORIDE mmol/L 100 97 99   CO2 mmol/L 25 24 28   BUN mg/dL 14 9 7   CREATININE mg/dL 0 89 1 12 0 67   CALCIUM mg/dL 9 2 9 5 9 4   ALK PHOS U/L 78  --   --    ALT U/L 12  --   --    AST U/L 14  --   --      Results from last 7 days   Lab Units 22  0613   MAGNESIUM mg/dL 2 4                   Code Status: Level 1 - Full Code

## 2022-11-27 NOTE — ASSESSMENT & PLAN NOTE
Eequiring BiPAP on admission; now saturating well on 3 L nasal cannula  She does request to wear BiPAP from time to time to avoid “wheezing”  VBG results noted  Titrate oxygen to maintain saturations greater than 88%

## 2022-11-27 NOTE — PLAN OF CARE
Problem: PAIN - ADULT  Goal: Verbalizes/displays adequate comfort level or baseline comfort level  Description: Interventions:  - Encourage patient to monitor pain and request assistance  - Assess pain using appropriate pain scale  - Administer analgesics based on type and severity of pain and evaluate response  - Implement non-pharmacological measures as appropriate and evaluate response  - Consider cultural and social influences on pain and pain management  - Notify physician/advanced practitioner if interventions unsuccessful or patient reports new pain  Outcome: Progressing     Problem: INFECTION - ADULT  Goal: Absence or prevention of progression during hospitalization  Description: INTERVENTIONS:  - Assess and monitor for signs and symptoms of infection  - Monitor lab/diagnostic results  - Monitor all insertion sites, i e  indwelling lines, tubes, and drains  - Monitor endotracheal if appropriate and nasal secretions for changes in amount and color  - [place] appropriate cooling/warming therapies per order  - Administer medications as ordered  - Instruct and encourage patient and family to use good hand hygiene technique  - Identify and instruct in appropriate isolation precautions for identified infection/condition  Outcome: Progressing  Goal: Absence of fever/infection during neutropenic period  Description: INTERVENTIONS:  - Monitor WBC    Outcome: Progressing     Problem: SAFETY ADULT  Goal: Patient will remain free of falls  Description: INTERVENTIONS:  - Educate patient/family on patient safety including physical limitations  - Instruct patient to call for assistance with activity   - Consult OT/PT to assist with strengthening/mobility   - Keep Call bell within reach  - Keep bed low and locked with side rails adjusted as appropriate  - Keep care items and personal belongings within reach  - Initiate and maintain comfort rounds  - Make Fall Risk Sign visible to staff  - Offer Toileting every 2 Hours, in advance of need  - Initiate/Maintain alarm  - Obtain necessary fall risk management equipment:   - Apply yellow socks and bracelet for high fall risk patients  - Consider moving patient to room near nurses station  Outcome: Progressing  Goal: Maintain or return to baseline ADL function  Description: INTERVENTIONS:  -  Assess patient's ability to carry out ADLs; assess patient's baseline for ADL function and identify physical deficits which impact ability to perform ADLs (bathing, care of mouth/teeth, toileting, grooming, dressing, etc )  - Assess/evaluate cause of self-care deficits   - Assess range of motion  - Assess patient's mobility; develop plan if impaired  - Assess patient's need for assistive devices and provide as appropriate  - Encourage maximum independence but intervene and supervise when necessary  - Involve family in performance of ADLs  - Assess for home care needs following discharge   - Consider OT consult to assist with ADL evaluation and planning for discharge  - Provide patient education as appropriate  Outcome: Progressing  Goal: Maintains/Returns to pre admission functional level  Description: INTERVENTIONS:  - Perform BMAT or MOVE assessment daily    - Set and communicate daily mobility goal to care team and patient/family/caregiver  - Collaborate with rehabilitation services on mobility goals if consulted  - Perform Range of Motion 4 times a day  - Reposition patient every 2 hours    - Dangle patient 3 times a day  - Stand patient 3 times a day  - Ambulate patient 3 times a day  - Out of bed to chair 3 times a day   - Out of bed for meals 3 times a day  - Out of bed for toileting  - Record patient progress and toleration of activity level   Outcome: Progressing     Problem: DISCHARGE PLANNING  Goal: Discharge to home or other facility with appropriate resources  Description: INTERVENTIONS:  - Identify barriers to discharge w/patient and caregiver  - Arrange for needed discharge resources and transportation as appropriate  - Identify discharge learning needs (meds, wound care, etc )  - Arrange for interpretive services to assist at discharge as needed  - Refer to Case Management Department for coordinating discharge planning if the patient needs post-hospital services based on physician/advanced practitioner order or complex needs related to functional status, cognitive ability, or social support system  Outcome: Progressing     Problem: Knowledge Deficit  Goal: Patient/family/caregiver demonstrates understanding of disease process, treatment plan, medications, and discharge instructions  Description: Complete learning assessment and assess knowledge base  Interventions:  - Provide teaching at level of understanding  - Provide teaching via preferred learning methods  Outcome: Progressing     Problem: RESPIRATORY - ADULT  Goal: Achieves optimal ventilation and oxygenation  Description: INTERVENTIONS:  - Assess for changes in respiratory status  - Assess for changes in mentation and behavior  - Position to facilitate oxygenation and minimize respiratory effort  - Oxygen administered by appropriate delivery if ordered  - Initiate smoking cessation education as indicated  - Encourage broncho-pulmonary hygiene including cough, deep breathe, Incentive Spirometry  - Assess the need for suctioning and aspirate as needed  - Assess and instruct to report SOB or any respiratory difficulty  - Respiratory Therapy support as indicated  Outcome: Progressing     Problem: Nutrition/Hydration-ADULT  Goal: Nutrient/Hydration intake appropriate for improving, restoring or maintaining nutritional needs  Description: Monitor and assess patient's nutrition/hydration status for malnutrition  Collaborate with interdisciplinary team and initiate plan and interventions as ordered  Monitor patient's weight and dietary intake as ordered or per policy  Utilize nutrition screening tool and intervene as necessary  Determine patient's food preferences and provide high-protein, high-caloric foods as appropriate       INTERVENTIONS:  - Monitor oral intake, urinary output, labs, and treatment plans  - Assess nutrition and hydration status and recommend course of action  - Evaluate amount of meals eaten  - Assist patient with eating if necessary   - Allow adequate time for meals  - Recommend/ encourage appropriate diets, oral nutritional supplements, and vitamin/mineral supplements  - Order, calculate, and assess calorie counts as needed  - Recommend, monitor, and adjust tube feedings and TPN/PPN based on assessed needs  - Assess need for intravenous fluids  - Provide specific nutrition/hydration education as appropriate  - Include patient/family/caregiver in decisions related to nutrition  Outcome: Progressing     Problem: Potential for Falls  Goal: Patient will remain free of falls  Description: INTERVENTIONS:  - Educate patient/family on patient safety including physical limitations  - Instruct patient to call for assistance with activity   - Consult OT/PT to assist with strengthening/mobility   - Keep Call bell within reach  - Keep bed low and locked with side rails adjusted as appropriate  - Keep care items and personal belongings within reach  - Initiate and maintain comfort rounds  - Make Fall Risk Sign visible to staff  - Offer Toileting every 2 Hours, in advance of need  - Initiate/Maintain alarm  - Obtain necessary fall risk management equipment:   - Apply yellow socks and bracelet for high fall risk patients  - Consider moving patient to room near nurses station  Outcome: Progressing

## 2022-11-27 NOTE — PLAN OF CARE
Problem: PAIN - ADULT  Goal: Verbalizes/displays adequate comfort level or baseline comfort level  Description: Interventions:  - Encourage patient to monitor pain and request assistance  - Assess pain using appropriate pain scale  - Administer analgesics based on type and severity of pain and evaluate response  - Implement non-pharmacological measures as appropriate and evaluate response  - Consider cultural and social influences on pain and pain management  - Notify physician/advanced practitioner if interventions unsuccessful or patient reports new pain  Outcome: Progressing     Problem: INFECTION - ADULT  Goal: Absence or prevention of progression during hospitalization  Description: INTERVENTIONS:  - Assess and monitor for signs and symptoms of infection  - Monitor lab/diagnostic results  - Monitor all insertion sites, i e  indwelling lines, tubes, and drains  - Monitor endotracheal if appropriate and nasal secretions for changes in amount and color  - [place] appropriate cooling/warming therapies per order  - Administer medications as ordered  - Instruct and encourage patient and family to use good hand hygiene technique  - Identify and instruct in appropriate isolation precautions for identified infection/condition  Outcome: Progressing  Goal: Absence of fever/infection during neutropenic period  Description: INTERVENTIONS:  - Monitor WBC    Outcome: Progressing     Problem: SAFETY ADULT  Goal: Patient will remain free of falls  Description: INTERVENTIONS:  - Educate patient/family on patient safety including physical limitations  - Instruct patient to call for assistance with activity   - Consult OT/PT to assist with strengthening/mobility   - Keep Call bell within reach  - Keep bed low and locked with side rails adjusted as appropriate  - Keep care items and personal belongings within reach  - Initiate and maintain comfort rounds  - Make Fall Risk Sign visible to staff  - Offer Toileting every 2 Hours, in advance of need  - Initiate/Maintain bed alarm  - Obtain necessary fall risk management equipment: socks  - Apply yellow socks and bracelet for high fall risk patients  - Consider moving patient to room near nurses station  Outcome: Progressing  Goal: Maintain or return to baseline ADL function  Description: INTERVENTIONS:  -  Assess patient's ability to carry out ADLs; assess patient's baseline for ADL function and identify physical deficits which impact ability to perform ADLs (bathing, care of mouth/teeth, toileting, grooming, dressing, etc )  - Assess/evaluate cause of self-care deficits   - Assess range of motion  - Assess patient's mobility; develop plan if impaired  - Assess patient's need for assistive devices and provide as appropriate  - Encourage maximum independence but intervene and supervise when necessary  - Involve family in performance of ADLs  - Assess for home care needs following discharge   - Consider OT consult to assist with ADL evaluation and planning for discharge  - Provide patient education as appropriate  Outcome: Progressing  Goal: Maintains/Returns to pre admission functional level  Description: INTERVENTIONS:  - Perform BMAT or MOVE assessment daily    - Set and communicate daily mobility goal to care team and patient/family/caregiver  - Collaborate with rehabilitation services on mobility goals if consulted  - Perform Range of Motion 3 times a day  - Reposition patient every 2 hours    - Dangle patient 3 times a day  - Stand patient 3 times a day  - Ambulate patient 3 times a day  - Out of bed to chair 3 times a day   - Out of bed for meals 3 times a day  - Out of bed for toileting  - Record patient progress and toleration of activity level   Outcome: Progressing     Problem: DISCHARGE PLANNING  Goal: Discharge to home or other facility with appropriate resources  Description: INTERVENTIONS:  - Identify barriers to discharge w/patient and caregiver  - Arrange for needed discharge resources and transportation as appropriate  - Identify discharge learning needs (meds, wound care, etc )  - Arrange for interpretive services to assist at discharge as needed  - Refer to Case Management Department for coordinating discharge planning if the patient needs post-hospital services based on physician/advanced practitioner order or complex needs related to functional status, cognitive ability, or social support system  Outcome: Progressing     Problem: Knowledge Deficit  Goal: Patient/family/caregiver demonstrates understanding of disease process, treatment plan, medications, and discharge instructions  Description: Complete learning assessment and assess knowledge base  Interventions:  - Provide teaching at level of understanding  - Provide teaching via preferred learning methods  Outcome: Progressing     Problem: RESPIRATORY - ADULT  Goal: Achieves optimal ventilation and oxygenation  Description: INTERVENTIONS:  - Assess for changes in respiratory status  - Assess for changes in mentation and behavior  - Position to facilitate oxygenation and minimize respiratory effort  - Oxygen administered by appropriate delivery if ordered  - Initiate smoking cessation education as indicated  - Encourage broncho-pulmonary hygiene including cough, deep breathe, Incentive Spirometry  - Assess the need for suctioning and aspirate as needed  - Assess and instruct to report SOB or any respiratory difficulty  - Respiratory Therapy support as indicated  Outcome: Progressing     Problem: Nutrition/Hydration-ADULT  Goal: Nutrient/Hydration intake appropriate for improving, restoring or maintaining nutritional needs  Description: Monitor and assess patient's nutrition/hydration status for malnutrition  Collaborate with interdisciplinary team and initiate plan and interventions as ordered  Monitor patient's weight and dietary intake as ordered or per policy  Utilize nutrition screening tool and intervene as necessary  Determine patient's food preferences and provide high-protein, high-caloric foods as appropriate       INTERVENTIONS:  - Monitor oral intake, urinary output, labs, and treatment plans  - Assess nutrition and hydration status and recommend course of action  - Evaluate amount of meals eaten  - Assist patient with eating if necessary   - Allow adequate time for meals  - Recommend/ encourage appropriate diets, oral nutritional supplements, and vitamin/mineral supplements  - Order, calculate, and assess calorie counts as needed  - Recommend, monitor, and adjust tube feedings and TPN/PPN based on assessed needs  - Assess need for intravenous fluids  - Provide specific nutrition/hydration education as appropriate  - Include patient/family/caregiver in decisions related to nutrition  Outcome: Progressing     Problem: Potential for Falls  Goal: Patient will remain free of falls  Description: INTERVENTIONS:  - Educate patient/family on patient safety including physical limitations  - Instruct patient to call for assistance with activity   - Consult OT/PT to assist with strengthening/mobility   - Keep Call bell within reach  - Keep bed low and locked with side rails adjusted as appropriate  - Keep care items and personal belongings within reach  - Initiate and maintain comfort rounds  - Make Fall Risk Sign visible to staff  - Offer Toileting every 2 Hours, in advance of need  - Initiate/Maintain bed alarm  - Obtain necessary fall risk management equipment: socks  - Apply yellow socks and bracelet for high fall risk patients  - Consider moving patient to room near nurses station  Outcome: Progressing

## 2022-11-27 NOTE — ASSESSMENT & PLAN NOTE
Lab Results   Component Value Date    HGBA1C 10 0 (H) 04/12/2022       Recent Labs     11/26/22  1055 11/26/22  1619 11/26/22  2113 11/27/22  0725   POCGLU 354* 356* 346* 486*       Blood Sugar Average: Last 72 hrs:  (P) 393 5   Poorly controlled type 2 diabetes  Now elevated in setting of steroid use  Increase Lantus to 20 units q h s  and added 8 units NovoLog t i d  with meals   Sliding scale algorithm for t i d   With meals and before bedtime  Titrate as tolerated  Carb controlled diet, however patient witnessed ordering food and not compliant with diet  Consider insulin drip moving forward

## 2022-11-28 LAB
ANION GAP SERPL CALCULATED.3IONS-SCNC: 9 MMOL/L (ref 4–13)
BUN SERPL-MCNC: 16 MG/DL (ref 5–25)
CALCIUM SERPL-MCNC: 9.2 MG/DL (ref 8.3–10.1)
CHLORIDE SERPL-SCNC: 102 MMOL/L (ref 96–108)
CO2 SERPL-SCNC: 28 MMOL/L (ref 21–32)
CREAT SERPL-MCNC: 0.82 MG/DL (ref 0.6–1.3)
ERYTHROCYTE [DISTWIDTH] IN BLOOD BY AUTOMATED COUNT: 13.6 % (ref 11.6–15.1)
EST. AVERAGE GLUCOSE BLD GHB EST-MCNC: 232 MG/DL
GFR SERPL CREATININE-BSD FRML MDRD: 87 ML/MIN/1.73SQ M
GLUCOSE SERPL-MCNC: 189 MG/DL (ref 65–140)
GLUCOSE SERPL-MCNC: 191 MG/DL (ref 65–140)
GLUCOSE SERPL-MCNC: 194 MG/DL (ref 65–140)
GLUCOSE SERPL-MCNC: 195 MG/DL (ref 65–140)
GLUCOSE SERPL-MCNC: 196 MG/DL (ref 65–140)
GLUCOSE SERPL-MCNC: 198 MG/DL (ref 65–140)
GLUCOSE SERPL-MCNC: 206 MG/DL (ref 65–140)
GLUCOSE SERPL-MCNC: 216 MG/DL (ref 65–140)
GLUCOSE SERPL-MCNC: 232 MG/DL (ref 65–140)
GLUCOSE SERPL-MCNC: 234 MG/DL (ref 65–140)
GLUCOSE SERPL-MCNC: 237 MG/DL (ref 65–140)
GLUCOSE SERPL-MCNC: 264 MG/DL (ref 65–140)
GLUCOSE SERPL-MCNC: 286 MG/DL (ref 65–140)
GLUCOSE SERPL-MCNC: 293 MG/DL (ref 65–140)
HBA1C MFR BLD: 9.7 %
HCT VFR BLD AUTO: 39.7 % (ref 34.8–46.1)
HGB BLD-MCNC: 12.8 G/DL (ref 11.5–15.4)
MCH RBC QN AUTO: 30.5 PG (ref 26.8–34.3)
MCHC RBC AUTO-ENTMCNC: 32.2 G/DL (ref 31.4–37.4)
MCV RBC AUTO: 95 FL (ref 82–98)
PLATELET # BLD AUTO: 396 THOUSANDS/UL (ref 149–390)
PMV BLD AUTO: 10 FL (ref 8.9–12.7)
POTASSIUM SERPL-SCNC: 3.8 MMOL/L (ref 3.5–5.3)
RBC # BLD AUTO: 4.2 MILLION/UL (ref 3.81–5.12)
SODIUM SERPL-SCNC: 139 MMOL/L (ref 135–147)
WBC # BLD AUTO: 16.14 THOUSAND/UL (ref 4.31–10.16)

## 2022-11-28 RX ORDER — METHYLPREDNISOLONE SODIUM SUCCINATE 40 MG/ML
40 INJECTION, POWDER, LYOPHILIZED, FOR SOLUTION INTRAMUSCULAR; INTRAVENOUS EVERY 12 HOURS SCHEDULED
Status: DISCONTINUED | OUTPATIENT
Start: 2022-11-28 | End: 2022-11-29

## 2022-11-28 RX ADMIN — IPRATROPIUM BROMIDE 0.5 MG: 0.5 SOLUTION RESPIRATORY (INHALATION) at 14:00

## 2022-11-28 RX ADMIN — GUAIFENESIN 600 MG: 600 TABLET ORAL at 21:45

## 2022-11-28 RX ADMIN — LEVALBUTEROL 1.25 MG: 1.25 SOLUTION, CONCENTRATE RESPIRATORY (INHALATION) at 14:00

## 2022-11-28 RX ADMIN — LEVALBUTEROL 1.25 MG: 1.25 SOLUTION, CONCENTRATE RESPIRATORY (INHALATION) at 17:18

## 2022-11-28 RX ADMIN — HYDROXYZINE HYDROCHLORIDE 25 MG: 25 TABLET ORAL at 17:45

## 2022-11-28 RX ADMIN — METHYLPREDNISOLONE SODIUM SUCCINATE 40 MG: 40 INJECTION, POWDER, FOR SOLUTION INTRAMUSCULAR; INTRAVENOUS at 21:45

## 2022-11-28 RX ADMIN — HYDROXYZINE HYDROCHLORIDE 25 MG: 25 TABLET ORAL at 08:47

## 2022-11-28 RX ADMIN — METHYLPREDNISOLONE SODIUM SUCCINATE 40 MG: 40 INJECTION, POWDER, FOR SOLUTION INTRAMUSCULAR; INTRAVENOUS at 00:24

## 2022-11-28 RX ADMIN — IPRATROPIUM BROMIDE 0.5 MG: 0.5 SOLUTION RESPIRATORY (INHALATION) at 17:18

## 2022-11-28 RX ADMIN — GUAIFENESIN 600 MG: 600 TABLET ORAL at 08:47

## 2022-11-28 RX ADMIN — METHYLPREDNISOLONE SODIUM SUCCINATE 40 MG: 40 INJECTION, POWDER, FOR SOLUTION INTRAMUSCULAR; INTRAVENOUS at 05:23

## 2022-11-28 RX ADMIN — IPRATROPIUM BROMIDE 0.5 MG: 0.5 SOLUTION RESPIRATORY (INHALATION) at 20:14

## 2022-11-28 RX ADMIN — ATORVASTATIN CALCIUM 20 MG: 20 TABLET, FILM COATED ORAL at 08:47

## 2022-11-28 RX ADMIN — SODIUM CHLORIDE 4 UNITS/HR: 9 INJECTION, SOLUTION INTRAVENOUS at 04:30

## 2022-11-28 RX ADMIN — FLUTICASONE PROPIONATE 2 PUFF: 110 AEROSOL, METERED RESPIRATORY (INHALATION) at 08:56

## 2022-11-28 RX ADMIN — ENOXAPARIN SODIUM 40 MG: 40 INJECTION SUBCUTANEOUS at 08:47

## 2022-11-28 RX ADMIN — METHYLPREDNISOLONE SODIUM SUCCINATE 40 MG: 40 INJECTION, POWDER, FOR SOLUTION INTRAMUSCULAR; INTRAVENOUS at 12:40

## 2022-11-28 RX ADMIN — SODIUM CHLORIDE 12 UNITS/HR: 9 INJECTION, SOLUTION INTRAVENOUS at 17:43

## 2022-11-28 RX ADMIN — FLUTICASONE PROPIONATE 2 PUFF: 110 AEROSOL, METERED RESPIRATORY (INHALATION) at 21:46

## 2022-11-28 RX ADMIN — AMLODIPINE BESYLATE 10 MG: 10 TABLET ORAL at 08:47

## 2022-11-28 RX ADMIN — LEVALBUTEROL 1.25 MG: 1.25 SOLUTION, CONCENTRATE RESPIRATORY (INHALATION) at 20:14

## 2022-11-28 NOTE — PLAN OF CARE
Problem: SAFETY ADULT  Goal: Patient will remain free of falls  Description: INTERVENTIONS:  - Educate patient/family on patient safety including physical limitations  - Instruct patient to call for assistance with activity   - Consult OT/PT to assist with strengthening/mobility   - Keep Call bell within reach  - Keep bed low and locked with side rails adjusted as appropriate  - Keep care items and personal belongings within reach  - Initiate and maintain comfort rounds  - Make Fall Risk Sign visible to staff  - Offer Toileting every 2 Hours, in advance of need  - Initiate/Maintain alarm  - Obtain necessary fall risk management equipment  - Apply yellow socks and bracelet for high fall risk patients  - Consider moving patient to room near nurses station  11/28/2022 1551 by Gulshan Figueroa RN  Outcome: Progressing  11/28/2022 1551 by Gulshan Figueroa RN  Outcome: Progressing  Goal: Maintain or return to baseline ADL function  Description: INTERVENTIONS:  -  Assess patient's ability to carry out ADLs; assess patient's baseline for ADL function and identify physical deficits which impact ability to perform ADLs (bathing, care of mouth/teeth, toileting, grooming, dressing, etc )  - Assess/evaluate cause of self-care deficits   - Assess range of motion  - Assess patient's mobility; develop plan if impaired  - Assess patient's need for assistive devices and provide as appropriate  - Encourage maximum independence but intervene and supervise when necessary  - Involve family in performance of ADLs  - Assess for home care needs following discharge   - Consider OT consult to assist with ADL evaluation and planning for discharge  - Provide patient education as appropriate  11/28/2022 1551 by Gulshan Figueroa RN  Outcome: Progressing  11/28/2022 1551 by Gulshan Figueroa RN  Outcome: Progressing  Goal: Maintains/Returns to pre admission functional level  Description: INTERVENTIONS:  - Perform BMAT or MOVE assessment daily    - Set and communicate daily mobility goal to care team and patient/family/caregiver  - Collaborate with rehabilitation services on mobility goals if consulted  - Perform Range of Motion 3 times a day  - Reposition patient every 2 hours  - Dangle patient 3 times a day  - Stand patient 3 times a day  - Ambulate patient 3 times a day  - Out of bed to chair 3 times a day   - Out of bed for meals 3 times a day  - Out of bed for toileting  - Record patient progress and toleration of activity level   11/28/2022 1551 by Gulshan Figueroa RN  Outcome: Progressing  11/28/2022 1551 by Gulshan Figueroa RN  Outcome: Progressing     Problem: DISCHARGE PLANNING  Goal: Discharge to home or other facility with appropriate resources  Description: INTERVENTIONS:  - Identify barriers to discharge w/patient and caregiver  - Arrange for needed discharge resources and transportation as appropriate  - Identify discharge learning needs (meds, wound care, etc )  - Arrange for interpretive services to assist at discharge as needed  - Refer to Case Management Department for coordinating discharge planning if the patient needs post-hospital services based on physician/advanced practitioner order or complex needs related to functional status, cognitive ability, or social support system  11/28/2022 1551 by Gulshan Figueroa RN  Outcome: Progressing  11/28/2022 1551 by Gulshan Figueroa RN  Outcome: Progressing     Problem: Knowledge Deficit  Goal: Patient/family/caregiver demonstrates understanding of disease process, treatment plan, medications, and discharge instructions  Description: Complete learning assessment and assess knowledge base    Interventions:  - Provide teaching at level of understanding  - Provide teaching via preferred learning methods  11/28/2022 1551 by Gulshan Figueroa RN  Outcome: Progressing  11/28/2022 1551 by Gulshan Figueroa RN  Outcome: Progressing     Problem: RESPIRATORY - ADULT  Goal: Achieves optimal ventilation and oxygenation  Description: INTERVENTIONS:  - Assess for changes in respiratory status  - Assess for changes in mentation and behavior  - Position to facilitate oxygenation and minimize respiratory effort  - Oxygen administered by appropriate delivery if ordered  - Initiate smoking cessation education as indicated  - Encourage broncho-pulmonary hygiene including cough, deep breathe, Incentive Spirometry  - Assess the need for suctioning and aspirate as needed  - Assess and instruct to report SOB or any respiratory difficulty  - Respiratory Therapy support as indicated  11/28/2022 1551 by Donald rOo RN  Outcome: Progressing  11/28/2022 1551 by Donald Oro RN  Outcome: Progressing     Problem: Nutrition/Hydration-ADULT  Goal: Nutrient/Hydration intake appropriate for improving, restoring or maintaining nutritional needs  Description: Monitor and assess patient's nutrition/hydration status for malnutrition  Collaborate with interdisciplinary team and initiate plan and interventions as ordered  Monitor patient's weight and dietary intake as ordered or per policy  Utilize nutrition screening tool and intervene as necessary  Determine patient's food preferences and provide high-protein, high-caloric foods as appropriate       INTERVENTIONS:  - Monitor oral intake, urinary output, labs, and treatment plans  - Assess nutrition and hydration status and recommend course of action  - Evaluate amount of meals eaten  - Assist patient with eating if necessary   - Allow adequate time for meals  - Recommend/ encourage appropriate diets, oral nutritional supplements, and vitamin/mineral supplements  - Order, calculate, and assess calorie counts as needed  - Recommend, monitor, and adjust tube feedings and TPN/PPN based on assessed needs  - Assess need for intravenous fluids  - Provide specific nutrition/hydration education as appropriate  - Include patient/family/caregiver in decisions related to nutrition  11/28/2022 1551 by Donald Oro RN  Outcome: Progressing  11/28/2022 1551 by Shaheen Edmond RN  Outcome: Progressing     Problem: Potential for Falls  Goal: Patient will remain free of falls  Description: INTERVENTIONS:  - Educate patient/family on patient safety including physical limitations  - Instruct patient to call for assistance with activity   - Consult OT/PT to assist with strengthening/mobility   - Keep Call bell within reach  - Keep bed low and locked with side rails adjusted as appropriate  - Keep care items and personal belongings within reach  - Initiate and maintain comfort rounds  - Make Fall Risk Sign visible to staff  - Offer Toileting every 2 Hours, in advance of need  - Initiate/Maintain alarm  - Obtain necessary fall risk management equipment  - Apply yellow socks and bracelet for high fall risk patients  - Consider moving patient to room near nurses station  11/28/2022 1551 by Shaheen Edmond RN  Outcome: Progressing  11/28/2022 1551 by Shaheen Edmond RN  Outcome: Progressing     Problem: METABOLIC, FLUID AND ELECTROLYTES - ADULT  Goal: Electrolytes maintained within normal limits  Description: INTERVENTIONS:  - Monitor labs and assess patient for signs and symptoms of electrolyte imbalances  - Administer electrolyte replacement as ordered  - Monitor response to electrolyte replacements, including repeat lab results as appropriate  - Instruct patient on fluid and nutrition as appropriate  11/28/2022 1551 by Shaheen Edmond RN  Outcome: Progressing  11/28/2022 1551 by Shaheen Edmond RN  Outcome: Progressing  Goal: Glucose maintained within target range  Description: INTERVENTIONS:  - Monitor Blood Glucose as ordered  - Assess for signs and symptoms of hyperglycemia and hypoglycemia  - Administer ordered medications to maintain glucose within target range  - Assess nutritional intake and initiate nutrition service referral as needed  11/28/2022 1551 by Shaheen Edmond RN  Outcome: Progressing  11/28/2022 1551 by Shaheen Edmond RN  Outcome: Progressing Problem: PAIN - ADULT  Goal: Verbalizes/displays adequate comfort level or baseline comfort level  Description: Interventions:  - Encourage patient to monitor pain and request assistance  - Assess pain using appropriate pain scale  - Administer analgesics based on type and severity of pain and evaluate response  - Implement non-pharmacological measures as appropriate and evaluate response  - Consider cultural and social influences on pain and pain management  - Notify physician/advanced practitioner if interventions unsuccessful or patient reports new pain  11/28/2022 1551 by Simone Horn RN  Outcome: Completed  11/28/2022 1551 by Simone Horn RN  Outcome: Progressing     Problem: INFECTION - ADULT  Goal: Absence or prevention of progression during hospitalization  Description: INTERVENTIONS:  - Assess and monitor for signs and symptoms of infection  - Monitor lab/diagnostic results  - Monitor all insertion sites, i e  indwelling lines, tubes, and drains  - Monitor endotracheal if appropriate and nasal secretions for changes in amount and color  - [place] appropriate cooling/warming therapies per order  - Administer medications as ordered  - Instruct and encourage patient and family to use good hand hygiene technique  - Identify and instruct in appropriate isolation precautions for identified infection/condition  11/28/2022 1551 by Simone Horn RN  Outcome: Completed  11/28/2022 1551 by Simone Horn RN  Outcome: Progressing  Goal: Absence of fever/infection during neutropenic period  Description: INTERVENTIONS:  - Monitor WBC    11/28/2022 1551 by Simone Horn RN  Outcome: Completed  11/28/2022 1551 by Simone Horn RN  Outcome: Progressing

## 2022-11-28 NOTE — PLAN OF CARE
Problem: PAIN - ADULT  Goal: Verbalizes/displays adequate comfort level or baseline comfort level  Description: Interventions:  - Encourage patient to monitor pain and request assistance  - Assess pain using appropriate pain scale  - Administer analgesics based on type and severity of pain and evaluate response  - Implement non-pharmacological measures as appropriate and evaluate response  - Consider cultural and social influences on pain and pain management  - Notify physician/advanced practitioner if interventions unsuccessful or patient reports new pain  Outcome: Progressing     Problem: INFECTION - ADULT  Goal: Absence or prevention of progression during hospitalization  Description: INTERVENTIONS:  - Assess and monitor for signs and symptoms of infection  - Monitor lab/diagnostic results  - Monitor all insertion sites, i e  indwelling lines, tubes, and drains  - Monitor endotracheal if appropriate and nasal secretions for changes in amount and color  - [place] appropriate cooling/warming therapies per order  - Administer medications as ordered  - Instruct and encourage patient and family to use good hand hygiene technique  - Identify and instruct in appropriate isolation precautions for identified infection/condition  Outcome: Progressing  Goal: Absence of fever/infection during neutropenic period  Description: INTERVENTIONS:  - Monitor WBC    Outcome: Progressing     Problem: SAFETY ADULT  Goal: Patient will remain free of falls  Description: INTERVENTIONS:  - Educate patient/family on patient safety including physical limitations  - Instruct patient to call for assistance with activity   - Consult OT/PT to assist with strengthening/mobility   - Keep Call bell within reach  - Keep bed low and locked with side rails adjusted as appropriate  - Keep care items and personal belongings within reach  - Initiate and maintain comfort rounds  - Make Fall Risk Sign visible to staff  - Offer Toileting every 2 Hours, in advance of need  - Initiate/Maintain 2alarm  - Obtain necessary fall risk management equipment: 2  - Apply yellow socks and bracelet for high fall risk patients  - Consider moving patient to room near nurses station  Outcome: Progressing  Goal: Maintain or return to baseline ADL function  Description: INTERVENTIONS:  -  Assess patient's ability to carry out ADLs; assess patient's baseline for ADL function and identify physical deficits which impact ability to perform ADLs (bathing, care of mouth/teeth, toileting, grooming, dressing, etc )  - Assess/evaluate cause of self-care deficits   - Assess range of motion  - Assess patient's mobility; develop plan if impaired  - Assess patient's need for assistive devices and provide as appropriate  - Encourage maximum independence but intervene and supervise when necessary  - Involve family in performance of ADLs  - Assess for home care needs following discharge   - Consider OT consult to assist with ADL evaluation and planning for discharge  - Provide patient education as appropriate  Outcome: Progressing  Goal: Maintains/Returns to pre admission functional level  Description: INTERVENTIONS:  - Perform BMAT or MOVE assessment daily    - Set and communicate daily mobility goal to care team and patient/family/caregiver  - Collaborate with rehabilitation services on mobility goals if consulted  - Perform Range of Motion 2 times a day  - Reposition patient every 2 hours    - Dangle patient 2 times a day  - Stand patient 2 times a day  - Ambulate patient 2 times a day  - Out of bed to chair 2 times a day   - Out of bed for meals 2 times a day  - Out of bed for toileting  - Record patient progress and toleration of activity level   Outcome: Progressing     Problem: DISCHARGE PLANNING  Goal: Discharge to home or other facility with appropriate resources  Description: INTERVENTIONS:  - Identify barriers to discharge w/patient and caregiver  - Arrange for needed discharge resources and transportation as appropriate  - Identify discharge learning needs (meds, wound care, etc )  - Arrange for interpretive services to assist at discharge as needed  - Refer to Case Management Department for coordinating discharge planning if the patient needs post-hospital services based on physician/advanced practitioner order or complex needs related to functional status, cognitive ability, or social support system  Outcome: Progressing     Problem: Knowledge Deficit  Goal: Patient/family/caregiver demonstrates understanding of disease process, treatment plan, medications, and discharge instructions  Description: Complete learning assessment and assess knowledge base  Interventions:  - Provide teaching at level of understanding  - Provide teaching via preferred learning methods  Outcome: Progressing     Problem: RESPIRATORY - ADULT  Goal: Achieves optimal ventilation and oxygenation  Description: INTERVENTIONS:  - Assess for changes in respiratory status  - Assess for changes in mentation and behavior  - Position to facilitate oxygenation and minimize respiratory effort  - Oxygen administered by appropriate delivery if ordered  - Initiate smoking cessation education as indicated  - Encourage broncho-pulmonary hygiene including cough, deep breathe, Incentive Spirometry  - Assess the need for suctioning and aspirate as needed  - Assess and instruct to report SOB or any respiratory difficulty  - Respiratory Therapy support as indicated  Outcome: Progressing     Problem: Nutrition/Hydration-ADULT  Goal: Nutrient/Hydration intake appropriate for improving, restoring or maintaining nutritional needs  Description: Monitor and assess patient's nutrition/hydration status for malnutrition  Collaborate with interdisciplinary team and initiate plan and interventions as ordered  Monitor patient's weight and dietary intake as ordered or per policy  Utilize nutrition screening tool and intervene as necessary  Determine patient's food preferences and provide high-protein, high-caloric foods as appropriate       INTERVENTIONS:  - Monitor oral intake, urinary output, labs, and treatment plans  - Assess nutrition and hydration status and recommend course of action  - Evaluate amount of meals eaten  - Assist patient with eating if necessary   - Allow adequate time for meals  - Recommend/ encourage appropriate diets, oral nutritional supplements, and vitamin/mineral supplements  - Order, calculate, and assess calorie counts as needed  - Recommend, monitor, and adjust tube feedings and TPN/PPN based on assessed needs  - Assess need for intravenous fluids  - Provide specific nutrition/hydration education as appropriate  - Include patient/family/caregiver in decisions related to nutrition  Outcome: Progressing     Problem: Potential for Falls  Goal: Patient will remain free of falls  Description: INTERVENTIONS:  - Educate patient/family on patient safety including physical limitations  - Instruct patient to call for assistance with activity   - Consult OT/PT to assist with strengthening/mobility   - Keep Call bell within reach  - Keep bed low and locked with side rails adjusted as appropriate  - Keep care items and personal belongings within reach  - Initiate and maintain comfort rounds  - Make Fall Risk Sign visible to staff  - Offer Toileting every 2 Hours, in advance of need  - Initiate/Maintain 2alarm  - Obtain necessary fall risk management equipment: 2  - Apply yellow socks and bracelet for high fall risk patients  - Consider moving patient to room near nurses station  Outcome: Progressing

## 2022-11-28 NOTE — ASSESSMENT & PLAN NOTE
· Patient reports with asthma exacerbation  · On admission required BiPAP  She was monitored off for about 15 minutes before noticing increased work of breathing and was replaced on BiPAP    · Will taper Solu-Medrol to 40 mg IV q 12 hours  · Continue scheduled nebulizers

## 2022-11-28 NOTE — ASSESSMENT & PLAN NOTE
· Eequiring BiPAP on admission; now saturating well on 2 L nasal cannula  · She does request to wear BiPAP from time to time to avoid “wheezing”  · Titrate oxygen to maintain saturations greater than 88%

## 2022-11-28 NOTE — ASSESSMENT & PLAN NOTE
Lab Results   Component Value Date    HGBA1C 10 0 (H) 04/12/2022       Recent Labs     11/28/22  0845 11/28/22  1016 11/28/22  1238 11/28/22  1430   POCGLU 195* 293* 286* 264*       Blood Sugar Average: Last 72 hrs:  (P) 313 65   · Poorly controlled type 2 diabetes  · Now elevated in setting of steroid use  · Currently on insulin drip  · Will recheck hemoglobin A1c  ·  Sliding scale algorithm for t i d   With meals and before bedtime  · Titrate as tolerated  · Carb controlled diet, however patient witnessed ordering food and not compliant with diet

## 2022-11-28 NOTE — CASE MANAGEMENT
Case Management Assessment & Discharge Planning Note    Patient name Samy Escboar  Location [facility] [place] [facility]* MRN [id number]  : 1980 Date 2022       Current Admission Date: 2022  Current Admission Diagnosis:Asthma exacerbation   Patient Active Problem List    Diagnosis Date Noted   • Type 2 diabetes mellitus, without long-term current use of insulin ([facility] ) 2022   • High cholesterol 10/19/2022   • Hypertension    • Morbid obesity with BMI of 40 0-44 9, adult ([facility] ) 10/02/2018   • Lactic acid acidosis 10/02/2018   • Acute respiratory failure with hypoxia ([facility] ) 10/01/2018   • Other headache syndrome 10/01/2018   • Tobacco abuse 10/01/2018   • Pneumonia 2017   • Asthma exacerbation 2016   • Dental abscess 2016      LOS (days): 3  Geometric Mean LOS (GMLOS) (days):   Days to GMLOS:     OBJECTIVE:    Risk of Unplanned Readmission Score: 11 47         Current admission status: Inpatient       Preferred Pharmacy:   [address] [place] [address], [address] [place] [address]  [address] [place] [address]  Phone: [phone] Fax: [phone]    [facility],[address], [address]  [facility] [address]  Phone: [phone] Fax: [phone]    [facility] - [address] [place] Andrea Garza   Andrea Garza  [address]  Phone: [phone] Fax: John [address], [address] [address]  Phone: [phone] Fax: [phone]    Primary Care Provider: Quynh Holland MD    Primary Insurance: [facility]  Secondary Insurance:     ASSESSMENT:  Matthew Wooten Proxies    There are no active Health Care Proxies on file         Advance Directives  Does patient have a [address] [facility] [facility] [address]?: No  Was patient offered paperwork?: Yes  Does patient have Advance Directives?: No  Was patient offered paperwork?: Yes  Primary Contact: Toñito Perry (Spouse)   [phone]         Readmission [facility]  30 Day Readmission: No    Patient Information  Admitted from[de-identified] Home  Mental Status: Alert  During Assessment patient was accompanied by: Not accompanied during assessment  Assessment information provided by[de-identified] Patient  Primary Caregiver: Self  Support Systems: Spouse/significant other, Family members  [place] of Residence: [address] [place] [address] do you live in?: [address] [place] [address] entry access options   Select all that apply : Stairs  Number of steps to enter home : 3  Do the steps have railings?: Yes  Type of Current Residence: [address] home  Upon entering residence, is there a bedroom on the main floor (no further steps)?: No  A bedroom is located on the following floor levels of residence (select all that apply):: 2nd Floor  Upon entering residence, is there a bathroom on the main floor (no further steps)?: Yes  Number of steps to 2nd floor from main floor: One Flight  In the last 12 months, was there a time when you were not able to pay the mortgage or rent on time?: No  In the last 12 months, how many places have you lived?: 1  In the last 12 months, was there a time when you did not have a steady place to sleep or slept in a shelter (including now)?: No  Homeless/housing insecurity resource given?: N/A  Living Arrangements: Lives w/ Spouse/significant other (lives w/ children)  Is patient a ?: No    Activities of Daily Living Prior to Admission  Functional Status: Independent  Completes ADLs independently?: Yes  Ambulates independently?: Yes  Does patient use assisted devices?: No  Does patient currently own DME?: No  Does patient have a history of Outpatient Therapy (PT/OT)?: No  Does the patient have a history of Short-Term Rehab?: No  Does patient have a history of HHC?: No  Does patient currently have [employer]?: No         Patient Information Continued  Income Source: Employed  Does patient have prescription coverage?: Yes  Within the past 12 months, you worried that your food would run out before you got the money to buy more : Never true  Within the past 12 months, the food you bought just didn't last and you didn't have money to get more : Never true  Food insecurity resource given?: N/A  Does patient receive dialysis treatments?: No  Does patient have a history of substance abuse?: No (1/2 pack cigarettes/day)  Does patient have a history of Mental Health Diagnosis?: No         Means of Transportation  Means of Transport to Appts[de-identified] Drives Self  In the past 12 months, has lack of transportation kept you from medical appointments or from getting medications?: No  In the past 12 months, has lack of transportation kept you from meetings, work, or from getting things needed for daily living?: No  Was application for public transport provided?: N/A        DISCHARGE DETAILS:    Discharge planning discussed with[de-identified] pt        [address]         Is the patient interested in [facility] at discharge?: No    DME Referral Provided  Referral made for DME?: No    Other Referral/Resources/Interventions Provided:  Interventions: None Indicated       Treatment Team Recommendation: Home  Discharge Destination Plan[de-identified] Home  Transport at Discharge : Free Local Transportation (LYFT)

## 2022-11-28 NOTE — PROGRESS NOTES
[address] [facility]  Progress Note - Rodolfo Ramírez 1980, 38 y o  female MRN: [id number]  Unit/Bed#: [place] [address] -01 Encounter: [id number]  Primary Care Provider: Pati Sharp MD   Date and time admitted to hospital: 11/25/2022  7:15 PM    * Asthma exacerbation  Assessment & Plan  · Patient reports with asthma exacerbation  · On admission required BiPAP  She was monitored off for about 15 minutes before noticing increased work of breathing and was replaced on BiPAP  · Will taper Solu-Medrol to 40 mg IV q 12 hours  · Continue scheduled nebulizers    Acute respiratory failure with hypoxia (HCC)  Assessment & Plan  · Eequiring BiPAP on admission; now saturating well on 2 L nasal cannula  · She does request to wear BiPAP from time to time to avoid “wheezing”  · Titrate oxygen to maintain saturations greater than 88%    Type 2 diabetes mellitus, without long-term current use of insulin [facility])  Assessment & Plan  Lab Results   Component Value Date    HGBA1C 10 0 (H) 04/12/2022       Recent Labs     11/28/22  0845 11/28/22  1016 11/28/22  1238 11/28/22  1430   POCGLU 195* 293* 286* 264*       Blood Sugar Average: Last 72 hrs:  (P) 313 65   · Poorly controlled type 2 diabetes  · Now elevated in setting of steroid use  · Currently on insulin drip  · Will recheck hemoglobin A1c  ·  Sliding scale algorithm for t i d  With meals and before bedtime  · Titrate as tolerated  · Carb controlled diet, however patient witnessed ordering food and not compliant with diet    Hypertension  Assessment & Plan  Continue amlodipine    Morbid obesity with BMI of 40 0-44 9, adult ([address] )  Assessment & Plan  ·  pt with BMI of  52 6  · Counseled on diet and lifestyle modification          VTE Pharmacologic Prophylaxis:   Pharmacologic:  Lovenox    Patient Centered Rounds: I have performed bedside rounds with nursing staff today  Time Spent for Care: 20 minutes    More than 50% of total time spent on counseling and coordination of care as described above  Current Length of Stay: 3 day(s)    Current Patient Status: Inpatient   Certification Statement: The patient will continue to require additional inpatient hospital stay due to Asthma exacerbation, uncontrolled blood sugars    Discharge Plan / Estimated Discharge Date: 24-48h    Code Status: Level 1 - Full Code      Subjective:   Patient seen and examined at bedside, comfortable still complains of feeling short of breath, denies any chest pain, abdominal pain, nausea or vomiting    Objective:     Vitals:   Temp (24hrs), Av 2 °F (36 8 °C), Min:97 4 °F (36 3 °C), Max:99 2 °F (37 3 °C)    Temp:  [97 4 °F (36 3 °C)-99 2 °F (37 3 °C)] 97 9 °F (36 6 °C)  HR:  [] 94  Resp:  [16-22] 18  BP: (121-145)/(78-93) 129/78  SpO2:  [93 %-98 %] 93 %  Body mass index is 52 61 kg/m²  Input and Output Summary (last 24 hours):     No intake or output data in the 24 hours ending 22 1506    Physical Exam:    Constitutional: Patient is oriented to person, place and time, no acute distress  HEENT:  Normocephalic, atraumatic  Cardiovascular: Normal S1S2, RRR, No murmurs/rubs/gallops appreciated  Pulmonary:  Bilateral air entry, scattered wheezing  Abdominal: Soft, Bowel sounds present, Non-tender, Non-distended  Extremities:  No cyanosis, clubbing or edema  Neurological:  Awake, alert  Skin:  Warm, dry    Additional Data:     Labs:    Results from last 7 days   Lab Units 22  0602 22  0613 22  0503 22  1929   WBC Thousand/uL 16 14* 16 05*   < > 6 93   HEMOGLOBIN g/dL 12 8 11 8   < > 12 5   HEMATOCRIT % 39 7 36 5   < > 38 0   PLATELETS Thousands/uL 396* 344   < > 286   NEUTROS PCT %  --   --   --  63   LYMPHS PCT %  --   --   --  30   LYMPHO PCT %  --  7*  --   --    MONOS PCT %  --   --   --  4   MONO PCT %  --  2*  --   --    EOS PCT %  --  0  --  3    < > = values in this interval not displayed       Results from last 7 days   Lab Units 22  0602 11/27/22  0613   POTASSIUM mmol/L 3 8 4 8   CHLORIDE mmol/L 102 100   CO2 mmol/L 28 25   BUN mg/dL 16 14   CREATININE mg/dL 0 82 0 89   CALCIUM mg/dL 9 2 9 2   ALK PHOS U/L  --  78   ALT U/L  --  12   AST U/L  --  14            I Have Reviewed All Lab Data Listed Above      Invasive Devices     Peripheral Intravenous Line  Duration           Peripheral IV 11/27/22 Distal;Right;Ventral (anterior) Forearm 1 day    Peripheral IV 11/28/22 Left;Ventral (anterior) Forearm <1 day                     Recent Cultures (last 7 days):           Last 24 Hours Medication List:   Current Facility-Administered Medications   Medication Dose Route Frequency Provider Last Rate   • acetaminophen  650 mg Oral Q6H PRN Katarzyna Ask, DO     • aluminum-magnesium hydroxide-simethicone  30 mL Oral Q6H PRN Katarzyna Ask, DO     • amLODIPine  10 mg Oral Daily Katarzyna Ask, DO     • atorvastatin  20 mg Oral Daily Katarzyna Ask, DO     • enoxaparin  40 mg Subcutaneous Daily Katarzyna Ask, DO     • fluticasone  2 puff Inhalation BID Katarzyna Ask, DO     • guaiFENesin  600 mg Oral Q12H [address] Katarzyna Ask, DO     • hydrOXYzine HCL  25 mg Oral Q6H PRN Nghia Pineda MD     • insulin regular (HumuLIN R,NovoLIN R) infusion  0 3-21 Units/hr Intravenous Titrated Nghia Pineda MD 12 Units/hr (11/28/22 1436)   • ipratropium  0 5 mg Nebulization 4x Daily Katarzyna Ask, DO     • levalbuterol  1 25 mg Nebulization 4x Daily Katazryna Ask, DO     • methylPREDNISolone sodium succinate  40 mg Intravenous Q12H [address] Leandro Rea MD     • ondansetron  4 mg Intravenous Q6H PRN Katarzyna Ask, DO          Today, Patient Was Seen By: Leandro Rea MD

## 2022-11-29 LAB
ANION GAP SERPL CALCULATED.3IONS-SCNC: 11 MMOL/L (ref 4–13)
BASOPHILS # BLD MANUAL: 0 THOUSAND/UL (ref 0–0.1)
BASOPHILS NFR MAR MANUAL: 0 % (ref 0–1)
BUN SERPL-MCNC: 17 MG/DL (ref 5–25)
CALCIUM SERPL-MCNC: 8.8 MG/DL (ref 8.3–10.1)
CHLORIDE SERPL-SCNC: 103 MMOL/L (ref 96–108)
CO2 SERPL-SCNC: 24 MMOL/L (ref 21–32)
CREAT SERPL-MCNC: 0.82 MG/DL (ref 0.6–1.3)
EOSINOPHIL # BLD MANUAL: 0 THOUSAND/UL (ref 0–0.4)
EOSINOPHIL NFR BLD MANUAL: 0 % (ref 0–6)
ERYTHROCYTE [DISTWIDTH] IN BLOOD BY AUTOMATED COUNT: 13.4 % (ref 11.6–15.1)
GFR SERPL CREATININE-BSD FRML MDRD: 87 ML/MIN/1.73SQ M
GLUCOSE SERPL-MCNC: 153 MG/DL (ref 65–140)
GLUCOSE SERPL-MCNC: 173 MG/DL (ref 65–140)
GLUCOSE SERPL-MCNC: 181 MG/DL (ref 65–140)
GLUCOSE SERPL-MCNC: 189 MG/DL (ref 65–140)
GLUCOSE SERPL-MCNC: 189 MG/DL (ref 65–140)
GLUCOSE SERPL-MCNC: 205 MG/DL (ref 65–140)
GLUCOSE SERPL-MCNC: 219 MG/DL (ref 65–140)
GLUCOSE SERPL-MCNC: 220 MG/DL (ref 65–140)
GLUCOSE SERPL-MCNC: 233 MG/DL (ref 65–140)
GLUCOSE SERPL-MCNC: 270 MG/DL (ref 65–140)
GLUCOSE SERPL-MCNC: 315 MG/DL (ref 65–140)
GLUCOSE SERPL-MCNC: 75 MG/DL (ref 65–140)
HCT VFR BLD AUTO: 35.9 % (ref 34.8–46.1)
HGB BLD-MCNC: 12 G/DL (ref 11.5–15.4)
LYMPHOCYTES # BLD AUTO: 0.76 THOUSAND/UL (ref 0.6–4.47)
LYMPHOCYTES # BLD AUTO: 5 % (ref 14–44)
MCH RBC QN AUTO: 30.6 PG (ref 26.8–34.3)
MCHC RBC AUTO-ENTMCNC: 33.4 G/DL (ref 31.4–37.4)
MCV RBC AUTO: 92 FL (ref 82–98)
MONOCYTES # BLD AUTO: 0.91 THOUSAND/UL (ref 0–1.22)
MONOCYTES NFR BLD: 6 % (ref 4–12)
NEUTROPHILS # BLD MANUAL: 13.52 THOUSAND/UL (ref 1.85–7.62)
NEUTS SEG NFR BLD AUTO: 89 % (ref 43–75)
PLATELET # BLD AUTO: 414 THOUSANDS/UL (ref 149–390)
PLATELET BLD QL SMEAR: ABNORMAL
PMV BLD AUTO: 10 FL (ref 8.9–12.7)
POTASSIUM SERPL-SCNC: 3.4 MMOL/L (ref 3.5–5.3)
RBC # BLD AUTO: 3.92 MILLION/UL (ref 3.81–5.12)
RBC MORPH BLD: NORMAL
SODIUM SERPL-SCNC: 138 MMOL/L (ref 135–147)
WBC # BLD AUTO: 15.19 THOUSAND/UL (ref 4.31–10.16)

## 2022-11-29 RX ORDER — POTASSIUM CHLORIDE 20 MEQ/1
40 TABLET, EXTENDED RELEASE ORAL ONCE
Status: COMPLETED | OUTPATIENT
Start: 2022-11-29 | End: 2022-11-29

## 2022-11-29 RX ORDER — METHYLPREDNISOLONE SODIUM SUCCINATE 40 MG/ML
40 INJECTION, POWDER, LYOPHILIZED, FOR SOLUTION INTRAMUSCULAR; INTRAVENOUS DAILY
Status: DISCONTINUED | OUTPATIENT
Start: 2022-11-30 | End: 2022-11-30

## 2022-11-29 RX ORDER — INSULIN LISPRO 100 [IU]/ML
10 INJECTION, SOLUTION INTRAVENOUS; SUBCUTANEOUS
Status: DISCONTINUED | OUTPATIENT
Start: 2022-11-29 | End: 2022-12-01 | Stop reason: HOSPADM

## 2022-11-29 RX ADMIN — FLUTICASONE PROPIONATE 2 PUFF: 110 AEROSOL, METERED RESPIRATORY (INHALATION) at 20:49

## 2022-11-29 RX ADMIN — LEVALBUTEROL 1.25 MG: 1.25 SOLUTION, CONCENTRATE RESPIRATORY (INHALATION) at 07:04

## 2022-11-29 RX ADMIN — SODIUM CHLORIDE 12 UNITS/HR: 9 INJECTION, SOLUTION INTRAVENOUS at 02:54

## 2022-11-29 RX ADMIN — HYDROXYZINE HYDROCHLORIDE 25 MG: 25 TABLET ORAL at 20:50

## 2022-11-29 RX ADMIN — SODIUM CHLORIDE 8 UNITS/HR: 9 INJECTION, SOLUTION INTRAVENOUS at 20:53

## 2022-11-29 RX ADMIN — ENOXAPARIN SODIUM 40 MG: 40 INJECTION SUBCUTANEOUS at 08:29

## 2022-11-29 RX ADMIN — ATORVASTATIN CALCIUM 20 MG: 20 TABLET, FILM COATED ORAL at 08:29

## 2022-11-29 RX ADMIN — POTASSIUM CHLORIDE 40 MEQ: 1500 TABLET, EXTENDED RELEASE ORAL at 12:26

## 2022-11-29 RX ADMIN — FLUTICASONE PROPIONATE 2 PUFF: 110 AEROSOL, METERED RESPIRATORY (INHALATION) at 08:28

## 2022-11-29 RX ADMIN — GUAIFENESIN 600 MG: 600 TABLET ORAL at 08:29

## 2022-11-29 RX ADMIN — SODIUM CHLORIDE 16 UNITS/HR: 9 INJECTION, SOLUTION INTRAVENOUS at 15:36

## 2022-11-29 RX ADMIN — IPRATROPIUM BROMIDE 0.5 MG: 0.5 SOLUTION RESPIRATORY (INHALATION) at 07:04

## 2022-11-29 RX ADMIN — METHYLPREDNISOLONE SODIUM SUCCINATE 40 MG: 40 INJECTION, POWDER, FOR SOLUTION INTRAMUSCULAR; INTRAVENOUS at 08:30

## 2022-11-29 RX ADMIN — LEVALBUTEROL 1.25 MG: 1.25 SOLUTION, CONCENTRATE RESPIRATORY (INHALATION) at 13:24

## 2022-11-29 RX ADMIN — LEVALBUTEROL 1.25 MG: 1.25 SOLUTION, CONCENTRATE RESPIRATORY (INHALATION) at 20:07

## 2022-11-29 RX ADMIN — IPRATROPIUM BROMIDE 0.5 MG: 0.5 SOLUTION RESPIRATORY (INHALATION) at 13:24

## 2022-11-29 RX ADMIN — HYDROXYZINE HYDROCHLORIDE 25 MG: 25 TABLET ORAL at 08:42

## 2022-11-29 RX ADMIN — AMLODIPINE BESYLATE 10 MG: 10 TABLET ORAL at 08:29

## 2022-11-29 RX ADMIN — GUAIFENESIN 600 MG: 600 TABLET ORAL at 20:49

## 2022-11-29 RX ADMIN — IPRATROPIUM BROMIDE 0.5 MG: 0.5 SOLUTION RESPIRATORY (INHALATION) at 20:07

## 2022-11-29 NOTE — ASSESSMENT & PLAN NOTE
Lab Results   Component Value Date    HGBA1C 9 7 (H) 11/28/2022       Recent Labs     11/29/22  0211 11/29/22  0358 11/29/22  0619 11/29/22  0814   POCGLU 189* 189* 173* 75       Blood Sugar Average: Last 72 hrs:  (P) 722 [id number]   · Poorly controlled type 2 diabetes  · Now elevated in setting of steroid use  · Currently on insulin drip  ·  Sliding scale algorithm for t i d   With meals and before bedtime  · Titrate as tolerated  · Carb controlled diet, however patient witnessed ordering food and not compliant with diet  · Endocrinology input will be appreciated

## 2022-11-29 NOTE — PROGRESS NOTES
[address] [facility]  Progress Note - Berna Lawrencearmond 1980, 38 y o  female MRN: [id number]  Unit/Bed#: [place] [address] -01 Encounter: [id number]  Primary Care Provider: Orlando Duarte MD   Date and time admitted to hospital: 11/25/2022  7:15 PM    * Asthma exacerbation  Assessment & Plan  · Patient reports with asthma exacerbation  · On admission required BiPAP  She was monitored off for about 15 minutes before noticing increased work of breathing and was replaced on BiPAP  · Will taper Solu-Medrol to 40 mg IV q 12 hours  · Continue scheduled nebulizers    Acute respiratory failure with hypoxia (HCC)  Assessment & Plan  · Eequiring BiPAP on admission; now saturating well on 2 L nasal cannula  · She does request to wear BiPAP from time to time to avoid “wheezing”  · Titrate oxygen to maintain saturations greater than 88%    Type 2 diabetes mellitus, without long-term current use of insulin [facility])  Assessment & Plan  Lab Results   Component Value Date    HGBA1C 9 7 (H) 11/28/2022       Recent Labs     11/29/22  0211 11/29/22  0358 11/29/22  0619 11/29/22  0814   POCGLU 189* 189* 173* 75       Blood Sugar Average: Last 72 hrs:  (P) 386 [id number]   · Poorly controlled type 2 diabetes  · Now elevated in setting of steroid use  · Currently on insulin drip  ·  Sliding scale algorithm for t i d  With meals and before bedtime  · Titrate as tolerated  · Carb controlled diet, however patient witnessed ordering food and not compliant with diet    Hypertension  Assessment & Plan  Continue amlodipine    Morbid obesity with BMI of 40 0-44 9, adult ([address] )  Assessment & Plan  ·  pt with BMI of  52 6  · Counseled on diet and lifestyle modification            VTE Pharmacologic Prophylaxis:   Pharmacologic:  Lovenox    Patient Centered Rounds: I have performed bedside rounds with nursing staff today  Education and Discussions with Family / Patient:  Patient    Time Spent for Care: 20 minutes    More than 50% of total time spent on counseling and coordination of care as described above  Current Length of Stay: 4 day(s)    Current Patient Status: Inpatient   Certification Statement: The patient will continue to require additional inpatient hospital stay due to Asthma exacerbation uncontrolled diabetes    Discharge Plan / Estimated Discharge Date:     Code Status: Level 1 - Full Code      Subjective:   Patient seen and examined at bedside, stating she feels tired, denies any chest pain, abdominal pain, nausea vomiting    Objective:     Vitals:   Temp (24hrs), Av 2 °F (36 8 °C), Min:97 9 °F (36 6 °C), Max:98 5 °F (36 9 °C)    Temp:  [97 9 °F (36 6 °C)-98 5 °F (36 9 °C)] 98 2 °F (36 8 °C)  HR:  [81-99] 81  Resp:  [16-18] 16  BP: (128-142)/(70-81) 128/70  SpO2:  [93 %-99 %] 99 %  Body mass index is 52 59 kg/m²  Input and Output Summary (last 24 hours): Intake/Output Summary (Last 24 hours) at 2022 0849  Last data filed at 2022 0501  Gross per 24 hour   Intake --   Output 2500 ml   Net -2500 ml       Physical Exam:    Constitutional: Patient is oriented to person, place and time, no acute distress  HEENT:  Normocephalic, atraumatic  Cardiovascular: Normal S1S2, RRR, No murmurs/rubs/gallops appreciated  Pulmonary:  Bilateral air entry, No rhonchi/rales/wheezing appreciated  Abdominal: Soft, Bowel sounds present, Non-tender, Non-distended  Extremities:  No cyanosis, clubbing or edema  Neurological: Cranial nerves II-XII grossly intact, sensation intact, otherwise no focal neurological symptoms     Skin:  Warm, dry    Additional Data:     Labs:    Results from last 7 days   Lab Units 22  0514 22  0503 22  1929   WBC Thousand/uL 15 19*   < > 6 93   HEMOGLOBIN g/dL 12 0   < > 12 5   HEMATOCRIT % 35 9   < > 38 0   PLATELETS Thousands/uL 414*   < > 286   NEUTROS PCT %  --   --  63   LYMPHS PCT %  --   --  30   LYMPHO PCT % 5*   < >  --    MONOS PCT %  --   --  4   MONO PCT % 6 < >  --    EOS PCT % 0   < > 3    < > = values in this interval not displayed  Results from last 7 days   Lab Units 11/29/22  0514 11/28/22  0602 11/27/22  0613   POTASSIUM mmol/L 3 4*   < > 4 8   CHLORIDE mmol/L 103   < > 100   CO2 mmol/L 24   < > 25   BUN mg/dL 17   < > 14   CREATININE mg/dL 0 82   < > 0 89   CALCIUM mg/dL 8 8   < > 9 2   ALK PHOS U/L  --   --  78   ALT U/L  --   --  12   AST U/L  --   --  14    < > = values in this interval not displayed  I Have Reviewed All Lab Data Listed Above      Invasive Devices     Peripheral Intravenous Line  Duration           Peripheral IV 11/28/22 Left;Ventral (anterior) Forearm <1 day                     Recent Cultures (last 7 days):           Last 24 Hours Medication List:   Current Facility-Administered Medications   Medication Dose Route Frequency Provider Last Rate   • acetaminophen  650 mg Oral Q6H PRN Levester Cloud, DO     • aluminum-magnesium hydroxide-simethicone  30 mL Oral Q6H PRN Levester Troup, DO     • amLODIPine  10 mg Oral Daily Levester Cloud, DO     • atorvastatin  20 mg Oral Daily Levester Cloud, DO     • enoxaparin  40 mg Subcutaneous Daily Levester Cloud, DO     • fluticasone  2 puff Inhalation BID Levester Troup, DO     • guaiFENesin  600 mg Oral Q12H [address] Levester Troup, DO     • hydrOXYzine HCL  25 mg Oral Q6H PRN Samantha Bernal MD     • insulin regular (HumuLIN R,NovoLIN R) infusion  0 3-21 Units/hr Intravenous Titrated Samantha Bernal MD 1 5 Units/hr (11/29/22 0830)   • ipratropium  0 5 mg Nebulization 4x Daily Levester Cloud, DO     • levalbuterol  1 25 mg Nebulization 4x Daily Levester Cloud, DO     • methylPREDNISolone sodium succinate  40 mg Intravenous Q12H Alma Delia Holliday MD     • ondansetron  4 mg Intravenous Q6H PRN Levester Cloud, DO     • potassium chloride  40 mEq Oral Once Veto Wilson MD          Today, Patient Was Seen By: Veto Wilson MD

## 2022-11-29 NOTE — PLAN OF CARE
Problem: SAFETY ADULT  Goal: Patient will remain free of falls  Description: INTERVENTIONS:  - Educate patient/family on patient safety including physical limitations  - Instruct patient to call for assistance with activity   - Consult OT/PT to assist with strengthening/mobility   - Keep Call bell within reach  - Keep bed low and locked with side rails adjusted as appropriate  - Keep care items and personal belongings within reach  - Initiate and maintain comfort rounds  - Make Fall Risk Sign visible to staff  - Apply yellow socks and bracelet for high fall risk patients  - Consider moving patient to room near nurses station  Outcome: Progressing  Goal: Maintain or return to baseline ADL function  Description: INTERVENTIONS:  -  Assess patient's ability to carry out ADLs; assess patient's baseline for ADL function and identify physical deficits which impact ability to perform ADLs (bathing, care of mouth/teeth, toileting, grooming, dressing, etc )  - Assess/evaluate cause of self-care deficits   - Assess range of motion  - Assess patient's mobility; develop plan if impaired  - Assess patient's need for assistive devices and provide as appropriate  - Encourage maximum independence but intervene and supervise when necessary  - Involve family in performance of ADLs  - Assess for home care needs following discharge   - Consider OT consult to assist with ADL evaluation and planning for discharge  - Provide patient education as appropriate  Outcome: Progressing  Goal: Maintains/Returns to pre admission functional level  Description: INTERVENTIONS:  - Perform BMAT or MOVE assessment daily    - Set and communicate daily mobility goal to care team and patient/family/caregiver     - Collaborate with rehabilitation services on mobility goals if consulted  - Out of bed for toileting  - Record patient progress and toleration of activity level   Outcome: Progressing     Problem: DISCHARGE PLANNING  Goal: Discharge to home or other facility with appropriate resources  Description: INTERVENTIONS:  - Identify barriers to discharge w/patient and caregiver  - Arrange for needed discharge resources and transportation as appropriate  - Identify discharge learning needs (meds, wound care, etc )  - Arrange for interpretive services to assist at discharge as needed  - Refer to Case Management Department for coordinating discharge planning if the patient needs post-hospital services based on physician/advanced practitioner order or complex needs related to functional status, cognitive ability, or social support system  Outcome: Progressing     Problem: Knowledge Deficit  Goal: Patient/family/caregiver demonstrates understanding of disease process, treatment plan, medications, and discharge instructions  Description: Complete learning assessment and assess knowledge base  Interventions:  - Provide teaching at level of understanding  - Provide teaching via preferred learning methods  Outcome: Progressing     Problem: RESPIRATORY - ADULT  Goal: Achieves optimal ventilation and oxygenation  Description: INTERVENTIONS:  - Assess for changes in respiratory status  - Assess for changes in mentation and behavior  - Position to facilitate oxygenation and minimize respiratory effort  - Oxygen administered by appropriate delivery if ordered  - Initiate smoking cessation education as indicated  - Encourage broncho-pulmonary hygiene including cough, deep breathe, Incentive Spirometry  - Assess the need for suctioning and aspirate as needed  - Assess and instruct to report SOB or any respiratory difficulty  - Respiratory Therapy support as indicated  Outcome: Progressing     Problem: Nutrition/Hydration-ADULT  Goal: Nutrient/Hydration intake appropriate for improving, restoring or maintaining nutritional needs  Description: Monitor and assess patient's nutrition/hydration status for malnutrition   Collaborate with interdisciplinary team and initiate plan and interventions as ordered  Monitor patient's weight and dietary intake as ordered or per policy  Utilize nutrition screening tool and intervene as necessary  Determine patient's food preferences and provide high-protein, high-caloric foods as appropriate       INTERVENTIONS:  - Monitor oral intake, urinary output, labs, and treatment plans  - Assess nutrition and hydration status and recommend course of action  - Evaluate amount of meals eaten  - Assist patient with eating if necessary   - Allow adequate time for meals  - Recommend/ encourage appropriate diets, oral nutritional supplements, and vitamin/mineral supplements  - Order, calculate, and assess calorie counts as needed  - Recommend, monitor, and adjust tube feedings and TPN/PPN based on assessed needs  - Assess need for intravenous fluids  - Provide specific nutrition/hydration education as appropriate  - Include patient/family/caregiver in decisions related to nutrition  Outcome: Progressing     Problem: Potential for Falls  Goal: Patient will remain free of falls  Description: INTERVENTIONS:  - Educate patient/family on patient safety including physical limitations  - Instruct patient to call for assistance with activity   - Consult OT/PT to assist with strengthening/mobility   - Keep Call bell within reach  - Keep bed low and locked with side rails adjusted as appropriate  - Keep care items and personal belongings within reach  - Initiate and maintain comfort rounds  - Make Fall Risk Sign visible to staff  - Apply yellow socks and bracelet for high fall risk patients  - Consider moving patient to room near nurses station  Outcome: Progressing     Problem: METABOLIC, FLUID AND ELECTROLYTES - ADULT  Goal: Electrolytes maintained within normal limits  Description: INTERVENTIONS:  - Monitor labs and assess patient for signs and symptoms of electrolyte imbalances  - Administer electrolyte replacement as ordered  - Monitor response to electrolyte replacements, including repeat lab results as appropriate  - Instruct patient on fluid and nutrition as appropriate  Outcome: Progressing  Goal: Glucose maintained within target range  Description: INTERVENTIONS:  - Monitor Blood Glucose as ordered  - Assess for signs and symptoms of hyperglycemia and hypoglycemia  - Administer ordered medications to maintain glucose within target range  - Assess nutritional intake and initiate nutrition service referral as needed  Outcome: Progressing

## 2022-11-29 NOTE — PLAN OF CARE
Problem: DISCHARGE PLANNING  Goal: Discharge to home or other facility with appropriate resources  Description: INTERVENTIONS:  - Identify barriers to discharge w/patient and caregiver  - Arrange for needed discharge resources and transportation as appropriate  - Identify discharge learning needs (meds, wound care, etc )  - Arrange for interpretive services to assist at discharge as needed  - Refer to Case Management Department for coordinating discharge planning if the patient needs post-hospital services based on physician/advanced practitioner order or complex needs related to functional status, cognitive ability, or social support system  Outcome: Progressing     Problem: Knowledge Deficit  Goal: Patient/family/caregiver demonstrates understanding of disease process, treatment plan, medications, and discharge instructions  Description: Complete learning assessment and assess knowledge base    Interventions:  - Provide teaching at level of understanding  - Provide teaching via preferred learning methods  Outcome: Progressing     Problem: METABOLIC, FLUID AND ELECTROLYTES - ADULT  Goal: Electrolytes maintained within normal limits  Description: INTERVENTIONS:  - Monitor labs and assess patient for signs and symptoms of electrolyte imbalances  - Administer electrolyte replacement as ordered  - Monitor response to electrolyte replacements, including repeat lab results as appropriate  - Instruct patient on fluid and nutrition as appropriate  Outcome: Progressing  Goal: Glucose maintained within target range  Description: INTERVENTIONS:  - Monitor Blood Glucose as ordered  - Assess for signs and symptoms of hyperglycemia and hypoglycemia  - Administer ordered medications to maintain glucose within target range  - Assess nutritional intake and initiate nutrition service referral as needed  Outcome: Progressing

## 2022-11-29 NOTE — CONSULTS
Consultation - Carie Lam 38 y o  female MRN: [id number]    Unit/Bed#: [place] [address] -01 Encounter: [id number]      Assessment/Plan     Assessment: This is a 38y o -year-old female with diabetes with hyperglycemia exacerbated by steroid, asthma exacerbation, obstructive sleep apnea and morbid obesity  Plan:  1  Type 2 diabetes with hyperglycemia exacerbated by steroids-continue IV insulin and add Humalog 10 units with each meal   Continue to monitor blood sugars every 2 hours  Once we have a better understanding of insulin requirements, I will transition her to subcutaneous insulin  Recommend a nutrition consult while she is here in the hospital     At the time of discharge, I recommend that she be discharged on basal bolus insulin therapy which she is agreeable to  She will need insulin injection teaching  She should follow up in the office two weeks after discharge  2  Asthma exacerbation-management per primary team     3  Obstructive sleep apnea-recommend outpatient sleep evaluation along with a sleep study  4  Morbid obesity-one we see your as an outpatient, recommend transitioning to medications that will improve diabetes as well as decreased insulin requirements and help with weight loss  CC: Diabetes Consult    History of Present Illness     HPI: Carie aLm is a 38y o  year old female with type 2 diabetes for less than 1 years  She is on oral agents at home  She admits to polyuria, polydipsia, nocturia and blurry vision  She denies neuropathy, nephropathy, retinopathy, heart attack, stroke and claudication but does admit to none  She denies any hypoglycemia  She was admitted to the hospital with asthma exacerbation which is being treated with IV steroids  Inpatient consult to Endocrinology  Consult performed by: Heather Leiva MD  Consult ordered by: Jennie Drew MD          Review of Systems   Constitutional: Negative for chills and fever     Respiratory: Positive for cough and shortness of breath  Cardiovascular: Negative for chest pain  Gastrointestinal: Negative for constipation, diarrhea, nausea and vomiting  Endocrine: Positive for polydipsia and polyuria  All other systems reviewed and are negative  Historical Information   Past Medical History:   Diagnosis Date   • Asthma    • Dental abscess    • Diabetes mellitus ([address] )    • High cholesterol    • Hypertension      Past Surgical History:   Procedure Laterality Date   • CHOLECYSTECTOMY     • ECTOPIC PREGNANCY SURGERY     • INCISION AND DRAINAGE INTRA ORAL ABSCESS Left 3/21/2016    Procedure: INCISION AND DRAINAGE  (I&D) WOUND ORAL Left  Space;  Surgeon: Deborrah Moritz, MD;  Location: [facility];  Service:    • MULTIPLE TOOTH EXTRACTIONS N/A 3/21/2016    Procedure: EXTRACTION TEETH MULTIPLE; 17,18,19,11,12,4,5,28,32;  Surgeon: Deborrah Moritz, MD;  Location: [facility];  Service:    • SALPINGECTOMY       Social History   Social History     Substance and Sexual Activity   Alcohol Use Yes    Comment: Social     Social History     Substance and Sexual Activity   Drug Use No     Social History     Tobacco Use   Smoking Status Every Day   • Packs/day: 0 50   • Years: 10 00   • Pack years: 5 00   • Types: Cigarettes   Smokeless Tobacco Never   Tobacco Comments    PT was educated on smoking and the effect is has on her asthma       Family History:   Family History   Problem Relation Age of Onset   • Diabetes Mother    • Heart failure Mother    • Cancer Neg Hx        Meds/Allergies   Current Facility-Administered Medications   Medication Dose Route Frequency Provider Last Rate Last Admin   • acetaminophen (TYLENOL) tablet 650 mg  650 mg Oral Q6H PRN Saad Genta, DO   650 mg at 11/26/22 0733   • aluminum-magnesium hydroxide-simethicone (MYLANTA) oral suspension 30 mL  30 mL Oral Q6H PRN Saad Genta, DO   30 mL at 11/26/22 0732   • amLODIPine (NORVASC) tablet 10 mg  10 mg Oral Daily Becki Cargo [place], DO   10 mg at 11/29/22 6350   • atorvastatin (LIPITOR) tablet 20 mg  20 mg Oral Daily Belinda Riches, DO   20 mg at 11/29/22 7202   • enoxaparin (LOVENOX) subcutaneous injection 40 mg  40 mg Subcutaneous Daily Belinda Riches, DO   40 mg at 11/29/22 0829   • fluticasone (FLOVENT HFA) 110 MCG/ACT inhaler 2 puff  2 puff Inhalation BID Belinda Riches, DO   2 puff at 11/29/22 9437   • guaiFENesin (MUCINEX) 12 hr tablet 600 mg  600 mg Oral Q12H [address] Belinda Riches, DO   600 mg at 11/29/22 8024   • hydrOXYzine HCL (ATARAX) tablet 25 mg  25 mg Oral Q6H PRN Hawa Treviño MD   25 mg at 11/29/22 4314   • insulin regular (HumuLIN R,NovoLIN R) 1 Units/mL in sodium chloride 0 9 % 100 mL infusion  0 3-21 Units/hr Intravenous Titrated Hawa Treviño MD 16 mL/hr at 11/29/22 1536 16 Units/hr at 11/29/22 1536   • ipratropium (ATROVENT) 0 02 % inhalation solution 0 5 mg  0 5 mg Nebulization 4x Daily Belinda Riches, DO   0 5 mg at 11/29/22 1324   • levalbuterol (XOPENEX) inhalation solution 1 25 mg  1 25 mg Nebulization 4x Daily Belinda Riches, DO   1 25 mg at 11/29/22 1324   • [START ON 11/30/2022] methylPREDNISolone sodium succinate (Solu-MEDROL) injection 40 mg  40 mg Intravenous Daily Tish Ortiz MD       • ondansetron [facility]) injection 4 mg  4 mg Intravenous Q6H PRN Belinda Riches, DO         Allergies   Allergen Reactions   • Other Rash       [place]        Objective   Vitals: Blood pressure 128/70, pulse 81, temperature 98 2 °F (36 8 °C), resp  rate 16, height 5' 5" (1 651 m), weight (!) 143 kg (316 lb), SpO2 95 %  Intake/Output Summary (Last 24 hours) at 11/29/2022 1825  Last data filed at 11/29/2022 1321  Gross per 24 hour   Intake 720 ml   Output 2500 ml   Net -1780 ml     Invasive Devices     Peripheral Intravenous Line  Duration           Peripheral IV 11/28/22 Left;Ventral (anterior) Forearm 1 day                Physical Exam  Vitals reviewed     Constitutional:       General: She is not in acute distress  Appearance: She is well-developed and well-nourished  She is obese  She is not diaphoretic  HENT:      Head: Normocephalic and atraumatic  Mouth/Throat:      Mouth: Oropharynx is clear and moist and mucous membranes are normal       Pharynx: No oropharyngeal exudate  Eyes:      General: Lids are normal  No scleral icterus  Right eye: No discharge  Left eye: No discharge  Extraocular Movements: EOM normal       Conjunctiva/sclera: Conjunctivae normal    Neck:      Thyroid: No thyromegaly  Cardiovascular:      Rate and Rhythm: Normal rate and regular rhythm  Heart sounds: Normal heart sounds  No murmur heard  No friction rub  No gallop  Pulmonary:      Effort: Pulmonary effort is normal  No respiratory distress  Breath sounds: Wheezing present  Abdominal:      General: Bowel sounds are normal  There is no distension  Palpations: Abdomen is soft  Tenderness: There is no abdominal tenderness  Musculoskeletal:         General: No tenderness, deformity or edema  Normal range of motion  Cervical back: Neck supple  Lymphadenopathy:      Head:      Right side of head: No occipital adenopathy  Left side of head: No occipital adenopathy  Upper Body:      Right upper body: No supraclavicular adenopathy  Left upper body: No supraclavicular adenopathy  Skin:     General: Skin is warm and intact  Findings: No erythema or rash  Neurological:      Mental Status: She is alert and oriented to person, place, and time  Cranial Nerves: No cranial nerve deficit  Psychiatric:         Mood and Affect: Mood and affect and mood normal          Behavior: Behavior normal          The history was obtained from the review of the chart, patient      Lab Results:   Results from last 7 days   Lab Units 11/28/22  0602   HEMOGLOBIN A1C % 9 7*     Lab Results   Component Value Date    WBC 15 19 (H) 11/29/2022    HGB 12 0 11/29/2022 HCT 35 9 11/29/2022    MCV 92 11/29/2022     (H) 11/29/2022     Lab Results   Component Value Date/Time    BUN 17 11/29/2022 05:14 AM    BUN 6 04/01/2015 04:45 AM     (L) 04/01/2015 04:45 AM    K 3 4 (L) 11/29/2022 05:14 AM    K 3 7 04/01/2015 04:45 AM     11/29/2022 05:14 AM     04/01/2015 04:45 AM    CO2 24 11/29/2022 05:14 AM    CO2 21 03/19/2016 10:42 AM    CREATININE 0 82 11/29/2022 05:14 AM    CREATININE 0 72 04/01/2015 04:45 AM    AST 14 11/27/2022 06:13 AM    AST 80 (H) 04/01/2015 04:45 AM    ALT 12 11/27/2022 06:13 AM    ALT 57 04/01/2015 04:45 AM    ALB 3 2 (L) 11/27/2022 06:13 AM    ALB 3 3 (L) 04/01/2015 04:45 AM     No results for input(s): CHOL, HDL, LDL, TRIG, VLDL in the last 72 hours  No results found for: Salma Hernandez  POC Glucose (mg/dl)   Date Value   11/29/2022 270 (H)   11/29/2022 233 (H)   11/29/2022 220 (H)   11/29/2022 75   11/29/2022 173 (H)   11/29/2022 189 (H)   11/29/2022 189 (H)   11/29/2022 205 (H)   11/28/2022 206 (H)   11/28/2022 194 (H)       Imaging Studies: I have personally reviewed pertinent reports  Portions of the record may have been created with voice recognition software

## 2022-11-29 NOTE — ASSESSMENT & PLAN NOTE
· Patient reports with asthma exacerbation  · On admission required BiPAP  She was monitored off for about 15 minutes before noticing increased work of breathing and was replaced on BiPAP    · Will taper Solu-Medrol to 40 mg IV daily  · Continue scheduled nebulizers

## 2022-11-30 LAB
ANION GAP SERPL CALCULATED.3IONS-SCNC: 7 MMOL/L (ref 4–13)
BUN SERPL-MCNC: 15 MG/DL (ref 5–25)
CALCIUM SERPL-MCNC: 8.2 MG/DL (ref 8.3–10.1)
CHLORIDE SERPL-SCNC: 105 MMOL/L (ref 96–108)
CO2 SERPL-SCNC: 29 MMOL/L (ref 21–32)
CREAT SERPL-MCNC: 0.7 MG/DL (ref 0.6–1.3)
ERYTHROCYTE [DISTWIDTH] IN BLOOD BY AUTOMATED COUNT: 13.3 % (ref 11.6–15.1)
GFR SERPL CREATININE-BSD FRML MDRD: 105 ML/MIN/1.73SQ M
GLUCOSE SERPL-MCNC: 143 MG/DL (ref 65–140)
GLUCOSE SERPL-MCNC: 144 MG/DL (ref 65–140)
GLUCOSE SERPL-MCNC: 159 MG/DL (ref 65–140)
GLUCOSE SERPL-MCNC: 161 MG/DL (ref 65–140)
GLUCOSE SERPL-MCNC: 166 MG/DL (ref 65–140)
GLUCOSE SERPL-MCNC: 179 MG/DL (ref 65–140)
GLUCOSE SERPL-MCNC: 206 MG/DL (ref 65–140)
GLUCOSE SERPL-MCNC: 230 MG/DL (ref 65–140)
GLUCOSE SERPL-MCNC: 263 MG/DL (ref 65–140)
GLUCOSE SERPL-MCNC: 73 MG/DL (ref 65–140)
GLUCOSE SERPL-MCNC: 74 MG/DL (ref 65–140)
GLUCOSE SERPL-MCNC: 77 MG/DL (ref 65–140)
GLUCOSE SERPL-MCNC: 99 MG/DL (ref 65–140)
HCT VFR BLD AUTO: 36.8 % (ref 34.8–46.1)
HGB BLD-MCNC: 12.1 G/DL (ref 11.5–15.4)
MAGNESIUM SERPL-MCNC: 2 MG/DL (ref 1.6–2.6)
MCH RBC QN AUTO: 30.3 PG (ref 26.8–34.3)
MCHC RBC AUTO-ENTMCNC: 32.9 G/DL (ref 31.4–37.4)
MCV RBC AUTO: 92 FL (ref 82–98)
PLATELET # BLD AUTO: 448 THOUSANDS/UL (ref 149–390)
PMV BLD AUTO: 9.5 FL (ref 8.9–12.7)
POTASSIUM SERPL-SCNC: 2.9 MMOL/L (ref 3.5–5.3)
RBC # BLD AUTO: 3.99 MILLION/UL (ref 3.81–5.12)
SODIUM SERPL-SCNC: 141 MMOL/L (ref 135–147)
WBC # BLD AUTO: 14.12 THOUSAND/UL (ref 4.31–10.16)

## 2022-11-30 RX ORDER — PREDNISONE 20 MG/1
40 TABLET ORAL DAILY
Status: DISCONTINUED | OUTPATIENT
Start: 2022-12-01 | End: 2022-12-01 | Stop reason: HOSPADM

## 2022-11-30 RX ORDER — POTASSIUM CHLORIDE 20 MEQ/1
40 TABLET, EXTENDED RELEASE ORAL ONCE
Status: COMPLETED | OUTPATIENT
Start: 2022-11-30 | End: 2022-11-30

## 2022-11-30 RX ADMIN — HYDROXYZINE HYDROCHLORIDE 25 MG: 25 TABLET ORAL at 07:31

## 2022-11-30 RX ADMIN — ACETAMINOPHEN 650 MG: 325 TABLET, FILM COATED ORAL at 07:31

## 2022-11-30 RX ADMIN — LEVALBUTEROL 1.25 MG: 1.25 SOLUTION, CONCENTRATE RESPIRATORY (INHALATION) at 19:59

## 2022-11-30 RX ADMIN — IPRATROPIUM BROMIDE 0.5 MG: 0.5 SOLUTION RESPIRATORY (INHALATION) at 19:59

## 2022-11-30 RX ADMIN — INSULIN LISPRO 10 UNITS: 100 INJECTION, SOLUTION INTRAVENOUS; SUBCUTANEOUS at 08:36

## 2022-11-30 RX ADMIN — SODIUM CHLORIDE 12 UNITS/HR: 9 INJECTION, SOLUTION INTRAVENOUS at 11:41

## 2022-11-30 RX ADMIN — METHYLPREDNISOLONE SODIUM SUCCINATE 40 MG: 40 INJECTION, POWDER, FOR SOLUTION INTRAMUSCULAR; INTRAVENOUS at 08:35

## 2022-11-30 RX ADMIN — POTASSIUM CHLORIDE 40 MEQ: 1500 TABLET, EXTENDED RELEASE ORAL at 08:35

## 2022-11-30 RX ADMIN — GUAIFENESIN 600 MG: 600 TABLET ORAL at 08:35

## 2022-11-30 RX ADMIN — INSULIN LISPRO 10 UNITS: 100 INJECTION, SOLUTION INTRAVENOUS; SUBCUTANEOUS at 18:11

## 2022-11-30 RX ADMIN — FLUTICASONE PROPIONATE 2 PUFF: 110 AEROSOL, METERED RESPIRATORY (INHALATION) at 08:35

## 2022-11-30 RX ADMIN — IPRATROPIUM BROMIDE 0.5 MG: 0.5 SOLUTION RESPIRATORY (INHALATION) at 13:19

## 2022-11-30 RX ADMIN — LEVALBUTEROL 1.25 MG: 1.25 SOLUTION, CONCENTRATE RESPIRATORY (INHALATION) at 07:18

## 2022-11-30 RX ADMIN — ATORVASTATIN CALCIUM 20 MG: 20 TABLET, FILM COATED ORAL at 08:35

## 2022-11-30 RX ADMIN — IPRATROPIUM BROMIDE 0.5 MG: 0.5 SOLUTION RESPIRATORY (INHALATION) at 07:18

## 2022-11-30 RX ADMIN — FLUTICASONE PROPIONATE 2 PUFF: 110 AEROSOL, METERED RESPIRATORY (INHALATION) at 21:08

## 2022-11-30 RX ADMIN — AMLODIPINE BESYLATE 10 MG: 10 TABLET ORAL at 08:35

## 2022-11-30 RX ADMIN — LEVALBUTEROL 1.25 MG: 1.25 SOLUTION, CONCENTRATE RESPIRATORY (INHALATION) at 13:19

## 2022-11-30 RX ADMIN — INSULIN LISPRO 10 UNITS: 100 INJECTION, SOLUTION INTRAVENOUS; SUBCUTANEOUS at 12:37

## 2022-11-30 RX ADMIN — ENOXAPARIN SODIUM 40 MG: 40 INJECTION SUBCUTANEOUS at 08:35

## 2022-11-30 NOTE — PLAN OF CARE
Problem: Knowledge Deficit  Goal: Patient/family/caregiver demonstrates understanding of disease process, treatment plan, medications, and discharge instructions  Description: Complete learning assessment and assess knowledge base    Interventions:  - Provide teaching at level of understanding  - Provide teaching via preferred learning methods  Outcome: Progressing     Problem: RESPIRATORY - ADULT  Goal: Achieves optimal ventilation and oxygenation  Description: INTERVENTIONS:  - Assess for changes in respiratory status  - Assess for changes in mentation and behavior  - Position to facilitate oxygenation and minimize respiratory effort  - Oxygen administered by appropriate delivery if ordered  - Initiate smoking cessation education as indicated  - Encourage broncho-pulmonary hygiene including cough, deep breathe, Incentive Spirometry  - Assess the need for suctioning and aspirate as needed  - Assess and instruct to report SOB or any respiratory difficulty  - Respiratory Therapy support as indicated  Outcome: Progressing     Problem: METABOLIC, FLUID AND ELECTROLYTES - ADULT  Goal: Electrolytes maintained within normal limits  Description: INTERVENTIONS:  - Monitor labs and assess patient for signs and symptoms of electrolyte imbalances  - Administer electrolyte replacement as ordered  - Monitor response to electrolyte replacements, including repeat lab results as appropriate  - Instruct patient on fluid and nutrition as appropriate  Outcome: Progressing  Goal: Glucose maintained within target range  Description: INTERVENTIONS:  - Monitor Blood Glucose as ordered  - Assess for signs and symptoms of hyperglycemia and hypoglycemia  - Administer ordered medications to maintain glucose within target range  - Assess nutritional intake and initiate nutrition service referral as needed  Outcome: Progressing

## 2022-11-30 NOTE — ASSESSMENT & PLAN NOTE
Lab Results   Component Value Date    HGBA1C 9 7 (H) 11/28/2022       Recent Labs     11/30/22  0232 11/30/22  0446 11/30/22  0447 11/30/22  0619   POCGLU 144* 77 73 99       Blood Sugar Average: Last 72 hrs:  (P) 229 [id number]   · Poorly controlled type 2 diabetes  · Endocrinology input appreciated patient started on Humalog 10 units with each meal and continue insulin drip  · Further recommendations by endocrinology will be appreciated  · Nutrition evaluation  ·  Sliding scale algorithm for t i d   With meals and before bedtime Patient has symptoms to strongly suggest obstructive sleep apnea  If she does in fact have untreated sleep apnea, this could be contributing to some of her daytime symptoms  We will proceed with home study for further evaluation

## 2022-11-30 NOTE — ASSESSMENT & PLAN NOTE
· Patient reports with asthma exacerbation  · On admission required BiPAP  She was monitored off for about 15 minutes before noticing increased work of breathing and was replaced on BiPAP    · Will taper to prednisone 40 mg daily  · Continue scheduled nebulizers

## 2022-11-30 NOTE — PROGRESS NOTES
[facility]  Progress Note - Starlyn Dandy 1980, 38 y o  female MRN: [id number]  Unit/Bed#: [place] [address] -01 Encounter: [id number]  Primary Care Provider: Liza Sanchez MD   Date and time admitted to hospital: 11/25/2022  7:15 PM    * Asthma exacerbation  Assessment & Plan  · Patient reports with asthma exacerbation  · On admission required BiPAP  She was monitored off for about 15 minutes before noticing increased work of breathing and was replaced on BiPAP  · Will taper to prednisone 40 mg daily  · Continue scheduled nebulizers    Acute respiratory failure with hypoxia (HCC)  Assessment & Plan  · Eequiring BiPAP on admission; now saturating well on 2 L nasal cannula  · She does request to wear BiPAP from time to time to avoid “wheezing”  · Titrate oxygen to maintain saturations greater than 88%    Type 2 diabetes mellitus, without long-term current use of insulin [facility])  Assessment & Plan  Lab Results   Component Value Date    HGBA1C 9 7 (H) 11/28/2022       Recent Labs     11/30/22  0232 11/30/22  0446 11/30/22  0447 11/30/22  0619   POCGLU 144* 77 73 99       Blood Sugar Average: Last 72 hrs:  (P) 229 [id number]   · Poorly controlled type 2 diabetes  · Endocrinology input appreciated patient started on Humalog 10 units with each meal and continue insulin drip  · Further recommendations by endocrinology will be appreciated  · Nutrition evaluation  ·  Sliding scale algorithm for t i d  With meals and before bedtime    Hypertension  Assessment & Plan  Continue amlodipine    Morbid obesity with BMI of 40 0-44 9, adult (HCC)  Assessment & Plan  ·  pt with BMI of  52 6  · Counseled on diet and lifestyle modification      VTE Pharmacologic Prophylaxis:   Pharmacologic: lovenox    Patient Centered Rounds: I have performed bedside rounds with nursing staff today  Education and Discussions with Family / Patient: patient    Time Spent for Care: 20 minutes    More than 50% of total time spent on counseling and coordination of care as described above  Current Length of Stay: 5 day(s)    Current Patient Status: Inpatient   Certification Statement: The patient will continue to require additional inpatient hospital stay due to astham exac, hyperglycemia    Discharge Plan / Estimated Discharge Date: 24h    Code Status: Level 1 - Full Code      Subjective:   Patient seen and examined at bedside, complaining of fatigue, denies any chest pain, abdominal pain, nausea or vomiting    Objective:     Vitals:   No data recorded  HR:  [80-86] 86  Resp:  [20-21] 21  BP: (124-127)/(72-73) 124/72  SpO2:  [95 %-99 %] 97 %  Body mass index is 52 59 kg/m²  Input and Output Summary (last 24 hours): Intake/Output Summary (Last 24 hours) at 11/30/2022 1302  Last data filed at 11/30/2022 0900  Gross per 24 hour   Intake 720 ml   Output 2000 ml   Net -1280 ml       Physical Exam:    Constitutional: Patient is oriented to person, place and time, no acute distress  HEENT:  Normocephalic, atraumatic  Cardiovascular: Normal S1S2, RRR, No murmurs/rubs/gallops appreciated  Pulmonary:  Bilateral air entry, No rhonchi/rales/wheezing appreciated  Abdominal: Soft, Bowel sounds present, Non-tender, Non-distended  Extremities:  No cyanosis, clubbing or edema  Neurological:  Awake, alert  Skin:  Warm, dry    Additional Data:     Labs:    Results from last 7 days   Lab Units 11/30/22  0439 11/29/22  0514 11/26/22  0503 11/25/22  1929   WBC Thousand/uL 14 12* 15 19*   < > 6 93   HEMOGLOBIN g/dL 12 1 12 0   < > 12 5   HEMATOCRIT % 36 8 35 9   < > 38 0   PLATELETS Thousands/uL 448* 414*   < > 286   NEUTROS PCT %  --   --   --  63   LYMPHS PCT %  --   --   --  30   LYMPHO PCT %  --  5*   < >  --    MONOS PCT %  --   --   --  4   MONO PCT %  --  6   < >  --    EOS PCT %  --  0   < > 3    < > = values in this interval not displayed       Results from last 7 days   Lab Units 11/30/22  0439 11/28/22  0602 11/27/22  0613   POTASSIUM mmol/L 2 9*   < > 4 8   CHLORIDE mmol/L 105   < > 100   CO2 mmol/L 29   < > 25   BUN mg/dL 15   < > 14   CREATININE mg/dL 0 70   < > 0 89   CALCIUM mg/dL 8 2*   < > 9 2   ALK PHOS U/L  --   --  78   ALT U/L  --   --  12   AST U/L  --   --  14    < > = values in this interval not displayed  I Have Reviewed All Lab Data Listed Above      Invasive Devices     Peripheral Intravenous Line  Duration           Peripheral IV 11/28/22 Left;Ventral (anterior) Forearm 2 days                     Recent Cultures (last 7 days):           Last 24 Hours Medication List:   Current Facility-Administered Medications   Medication Dose Route Frequency Provider Last Rate   • acetaminophen  650 mg Oral Q6H PRN Johnathan Pea, DO     • aluminum-magnesium hydroxide-simethicone  30 mL Oral Q6H PRN Johnathan Pea, DO     • amLODIPine  10 mg Oral Daily Johnathan Pea, DO     • atorvastatin  20 mg Oral Daily Johnathan Pea, DO     • enoxaparin  40 mg Subcutaneous Daily [place] Pea, DO     • fluticasone  2 puff Inhalation BID Johnathan Pea, DO     • guaiFENesin  600 mg Oral Q12H [address] [place] Pea, DO     • hydrOXYzine HCL  25 mg Oral Q6H PRN Jaz North MD     • insulin lispro  10 Units Subcutaneous TID With Meals Herberth Forrest MD     • insulin regular (HumuLIN R,NovoLIN R) infusion  0 3-21 Units/hr Intravenous Titrated Jaz North MD 12 Units/hr (11/30/22 1141)   • ipratropium  0 5 mg Nebulization 4x Daily Johnathan Pea, DO     • levalbuterol  1 25 mg Nebulization 4x Daily [place] Pea, DO     • ondansetron  4 mg Intravenous Q6H PRN Johnathan Pea, DO     • [START ON 12/1/2022] predniSONE  40 mg Oral Daily Rivera Cain MD          Today, Patient Was Seen By: Rivera Cain MD

## 2022-12-01 VITALS
RESPIRATION RATE: 18 BRPM | WEIGHT: 293 LBS | DIASTOLIC BLOOD PRESSURE: 81 MMHG | TEMPERATURE: 98.7 F | SYSTOLIC BLOOD PRESSURE: 120 MMHG | OXYGEN SATURATION: 94 % | HEART RATE: 92 BPM | HEIGHT: 65 IN | BODY MASS INDEX: 48.82 KG/M2

## 2022-12-01 LAB
ANION GAP SERPL CALCULATED.3IONS-SCNC: 5 MMOL/L (ref 4–13)
BASOPHILS # BLD MANUAL: 0 THOUSAND/UL (ref 0–0.1)
BASOPHILS NFR MAR MANUAL: 0 % (ref 0–1)
BUN SERPL-MCNC: 13 MG/DL (ref 5–25)
CALCIUM SERPL-MCNC: 8.3 MG/DL (ref 8.3–10.1)
CHLORIDE SERPL-SCNC: 103 MMOL/L (ref 96–108)
CO2 SERPL-SCNC: 30 MMOL/L (ref 21–32)
CREAT SERPL-MCNC: 0.79 MG/DL (ref 0.6–1.3)
EOSINOPHIL # BLD MANUAL: 0 THOUSAND/UL (ref 0–0.4)
EOSINOPHIL NFR BLD MANUAL: 0 % (ref 0–6)
ERYTHROCYTE [DISTWIDTH] IN BLOOD BY AUTOMATED COUNT: 13.6 % (ref 11.6–15.1)
GFR SERPL CREATININE-BSD FRML MDRD: 91 ML/MIN/1.73SQ M
GLUCOSE SERPL-MCNC: 102 MG/DL (ref 65–140)
GLUCOSE SERPL-MCNC: 116 MG/DL (ref 65–140)
GLUCOSE SERPL-MCNC: 150 MG/DL (ref 65–140)
GLUCOSE SERPL-MCNC: 154 MG/DL (ref 65–140)
GLUCOSE SERPL-MCNC: 187 MG/DL (ref 65–140)
GLUCOSE SERPL-MCNC: 193 MG/DL (ref 65–140)
GLUCOSE SERPL-MCNC: 299 MG/DL (ref 65–140)
HCT VFR BLD AUTO: 36.2 % (ref 34.8–46.1)
HGB BLD-MCNC: 11.7 G/DL (ref 11.5–15.4)
LYMPHOCYTES # BLD AUTO: 27 % (ref 14–44)
LYMPHOCYTES # BLD AUTO: 4.1 THOUSAND/UL (ref 0.6–4.47)
MCH RBC QN AUTO: 30.2 PG (ref 26.8–34.3)
MCHC RBC AUTO-ENTMCNC: 32.3 G/DL (ref 31.4–37.4)
MCV RBC AUTO: 93 FL (ref 82–98)
MONOCYTES # BLD AUTO: 0.76 THOUSAND/UL (ref 0–1.22)
MONOCYTES NFR BLD: 5 % (ref 4–12)
NEUTROPHILS # BLD MANUAL: 10.34 THOUSAND/UL (ref 1.85–7.62)
NEUTS SEG NFR BLD AUTO: 68 % (ref 43–75)
PLATELET # BLD AUTO: 493 THOUSANDS/UL (ref 149–390)
PLATELET BLD QL SMEAR: ABNORMAL
PMV BLD AUTO: 9.5 FL (ref 8.9–12.7)
POTASSIUM SERPL-SCNC: 3.7 MMOL/L (ref 3.5–5.3)
RBC # BLD AUTO: 3.88 MILLION/UL (ref 3.81–5.12)
RBC MORPH BLD: NORMAL
SODIUM SERPL-SCNC: 138 MMOL/L (ref 135–147)
WBC # BLD AUTO: 15.2 THOUSAND/UL (ref 4.31–10.16)

## 2022-12-01 RX ORDER — INSULIN LISPRO 100 [IU]/ML
6 INJECTION, SOLUTION INTRAVENOUS; SUBCUTANEOUS
Qty: 15 ML | Refills: 0 | Status: SHIPPED | OUTPATIENT
Start: 2022-12-01

## 2022-12-01 RX ORDER — PREDNISONE 10 MG/1
TABLET ORAL
Qty: 20 TABLET | Refills: 0 | Status: SHIPPED | OUTPATIENT
Start: 2022-12-02 | End: 2022-12-01 | Stop reason: SDUPTHER

## 2022-12-01 RX ORDER — INSULIN GLARGINE 100 [IU]/ML
30 INJECTION, SOLUTION SUBCUTANEOUS EVERY MORNING
Qty: 9 ML | Refills: 0 | Status: SHIPPED | OUTPATIENT
Start: 2022-12-01 | End: 2022-12-01 | Stop reason: SDUPTHER

## 2022-12-01 RX ORDER — PEN NEEDLE, DIABETIC 32GX 5/32"
NEEDLE, DISPOSABLE MISCELLANEOUS
Qty: 100 EACH | Refills: 0 | Status: SHIPPED | OUTPATIENT
Start: 2022-12-01

## 2022-12-01 RX ORDER — PEN NEEDLE, DIABETIC 32GX 5/32"
NEEDLE, DISPOSABLE MISCELLANEOUS
Qty: 100 EACH | Refills: 0 | Status: SHIPPED | OUTPATIENT
Start: 2022-12-01 | End: 2022-12-01 | Stop reason: SDUPTHER

## 2022-12-01 RX ORDER — INSULIN GLARGINE 100 [IU]/ML
30 INJECTION, SOLUTION SUBCUTANEOUS EVERY MORNING
Qty: 9 ML | Refills: 0 | Status: SHIPPED | OUTPATIENT
Start: 2022-12-01 | End: 2022-12-31

## 2022-12-01 RX ORDER — PREDNISONE 10 MG/1
TABLET ORAL
Qty: 20 TABLET | Refills: 0 | Status: SHIPPED | OUTPATIENT
Start: 2022-12-02 | End: 2022-12-10

## 2022-12-01 RX ORDER — INSULIN GLARGINE 100 [IU]/ML
30 INJECTION, SOLUTION SUBCUTANEOUS EVERY MORNING
Status: DISCONTINUED | OUTPATIENT
Start: 2022-12-01 | End: 2022-12-01 | Stop reason: HOSPADM

## 2022-12-01 RX ORDER — INSULIN LISPRO 100 [IU]/ML
6 INJECTION, SOLUTION INTRAVENOUS; SUBCUTANEOUS
Qty: 15 ML | Refills: 0 | Status: SHIPPED | OUTPATIENT
Start: 2022-12-01 | End: 2022-12-01 | Stop reason: SDUPTHER

## 2022-12-01 RX ADMIN — FLUTICASONE PROPIONATE 2 PUFF: 110 AEROSOL, METERED RESPIRATORY (INHALATION) at 08:35

## 2022-12-01 RX ADMIN — AMLODIPINE BESYLATE 10 MG: 10 TABLET ORAL at 08:34

## 2022-12-01 RX ADMIN — ATORVASTATIN CALCIUM 20 MG: 20 TABLET, FILM COATED ORAL at 08:34

## 2022-12-01 RX ADMIN — IPRATROPIUM BROMIDE 0.5 MG: 0.5 SOLUTION RESPIRATORY (INHALATION) at 12:55

## 2022-12-01 RX ADMIN — INSULIN LISPRO 10 UNITS: 100 INJECTION, SOLUTION INTRAVENOUS; SUBCUTANEOUS at 12:32

## 2022-12-01 RX ADMIN — LEVALBUTEROL 1.25 MG: 1.25 SOLUTION, CONCENTRATE RESPIRATORY (INHALATION) at 12:55

## 2022-12-01 RX ADMIN — ENOXAPARIN SODIUM 40 MG: 40 INJECTION SUBCUTANEOUS at 08:34

## 2022-12-01 RX ADMIN — INSULIN GLARGINE 30 UNITS: 100 INJECTION, SOLUTION SUBCUTANEOUS at 08:34

## 2022-12-01 RX ADMIN — IPRATROPIUM BROMIDE 0.5 MG: 0.5 SOLUTION RESPIRATORY (INHALATION) at 07:32

## 2022-12-01 RX ADMIN — LEVALBUTEROL 1.25 MG: 1.25 SOLUTION, CONCENTRATE RESPIRATORY (INHALATION) at 07:32

## 2022-12-01 RX ADMIN — ACETAMINOPHEN 650 MG: 325 TABLET, FILM COATED ORAL at 08:52

## 2022-12-01 RX ADMIN — SODIUM CHLORIDE 6 UNITS/HR: 9 INJECTION, SOLUTION INTRAVENOUS at 00:22

## 2022-12-01 RX ADMIN — PREDNISONE 40 MG: 20 TABLET ORAL at 08:33

## 2022-12-01 NOTE — UTILIZATION REVIEW
Continued Stay Review    Date: 11/30/2022                      Current Patient Class: IP  Current Level of Care: [facility]    HPI:42 y o  female initially admitted on 11/25 for Asthma Exacerbation    Assessment/Plan: c/o fatigue  Exp wheezes  IV Sulumedrol decreased to 40 qd from q12h  Plan taper to Prednisone  Currently on 2 L nc  Seen by Endocrine 11/29: Type 2 diabetes with hyperglycemia exacerbated by steroids  Started on Humalog 10 units with meals on 11/30; continue Insulin drip  Vital Signs:   12/01/22 07:53:47 98 7 °F (37 1 °C) 92 18 120/81 94 93 % -- -- --   12/01/22 0735 -- -- -- -- -- -- 32 3 L/min Nasal cannula   11/30/22 20:38:04 -- 101 22 121/70 87 93 % -- -- --   11/30/22 2000 -- -- -- -- -- 96 % -- -- --   11/30/22 1930 -- -- -- -- -- 95 % -- -- None (Room air)   11/30/22 1335 -- -- -- -- -- 94 % -- -- None (Room air)   11/30/22 0900 -- -- -- -- -- 97 % -- -- None (Room air)   11/30/22 08:18:02 -- 86 21 124/72 89 97 % -- -- --   11/30/22 0750 -- -- -- -- -- 99 % 28 2 L/min Nasal cannula   11/29/22 23:04:55 -- 80 20 127/73 91 96 % -- -- --   11/29/22 2200 -- -- -- -- -- -- 28 2 L/min Nasal cannula         Pertinent Labs/Diagnostic Results:   Results from last 7 days   Lab Units 11/25/22 1929   SARS-COV-2  Negative     Results from last 7 days   Lab Units 12/01/22  0543 11/30/22  0439 11/29/22  0514 11/28/22  0602 11/27/22  0613 11/26/22  0503 11/25/22 1929   WBC Thousand/uL 15 20* 14 12* 15 19* 16 14* 16 05*   < > 6 93   HEMOGLOBIN g/dL 11 7 12 1 12 0 12 8 11 8   < > 12 5   HEMATOCRIT % 36 2 36 8 35 9 39 7 36 5   < > 38 0   PLATELETS Thousands/uL 493* 448* 414* 396* 344   < > 286   NEUTROS ABS Thousands/µL  --   --   --   --   --   --  4 35   BANDS PCT %  --   --   --   --  2  --   --     < > = values in this interval not displayed       Results from last 7 days   Lab Units 12/01/22  0543 11/30/22  0439 11/29/22  0514 11/28/22  0602 11/27/22  0613   SODIUM mmol/L 138 141 138 139 134* POTASSIUM mmol/L 3 7 2 9* 3 4* 3 8 4 8   CHLORIDE mmol/L 103 105 103 102 100   CO2 mmol/L 30 29 24 28 25   ANION GAP mmol/L 5 7 11 9 9   BUN mg/dL 13 15 17 16 14   CREATININE mg/dL 0 79 0 70 0 82 0 82 0 89   EGFR ml/min/1 73sq m 91 105 87 87 79   CALCIUM mg/dL 8 3 8 2* 8 8 9 2 9 2   MAGNESIUM mg/dL  --  2 0  --   --  2 4   PHOSPHORUS mg/dL  --   --   --   --  3 4     Results from last 7 days   Lab Units 11/27/22  0613   AST U/L 14   ALT U/L 12   ALK PHOS U/L 78   TOTAL PROTEIN g/dL 8 0   ALBUMIN g/dL 3 2*   TOTAL BILIRUBIN mg/dL 0 43     Results from last 7 days   Lab Units 12/01/22  1206 12/01/22  1016 12/01/22  0802 12/01/22  0541 12/01/22  0438 12/01/22  0155 11/30/22  2331 11/30/22  2035 11/30/22  1740 11/30/22  1537 11/30/22  1259 11/30/22  1029   POC GLUCOSE mg/dl 299* 193* 116 150* 187* 102 143* 166* 230* 161* 159* 263*     Results from last 7 days   Lab Units 12/01/22  0543 11/30/22  0439 11/29/22  0514 11/28/22  0602 11/27/22  0613 11/26/22  0503 11/25/22  1929   GLUCOSE RANDOM mg/dL 154* 74 219* 189* 451* 615* 285*     Results from last 7 days   Lab Units 11/28/22  0602   HEMOGLOBIN A1C % 9 7*   EAG mg/dl 232     Results from last 7 days   Lab Units 11/25/22  2320   PH BECCA  7 434*   PCO2 BECCA mm Hg 31 4*   PO2 BECCA mm Hg 125 2*   HCO3 BCECA mmol/L 20 6*   BASE EXC BECCA mmol/L -2 8   O2 CONTENT BECCA ml/dL 17 1   O2 HGB, VENOUS % 96 3*     Results from last 7 days   Lab Units 11/25/22 1929   HS TNI 0HR ng/L 2     Results from last 7 days   Lab Units 11/25/22 1929   INFLUENZA A PCR  Negative   INFLUENZA B PCR  Negative   RSV PCR  Negative     Medications:   Scheduled Medications:  amLODIPine, 10 mg, Oral, Daily  atorvastatin, 20 mg, Oral, Daily  enoxaparin, 40 mg, Subcutaneous, Daily  fluticasone, 2 puff, Inhalation, BID  guaiFENesin, 600 mg, Oral, Q12H ADAL  insulin lispro, 10 Units, Subcutaneous, TID With Meals  ipratropium, 0 5 mg, Nebulization, 4x Daily  levalbuterol, 1 25 mg, Nebulization, 4x Daily  SoluMedrol 40 mg,  IV, Daily   KDur 40 meq  Po x 1 11/30    Continuous IV Infusions:  insulin regular (HumuLIN R,NovoLIN R) infusion, 0 3-21 Units/hr, Intravenous, Titrated    PRN Meds:  acetaminophen, 650 mg, Oral, Q6H PRN x 1  aluminum-magnesium hydroxide-simethicone, 30 mL, Oral, Q6H PRN  hydrOXYzine HCL, 25 mg, Oral, Q6H PRN x 1  ondansetron, 4 mg, Intravenous, Q6H PRN    Discharge Plan: [facility]    Network Utilization Review Department  ATTENTION: Please call with any questions or concerns to [phone] and carefully listen to the prompts so that you are directed to the right person  All voicemails are confidential   Fern Cardoso all requests for admission clinical reviews, approved or denied determinations and any other requests to dedicated fax number below belonging to the campus where the patient is receiving treatment   List of dedicated fax numbers for the Facilities:  [address] DENIALS (Administrative/Medical Necessity) [phone]   [address] (Maternity/NICU/Pediatrics) [phone]   [address] [phone]   [place] [address] [phone]   [address] [place] [address] [address]dul[address] [phone]   [address] [facility] [address] [phone]

## 2022-12-01 NOTE — DISCHARGE SUMMARY
[address] [facility]  Discharge- Samy Escobar 1980, 38 y o  female MRN: [id number]  Unit/Bed#: [address] 87 210-01 Encounter: [id number]  Primary Care Provider: Quynh Holland MD   Date and time admitted to hospital: 11/25/2022  7:15 PM    * Asthma exacerbation  Assessment & Plan  · Patient reports with asthma exacerbation  · On admission required BiPAP  She was monitored off for about 15 minutes before noticing increased work of breathing and was replaced on BiPAP  · Will discharge on prednisone taper  · Has albuterol inhaler at home    Acute respiratory failure with hypoxia (HCC)  Assessment & Plan  · Improved, saturating well on room air  · Does not require supplemental oxygen on discharge    Type 2 diabetes mellitus, without long-term current use of insulin [facility])  Assessment & Plan  Lab Results   Component Value Date    HGBA1C 9 7 (H) 11/28/2022       Recent Labs     12/01/22  0155 12/01/22  0438 12/01/22  0541 12/01/22  0802   POCGLU 102 187* 150* 116       Blood Sugar Average: Last 72 hrs:  (P) 190   · Poorly controlled type 2 diabetes  · Endocrinology input appreciated   · Patient will be discharged on Lantus 30 units daily and Humalog 6 units t i d  With meals  · [facility] given pens, needles    She has a glucometer, lancets and test strips at home  · She will follow with endocrinology are patient      Hypertension  Assessment & Plan  Continue amlodipine    Morbid obesity with BMI of 40 0-44 9, adult ([address] )  Assessment & Plan  ·  pt with BMI of  52 6  · Counseled on diet and lifestyle modification      Transition of Care Discharge Summary - [facility] Internal Medicine    Patient Information: Samy Escobar 38 y o  female MRN: [id number]  Unit/Bed#: [address] 87 210-01 Encounter: [id number]    Discharging Physician / Practitioner: Yessica Hamilton MD  PCP: Quynh Holland MD  Admission Date: 11/25/2022  Discharge Date: 12/01/22    Disposition:      Other: home      Reason for Admission: asthma exacerbation    Discharge Diagnoses:     Principal Problem:    Asthma exacerbation  Active Problems:    Acute respiratory failure with hypoxia (HCC)    Morbid obesity with BMI of 40 0-44 9, adult (HCC)    Hypertension    Type 2 diabetes mellitus, without long-term current use of insulin (HCC)  Resolved Problems:    * No resolved hospital problems  *      Consultations During Hospital Stay:  · IP CONSULT TO ENDOCRINOLOGY  · IP CONSULT TO NUTRITION SERVICES  · IP CONSULT TO NUTRITION SERVICES      Procedures Performed:     · none    Medication Adjustments and Discharge Medications:  · Medication Dosing Tapers - Please refer to Discharge Medication List for details on any medication dosing tapers (if applicable to patient)  · Discharge Medication List: See after visit summary for reconciled discharge medications  Wound Care Recommendations:  When applicable, please see wound care section of After Visit Summary  Diet Recommendations at Discharge:  Diet -        Diet Orders   (From admission, onward)             Start     Ordered    11/26/22 0100  Diet Valentino/CHO Controlled; Consistent Carbohydrate Diet Level 2 (5 carb servings/75 grams CHO/meal)  Diet effective now        References:    Nutrtion Support Algorithm Enteral vs  Parenteral   Question Answer Comment   Diet Type Valentino/CHO Controlled    Valentino/CHO Controlled Consistent Carbohydrate Diet Level 2 (5 carb servings/75 grams CHO/meal)    RD to adjust diet per protocol? Yes        11/26/22 0059              Fluid Restriction - No Fluid Restriction at Discharge  Significant Findings / Test Results:     XR chest 1 view portable    Result Date: 11/26/2022  · Impression: No acute cardiopulmonary disease  Workstation performed: [id number]     Hospital Course:     Jake Smith is a 38 y o  female patient who originally presented to the hospital on 11/25/2022 due to asthma exacerbation and acute hypoxic respiratory failure  Patient required IV steroids and ultimately needed intravenous insulin drip due to hypoglycemia likely worsened due to steroids  Endocrinology consulted and patient was transitioned to subcu insulin which will be discharged on  [facility] and have [facility]  Patient has a glucometer, test strips and lancets at home  States she will follow with endocrinology outpatient  States she feels comfortable self administering insulin  Please see above problem list for further details  Condition at Discharge: good     Discharge Day Visit / Exam:     Subjective:  Patient states she feels much better today    Vitals: Blood Pressure: 120/81 (12/01/22 0753)  Pulse: 92 (12/01/22 0753)  Temperature: 98 7 °F (37 1 °C) (12/01/22 0753)  Temp Source: Oral (11/28/22 1415)  Respirations: 18 (12/01/22 0753)  Height: 5' 5" (165 1 cm) (11/26/22 1112)  Weight - Scale: (!) 143 kg (316 lb) (11/28/22 1415)  SpO2: 94 % (12/01/22 1258)    Physical Exam:    Constitutional: Patient is oriented to person, place and time, no acute distress  HEENT:  Normocephalic, atraumatic  Cardiovascular: Normal S1S2, RRR, No murmurs/rubs/gallops appreciated  Pulmonary:  Bilateral air entry, No rhonchi/rales/wheezing appreciated  Abdominal: Soft, Bowel sounds present, Non-tender, Non-distended  Extremities:  No cyanosis, clubbing or edema  Neurological: Cranial nerves II-XII grossly intact, sensation intact, otherwise no focal neurological symptoms  Discharge instructions/Information to patient and family:   See after visit summary section titled Discharge Instructions for information provided to patient and family  Planned Readmission: no     Discharge Statement:  I spent 35 minutes discharging the patient  This time was spent on the day of discharge  I had direct contact with the patient on the day of discharge   Greater than 50% of the total time was spent examining patient, answering all patient questions, arranging and discussing plan of care with patient as well as directly providing post-discharge instructions  Additional time then spent on discharge activities      ** Please Note: This note has been constructed using a voice recognition system **

## 2022-12-01 NOTE — CASE MANAGEMENT
Case Management Discharge Planning Note    Patient name Loletta Aschoff  Location hospitals [facility] K[facility]* MRN [id number]  : 1980 Date 2022       Current Admission Date: 2022  Current Admission Diagnosis:Asthma exacerbation   Patient Active Problem List    Diagnosis Date Noted   • Type 2 diabetes mellitus, without long-term current use of insulin ([facility] ) 2022   • High cholesterol 10/19/2022   • Hypertension    • Morbid obesity with BMI of 40 0-44 9, adult ([facility] [address] ) 10/02/2018   • Lactic acid acidosis 10/02/2018   • Acute respiratory failure with hypoxia ([facility] ) 10/01/2018   • Other headache syndrome 10/01/2018   • Tobacco abuse 10/01/2018   • Pneumonia 2017   • Asthma exacerbation 2016   • Dental abscess 2016      LOS (days): 6  Geometric Mean LOS (GMLOS) (days):   Days to GMLOS:     OBJECTIVE:  Risk of Unplanned Readmission Score: 11 16         Current admission status: Inpatient   Preferred Pharmacy:   [address] [place] [address], [address] [place] [address]  [address] [place] [address]  Phone: [phone] Fax: [phone]    [facility],[address], [address]  [facility] [address] [address]  Phone: [phone] Fax: [phone]    [facility] [address]  Phone: [phone] Fax: [address], [address]  Phone: [phone] Fax: [address], [address] - [address] ,  [address] ,  [facility] [address] [address]  Phone: [phone] Fax: [phone]    Primary Care Provider: Lucille Smith MD    Primary Insurance: [address] [place] [address]  Secondary Insurance:     DISCHARGE DETAILS:  CM validated co-pay with [facility] pharmacist Alonzo Maldonado for insulins - zero co-pay  Dr Chi Marsh informed

## 2022-12-01 NOTE — RESPIRATORY THERAPY NOTE
Home Oxygen Qualifying Test     Patient name: David Maier        : 1980   Date of Test:  2022  Diagnosis:    Home Oxygen Test:    **Medicare Guidelines require item(s) 1-5 on all ambulatory patients or 1 and 2 on non-ambulatory patients  1  Baseline SPO2 on Room Air at rest 94 %   a  If <= 88% on Room Air add O2 via NC to obtain SpO2 >=88%  If LPM needed, document LPM N/A needed to reach =>88%    2  SPO2 during exertion on Room Air 95  %  a  During exertion monitor SPO2  If SPO2 increases >=89%, do not add supplemental oxygen    3  SPO2 on Oxygen at Rest N/A % at N/A LPM    4  SPO2 during exertion on Oxygen N/A % at N/A LPM    5  Test performed during exertion activity  []  Supplemental Home Oxygen is indicated  [x]  Client does not qualify for home oxygen      Respiratory Additional Notes-WALK UNASSISTED X [address] [facility],

## 2022-12-01 NOTE — PLAN OF CARE
Discharge Instructions    Discharged to home by car with relative. Discharged via walking, hospital escort: Yes.  Special equipment needed: Not Applicable    Be sure to schedule a follow-up appointment with your primary care doctor or any specialists as instructed.     Discharge Plan:   Diet Plan: Discussed  Activity Level: Discussed  Confirmed Follow up Appointment: Patient to Call and Schedule Appointment  Confirmed Symptoms Management: Discussed  Medication Reconciliation Updated: Yes    I understand that a diet low in cholesterol, fat, and sodium is recommended for good health. Unless I have been given specific instructions below for another diet, I accept this instruction as my diet prescription.   Other diet: low fat    Special Instructions: None    · Is patient discharged on Warfarin / Coumadin?   No     Depression / Suicide Risk    As you are discharged from this [facility] facility, it is important to learn how to keep safe from harming yourself.    Recognize the warning signs:  · Abrupt changes in personality, positive or negative- including increase in energy   · Giving away possessions  · Change in eating patterns- significant weight changes-  positive or negative  · Change in sleeping patterns- unable to sleep or sleeping all the time   · Unwillingness or inability to communicate  · Depression  · Unusual sadness, discouragement and loneliness  · Talk of wanting to die  · Neglect of personal appearance   · Rebelliousness- reckless behavior  · Withdrawal from people/activities they love  · Confusion- inability to concentrate     If you or a loved one observes any of these behaviors or has concerns about self-harm, here's what you can do:  · Talk about it- your feelings and reasons for harming yourself  · Remove any means that you might use to hurt yourself (examples: pills, rope, extension cords, firearm)  · Get professional help from the community (Mental Health, Substance Abuse, psychological  Problem: SAFETY ADULT  Goal: Patient will remain free of falls  Description: INTERVENTIONS:  - Educate patient/family on patient safety including physical limitations  - Instruct patient to call for assistance with activity   - Consult OT/PT to assist with strengthening/mobility   - Keep Call bell within reach  - Keep bed low and locked with side rails adjusted as appropriate  - Keep care items and personal belongings within reach  - Initiate and maintain comfort rounds  - Make Fall Risk Sign visible to staff  - Offer Toileting every 2 Hours, in advance of need  - Initiate/Maintain bed alarm  - Obtain necessary fall risk management equipment  - Apply yellow socks and bracelet for high fall risk patients  - Consider moving patient to room near nurses station  Outcome: Progressing  Goal: Maintain or return to baseline ADL function  Description: INTERVENTIONS:  -  Assess patient's ability to carry out ADLs; assess patient's baseline for ADL function and identify physical deficits which impact ability to perform ADLs (bathing, care of mouth/teeth, toileting, grooming, dressing, etc )  - Assess/evaluate cause of self-care deficits   - Assess range of motion  - Assess patient's mobility; develop plan if impaired  - Assess patient's need for assistive devices and provide as appropriate  - Encourage maximum independence but intervene and supervise when necessary  - Involve family in performance of ADLs  - Assess for home care needs following discharge   - Consider OT consult to assist with ADL evaluation and planning for discharge  - Provide patient education as appropriate  Outcome: Progressing  Goal: Maintains/Returns to pre admission functional level  Description: INTERVENTIONS:  - Perform BMAT or MOVE assessment daily    - Set and communicate daily mobility goal to care team and patient/family/caregiver     - Collaborate with rehabilitation services on mobility goals if consulted  - Perform Range of Motion 2 times a counseling)  · Do not be alone:Call your Safe Contact- someone whom you trust who will be there for you.  · Call your local CRISIS HOTLINE 159-2894 or 196-226-4514  · Call your local Children's Mobile Crisis Response Team [place] (731) 371-5558 or www.[employer]  · Call the toll free National Suicide Prevention Hotlines   · National Suicide Prevention Lifeline 807-422-JPVI (8220)  · [employer] Hope Line Network 800-SUICIDE (750-7217)    Vitamin B12 Deficiency  Vitamin B12 deficiency means that your body does not have enough vitamin B12. The body needs this vitamin:  · To make red blood cells.  · To make genes (DNA).  · To help the nerves work.  If you do not have enough vitamin B12 in your body, you can have health problems.  What are the causes?  · Not eating enough foods that contain vitamin B12.  · Not being able to absorb vitamin B12 from the food that you eat.  · Certain digestive system diseases.  · A condition in which the body does not make enough of a certain protein, which results in too few red blood cells (pernicious anemia).  · Having a surgery in which part of the stomach or small intestine is removed.  · Taking medicines that make it hard for the body to absorb vitamin B12. These medicines include:  ? Heartburn medicines.  ? Some antibiotic medicines.  ? Other medicines that are used to treat certain conditions.  What increases the risk?  · Being older than age 50.  · Eating a vegetarian or vegan diet, especially while you are pregnant.  · Eating a poor diet while you are pregnant.  · Taking certain medicines.  · Having alcoholism.  What are the signs or symptoms?  In some cases, there are no symptoms. If the condition leads to too few blood cells or nerve damage, symptoms can occur, such as:  · Feeling weak.  · Feeling tired (fatigued).  · Not being hungry.  · Weight loss.  · A loss of feeling (numbness) or tingling in your hands and feet.  · Redness and burning of the tongue.  · Being  day   - Reposition patient every 2 hours  - Dangle patient 2 times a day  - Stand patient 2 times a day  - Ambulate patient 2 times a day  - Out of bed to chair 2 times a day   - Out of bed for meals 2 times a day  - Out of bed for toileting  - Record patient progress and toleration of activity level   Outcome: Progressing     Problem: DISCHARGE PLANNING  Goal: Discharge to home or other facility with appropriate resources  Description: INTERVENTIONS:  - Identify barriers to discharge w/patient and caregiver  - Arrange for needed discharge resources and transportation as appropriate  - Identify discharge learning needs (meds, wound care, etc )  - Arrange for interpretive services to assist at discharge as needed  - Refer to Case Management Department for coordinating discharge planning if the patient needs post-hospital services based on physician/advanced practitioner order or complex needs related to functional status, cognitive ability, or social support system  Outcome: Progressing     Problem: Knowledge Deficit  Goal: Patient/family/caregiver demonstrates understanding of disease process, treatment plan, medications, and discharge instructions  Description: Complete learning assessment and assess knowledge base    Interventions:  - Provide teaching at level of understanding  - Provide teaching via preferred learning methods  Outcome: Progressing     Problem: RESPIRATORY - ADULT  Goal: Achieves optimal ventilation and oxygenation  Description: INTERVENTIONS:  - Assess for changes in respiratory status  - Assess for changes in mentation and behavior  - Position to facilitate oxygenation and minimize respiratory effort  - Oxygen administered by appropriate delivery if ordered  - Initiate smoking cessation education as indicated  - Encourage broncho-pulmonary hygiene including cough, deep breathe, Incentive Spirometry  - Assess the need for suctioning and aspirate as needed  - Assess and instruct to report SOB or any respiratory difficulty  - Respiratory Therapy support as indicated  Outcome: Progressing     Problem: Nutrition/Hydration-ADULT  Goal: Nutrient/Hydration intake appropriate for improving, restoring or maintaining nutritional needs  Description: Monitor and assess patient's nutrition/hydration status for malnutrition  Collaborate with interdisciplinary team and initiate plan and interventions as ordered  Monitor patient's weight and dietary intake as ordered or per policy  Utilize nutrition screening tool and intervene as necessary  Determine patient's food preferences and provide high-protein, high-caloric foods as appropriate       INTERVENTIONS:  - Monitor oral intake, urinary output, labs, and treatment plans  - Assess nutrition and hydration status and recommend course of action  - Evaluate amount of meals eaten  - Assist patient with eating if necessary   - Allow adequate time for meals  - Recommend/ encourage appropriate diets, oral nutritional supplements, and vitamin/mineral supplements  - Order, calculate, and assess calorie counts as needed  - Recommend, monitor, and adjust tube feedings and TPN/PPN based on assessed needs  - Assess need for intravenous fluids  - Provide specific nutrition/hydration education as appropriate  - Include patient/family/caregiver in decisions related to nutrition  Outcome: Progressing     Problem: Potential for Falls  Goal: Patient will remain free of falls  Description: INTERVENTIONS:  - Educate patient/family on patient safety including physical limitations  - Instruct patient to call for assistance with activity   - Consult OT/PT to assist with strengthening/mobility   - Keep Call bell within reach  - Keep bed low and locked with side rails adjusted as appropriate  - Keep care items and personal belongings within reach  - Initiate and maintain comfort rounds  - Make Fall Risk Sign visible to staff  - Offer Toileting every 2 Hours, in advance of need  - Initiate/Maintain mixed up (confused) or having memory problems.  · Sadness (depression).  · Problems with your senses. This can include color blindness, ringing in the ears, or loss of taste.  · Watery poop (diarrhea) or trouble pooping (constipation).  · Trouble walking.  If anemia is very bad, symptoms can include:  · Being short of breath.  · Being dizzy.  · Having a very fast heartbeat.  How is this treated?  · Changing the way you eat and drink, such as:  ? Eating more foods that contain vitamin B12.  ? Drinking little or no alcohol.  · Getting vitamin B12 shots.  · Taking vitamin B12 supplements. Your doctor will tell you the dose that is best for you.  Follow these instructions at home:  Eating and drinking    · Eat lots of healthy foods that contain vitamin B12. These include:  ? Meats and poultry, such as beef, pork, chicken, turkey, and organ meats, such as liver.  ? Seafood, such as clams, rainbow trout, salmon, tuna, and eduarda.  ? Eggs.  ? Cereal and dairy products that have vitamin B12 added to them. Check the label.  The items listed above may not be a complete list of what you can eat and drink. Contact a dietitian for more options.  General instructions  · Get any shots as told by your doctor.  · Take supplements only as told by your doctor.  · Do not drink alcohol if your doctor tells you not to. In some cases, you may only be asked to limit alcohol use.  · Keep all follow-up visits as told by your doctor. This is important.  Contact a doctor if:  · Your symptoms come back.  Get help right away if:  · You have trouble breathing.  · You have a very fast heartbeat.  · You have chest pain.  · You get dizzy.  · You pass out.  Summary  · Vitamin B12 deficiency means that your body is not getting enough vitamin B12.  · In some cases, there are no symptoms of this condition.  · Treatment may include making a change in the way you eat and drink, getting vitamin B12 shots, or taking supplements.  · Eat lots of healthy  bed alarm  - Obtain necessary fall risk management equipment    - Apply yellow socks and bracelet for high fall risk patients  - Consider moving patient to room near nurses station  Outcome: Progressing     Problem: METABOLIC, FLUID AND ELECTROLYTES - ADULT  Goal: Electrolytes maintained within normal limits  Description: INTERVENTIONS:  - Monitor labs and assess patient for signs and symptoms of electrolyte imbalances  - Administer electrolyte replacement as ordered  - Monitor response to electrolyte replacements, including repeat lab results as appropriate  - Instruct patient on fluid and nutrition as appropriate  Outcome: Progressing  Goal: Glucose maintained within target range  Description: INTERVENTIONS:  - Monitor Blood Glucose as ordered  - Assess for signs and symptoms of hyperglycemia and hypoglycemia  - Administer ordered medications to maintain glucose within target range  - Assess nutritional intake and initiate nutrition service referral as needed  Outcome: Progressing foods that contain vitamin B12.  This information is not intended to replace advice given to you by your health care provider. Make sure you discuss any questions you have with your health care provider.  Document Released: 12/06/2012 Document Revised: 08/27/2019 Document Reviewed: 08/27/2019  Elsevier Patient Education © 2020 [employer]  Vitamin B12 Deficiency  Vitamin B12 deficiency occurs when the body does not have enough vitamin B12, which is an important vitamin. The body needs this vitamin:  · To make red blood cells.  · To make DNA. This is the genetic material inside cells.  · To help the nerves work properly so they can carry messages from the brain to the body.  Vitamin B12 deficiency can cause various health problems, such as a low red blood cell count (anemia) or nerve damage.  What are the causes?  This condition may be caused by:  · Not eating enough foods that contain vitamin B12.  · Not having enough stomach acid and digestive fluids to properly absorb vitamin B12 from the food that you eat.  · Certain digestive system diseases that make it hard to absorb vitamin B12. These diseases include Crohn's disease, chronic pancreatitis, and cystic fibrosis.  · A condition in which the body does not make enough of a protein (intrinsic factor), resulting in too few red blood cells (pernicious anemia).  · Having a surgery in which part of the stomach or small intestine is removed.  · Taking certain medicines that make it hard for the body to absorb vitamin B12. These medicines include:  ? Heartburn medicines (antacids and proton pump inhibitors).  ? Certain antibiotic medicines.  ? Some medicines that are used to treat diabetes, tuberculosis, gout, or high cholesterol.  What increases the risk?  The following factors may make you more likely to develop a B12 deficiency:  · Being older than age 50.  · Eating a vegetarian or vegan diet, especially while you are pregnant.  · Eating a poor diet while you are  pregnant.  · Taking certain medicines.  · Having alcoholism.  What are the signs or symptoms?  In some cases, there are no symptoms of this condition. If the condition leads to anemia or nerve damage, various symptoms can occur, such as:  · Weakness.  · Fatigue.  · Loss of appetite.  · Weight loss.  · Numbness or tingling in your hands and feet.  · Redness and burning of the tongue.  · Confusion or memory problems.  · Depression.  · Sensory problems, such as color blindness, ringing in the ears, or loss of taste.  · Diarrhea or constipation.  · Trouble walking.  If anemia is severe, symptoms can include:  · Shortness of breath.  · Dizziness.  · Rapid heart rate (tachycardia).  How is this diagnosed?  This condition may be diagnosed with a blood test to measure the level of vitamin B12 in your blood. You may also have other tests, including:  · A group of tests that measure certain characteristics of blood cells (complete blood count, CBC).  · A blood test to measure intrinsic factor.  · A procedure where a thin tube with a camera on the end is used to look into your stomach or intestines (endoscopy).  Other tests may be needed to discover the cause of B12 deficiency.  How is this treated?  Treatment for this condition depends on the cause. This condition may be treated by:  · Changing your eating and drinking habits, such as:  ? Eating more foods that contain vitamin B12.  ? Drinking less alcohol or no alcohol.  · Getting vitamin B12 injections.  · Taking vitamin B12 supplements. Your health care provider will tell you which dosage is best for you.  Follow these instructions at home:  Eating and drinking    · Eat lots of healthy foods that contain vitamin B12, including:  ? Meats and poultry. This includes beef, pork, chicken, turkey, and organ meats, such as liver.  ? Seafood. This includes clams, rainbow trout, salmon, tuna, and eduarda.  ? Eggs.  ? Cereal and dairy products that are fortified. This means that  vitamin B12 has been added to the food. Check the label on the package to see if the food is fortified.  The items listed above may not be a complete list of recommended foods and beverages. Contact a dietitian for more information.  General instructions  · Get any injections that are prescribed by your health care provider.  · Take supplements only as told by your health care provider. Follow the directions carefully.  · Do not drink alcohol if your health care provider tells you not to. In some cases, you may only be asked to limit alcohol use.  · Keep all follow-up visits as told by your health care provider. This is important.  Contact a health care provider if:  · Your symptoms come back.  Get help right away if you:  · Develop shortness of breath.  · Have a rapid heart rate.  · Have chest pain.  · Become dizzy or lose consciousness.  Summary  · Vitamin B12 deficiency occurs when the body does not have enough vitamin B12.  · The main causes of vitamin B12 deficiency include dietary deficiency, digestive diseases, pernicious anemia, and having a surgery in which part of the stomach or small intestine is removed.  · In some cases, there are no symptoms of this condition. If the condition leads to anemia or nerve damage, various symptoms can occur, such as weakness, shortness of breath, and numbness.  · Treatment may include getting vitamin B12 injections or taking vitamin B12 supplements. Eat lots of healthy foods that contain vitamin B12.  This information is not intended to replace advice given to you by your health care provider. Make sure you discuss any questions you have with your health care provider.  Document Released: 03/11/2013 Document Revised: 08/27/2019 Document Reviewed: 08/27/2019  [employer] Patient Education © 2020 [employer]

## 2022-12-01 NOTE — PLAN OF CARE
Problem: SAFETY ADULT  Goal: Patient will remain free of falls  Description: INTERVENTIONS:  - Educate patient/family on patient safety including physical limitations  - Instruct patient to call for assistance with activity   - Consult OT/PT to assist with strengthening/mobility   - Keep Call bell within reach  - Keep bed low and locked with side rails adjusted as appropriate  - Keep care items and personal belongings within reach  - Initiate and maintain comfort rounds  - Make Fall Risk Sign visible to staff  - Offer Toileting every 2 Hours, in advance of need  - Initiate/Maintain bed alarm  - Obtain necessary fall risk management equipment  - Apply yellow socks and bracelet for high fall risk patients  - Consider moving patient to room near nurses station  Outcome: Progressing  Goal: Maintain or return to baseline ADL function  Description: INTERVENTIONS:  -  Assess patient's ability to carry out ADLs; assess patient's baseline for ADL function and identify physical deficits which impact ability to perform ADLs (bathing, care of mouth/teeth, toileting, grooming, dressing, etc )  - Assess/evaluate cause of self-care deficits   - Assess range of motion  - Assess patient's mobility; develop plan if impaired  - Assess patient's need for assistive devices and provide as appropriate  - Encourage maximum independence but intervene and supervise when necessary  - Involve family in performance of ADLs  - Assess for home care needs following discharge   - Consider OT consult to assist with ADL evaluation and planning for discharge  - Provide patient education as appropriate  Outcome: Progressing  Goal: Maintains/Returns to pre admission functional level  Description: INTERVENTIONS:  - Perform BMAT or MOVE assessment daily    - Set and communicate daily mobility goal to care team and patient/family/caregiver     - Collaborate with rehabilitation services on mobility goals if consulted  - Perform Range of Motion 2 times a day   - Reposition patient every 2 hours  - Dangle patient 2 times a day  - Stand patient 2 times a day  - Ambulate patient 2 times a day  - Out of bed to chair 2 times a day   - Out of bed for meals 2 times a day  - Out of bed for toileting  - Record patient progress and toleration of activity level   Outcome: Progressing     Problem: DISCHARGE PLANNING  Goal: Discharge to home or other facility with appropriate resources  Description: INTERVENTIONS:  - Identify barriers to discharge w/patient and caregiver  - Arrange for needed discharge resources and transportation as appropriate  - Identify discharge learning needs (meds, wound care, etc )  - Arrange for interpretive services to assist at discharge as needed  - Refer to Case Management Department for coordinating discharge planning if the patient needs post-hospital services based on physician/advanced practitioner order or complex needs related to functional status, cognitive ability, or social support system  Outcome: Progressing     Problem: Knowledge Deficit  Goal: Patient/family/caregiver demonstrates understanding of disease process, treatment plan, medications, and discharge instructions  Description: Complete learning assessment and assess knowledge base    Interventions:  - Provide teaching at level of understanding  - Provide teaching via preferred learning methods  Outcome: Progressing     Problem: RESPIRATORY - ADULT  Goal: Achieves optimal ventilation and oxygenation  Description: INTERVENTIONS:  - Assess for changes in respiratory status  - Assess for changes in mentation and behavior  - Position to facilitate oxygenation and minimize respiratory effort  - Oxygen administered by appropriate delivery if ordered  - Initiate smoking cessation education as indicated  - Encourage broncho-pulmonary hygiene including cough, deep breathe, Incentive Spirometry  - Assess the need for suctioning and aspirate as needed  - Assess and instruct to report SOB or any respiratory difficulty  - Respiratory Therapy support as indicated  Outcome: Progressing     Problem: Nutrition/Hydration-ADULT  Goal: Nutrient/Hydration intake appropriate for improving, restoring or maintaining nutritional needs  Description: Monitor and assess patient's nutrition/hydration status for malnutrition  Collaborate with interdisciplinary team and initiate plan and interventions as ordered  Monitor patient's weight and dietary intake as ordered or per policy  Utilize nutrition screening tool and intervene as necessary  Determine patient's food preferences and provide high-protein, high-caloric foods as appropriate       INTERVENTIONS:  - Monitor oral intake, urinary output, labs, and treatment plans  - Assess nutrition and hydration status and recommend course of action  - Evaluate amount of meals eaten  - Assist patient with eating if necessary   - Allow adequate time for meals  - Recommend/ encourage appropriate diets, oral nutritional supplements, and vitamin/mineral supplements  - Order, calculate, and assess calorie counts as needed  - Recommend, monitor, and adjust tube feedings and TPN/PPN based on assessed needs  - Assess need for intravenous fluids  - Provide specific nutrition/hydration education as appropriate  - Include patient/family/caregiver in decisions related to nutrition  Outcome: Progressing     Problem: Potential for Falls  Goal: Patient will remain free of falls  Description: INTERVENTIONS:  - Educate patient/family on patient safety including physical limitations  - Instruct patient to call for assistance with activity   - Consult OT/PT to assist with strengthening/mobility   - Keep Call bell within reach  - Keep bed low and locked with side rails adjusted as appropriate  - Keep care items and personal belongings within reach  - Initiate and maintain comfort rounds  - Make Fall Risk Sign visible to staff  - Offer Toileting every 2 Hours, in advance of need  - Initiate/Maintain bed alarm  - Obtain necessary fall risk management equipment    - Apply yellow socks and bracelet for high fall risk patients  - Consider moving patient to room near nurses station  Outcome: Progressing     Problem: METABOLIC, FLUID AND ELECTROLYTES - ADULT  Goal: Electrolytes maintained within normal limits  Description: INTERVENTIONS:  - Monitor labs and assess patient for signs and symptoms of electrolyte imbalances  - Administer electrolyte replacement as ordered  - Monitor response to electrolyte replacements, including repeat lab results as appropriate  - Instruct patient on fluid and nutrition as appropriate  Outcome: Progressing  Goal: Glucose maintained within target range  Description: INTERVENTIONS:  - Monitor Blood Glucose as ordered  - Assess for signs and symptoms of hyperglycemia and hypoglycemia  - Administer ordered medications to maintain glucose within target range  - Assess nutritional intake and initiate nutrition service referral as needed  Outcome: Progressing

## 2022-12-01 NOTE — ASSESSMENT & PLAN NOTE
Lab Results   Component Value Date    HGBA1C 9 7 (H) 11/28/2022       Recent Labs     12/01/22  0155 12/01/22  0438 12/01/22  0541 12/01/22  0802   POCGLU 102 187* 150* 116       Blood Sugar Average: Last 72 hrs:  (P) 190   · Poorly controlled type 2 diabetes  · Endocrinology input appreciated patient started on Humalog 10 units with each meal and continue insulin drip  · Further recommendations by endocrinology will be appreciated  · Nutrition evaluation  ·  Sliding scale algorithm for t i d   With meals and before bedtime

## 2022-12-01 NOTE — NURSING NOTE
Discharge instructions for follow up appointments and medications reviewed with patient who verbalized understanding  Insulin education and administration reviewed with patient by explanation, demonstration, and teach back  Patient prepared and administered lunch time insulin under nurse supervision  All questions and concerns addressed  IV and praful removed prior to discharge

## 2023-03-09 ENCOUNTER — HOSPITAL ENCOUNTER (EMERGENCY)
Facility: HOSPITAL | Age: 43
Discharge: HOME | End: 2023-03-09
Attending: EMERGENCY MEDICINE | Admitting: EMERGENCY MEDICINE

## 2023-03-09 ENCOUNTER — APPOINTMENT (EMERGENCY)
Dept: CT IMAGING | Facility: HOSPITAL | Age: 43
End: 2023-03-09

## 2023-03-09 VITALS
OXYGEN SATURATION: 98 % | WEIGHT: 293 LBS | BODY MASS INDEX: 51.62 KG/M2 | SYSTOLIC BLOOD PRESSURE: 135 MMHG | RESPIRATION RATE: 20 BRPM | DIASTOLIC BLOOD PRESSURE: 70 MMHG | HEART RATE: 108 BPM | TEMPERATURE: 99.6 F

## 2023-03-09 DIAGNOSIS — N20.0 KIDNEY STONE: Primary | ICD-10-CM

## 2023-03-09 DIAGNOSIS — R82.71 BACTERIURIA: ICD-10-CM

## 2023-03-09 LAB
ANION GAP SERPL CALCULATED.3IONS-SCNC: 8 MMOL/L (ref 5–14)
BACTERIA UR QL AUTO: ABNORMAL /HPF
BASOPHILS # BLD AUTO: 0.03 THOUSANDS/ÂΜL (ref 0–0.1)
BASOPHILS NFR BLD AUTO: 0 % (ref 0–1)
BILIRUB UR QL STRIP: NEGATIVE
BUN SERPL-MCNC: 8 MG/DL (ref 5–25)
CALCIUM SERPL-MCNC: 9.5 MG/DL (ref 8.4–10.2)
CHLORIDE SERPL-SCNC: 103 MMOL/L (ref 96–108)
CLARITY UR: ABNORMAL
CO2 SERPL-SCNC: 25 MMOL/L (ref 21–32)
COLOR UR: YELLOW
CREAT SERPL-MCNC: 0.57 MG/DL (ref 0.6–1.2)
EOSINOPHIL # BLD AUTO: 0.23 THOUSAND/ÂΜL (ref 0–0.61)
EOSINOPHIL NFR BLD AUTO: 2 % (ref 0–6)
ERYTHROCYTE [DISTWIDTH] IN BLOOD BY AUTOMATED COUNT: 13 % (ref 11.6–15.1)
EXT PREGNANCY TEST URINE: NEGATIVE
EXT. CONTROL: NORMAL
GFR SERPL CREATININE-BSD FRML MDRD: 113 ML/MIN/1.73SQ M
GLUCOSE SERPL-MCNC: 244 MG/DL (ref 70–99)
GLUCOSE UR STRIP-MCNC: ABNORMAL MG/DL
HCT VFR BLD AUTO: 40.2 % (ref 34.8–46.1)
HGB BLD-MCNC: 13 G/DL (ref 11.5–15.4)
HGB UR QL STRIP.AUTO: 150
IMM GRANULOCYTES # BLD AUTO: 0.07 THOUSAND/UL (ref 0–0.2)
IMM GRANULOCYTES NFR BLD AUTO: 1 % (ref 0–2)
KETONES UR STRIP-MCNC: NEGATIVE MG/DL
LEUKOCYTE ESTERASE UR QL STRIP: 500
LYMPHOCYTES # BLD AUTO: 2.3 THOUSANDS/ÂΜL (ref 0.6–4.47)
LYMPHOCYTES NFR BLD AUTO: 20 % (ref 14–44)
MCH RBC QN AUTO: 29.3 PG (ref 26.8–34.3)
MCHC RBC AUTO-ENTMCNC: 32.3 G/DL (ref 31.4–37.4)
MCV RBC AUTO: 91 FL (ref 82–98)
MONOCYTES # BLD AUTO: 0.65 THOUSAND/ÂΜL (ref 0.17–1.22)
MONOCYTES NFR BLD AUTO: 6 % (ref 4–12)
NEUTROPHILS # BLD AUTO: 8.24 THOUSANDS/ÂΜL (ref 1.85–7.62)
NEUTS SEG NFR BLD AUTO: 71 % (ref 43–75)
NITRITE UR QL STRIP: POSITIVE
NON-SQ EPI CELLS URNS QL MICRO: ABNORMAL /HPF
NRBC BLD AUTO-RTO: 0 /100 WBCS
PH UR STRIP.AUTO: 5 [PH]
PLATELET # BLD AUTO: 359 THOUSANDS/UL (ref 149–390)
PMV BLD AUTO: 9.6 FL (ref 8.9–12.7)
POTASSIUM SERPL-SCNC: 3.4 MMOL/L (ref 3.5–5.3)
PROT UR STRIP-MCNC: ABNORMAL MG/DL
RBC # BLD AUTO: 4.43 MILLION/UL (ref 3.81–5.12)
RBC #/AREA URNS AUTO: ABNORMAL /HPF
SODIUM SERPL-SCNC: 136 MMOL/L (ref 135–147)
SP GR UR STRIP.AUTO: 1.01 (ref 1–1.04)
UROBILINOGEN UA: NEGATIVE MG/DL
WBC # BLD AUTO: 11.52 THOUSAND/UL (ref 4.31–10.16)
WBC #/AREA URNS AUTO: ABNORMAL /HPF

## 2023-03-09 RX ORDER — CEPHALEXIN 500 MG/1
500 CAPSULE ORAL EVERY 6 HOURS SCHEDULED
Qty: 28 CAPSULE | Refills: 0 | Status: SHIPPED | OUTPATIENT
Start: 2023-03-09 | End: 2023-03-12 | Stop reason: ALTCHOICE

## 2023-03-09 RX ORDER — ONDANSETRON 2 MG/ML
4 INJECTION INTRAMUSCULAR; INTRAVENOUS ONCE
Status: COMPLETED | OUTPATIENT
Start: 2023-03-09 | End: 2023-03-09

## 2023-03-09 RX ORDER — MORPHINE SULFATE 4 MG/ML
4 INJECTION, SOLUTION INTRAMUSCULAR; INTRAVENOUS ONCE
Status: COMPLETED | OUTPATIENT
Start: 2023-03-09 | End: 2023-03-09

## 2023-03-09 RX ORDER — CEPHALEXIN 500 MG/1
500 CAPSULE ORAL ONCE
Status: COMPLETED | OUTPATIENT
Start: 2023-03-09 | End: 2023-03-09

## 2023-03-09 RX ORDER — OXYCODONE HYDROCHLORIDE AND ACETAMINOPHEN 5; 325 MG/1; MG/1
1 TABLET ORAL EVERY 4 HOURS PRN
Qty: 16 TABLET | Refills: 0 | Status: SHIPPED | OUTPATIENT
Start: 2023-03-09 | End: 2023-03-19

## 2023-03-09 RX ADMIN — CEPHALEXIN 500 MG: 500 CAPSULE ORAL at 22:23

## 2023-03-09 RX ADMIN — ONDANSETRON 4 MG: 2 INJECTION INTRAMUSCULAR; INTRAVENOUS at 20:37

## 2023-03-09 RX ADMIN — MORPHINE SULFATE 4 MG: 4 INJECTION, SOLUTION INTRAMUSCULAR; INTRAVENOUS at 20:38

## 2023-03-10 NOTE — ED PROVIDER NOTES
History  Chief Complaint   Patient presents with   • Flank Pain     L flank pain since yesterday that radiates to the front  Increased urination  No pta medications      Patient is a 40-year-old female with a 2 day h/o left flank pain  Sharp constant pain radiating to the abdomen  +urinary frequency and urgency  No dysuria  +nausea  No relief with motrin yesterday  No f/s/c  No h/o similar pain or kidney stones  Prior to Admission Medications   Prescriptions Last Dose Informant Patient Reported? Taking? Insulin Pen Needle (BD Pen Needle Matilda 2nd Gen) 32G X 4 MM MISC   No No   Sig: For use with insulin pen  Pharmacy may dispense brand covered by insurance  albuterol (2 5 mg/3 mL) 0 083 % nebulizer solution   No No   Sig: Take 3 mL (2 5 mg total) by nebulization every 6 (six) hours as needed for wheezing or shortness of breath   albuterol (PROVENTIL HFA,VENTOLIN HFA) 90 mcg/act inhaler   No No   Sig: Inhale 2 puffs every 6 (six) hours as needed for wheezing or shortness of breath   amLODIPine (NORVASC) 10 mg tablet   No No   Sig: Take 1 tablet (10 mg total) by mouth daily   atorvastatin (LIPITOR) 20 mg tablet   Yes No   Sig: Take 20 mg by mouth daily   cetirizine (ZyrTEC) 10 mg tablet   Yes No   Sig: Take 10 mg by mouth daily   glipiZIDE-metFORMIN (METAGLIP) 2 5-500 MG per tablet   Yes No   Sig: Take 2 tablets by mouth 2 (two) times a day   insulin lispro (HumaLOG KwikPen) 100 units/mL injection pen   No No   Sig: Inject 6 Units under the skin 3 (three) times a day with meals   ipratropium-albuterol (DUO-NEB) 0 5-2 5 mg/3 mL nebulizer solution   Yes No   Sig: INHALE THE CONTENTS OF 1 VIAL VIA NEBULIZER 4 (FOUR) TIMES A DAY AS NEEDED FOR WHEEZING        Facility-Administered Medications: None       Past Medical History:   Diagnosis Date   • Asthma    • Dental abscess    • Diabetes mellitus ([facility] [address] )    • High cholesterol    • Hypertension        Past Surgical History:   Procedure Laterality Date   • CHOLECYSTECTOMY     • ECTOPIC PREGNANCY SURGERY     • INCISION AND DRAINAGE INTRA ORAL ABSCESS Left 3/21/2016    Procedure: INCISION AND DRAINAGE  (I&D) WOUND ORAL Left  Space;  Surgeon: Allyssa Dunham MD;  Location: [facility];  Service:    • MULTIPLE TOOTH EXTRACTIONS N/A 3/21/2016    Procedure: EXTRACTION TEETH MULTIPLE; 17,18,19,11,12,4,5,28,32;  Surgeon: Allyssa Dunham MD;  Location: [facility];  Service:    • SALPINGECTOMY         Family History   Problem Relation Age of Onset   • Diabetes Mother    • Heart failure Mother    • Cancer Neg Hx      I have reviewed and agree with the history as documented  E-Cigarette/Vaping     E-Cigarette/Vaping Substances     Social History     Tobacco Use   • Smoking status: Every Day     Packs/day: 0 50     Years: 10 00     Pack years: 5 00     Types: Cigarettes   • Smokeless tobacco: Never   • Tobacco comments:     PT was educated on smoking and the effect is has on her asthma  Substance Use Topics   • Alcohol use: Yes     Comment: Social   • Drug use: No       Review of Systems   Constitutional: Negative  HENT: Negative  Eyes: Negative  Respiratory: Negative  Cardiovascular: Negative  Gastrointestinal: Positive for nausea  Endocrine: Negative  Genitourinary: Positive for flank pain, frequency and urgency  Skin: Negative  Allergic/Immunologic: Negative  Neurological: Negative  Hematological: Negative  Psychiatric/Behavioral: Negative  All other systems reviewed and are negative  Physical Exam  Physical Exam  Vitals and nursing note reviewed  Constitutional:       Appearance: Normal appearance  She is obese  HENT:      Head: Normocephalic and atraumatic  Nose: Nose normal       Mouth/Throat:      Mouth: Mucous membranes are moist       Pharynx: Oropharynx is clear  Eyes:      Conjunctiva/sclera: Conjunctivae normal       Pupils: Pupils are equal, round, and reactive to light     Cardiovascular: Rate and Rhythm: Normal rate and regular rhythm  Pulses: Normal pulses  Heart sounds: Normal heart sounds  Pulmonary:      Effort: Pulmonary effort is normal       Breath sounds: Normal breath sounds  Abdominal:      General: Bowel sounds are normal       Palpations: Abdomen is soft  Tenderness: There is left CVA tenderness  There is no right CVA tenderness  Musculoskeletal:         General: Normal range of motion  Cervical back: Normal range of motion and neck supple  Skin:     General: Skin is warm and dry  Capillary Refill: Capillary refill takes less than 2 seconds  Neurological:      General: No focal deficit present  Mental Status: She is alert and oriented to person, place, and time     Psychiatric:         Mood and Affect: Mood normal          Behavior: Behavior normal          Vital Signs  ED Triage Vitals   Temperature Pulse Respirations Blood Pressure SpO2   03/09/23 2026 03/09/23 2026 03/09/23 2026 03/09/23 2026 03/09/23 2026   99 6 °F (37 6 °C) (!) 116 22 158/91 99 %      Temp Source Heart Rate Source Patient Position - Orthostatic VS BP Location FiO2 (%)   03/09/23 2026 03/09/23 2026 03/09/23 2026 03/09/23 2026 --   Tympanic Monitor Sitting Left arm       Pain Score       03/09/23 2038       10 - Worst Possible Pain           Vitals:    03/09/23 2026 03/09/23 2228   BP: 158/91 135/70   Pulse: (!) 116 (!) 108   Patient Position - Orthostatic VS: Sitting Sitting         Visual Acuity      ED Medications  Medications   ondansetron (ZOFRAN) injection 4 mg (4 mg Intravenous Given 3/9/23 2037)   morphine injection 4 mg (4 mg Intravenous Given 3/9/23 2038)   cephalexin (KEFLEX) capsule 500 mg (500 mg Oral Given 3/9/23 2223)       Diagnostic Studies  Results Reviewed     Procedure Component Value Units Date/Time    Urine Microscopic [[id number]]  (Abnormal) Collected: 03/09/23 2037    Lab Status: Final result Specimen: Urine, Clean Catch Updated: 03/09/23 2112     RBC, UA 4-10 /hpf      WBC, UA Innumerable /hpf      Epithelial Cells Occasional /hpf      Bacteria, UA Moderate /hpf     Urine culture [[id number]] Collected: 03/09/23 2037    Lab Status:  In process Specimen: Urine, Clean Catch Updated: 03/09/23 6263    Basic metabolic panel [[id number]]  (Abnormal) Collected: 03/09/23 2037    Lab Status: Final result Specimen: Blood from Line, Venous Updated: 03/09/23 2101     Sodium 136 mmol/L      Potassium 3 4 mmol/L      Chloride 103 mmol/L      CO2 25 mmol/L      ANION GAP 8 mmol/L      BUN 8 mg/dL      Creatinine 0 57 mg/dL      Glucose 244 mg/dL      Calcium 9 5 mg/dL      eGFR 113 ml/min/1 73sq m     Narrative:      [place] guidelines for Chronic Kidney Disease (CKD):   •  Stage 1 with normal or high GFR (GFR > 90 mL/min/1 73 square meters)  •  Stage 2 Mild CKD (GFR = 60-89 mL/min/1 73 square meters)  •  Stage 3A Moderate CKD (GFR = 45-59 mL/min/1 73 square meters)  •  Stage 3B Moderate CKD (GFR = 30-44 mL/min/1 73 square meters)  •  Stage 4 Severe CKD (GFR = 15-29 mL/min/1 73 square meters)  •  Stage 5 End Stage CKD (GFR <15 mL/min/1 73 square meters)  Note: GFR calculation is accurate only with a steady state creatinine    UA w Reflex to Microscopic w Reflex to Culture [[id number]]  (Abnormal) Collected: 03/09/23 2037    Lab Status: Final result Specimen: Urine, Clean Catch Updated: 03/09/23 2057     Color, UA Yellow     Clarity, UA Cloudy     Specific Gravity, UA 1 015     pH, UA 5 0     Leukocytes,  0     Nitrite, UA Positive     Protein,  (2+) mg/dl      Glucose,  (1/4%) mg/dl      Ketones, UA Negative mg/dl      Bilirubin, UA Negative     Occult Blood,  0     UROBILINOGEN UA Negative mg/dL     CBC and differential [[id number]]  (Abnormal) Collected: 03/09/23 2037    Lab Status: Final result Specimen: Blood from Line, Venous Updated: 03/09/23 2048     WBC 11 52 Thousand/uL      RBC 4 43 Million/uL      Hemoglobin 13 0 g/dL Hematocrit 40 2 %      MCV 91 fL      MCH 29 3 pg      MCHC 32 3 g/dL      RDW 13 0 %      MPV 9 6 fL      Platelets 946 Thousands/uL      nRBC 0 /100 WBCs      Neutrophils Relative 71 %      Immat GRANS % 1 %      Lymphocytes Relative 20 %      Monocytes Relative 6 %      Eosinophils Relative 2 %      Basophils Relative 0 %      Neutrophils Absolute 8 24 Thousands/µL      Immature Grans Absolute 0 07 Thousand/uL      Lymphocytes Absolute 2 30 Thousands/µL      Monocytes Absolute 0 65 Thousand/µL      Eosinophils Absolute 0 23 Thousand/µL      Basophils Absolute 0 03 Thousands/µL     POCT pregnancy, urine [[id number]]  (Normal) Resulted: 03/09/23 2041    Lab Status: Final result Updated: 03/09/23 2041     EXT Preg Test, Ur Negative     Control Valid                 CT renal stone study abdomen pelvis wo contrast   Final Result by Keli Eubanks MD (03/09 2214)      Fullness within left renal collecting system with associated inflammatory changes tracking along the left collecting system and retroperitoneum  These findings may reflect a recently passed stone, however an ascending left ureteritis/pyelitis may have a    similar appearance  Workstation performed: [id number]                    Procedures  Procedures         ED Course  ED Course as of 03/09/23 2248   Thu Mar 09, 2023   2057 Leukocytes, UA(!): 500 0   2057 Nitrite, UA(!): Positive   2221 Went over results  Will dc with abx, pain meds  Follow up with PCP  RTED for any concern  Medical Decision Making  Kidney stone: acute illness or injury     Details: left flank pain history c/w stone  ct showed recently passed stone  ua+ for infection  Amount and/or Complexity of Data Reviewed  Labs: ordered  Decision-making details documented in ED Course  Radiology: ordered  Decision-making details documented in ED Course  Risk  Prescription drug management            Disposition  Final diagnoses:   Kidney stone     Time reflects when diagnosis was documented in both MDM as applicable and the Disposition within this note     Time User Action Codes Description Comment    3/9/2023 10:22 PM Jarod Dwyer Add [N20 0] Kidney stone       ED Disposition     ED Disposition   Discharge    Condition   Stable    Date/Time   u Mar 9, 2023 10:22 PM    Comment   Harrison Huff discharge to home/self care                 Follow-up Information     Follow up With Specialties Details Why [address], MD Family Medicine   [address] [place] [address]  Marin Arreola   49  [id number] [phone]            Discharge Medication List as of 3/9/2023 10:23 PM      START taking these medications    Details   cephalexin (KEFLEX) 500 mg capsule Take 1 capsule (500 mg total) by mouth every 6 (six) hours for 7 days, Starting Thu 3/9/2023, Until Thu 3/16/2023, Normal      oxyCODONE-acetaminophen (Percocet) 5-325 mg per tablet Take 1 tablet by mouth every 4 (four) hours as needed for moderate pain for up to 10 days Max Daily Amount: 6 tablets, Starting Thu 3/9/2023, Until [place] 3/19/2023 at 2359, Normal         CONTINUE these medications which have NOT CHANGED    Details   albuterol (2 5 mg/3 mL) 0 083 % nebulizer solution Take 3 mL (2 5 mg total) by nebulization every 6 (six) hours as needed for wheezing or shortness of breath, Starting Mon 10/11/2021, Normal      albuterol (PROVENTIL HFA,VENTOLIN HFA) 90 mcg/act inhaler Inhale 2 puffs every 6 (six) hours as needed for wheezing or shortness of breath, Starting Mon 10/11/2021, Normal      amLODIPine (NORVASC) 10 mg tablet Take 1 tablet (10 mg total) by mouth daily, Starting Mon 10/11/2021, Normal      atorvastatin (LIPITOR) 20 mg tablet Take 20 mg by mouth daily, Starting Wed 8/3/2022, Historical Med      cetirizine (ZyrTEC) 10 mg tablet Take 10 mg by mouth daily, Starting Fri 5/13/2022, Until Sat 5/13/2023, Historical Med      glipiZIDE-metFORMIN (METAGLIP) 2 5-500 MG per tablet Take 2 tablets by mouth 2 (two) times a day, Starting Fri 9/23/2022, Until Thu 12/22/2022, Historical Med      insulin lispro (HumaLOG KwikPen) 100 units/mL injection pen Inject 6 Units under the skin 3 (three) times a day with meals, Starting Thu 12/1/2022, Normal      Insulin Pen Needle (BD Pen Needle Matilda 2nd Gen) 32G X 4 MM MISC For use with insulin pen  Pharmacy may dispense brand covered by insurance , Normal      ipratropium-albuterol (DUO-NEB) 0 5-2 5 mg/3 mL nebulizer solution INHALE THE CONTENTS OF 1 VIAL VIA NEBULIZER 4 (FOUR) TIMES A DAY AS NEEDED FOR WHEEZING , Historical Med             No discharge procedures on file      PDMP Review     None          ED Provider  Electronically Signed by           Zulma Cardoso MD  03/09/23 7502

## 2023-03-11 LAB — BACTERIA UR CULT: ABNORMAL

## 2023-03-12 RX ORDER — NITROFURANTOIN 25; 75 MG/1; MG/1
100 CAPSULE ORAL 2 TIMES DAILY
Qty: 14 CAPSULE | Refills: 0 | Status: SHIPPED | OUTPATIENT
Start: 2023-03-12 | End: 2023-03-19

## 2023-10-26 ENCOUNTER — HOSPITAL ENCOUNTER (EMERGENCY)
Facility: HOSPITAL | Age: 43
Discharge: HOME | End: 2023-10-26
Attending: INTERNAL MEDICINE
Payer: COMMERCIAL

## 2023-10-26 VITALS
BODY MASS INDEX: 48.39 KG/M2 | TEMPERATURE: 98.5 F | OXYGEN SATURATION: 99 % | HEART RATE: 93 BPM | DIASTOLIC BLOOD PRESSURE: 82 MMHG | SYSTOLIC BLOOD PRESSURE: 150 MMHG | WEIGHT: 290.8 LBS | RESPIRATION RATE: 16 BRPM

## 2023-10-26 DIAGNOSIS — K04.7 DENTAL INFECTION: ICD-10-CM

## 2023-10-26 DIAGNOSIS — K08.89 DENTALGIA: Primary | ICD-10-CM

## 2023-10-26 PROCEDURE — 96372 THER/PROPH/DIAG INJ SC/IM: CPT

## 2023-10-26 PROCEDURE — 99282 EMERGENCY DEPT VISIT SF MDM: CPT

## 2023-10-26 PROCEDURE — 99284 EMERGENCY DEPT VISIT MOD MDM: CPT | Performed by: INTERNAL MEDICINE

## 2023-10-26 RX ORDER — CHLORHEXIDINE GLUCONATE ORAL RINSE 1.2 MG/ML
15 SOLUTION DENTAL 2 TIMES DAILY
Qty: 120 ML | Refills: 0 | Status: SHIPPED | OUTPATIENT
Start: 2023-10-26

## 2023-10-26 RX ORDER — SENNOSIDES 8.6 MG
650 CAPSULE ORAL EVERY 8 HOURS PRN
Qty: 30 TABLET | Refills: 0 | Status: SHIPPED | OUTPATIENT
Start: 2023-10-26

## 2023-10-26 RX ORDER — KETOROLAC TROMETHAMINE 30 MG/ML
30 INJECTION, SOLUTION INTRAMUSCULAR; INTRAVENOUS ONCE
Status: COMPLETED | OUTPATIENT
Start: 2023-10-26 | End: 2023-10-26

## 2023-10-26 RX ORDER — AMOXICILLIN 500 MG/1
500 CAPSULE ORAL 3 TIMES DAILY
Qty: 21 CAPSULE | Refills: 0 | Status: SHIPPED | OUTPATIENT
Start: 2023-10-26 | End: 2023-11-02

## 2023-10-26 RX ORDER — NAPROXEN 500 MG/1
500 TABLET ORAL 2 TIMES DAILY WITH MEALS
Qty: 30 TABLET | Refills: 0 | Status: SHIPPED | OUTPATIENT
Start: 2023-10-26

## 2023-10-26 RX ADMIN — KETOROLAC TROMETHAMINE 30 MG: 30 INJECTION, SOLUTION INTRAMUSCULAR; INTRAVENOUS at 13:00

## 2023-10-26 NOTE — DISCHARGE INSTRUCTIONS
[address] [facility], [facility]  [phone]  Walk in house M-F [address]  [address] [facility], [facility],[address] floor  [place]), [address] [facility]  [phone]      Dr. Oscar Gutierrez  [address] [facility]  Takes adults & children on a waiting list      [facility]  [address] [facility], [address] [facility]  [phone]  Walk in scheduled only M-F 8a-noon & 1p-4p  Uninsured received 40% discount & payments  Payment must be made upfront before the service  Emergency [address] [facility] [address]  [address]  [address] [place] [address], [address] [facility]  [address], [address] [facility]  [address] [facility] in [facility], [facility], [address] [place] [address]

## 2023-10-26 NOTE — Clinical Note
Marina Torres was seen and treated in our emergency department on 10/26/2023. Diagnosis:     Carrie Marquez  . She may return on this date: 10/30/2023         If you have any questions or concerns, please don't hesitate to call.       Elina Kaba MD    ______________________________           _______________          _______________  [facility] Representative                              Date                                Time

## 2023-10-26 NOTE — ED PROVIDER NOTES
History  Chief Complaint   Patient presents with    Dental Pain     Pt came to ER with dental pain in front of mouth and on the left side. Pt reports she broken teeth. Pt took motrin last night without relief. HPI  68-year-old woman presents to ED with dentalgia and gum swelling over left upper posterior teeth. Patient reports she broke her tooth a long time ago. Starting last night she has had dentalgia. She reports associated gum swelling. She states she took Motrin last night with good relief of pain. Has not tried any medications or therapies at today. No trismus. No difficulty tolerating secretions, no difficulty swallowing, no dyspnea. Patient called [facility] but decided to come to the ER first.  She reports several tooth issues and states she needs multiple teeth pulled. Patient denies headache, visual changes, dizziness, fevers, chills, chest pain, palpitations, abdominal pain. Prior to Admission Medications   Prescriptions Last Dose Informant Patient Reported? Taking? Insulin Pen Needle (BD Pen Needle Matilda 2nd Gen) 32G X 4 MM MISC   No No   Sig: For use with insulin pen. Pharmacy may dispense brand covered by insurance.    albuterol (2.5 mg/3 mL) 0.083 % nebulizer solution   No No   Sig: Take 3 mL (2.5 mg total) by nebulization every 6 (six) hours as needed for wheezing or shortness of breath   albuterol (PROVENTIL HFA,VENTOLIN HFA) 90 mcg/act inhaler   No No   Sig: Inhale 2 puffs every 6 (six) hours as needed for wheezing or shortness of breath   amLODIPine (NORVASC) 10 mg tablet   No No   Sig: Take 1 tablet (10 mg total) by mouth daily   atorvastatin (LIPITOR) 20 mg tablet   Yes No   Sig: Take 20 mg by mouth daily   cetirizine (ZyrTEC) 10 mg tablet   Yes No   Sig: Take 10 mg by mouth daily   glipiZIDE-metFORMIN (METAGLIP) 2.5-500 MG per tablet   Yes No   Sig: Take 2 tablets by mouth 2 (two) times a day   insulin lispro (HumaLOG KwikPen) 100 units/mL injection pen   No No   Sig: Inject 6 Units under the skin 3 (three) times a day with meals   ipratropium-albuterol (DUO-NEB) 0.5-2.5 mg/3 mL nebulizer solution   Yes No   Sig: INHALE THE CONTENTS OF 1 VIAL VIA NEBULIZER 4 (FOUR) TIMES A DAY AS NEEDED FOR WHEEZING. Facility-Administered Medications: None       Past Medical History:   Diagnosis Date    Asthma     Dental abscess     Diabetes mellitus ([address])     High cholesterol     Hypertension        Past Surgical History:   Procedure Laterality Date    CHOLECYSTECTOMY      ECTOPIC PREGNANCY SURGERY      INCISION AND DRAINAGE INTRA ORAL ABSCESS Left 3/21/2016    Procedure: INCISION AND DRAINAGE  (I&D) WOUND ORAL Left  Space;  Surgeon: Vaishali Quijano MD;  Location: [facility];  Service:     MULTIPLE TOOTH EXTRACTIONS N/A 3/21/2016    Procedure: EXTRACTION TEETH MULTIPLE; 17,18,19,11,12,4,5,28,32;  Surgeon: Vaishali Quijano MD;  Location: [facility];  Service:     SALPINGECTOMY         Family History   Problem Relation Age of Onset    Diabetes Mother     Heart failure Mother     Cancer Neg Hx      I have reviewed and agree with the history as documented. E-Cigarette/Vaping     E-Cigarette/Vaping Substances     Social History     Tobacco Use    Smoking status: Every Day     Packs/day: 0.50     Years: 10.00     Total pack years: 5.00     Types: Cigarettes    Smokeless tobacco: Never    Tobacco comments:     PT was educated on smoking and the effect is has on her asthma. Substance Use Topics    Alcohol use: Yes     Comment: Social    Drug use: No       Review of Systems   All other systems reviewed and are negative. Physical Exam  Physical Exam  HENT:      Head: Normocephalic. Right Ear: External ear normal.      Left Ear: External ear normal.      Mouth/Throat:      Dentition: Abnormal dentition. Does not have dentures. Dental tenderness, gingival swelling and dental caries present. No dental abscesses or gum lesions.      Eyes:      Conjunctiva/sclera: Conjunctivae normal.   Cardiovascular:      Rate and Rhythm: Normal rate and regular rhythm. Pulses: Normal pulses. Pulmonary:      Effort: Pulmonary effort is normal.   Abdominal:      General: Abdomen is flat. There is no distension. Skin:     General: Skin is warm and dry. Capillary Refill: Capillary refill takes 2 to 3 seconds. Neurological:      General: No focal deficit present. Mental Status: She is alert. Psychiatric:         Mood and Affect: Mood normal.         Vital Signs  ED Triage Vitals [10/26/23 1248]   Temperature Pulse Respirations Blood Pressure SpO2   98.5 °F (36.9 °C) 93 16 150/82 99 %      Temp Source Heart Rate Source Patient Position - Orthostatic VS BP Location FiO2 (%)   Tympanic Monitor -- -- --      Pain Score       --           Vitals:    10/26/23 1248   BP: 150/82   Pulse: 93         Visual Acuity      ED Medications  Medications   ketorolac (TORADOL) injection 30 mg (30 mg Intramuscular Given 10/26/23 1300)       Diagnostic Studies  Results Reviewed       None                   No orders to display              Procedures  Procedures         ED Course                               SBIRT 20yo+      Flowsheet Row Most Recent Value   Initial Alcohol Screen: US AUDIT-C     1. How often do you have a drink containing alcohol? 0 Filed at: 10/26/2023 1247   2. How many drinks containing alcohol do you have on a typical day you are drinking? 0 Filed at: 10/26/2023 1247   3a. Male UNDER 65: How often do you have five or more drinks on one occasion? 0 Filed at: 10/26/2023 1247   3b. FEMALE Any Age, or MALE 65+: How often do you have 4 or more drinks on one occassion? 0 Filed at: 10/26/2023 1247   Audit-C Score 0 Filed at: 10/26/2023 1247   DAVY: How many times in the past year have you. .. Used an illegal drug or used a prescription medication for non-medical reasons?  Never Filed at: 10/26/2023 1247                      Medical Decision Making  - We will give tylenol/motrin for pain. - We will write for chlorhexidine to sterilize the area and prevent further worsening of infection or repeat infection.  - Given the appearance, we will also do antibiotics:  [    ] PCN 500mg QID  [ x ] Amoxicillin 500 TID  [    ] Augmentin 20mg/kg BID  [    ] Clindamycin (PCN allergy or worsening infection after 72 hours of amoxicillin) 8-20mg/kg/day TID  [    ] Azithromycin (alternative to clindamycin) 5-12mg/kg Qday  [    ] Metronidazole (30mg/kg/day QID) + Amoxicillin if suspected resistant infection  - The patient will follow-up with her primary care or dentist for re-evaluation of her symptoms.  - The patient has no red flags for Adrien's or serious dental infection at this juncture. The patient doesn't appear toxic, nor has rapid progression of symptoms. Patient isn't immunocompromised. Patient has no SOB nor trouble swallowing. Patient doesn't appear dehydrated nor demonstrates trismus on exam.       Problems Addressed:  Dental infection: acute illness or injury  Dentalgia: acute illness or injury    Amount and/or Complexity of Data Reviewed  External Data Reviewed: notes. Risk  OTC drugs. Prescription drug management. Disposition  Final diagnoses:   [address] infection     Time reflects when diagnosis was documented in both MDM as applicable and the Disposition within this note       Time User Action Codes Description Comment    10/26/2023 12:52 PM Jhonny Banda Add [K08.89] Electa Rater     10/26/2023  1:04 PM Jhonny Banda Add [K04.7] Dental infection           ED Disposition       ED Disposition   Discharge    Condition   Stable    Date/Time   Thu Oct 26, 2023 1252    [facility] discharge to home/self care.                    Follow-up Information       Follow up With Specialties Details Why Contact Info    Primo Lafleur MD Family Medicine Call  As needed [address]  [address] [facility] [address] [phone] Discharge Medication List as of 10/26/2023 12:53 PM        START taking these medications    Details   acetaminophen (TYLENOL) 650 mg CR tablet Take 1 tablet (650 mg total) by mouth every 8 (eight) hours as needed for mild pain, Starting Thu 10/26/2023, Normal      naproxen (Naprosyn) 500 mg tablet Take 1 tablet (500 mg total) by mouth 2 (two) times a day with meals, Starting Thu 10/26/2023, Normal           CONTINUE these medications which have NOT CHANGED    Details   albuterol (2.5 mg/3 mL) 0.083 % nebulizer solution Take 3 mL (2.5 mg total) by nebulization every 6 (six) hours as needed for wheezing or shortness of breath, Starting Mon 10/11/2021, Normal      albuterol (PROVENTIL HFA,VENTOLIN HFA) 90 mcg/act inhaler Inhale 2 puffs every 6 (six) hours as needed for wheezing or shortness of breath, Starting Mon 10/11/2021, Normal      amLODIPine (NORVASC) 10 mg tablet Take 1 tablet (10 mg total) by mouth daily, Starting Mon 10/11/2021, Normal      atorvastatin (LIPITOR) 20 mg tablet Take 20 mg by mouth daily, Starting Wed 8/3/2022, Historical Med      cetirizine (ZyrTEC) 10 mg tablet Take 10 mg by mouth daily, Starting Fri 5/13/2022, Until Sat 5/13/2023, Historical Med      glipiZIDE-metFORMIN (METAGLIP) 2.5-500 MG per tablet Take 2 tablets by mouth 2 (two) times a day, Starting Fri 9/23/2022, Until Thu 12/22/2022, Historical Med      insulin lispro (HumaLOG KwikPen) 100 units/mL injection pen Inject 6 Units under the skin 3 (three) times a day with meals, Starting Thu 12/1/2022, Normal      Insulin Pen Needle (BD Pen Needle Matilda 2nd Gen) 32G X 4 MM MISC For use with insulin pen.  Pharmacy may dispense brand covered by insurance., Normal      ipratropium-albuterol (DUO-NEB) 0.5-2.5 mg/3 mL nebulizer solution INHALE THE CONTENTS OF 1 VIAL VIA NEBULIZER 4 (FOUR) TIMES A DAY AS NEEDED FOR WHEEZING., Historical Med                 PDMP Review       None            ED Provider  Electronically Signed by Gil Lopez MD  10/26/23 1095

## 2023-11-09 NOTE — Clinical Note
Eze Valverde was seen and treated in our emergency department on 4/12/2022  No restrictions            Diagnosis:     Janine    She may return on this date: If you have any questions or concerns, please don't hesitate to call        Michelle Catalan PA-C    ______________________________           _______________          _______________  Hospital Representative                              Date                                Time Yes

## 2024-02-21 PROBLEM — J18.9 PNEUMONIA: Status: RESOLVED | Noted: 2017-04-25 | Resolved: 2024-02-21

## 2024-04-09 ENCOUNTER — HOSPITAL ENCOUNTER (INPATIENT)
Facility: HOSPITAL | Age: 44
LOS: 9 days | Discharge: HOME | DRG: 720 | End: 2024-04-18
Attending: EMERGENCY MEDICINE | Admitting: INTERNAL MEDICINE
Payer: COMMERCIAL

## 2024-04-09 DIAGNOSIS — E11.9 TYPE 2 DIABETES MELLITUS, WITHOUT LONG-TERM CURRENT USE OF INSULIN (HCC): ICD-10-CM

## 2024-04-09 DIAGNOSIS — N61.1 BREAST ABSCESS: ICD-10-CM

## 2024-04-09 DIAGNOSIS — A41.9 SEPSIS (HCC): ICD-10-CM

## 2024-04-09 DIAGNOSIS — N61.0 MASTITIS: Primary | ICD-10-CM

## 2024-04-09 DIAGNOSIS — F32.A ANXIETY AND DEPRESSION: ICD-10-CM

## 2024-04-09 DIAGNOSIS — N60.11 MASTITIS CHRONIC, RIGHT: ICD-10-CM

## 2024-04-09 DIAGNOSIS — F41.9 ANXIETY AND DEPRESSION: ICD-10-CM

## 2024-04-09 PROBLEM — J45.909 MODERATE ASTHMA: Status: ACTIVE | Noted: 2024-04-09

## 2024-04-09 LAB
ALBUMIN SERPL BCP-MCNC: 3.9 G/DL (ref 3.5–5)
ALP SERPL-CCNC: 82 U/L (ref 34–104)
ALT SERPL W P-5'-P-CCNC: 15 U/L (ref 7–52)
ANION GAP SERPL CALCULATED.3IONS-SCNC: 11 MMOL/L (ref 4–13)
APTT PPP: 29 SECONDS (ref 23–37)
AST SERPL W P-5'-P-CCNC: 17 U/L (ref 13–39)
BASOPHILS # BLD AUTO: 0.05 THOUSANDS/ÂΜL (ref 0–0.1)
BASOPHILS NFR BLD AUTO: 0 % (ref 0–1)
BILIRUB SERPL-MCNC: 0.72 MG/DL (ref 0.2–1)
BUN SERPL-MCNC: 6 MG/DL (ref 5–25)
CALCIUM SERPL-MCNC: 9.4 MG/DL (ref 8.4–10.2)
CHLORIDE SERPL-SCNC: 96 MMOL/L (ref 96–108)
CO2 SERPL-SCNC: 29 MMOL/L (ref 21–32)
CREAT SERPL-MCNC: 0.81 MG/DL (ref 0.6–1.3)
EOSINOPHIL # BLD AUTO: 0.15 THOUSAND/ÂΜL (ref 0–0.61)
EOSINOPHIL NFR BLD AUTO: 1 % (ref 0–6)
ERYTHROCYTE [DISTWIDTH] IN BLOOD BY AUTOMATED COUNT: 12.8 % (ref 11.6–15.1)
EXT PREGNANCY TEST URINE: NEGATIVE
EXT. CONTROL: NORMAL
GFR SERPL CREATININE-BSD FRML MDRD: 87 ML/MIN/1.73SQ M
GLUCOSE SERPL-MCNC: 223 MG/DL (ref 65–140)
GLUCOSE SERPL-MCNC: 253 MG/DL (ref 65–140)
GLUCOSE SERPL-MCNC: 257 MG/DL (ref 65–140)
HCT VFR BLD AUTO: 36.2 % (ref 34.8–46.1)
HGB BLD-MCNC: 12.3 G/DL (ref 11.5–15.4)
IMM GRANULOCYTES # BLD AUTO: 0.13 THOUSAND/UL (ref 0–0.2)
IMM GRANULOCYTES NFR BLD AUTO: 1 % (ref 0–2)
INR PPP: 1.04 (ref 0.84–1.19)
LACTATE SERPL-SCNC: 1.5 MMOL/L (ref 0.5–2)
LYMPHOCYTES # BLD AUTO: 1.88 THOUSANDS/ÂΜL (ref 0.6–4.47)
LYMPHOCYTES NFR BLD AUTO: 15 % (ref 14–44)
MCH RBC QN AUTO: 31.5 PG (ref 26.8–34.3)
MCHC RBC AUTO-ENTMCNC: 34 G/DL (ref 31.4–37.4)
MCV RBC AUTO: 93 FL (ref 82–98)
MONOCYTES # BLD AUTO: 0.63 THOUSAND/ÂΜL (ref 0.17–1.22)
MONOCYTES NFR BLD AUTO: 5 % (ref 4–12)
NEUTROPHILS # BLD AUTO: 9.88 THOUSANDS/ÂΜL (ref 1.85–7.62)
NEUTS SEG NFR BLD AUTO: 78 % (ref 43–75)
NRBC BLD AUTO-RTO: 0 /100 WBCS
PLATELET # BLD AUTO: 405 THOUSANDS/UL (ref 149–390)
PMV BLD AUTO: 9.3 FL (ref 8.9–12.7)
POTASSIUM SERPL-SCNC: 3.5 MMOL/L (ref 3.5–5.3)
PROT SERPL-MCNC: 7.9 G/DL (ref 6.4–8.4)
PROTHROMBIN TIME: 13.9 SECONDS (ref 11.6–14.5)
RBC # BLD AUTO: 3.91 MILLION/UL (ref 3.81–5.12)
SODIUM SERPL-SCNC: 136 MMOL/L (ref 135–147)
WBC # BLD AUTO: 12.72 THOUSAND/UL (ref 4.31–10.16)

## 2024-04-09 PROCEDURE — 96365 THER/PROPH/DIAG IV INF INIT: CPT

## 2024-04-09 PROCEDURE — 93005 ELECTROCARDIOGRAM TRACING: CPT

## 2024-04-09 PROCEDURE — 85610 PROTHROMBIN TIME: CPT

## 2024-04-09 PROCEDURE — 80053 COMPREHEN METABOLIC PANEL: CPT

## 2024-04-09 PROCEDURE — 96375 TX/PRO/DX INJ NEW DRUG ADDON: CPT

## 2024-04-09 PROCEDURE — 81025 URINE PREGNANCY TEST: CPT

## 2024-04-09 PROCEDURE — 85730 THROMBOPLASTIN TIME PARTIAL: CPT

## 2024-04-09 PROCEDURE — 99283 EMERGENCY DEPT VISIT LOW MDM: CPT

## 2024-04-09 PROCEDURE — 99291 CRITICAL CARE FIRST HOUR: CPT | Performed by: EMERGENCY MEDICINE

## 2024-04-09 PROCEDURE — 85025 COMPLETE CBC W/AUTO DIFF WBC: CPT

## 2024-04-09 PROCEDURE — 82948 REAGENT STRIP/BLOOD GLUCOSE: CPT

## 2024-04-09 PROCEDURE — 36415 COLL VENOUS BLD VENIPUNCTURE: CPT

## 2024-04-09 PROCEDURE — 83605 ASSAY OF LACTIC ACID: CPT

## 2024-04-09 PROCEDURE — 99223 1ST HOSP IP/OBS HIGH 75: CPT | Performed by: INTERNAL MEDICINE

## 2024-04-09 PROCEDURE — 87040 BLOOD CULTURE FOR BACTERIA: CPT

## 2024-04-09 RX ORDER — CEFTRIAXONE 2 G/50ML
2000 INJECTION, SOLUTION INTRAVENOUS ONCE
Status: COMPLETED | OUTPATIENT
Start: 2024-04-09 | End: 2024-04-09

## 2024-04-09 RX ORDER — MAGNESIUM HYDROXIDE/ALUMINUM HYDROXICE/SIMETHICONE 120; 1200; 1200 MG/30ML; MG/30ML; MG/30ML
30 SUSPENSION ORAL EVERY 6 HOURS PRN
Status: DISCONTINUED | OUTPATIENT
Start: 2024-04-09 | End: 2024-04-18 | Stop reason: HOSPADM

## 2024-04-09 RX ORDER — AMLODIPINE BESYLATE 10 MG/1
10 TABLET ORAL DAILY
Status: DISCONTINUED | OUTPATIENT
Start: 2024-04-10 | End: 2024-04-18 | Stop reason: HOSPADM

## 2024-04-09 RX ORDER — ENOXAPARIN SODIUM 100 MG/ML
40 INJECTION SUBCUTANEOUS DAILY
Status: DISCONTINUED | OUTPATIENT
Start: 2024-04-10 | End: 2024-04-18 | Stop reason: HOSPADM

## 2024-04-09 RX ORDER — CEFTRIAXONE 2 G/50ML
2000 INJECTION, SOLUTION INTRAVENOUS EVERY 24 HOURS
Status: DISCONTINUED | OUTPATIENT
Start: 2024-04-09 | End: 2024-04-15

## 2024-04-09 RX ORDER — INSULIN GLARGINE 100 [IU]/ML
30 INJECTION, SOLUTION SUBCUTANEOUS
Status: DISCONTINUED | OUTPATIENT
Start: 2024-04-09 | End: 2024-04-10

## 2024-04-09 RX ORDER — INSULIN LISPRO 100 [IU]/ML
6 INJECTION, SOLUTION INTRAVENOUS; SUBCUTANEOUS
Status: DISCONTINUED | OUTPATIENT
Start: 2024-04-09 | End: 2024-04-10

## 2024-04-09 RX ORDER — MORPHINE SULFATE 4 MG/ML
4 INJECTION, SOLUTION INTRAMUSCULAR; INTRAVENOUS ONCE AS NEEDED
Status: COMPLETED | OUTPATIENT
Start: 2024-04-09 | End: 2024-04-09

## 2024-04-09 RX ORDER — KETOROLAC TROMETHAMINE 30 MG/ML
15 INJECTION, SOLUTION INTRAMUSCULAR; INTRAVENOUS EVERY 6 HOURS PRN
Status: DISCONTINUED | OUTPATIENT
Start: 2024-04-09 | End: 2024-04-10

## 2024-04-09 RX ORDER — KETOROLAC TROMETHAMINE 30 MG/ML
15 INJECTION, SOLUTION INTRAMUSCULAR; INTRAVENOUS EVERY 6 HOURS PRN
Status: DISCONTINUED | OUTPATIENT
Start: 2024-04-09 | End: 2024-04-09

## 2024-04-09 RX ORDER — ATORVASTATIN CALCIUM 20 MG/1
20 TABLET, FILM COATED ORAL DAILY
Status: DISCONTINUED | OUTPATIENT
Start: 2024-04-10 | End: 2024-04-18 | Stop reason: HOSPADM

## 2024-04-09 RX ORDER — ALBUTEROL SULFATE 2.5 MG/3ML
2.5 SOLUTION RESPIRATORY (INHALATION) EVERY 6 HOURS PRN
Status: DISCONTINUED | OUTPATIENT
Start: 2024-04-09 | End: 2024-04-18 | Stop reason: HOSPADM

## 2024-04-09 RX ORDER — POLYETHYLENE GLYCOL 3350 17 G/17G
17 POWDER, FOR SOLUTION ORAL DAILY PRN
Status: DISCONTINUED | OUTPATIENT
Start: 2024-04-09 | End: 2024-04-16

## 2024-04-09 RX ORDER — NICOTINE 21 MG/24HR
1 PATCH, TRANSDERMAL 24 HOURS TRANSDERMAL DAILY
Status: DISCONTINUED | OUTPATIENT
Start: 2024-04-10 | End: 2024-04-11

## 2024-04-09 RX ORDER — SODIUM CHLORIDE, SODIUM GLUCONATE, SODIUM ACETATE, POTASSIUM CHLORIDE, MAGNESIUM CHLORIDE, SODIUM PHOSPHATE, DIBASIC, AND POTASSIUM PHOSPHATE .53; .5; .37; .037; .03; .012; .00082 G/100ML; G/100ML; G/100ML; G/100ML; G/100ML; G/100ML; G/100ML
125 INJECTION, SOLUTION INTRAVENOUS CONTINUOUS
Status: DISCONTINUED | OUTPATIENT
Start: 2024-04-09 | End: 2024-04-17

## 2024-04-09 RX ORDER — INSULIN LISPRO 100 [IU]/ML
2-12 INJECTION, SOLUTION INTRAVENOUS; SUBCUTANEOUS
Status: DISCONTINUED | OUTPATIENT
Start: 2024-04-09 | End: 2024-04-11

## 2024-04-09 RX ORDER — OXYCODONE HYDROCHLORIDE 5 MG/1
5 TABLET ORAL EVERY 6 HOURS PRN
Status: DISCONTINUED | OUTPATIENT
Start: 2024-04-09 | End: 2024-04-15

## 2024-04-09 RX ORDER — KETOROLAC TROMETHAMINE 30 MG/ML
15 INJECTION, SOLUTION INTRAMUSCULAR; INTRAVENOUS ONCE
Status: COMPLETED | OUTPATIENT
Start: 2024-04-09 | End: 2024-04-09

## 2024-04-09 RX ORDER — ALBUTEROL SULFATE 90 UG/1
2 AEROSOL, METERED RESPIRATORY (INHALATION) EVERY 6 HOURS PRN
Status: DISCONTINUED | OUTPATIENT
Start: 2024-04-09 | End: 2024-04-18 | Stop reason: HOSPADM

## 2024-04-09 RX ORDER — ACETAMINOPHEN 325 MG/1
650 TABLET ORAL EVERY 6 HOURS PRN
Status: DISCONTINUED | OUTPATIENT
Start: 2024-04-09 | End: 2024-04-15

## 2024-04-09 RX ORDER — IPRATROPIUM BROMIDE AND ALBUTEROL SULFATE 2.5; .5 MG/3ML; MG/3ML
3 SOLUTION RESPIRATORY (INHALATION) EVERY 6 HOURS PRN
Status: DISCONTINUED | OUTPATIENT
Start: 2024-04-09 | End: 2024-04-10

## 2024-04-09 RX ORDER — INSULIN GLARGINE 100 [IU]/ML
30 INJECTION, SOLUTION SUBCUTANEOUS DAILY
Status: ON HOLD | COMMUNITY
End: 2024-04-18

## 2024-04-09 RX ORDER — ONDANSETRON 2 MG/ML
4 INJECTION INTRAMUSCULAR; INTRAVENOUS EVERY 6 HOURS PRN
Status: DISCONTINUED | OUTPATIENT
Start: 2024-04-09 | End: 2024-04-18 | Stop reason: HOSPADM

## 2024-04-09 RX ORDER — ACETAMINOPHEN 325 MG/1
975 TABLET ORAL ONCE
Status: COMPLETED | OUTPATIENT
Start: 2024-04-09 | End: 2024-04-09

## 2024-04-09 RX ADMIN — SODIUM CHLORIDE 1000 ML: 0.9 INJECTION, SOLUTION INTRAVENOUS at 17:05

## 2024-04-09 RX ADMIN — INSULIN GLARGINE 30 UNITS: 100 INJECTION, SOLUTION SUBCUTANEOUS at 21:30

## 2024-04-09 RX ADMIN — KETOROLAC TROMETHAMINE 15 MG: 30 INJECTION, SOLUTION INTRAMUSCULAR; INTRAVENOUS at 17:06

## 2024-04-09 RX ADMIN — KETOROLAC TROMETHAMINE 15 MG: 30 INJECTION, SOLUTION INTRAMUSCULAR; INTRAVENOUS at 20:16

## 2024-04-09 RX ADMIN — ACETAMINOPHEN 975 MG: 325 TABLET ORAL at 17:05

## 2024-04-09 RX ADMIN — OXYCODONE HYDROCHLORIDE 5 MG: 5 TABLET ORAL at 21:46

## 2024-04-09 RX ADMIN — MORPHINE SULFATE 4 MG: 4 INJECTION, SOLUTION INTRAMUSCULAR; INTRAVENOUS at 17:16

## 2024-04-09 RX ADMIN — SODIUM CHLORIDE, SODIUM GLUCONATE, SODIUM ACETATE, POTASSIUM CHLORIDE, MAGNESIUM CHLORIDE, SODIUM PHOSPHATE, DIBASIC, AND POTASSIUM PHOSPHATE 125 ML/HR: .53; .5; .37; .037; .03; .012; .00082 INJECTION, SOLUTION INTRAVENOUS at 18:30

## 2024-04-09 RX ADMIN — CEFTRIAXONE 2000 MG: 2 INJECTION, SOLUTION INTRAVENOUS at 17:14

## 2024-04-09 NOTE — ED PROVIDER NOTES
"History  Chief Complaint   Patient presents with    Breast Pain     R breast pain/ swelling. States this has happened about 2 years ago. Redness to R breast.      43-year-old female with past medical history of insulin-dependent diabetes mellitus, hypertension, hyperlipidemia, asthma presents to emergency department complaining of right breast pain swelling and redness over the past few days.  Reports her breast is now hard along with the redness swelling and pain.  Has been worsening.  States this is recurrent it did happen a couple of years ago and she was seen by surgical oncology and told it was an \"infection or a clogged duct\".  She reports that she has had subjective fevers, chills since the symptoms started.  States did not take any medications today.  Denies drainage from the breast.  Denies skin changes other than the erythema.  Denies any nipple changes.  She states she is not breast-feeding.  Last child was 10 years ago.  Does report LMP was in January.       History provided by:  Patient, medical records and EMS personnel      Prior to Admission Medications   Prescriptions Last Dose Informant Patient Reported? Taking?   Insulin Pen Needle (BD Pen Needle Matilda 2nd Gen) 32G X 4 MM MISC   No No   Sig: For use with insulin pen. Pharmacy may dispense brand covered by insurance.   acetaminophen (TYLENOL) 650 mg CR tablet   No No   Sig: Take 1 tablet (650 mg total) by mouth every 8 (eight) hours as needed for mild pain   albuterol (2.5 mg/3 mL) 0.083 % nebulizer solution   No No   Sig: Take 3 mL (2.5 mg total) by nebulization every 6 (six) hours as needed for wheezing or shortness of breath   albuterol (PROVENTIL HFA,VENTOLIN HFA) 90 mcg/act inhaler   No No   Sig: Inhale 2 puffs every 6 (six) hours as needed for wheezing or shortness of breath   amLODIPine (NORVASC) 10 mg tablet   No No   Sig: Take 1 tablet (10 mg total) by mouth daily   atorvastatin (LIPITOR) 20 mg tablet   Yes No   Sig: Take 20 mg by mouth " daily   cetirizine (ZyrTEC) 10 mg tablet   Yes No   Sig: Take 10 mg by mouth daily   chlorhexidine (PERIDEX) 0.12 % solution   No No   Sig: Apply 15 mL to the mouth or throat 2 (two) times a day   glipiZIDE-metFORMIN (METAGLIP) 2.5-500 MG per tablet   Yes No   Sig: Take 2 tablets by mouth 2 (two) times a day   insulin lispro (HumaLOG KwikPen) 100 units/mL injection pen   No No   Sig: Inject 6 Units under the skin 3 (three) times a day with meals   ipratropium-albuterol (DUO-NEB) 0.5-2.5 mg/3 mL nebulizer solution   Yes No   Sig: INHALE THE CONTENTS OF 1 VIAL VIA NEBULIZER 4 (FOUR) TIMES A DAY AS NEEDED FOR WHEEZING.   naproxen (Naprosyn) 500 mg tablet   No No   Sig: Take 1 tablet (500 mg total) by mouth 2 (two) times a day with meals      Facility-Administered Medications: None       Past Medical History:   Diagnosis Date    Asthma     Dental abscess     Diabetes mellitus (HCC)     High cholesterol     Hypertension        Past Surgical History:   Procedure Laterality Date    CHOLECYSTECTOMY      ECTOPIC PREGNANCY SURGERY      INCISION AND DRAINAGE INTRA ORAL ABSCESS Left 3/21/2016    Procedure: INCISION AND DRAINAGE  (I&D) WOUND ORAL Left  Space;  Surgeon: Henok Parrish MD;  Location: [facility];  Service:     MULTIPLE TOOTH EXTRACTIONS N/A 3/21/2016    Procedure: EXTRACTION TEETH MULTIPLE; 17,18,19,11,12,4,5,28,32;  Surgeon: Henok Parrish MD;  Location: [facility];  Service:     SALPINGECTOMY         Family History   Problem Relation Age of Onset    Diabetes Mother     Heart failure Mother     Cancer Neg Hx      I have reviewed and agree with the history as documented.    E-Cigarette/Vaping     E-Cigarette/Vaping Substances     Social History     Tobacco Use    Smoking status: Every Day     Current packs/day: 0.50     Average packs/day: 0.5 packs/day for 10.0 years (5.0 ttl pk-yrs)     Types: Cigarettes    Smokeless tobacco: Never    Tobacco comments:     PT was educated on smoking and the  effect is has on her asthma.   Substance Use Topics    Alcohol use: Yes     Comment: Social    Drug use: No       Review of Systems   Constitutional:  Positive for chills and fever. Negative for diaphoresis.   Respiratory:  Negative for shortness of breath.    Cardiovascular:  Negative for chest pain.   Gastrointestinal:  Negative for abdominal pain and vomiting.   Genitourinary:  Negative for dysuria and vaginal bleeding.   Musculoskeletal:  Positive for myalgias. Negative for joint swelling.   Skin:  Positive for color change. Negative for wound.        +R breast pain, swelling, erythema    Neurological:  Negative for dizziness, syncope and weakness.   All other systems reviewed and are negative.      Physical Exam  Physical Exam  Vitals and nursing note reviewed. Exam conducted with a chaperone present (ED Tech Ayanna).   Constitutional:       General: She is awake. She is in acute distress.      Appearance: Normal appearance. She is not toxic-appearing or diaphoretic.   HENT:      Head: Normocephalic.      Mouth/Throat:      Lips: Pink.      Mouth: Mucous membranes are moist.   Eyes:      General: Vision grossly intact.   Cardiovascular:      Rate and Rhythm: Regular rhythm. Tachycardia present.      Heart sounds: Normal heart sounds.   Pulmonary:      Effort: Pulmonary effort is normal. No respiratory distress.      Breath sounds: Normal breath sounds.   Chest:      Chest wall: No crepitus.   Breasts:     Right: Swelling and tenderness present. No bleeding or nipple discharge.      Left: Normal.       Abdominal:      General: There is no distension.   Skin:     General: Skin is warm and dry.      Capillary Refill: Capillary refill takes less than 2 seconds.      Findings: Erythema present.   Neurological:      Mental Status: She is alert.         Vital Signs  ED Triage Vitals   Temperature Pulse Respirations Blood Pressure SpO2   04/09/24 1644 04/09/24 1630 04/09/24 1630 04/09/24 1630 04/09/24 1630   100.3 °F  (37.9 °C) (!) 133 (!) 24 144/68 99 %      Temp Source Heart Rate Source Patient Position - Orthostatic VS BP Location FiO2 (%)   04/09/24 1644 04/09/24 1630 04/09/24 1630 04/09/24 1630 --   Oral Monitor Sitting Left arm       Pain Score       04/09/24 1630       10 - Worst Possible Pain           Vitals:    04/09/24 1630 04/09/24 1718   BP: 144/68    Pulse: (!) 133 (!) 106   Patient Position - Orthostatic VS: Sitting          Visual Acuity      ED Medications  Medications   sodium chloride 0.9 % bolus 1,000 mL (1,000 mL Intravenous New Bag 4/9/24 1705)   multi-electrolyte (PLASMALYTE-A/ISOLYTE-S PH 7.4) IV solution (has no administration in time range)   acetaminophen (TYLENOL) tablet 650 mg (has no administration in time range)   polyethylene glycol (MIRALAX) packet 17 g (has no administration in time range)   ondansetron (ZOFRAN) injection 4 mg (has no administration in time range)   aluminum-magnesium hydroxide-simethicone (MAALOX) oral suspension 30 mL (has no administration in time range)   nicotine (NICODERM CQ) 14 mg/24hr TD 24 hr patch 1 patch (has no administration in time range)   enoxaparin (LOVENOX) subcutaneous injection 40 mg (has no administration in time range)   cefTRIAXone (ROCEPHIN) IVPB (premix in dextrose) 2,000 mg 50 mL (has no administration in time range)   albuterol inhalation solution 2.5 mg (has no administration in time range)   albuterol (PROVENTIL HFA,VENTOLIN HFA) inhaler 2 puff (has no administration in time range)   amLODIPine (NORVASC) tablet 10 mg (has no administration in time range)   atorvastatin (LIPITOR) tablet 20 mg (has no administration in time range)   insulin lispro (HumALOG/ADMELOG) 100 units/mL subcutaneous injection 6 Units (has no administration in time range)   ipratropium-albuterol (DUO-NEB) 0.5-2.5 mg/3 mL inhalation solution 3 mL (has no administration in time range)   acetaminophen (TYLENOL) tablet 975 mg (975 mg Oral Given 4/9/24 1705)   ketorolac (TORADOL)  injection 15 mg (15 mg Intravenous Given 4/9/24 1706)   cefTRIAXone (ROCEPHIN) IVPB (premix in dextrose) 2,000 mg 50 mL (2,000 mg Intravenous New Bag 4/9/24 1714)   morphine injection 4 mg (4 mg Intravenous Given 4/9/24 1716)       Diagnostic Studies  Results Reviewed       Procedure Component Value Units Date/Time    Lactic acid [[id number]]  (Normal) Collected: 04/09/24 1656    Lab Status: Final result Specimen: Blood from Arm, Right Updated: 04/09/24 1733     LACTIC ACID 1.5 mmol/L     Narrative:      Result may be elevated if tourniquet was used during collection.    Comprehensive metabolic panel [[id number]]  (Abnormal) Collected: 04/09/24 1656    Lab Status: Final result Specimen: Blood from Arm, Right Updated: 04/09/24 1732     Sodium 136 mmol/L      Potassium 3.5 mmol/L      Chloride 96 mmol/L      CO2 29 mmol/L      ANION GAP 11 mmol/L      BUN 6 mg/dL      Creatinine 0.81 mg/dL      Glucose 253 mg/dL      Calcium 9.4 mg/dL      AST 17 U/L      ALT 15 U/L      Alkaline Phosphatase 82 U/L      Total Protein 7.9 g/dL      Albumin 3.9 g/dL      Total Bilirubin 0.72 mg/dL      eGFR 87 ml/min/1.73sq m     Narrative:      [employer] guidelines for Chronic Kidney Disease (CKD):     Stage 1 with normal or high GFR (GFR > 90 mL/min/1.73 square meters)    Stage 2 Mild CKD (GFR = 60-89 mL/min/1.73 square meters)    Stage 3A Moderate CKD (GFR = 45-59 mL/min/1.73 square meters)    Stage 3B Moderate CKD (GFR = 30-44 mL/min/1.73 square meters)    Stage 4 Severe CKD (GFR = 15-29 mL/min/1.73 square meters)    Stage 5 End Stage CKD (GFR <15 mL/min/1.73 square meters)  Note: GFR calculation is accurate only with a steady state creatinine    Protime-INR [[id number]]  (Normal) Collected: 04/09/24 1656    Lab Status: Final result Specimen: Blood from Arm, Right Updated: 04/09/24 1722     Protime 13.9 seconds      INR 1.04    APTT [[id number]]  (Normal) Collected: 04/09/24 1656    Lab Status: Final result  Specimen: Blood from Arm, Right Updated: 04/09/24 1722     PTT 29 seconds     Blood culture #1 [[id number]] Collected: 04/09/24 1657    Lab Status: In process Specimen: Blood from Arm, Right Updated: 04/09/24 1718    Blood culture #2 [[id number]] Collected: 04/09/24 1712    Lab Status: In process Specimen: Blood from Arm, Left Updated: 04/09/24 1717    CBC and differential [[id number]]  (Abnormal) Collected: 04/09/24 1656    Lab Status: Final result Specimen: Blood from Arm, Right Updated: 04/09/24 1706     WBC 12.72 Thousand/uL      RBC 3.91 Million/uL      Hemoglobin 12.3 g/dL      Hematocrit 36.2 %      MCV 93 fL      MCH 31.5 pg      MCHC 34.0 g/dL      RDW 12.8 %      MPV 9.3 fL      Platelets 405 Thousands/uL      nRBC 0 /100 WBCs      Segmented % 78 %      Immature Grans % 1 %      Lymphocytes % 15 %      Monocytes % 5 %      Eosinophils Relative 1 %      Basophils Relative 0 %      Absolute Neutrophils 9.88 Thousands/µL      Absolute Immature Grans 0.13 Thousand/uL      Absolute Lymphocytes 1.88 Thousands/µL      Absolute Monocytes 0.63 Thousand/µL      Eosinophils Absolute 0.15 Thousand/µL      Basophils Absolute 0.05 Thousands/µL     POCT pregnancy, urine [[id number]]  (Normal) Resulted: 04/09/24 1650    Lab Status: Final result Updated: 04/09/24 1651     EXT Preg Test, Ur Negative     Control Valid                   No orders to display              Procedures  ECG 12 Lead Documentation Only    Date/Time: 4/9/2024 5:11 PM    Performed by: Laine Chang PA-C  Authorized by: Laine Chang PA-C    Indications / Diagnosis:  Tachycardia  ECG reviewed by me, the ED Provider: yes    Patient location:  ED  Previous ECG:     Comparison to cardiac monitor: Yes    Interpretation:     Interpretation: abnormal    Rate:     ECG rate:  112    ECG rate assessment: tachycardic    Rhythm:     Rhythm: sinus tachycardia    QRS:     QRS axis:  Normal  Conduction:     Conduction: abnormal      Abnormal conduction: incomplete  "RBBB    ST segments:     ST segments:  Normal  T waves:     T waves: non-specific             ED Course                            Initial Sepsis Screening       Row Name 04/09/24 3996                Is the patient's history suggestive of a new or worsening infection? Yes (Proceed)  -AC        Suspected source of infection soft tissue  -AC        Indicate SIRS criteria Tachycardia > 90 bpm;Leukocytosis (WBC > 71410 IJL) OR Leukopenia (WBC <4000 IJL) OR Bandemia (WBC >10% bands)  -AC        Are two or more of the above signs & symptoms of infection both present and new to the patient? Yes (Proceed)  -AC        Assess for evidence of organ dysfunction: Are any of the below criteria present within 6 hours of suspected infection and SIRS criteria that are NOT considered to be chronic conditions? --                  User Key  (r) = Recorded By, (t) = Taken By, (c) = Cosigned By      Initials Name Provider Type    SHANKAR Avendano DO Physician                                  Medical Decision Making  Per chart review, hx of breast abscess/mastitis requiring evaluation by surgical oncology (ultrasound performed) with final diagnosis of chronic mastitis that resolved with keflex. States this feels similar. Met SIRS criteria on arrival, suspected infection of R breast. Sepsis workup initiated. Breast exam consistent with mastitis- large area of erythema, warmth, induration. IV antibiotics initiated. No evidence of end organ damage or severe sepsis. Will admit for IV antibiotics.     All imaging and/or lab testing discussed with patient. Patient and/or family members verbalizes understanding and agrees with plan for admission. Patient is stable for admission.     Portions of the record may have been created with voice recognition software. Occasional wrong word or \"sound a like\" substitutions may have occurred due to the inherent limitations of voice recognition software. Read the chart carefully and recognize, using context, " where substitutions have occurred.            Amount and/or Complexity of Data Reviewed  Labs: ordered.    Risk  OTC drugs.  Prescription drug management.  Decision regarding hospitalization.             Disposition  Final diagnoses:   Mastitis   Sepsis (HCC)     Time reflects when diagnosis was documented in both MDM as applicable and the Disposition within this note       Time User Action Codes Description Comment    4/9/2024  5:42 PM Laine Chang [N61.0] Mastitis     4/9/2024  5:50 PM Laine Chang [A41.9] Sepsis (HCC)           ED Disposition       ED Disposition   Admit    Condition   Stable    Date/Time   Tue Apr 9, 2024  5:50 PM    Comment   Case was discussed with Dr. Austin and the patient's admission status was agreed to be Admission Status: inpatient status to the service of Dr. Austin .               Follow-up Information    None         Patient's Medications   Discharge Prescriptions    No medications on file       No discharge procedures on file.    PDMP Review       None            ED Provider  Electronically Signed by             Laine Chang PA-C  04/09/24 2811

## 2024-04-09 NOTE — ASSESSMENT & PLAN NOTE
"Lab Results   Component Value Date    HGBA1C 9.7 (H) 11/28/2022       No results for input(s): \"POCGLU\" in the last 72 hours.    Blood Sugar Average: Last 72 hrs:    Not well controlled   Patient was discharged on Lantus 30 units daily and Humalog 6 units t.i.d. With meals  She was supposed to follow with endocrinology outpatient  Place on [facility] type II diet    "

## 2024-04-09 NOTE — H&P
"[facility]  H&P  Name: Janine Stafford 43 y.o. female I MRN: [id number]  Unit/Bed#: 7T [facility] 702-01 I Date of Admission: 4/9/2024   Date of Service: 4/10/2024 I Hospital Day: 1      Assessment/Plan   Moderate asthma  Assessment & Plan  Not in acute exacerbation  Continue home albuterol, duonebs    Mastitis  Assessment & Plan  See above treatment plan in sepsis    Type 2 diabetes mellitus, without long-term current use of insulin ([facility])  Assessment & Plan  Lab Results   Component Value Date    HGBA1C 9.7 (H) 11/28/2022       No results for input(s): \"POCGLU\" in the last 72 hours.    Blood Sugar Average: Last 72 hrs:    Not well controlled   Patient was discharged on Lantus 30 units daily and Humalog 6 units t.i.d. With meals  She was supposed to follow with endocrinology outpatient  Place on [facility] type II diet      Hypertension  Assessment & Plan  Continue amlodipine      Morbid obesity with BMI of 40.0-44.9, adult ([facility])  Assessment & Plan  Counseled on diet and lifestyle modification    * Sepsis ([facility])  Assessment & Plan  Presents to ED with redness and pain of R breast  SIRS met: SIRS: 2/4; tachycardia, and WBC  Source of infection: mastitis  Received IV ceftriaxone  Ordered Blood cultures     Lab Results   Component Value Date    WBC 12.72 (H) 04/09/2024    PROCALCITONI <0.05 09/30/2018    LACTICACID 1.5 04/09/2024       IVF given in total of 1L bolus in ED  Continue IVF  Cultures   Blood cultures pending    IV antibiotics; IV ceftriaxone initially given in the ED; will continue IV ceftriaxone; follow up with cultures and deescalate as appropriate          VTE Prophylaxis: Enoxaparin (Lovenox)  / sequential compression device   Code Status: Full Code  POLST: There is no POLST form on file for this patient (pre-hospital)  Discussion with family: No family member at bedside    Anticipated Length of Stay:  Patient will be admitted on an Inpatient basis with an anticipated length of stay of  " more than 2 midnights.   Justification for Hospital Stay: sepsis due to mastitis    Total Time for Visit, including Counseling / Coordination of Care: 45 minutes.  Greater than 50% of this total time spent on direct patient counseling and coordination of care.    Chief Complaint:   R breast pain and swelling    History of Present Illness:    Janine Stafford is a 43 y.o. female with PMH of IDDM, HTN, HLD, moderate asthma who presents with right breast pain along with redness and swelling for few days.  Patient had similar episode 2 years ago when she was seen by surgical oncology.  She was told that it was possibly infection or clogged duct.  It was associated with fever and chills at home.  Patient also felt right breast induration which has been progressively worse.  She denies any drainage, nipple discharges or other skin changes except redness.    Review of Systems:    Review of Systems Patient was seen and examined at bedside. The patient has right breast pain, redness, swelling, subjective fever and chills and denies any chest pain, palpitation, shortness of breath, N/V, abd pain.      Past Medical and Surgical History:     Past Medical History:   Diagnosis Date    Asthma     Dental abscess     Diabetes mellitus (HCC)     High cholesterol     Hypertension        Past Surgical History:   Procedure Laterality Date    CHOLECYSTECTOMY      ECTOPIC PREGNANCY SURGERY      INCISION AND DRAINAGE INTRA ORAL ABSCESS Left 3/21/2016    Procedure: INCISION AND DRAINAGE  (I&D) WOUND ORAL Left  Space;  Surgeon: Henok Parrish MD;  Location: [facility];  Service:     MULTIPLE TOOTH EXTRACTIONS N/A 3/21/2016    Procedure: EXTRACTION TEETH MULTIPLE; 17,18,19,11,12,4,5,28,32;  Surgeon: Henok Parrish MD;  Location: [facility];  Service:     SALPINGECTOMY         Meds/Allergies:    Prior to Admission medications    Medication Sig Start Date End Date Taking? Authorizing Provider   acetaminophen (TYLENOL) 650  mg CR tablet Take 1 tablet (650 mg total) by mouth every 8 (eight) hours as needed for mild pain 10/26/23   Romina Frias MD   albuterol (2.5 mg/3 mL) 0.083 % nebulizer solution Take 3 mL (2.5 mg total) by nebulization every 6 (six) hours as needed for wheezing or shortness of breath 10/11/21   Josef Hill DO   albuterol (PROVENTIL HFA,VENTOLIN HFA) 90 mcg/act inhaler Inhale 2 puffs every 6 (six) hours as needed for wheezing or shortness of breath 10/11/21   Josef Hill DO   amLODIPine (NORVASC) 10 mg tablet Take 1 tablet (10 mg total) by mouth daily 10/11/21   Josef Hill DO   atorvastatin (LIPITOR) 20 mg tablet Take 20 mg by mouth daily 8/3/22   Historical Provider, MD   cetirizine (ZyrTEC) 10 mg tablet Take 10 mg by mouth daily 5/13/22 5/13/23  Historical Provider, MD   chlorhexidine (PERIDEX) 0.12 % solution Apply 15 mL to the mouth or throat 2 (two) times a day 10/26/23   Romina Frias MD   glipiZIDE-metFORMIN (METAGLIP) 2.5-500 MG per tablet Take 2 tablets by mouth 2 (two) times a day 9/23/22 12/22/22  Historical Provider, MD   insulin lispro (HumaLOG KwikPen) 100 units/mL injection pen Inject 6 Units under the skin 3 (three) times a day with meals 12/1/22   Tio Garcia MD   Insulin Pen Needle (BD Pen Needle Matilda 2nd Gen) 32G X 4 MM MISC For use with insulin pen. Pharmacy may dispense brand covered by insurance. 12/1/22   Tio Garcia MD   ipratropium-albuterol (DUO-NEB) 0.5-2.5 mg/3 mL nebulizer solution INHALE THE CONTENTS OF 1 VIAL VIA NEBULIZER 4 (FOUR) TIMES A DAY AS NEEDED FOR WHEEZING. 6/9/22   Historical Provider, MD   naproxen (Naprosyn) 500 mg tablet Take 1 tablet (500 mg total) by mouth 2 (two) times a day with meals 10/26/23   Romina Frias MD     I have reviewed home medications with patient personally.    Allergies:   No Known Allergies      Social History:     Marital Status: /Civil Union   Patient Pre-hospital Living Situation: Lives at home  Patient Pre-hospital  "Level of Mobility: Independent  Patient Pre-hospital Diet Restrictions: No restrictions    Social History     Substance and Sexual Activity   Alcohol Use Yes    Comment: Social     Social History     Tobacco Use   Smoking Status Every Day    Current packs/day: 0.50    Average packs/day: 0.5 packs/day for 10.0 years (5.0 ttl pk-yrs)    Types: Cigarettes   Smokeless Tobacco Never   Tobacco Comments    PT was educated on smoking and the effect is has on her asthma.     Social History     Substance and Sexual Activity   Drug Use No       Family History:    Family History   Problem Relation Age of Onset    Diabetes Mother     Heart failure Mother     Cancer Neg Hx        Physical Exam:     Vitals:   Blood Pressure: 115/72 (04/09/24 2309)  Pulse: 96 (04/09/24 2309)  Temperature: 99.9 °F (37.7 °C) (04/10/24 0055)  Temp Source: Temporal (04/10/24 0055)  Respirations: 19 (04/10/24 0055)  Height: 5' 10\" (177.8 cm) (04/09/24 1944)  Weight - Scale: 132 kg (290 lb) (04/09/24 1944)  SpO2: 96 % (04/09/24 2309)    Physical Exam  Vitals and nursing note reviewed.   Constitutional:       General: She is not in acute distress.     Appearance: Normal appearance. She is obese.   HENT:      Head: Normocephalic and atraumatic.      Right Ear: Tympanic membrane normal.      Left Ear: Tympanic membrane normal.      Ears:      Comments: As per nursing exam given remote location     Nose: Nose normal.      Comments: As per nursing exam given remote location     Mouth/Throat:      Mouth: Mucous membranes are moist.      Pharynx: Oropharynx is clear. No posterior oropharyngeal erythema.      Comments: As per nursing exam given remote location  Eyes:      Extraocular Movements: Extraocular movements intact.      Conjunctiva/sclera: Conjunctivae normal.      Pupils: Pupils are equal, round, and reactive to light.      Comments: As per nursing exam given remote location   Neck:      Comments: As per nursing exam given remote " location  Cardiovascular:      Rate and Rhythm: Normal rate and regular rhythm.      Pulses: Normal pulses.      Heart sounds: Normal heart sounds. No murmur heard.     Comments: As per nursing exam given remote location  Pulmonary:      Effort: Pulmonary effort is normal. No respiratory distress.      Breath sounds: Normal breath sounds. No rhonchi or rales.      Comments: As per nursing exam given remote location  Abdominal:      General: Bowel sounds are normal.      Palpations: Abdomen is soft.      Tenderness: There is no abdominal tenderness.      Comments: As per nursing exam given remote location   Musculoskeletal:      Cervical back: Normal range of motion and neck supple. No muscular tenderness.      Right lower leg: No edema.      Left lower leg: No edema.      Comments: As per nursing exam given remote location   Skin:     General: Skin is warm and dry.      Capillary Refill: Capillary refill takes less than 2 seconds.      Findings: Erythema present.      Comments: As per nursing exam given remote location   Neurological:      General: No focal deficit present.      Mental Status: She is alert and oriented to person, place, and time.         General: no acute distress  HEENT: NC/AT, PERRL, EOM - normal  Neck: Supple  Pulm/Chest: Normal chest wall expansion, clear breath sounds on both side  Breast tissue: redness, swelling, tenderness appreciated  CVS: normal S1&S2, capillary refill <2s  Abd: soft, non tender, non distended, bowel sounds +  MSK: move all 4 extremities spontaneously  Skin: warm  CNS: no acute focal neuro deficit      Additional Data:     Lab Results: I have personally reviewed pertinent reports.      Results from last 7 days   Lab Units 04/09/24  1656   WBC Thousand/uL 12.72*   HEMOGLOBIN g/dL 12.3   HEMATOCRIT % 36.2   PLATELETS Thousands/uL 405*   SEGS PCT % 78*   LYMPHO PCT % 15   MONO PCT % 5   EOS PCT % 1     Results from last 7 days   Lab Units 04/09/24  1656   SODIUM mmol/L 136    POTASSIUM mmol/L 3.5   CHLORIDE mmol/L 96   CO2 mmol/L 29   BUN mg/dL 6   CREATININE mg/dL 0.81   ANION GAP mmol/L 11   CALCIUM mg/dL 9.4   ALBUMIN g/dL 3.9   TOTAL BILIRUBIN mg/dL 0.72   ALK PHOS U/L 82   ALT U/L 15   AST U/L 17   GLUCOSE RANDOM mg/dL 253*     Results from last 7 days   Lab Units 04/09/24  1656   INR  1.04     Results from last 7 days   Lab Units 04/09/24 2011 04/09/24  1857   POC GLUCOSE mg/dl 257* 223*         Results from last 7 days   Lab Units 04/09/24  1656   LACTIC ACID mmol/L 1.5       Imaging: I have personally reviewed pertinent reports.      No orders to display       EKG, Pathology, and Other Studies Reviewed on Admission:   EKG: Sinus tachycardia    Allscripts / Epic Records Reviewed: Yes     ** Please Note: This note has been constructed using a voice recognition system. **

## 2024-04-09 NOTE — ASSESSMENT & PLAN NOTE
Presents to ED with redness and pain of R breast  SIRS met: SIRS: 2/4; tachycardia, and WBC  Source of infection: mastitis  Received IV ceftriaxone  Ordered Blood cultures     Lab Results   Component Value Date    WBC 12.72 (H) 04/09/2024    PROCALCITONI <0.05 09/30/2018    LACTICACID 1.5 04/09/2024       IVF given in total of 1L bolus in ED  Continue IVF  Cultures   Blood cultures pending    IV antibiotics; IV ceftriaxone initially given in the ED; will continue IV ceftriaxone; follow up with cultures and deescalate as appropriate

## 2024-04-09 NOTE — ED ATTENDING ATTESTATION
"4/9/2024  I, Kt Avendano DO, saw and evaluated the patient. I have discussed the patient with the resident/non-physician practitioner and agree with the resident's/non-physician practitioner's findings, Plan of Care, and MDM as documented in the resident's/non-physician practitioner's note, except where noted. All available labs and Radiology studies were reviewed.  I was present for key portions of any procedure(s) performed by the resident/non-physician practitioner and I was immediately available to provide assistance.       At this point I agree with the current assessment done in the Emergency Department.  I have conducted an independent evaluation of this patient a history and physical is as follows:    43-year-old female presents via EMS for evaluation of several days of swelling/distention and increasing pain throughout her right breast similar to prior episode of non-lactating mastitis for which she was treated with antibiotics and analgesics. She denies trauma. She states that the pain feels like \"lightning bolts\" shooting throughout her right breast.  No report of other, associated systemic symptoms.    No recent travel or similar sick contacts.    ROS: Denies f/c, HA, LH/dizziness, CP, SOB, abdominal pain, n/v/d or dysuria. 12 system ROS o/w negative.    PE: NAD, though appears uncomfortable, alert; PERRL, EOMI; MMM, no posterior oropharyngeal exudate, edema or erythema; HRR, no murmur, monitor shows sinus tachycardia at 110 bpm; lungs CTA w/o w/r/r, POx 99% on RA (nl), borderline tachypnea; abdomen s/nt/nd, nl BS in all 4 quadrant, obese; (-) LE edema or calf TTP, FROM extremities x4; skin on right breast above and around the nipple is erythematous, swollen and TTP, no discharge from the nipple, skin is otherwise p/w/d; CNs GI/NF, oriented.    MDM/DDx: Right breast pain - acute non-lactating mastitis, at risk for abscess, sepsis, doubt hidradenitis suppurativa.     I independently reviewed and " interpreted ordered labs and EKG from this encounter.    A/P: Will check septic work up, treat symptoms, reevaluate for further work up and disposition.    ED Course         Critical Care Time  CriticalCare Time    Date/Time: 4/9/2024 5:33 PM    Performed by: Kt Avendano DO  Authorized by: Kt Avendano DO    Critical care provider statement:     Critical care time (minutes):  30    Critical care time was exclusive of:  Separately billable procedures and treating other patients and teaching time    Critical care was necessary to treat or prevent imminent or life-threatening deterioration of the following conditions:  Sepsis    Critical care was time spent personally by me on the following activities:  Obtaining history from patient or surrogate, development of treatment plan with patient or surrogate, discussions with consultants, evaluation of patient's response to treatment, examination of patient, ordering and performing treatments and interventions, ordering and review of laboratory studies, ordering and review of radiographic studies, re-evaluation of patient's condition and review of old charts    I assumed direction of critical care for this patient from another provider in my specialty: no

## 2024-04-09 NOTE — LETTER
[address] MEDICAL SURGICAL UNIT  [address] [facility] [id number] [phone]  Dept: [phone]    April 15, 2024     Patient: Janine Stafford   YOB: 1980   Date of Visit: 4/9/2024       To Whom it May Concern:    Janine Stafford is under my professional care. She was seen in the hospital from 4/9/2024 and remains hospitalized for the next 24-48 hours. She will receive discharge instructions and further paperwork regarding when she may return to work and her limitations.    If you have any questions or concerns, please don't hesitate to call.         Sincerely,          Elicia Falcon, DO

## 2024-04-10 PROBLEM — E87.6 HYPOKALEMIA: Status: ACTIVE | Noted: 2024-04-10

## 2024-04-10 PROBLEM — J45.20 MILD INTERMITTENT ASTHMA WITHOUT COMPLICATION: Status: ACTIVE | Noted: 2022-04-13

## 2024-04-10 PROBLEM — N60.11 MASTITIS CHRONIC, RIGHT: Status: ACTIVE | Noted: 2017-10-19

## 2024-04-10 LAB
ANION GAP SERPL CALCULATED.3IONS-SCNC: 9 MMOL/L (ref 4–13)
BASOPHILS # BLD AUTO: 0.04 THOUSANDS/ÂΜL (ref 0–0.1)
BASOPHILS NFR BLD AUTO: 0 % (ref 0–1)
BUN SERPL-MCNC: 7 MG/DL (ref 5–25)
CALCIUM SERPL-MCNC: 8.5 MG/DL (ref 8.4–10.2)
CHLORIDE SERPL-SCNC: 98 MMOL/L (ref 96–108)
CO2 SERPL-SCNC: 28 MMOL/L (ref 21–32)
CREAT SERPL-MCNC: 0.66 MG/DL (ref 0.6–1.3)
EOSINOPHIL # BLD AUTO: 0.09 THOUSAND/ÂΜL (ref 0–0.61)
EOSINOPHIL NFR BLD AUTO: 1 % (ref 0–6)
ERYTHROCYTE [DISTWIDTH] IN BLOOD BY AUTOMATED COUNT: 12.9 % (ref 11.6–15.1)
GFR SERPL CREATININE-BSD FRML MDRD: 107 ML/MIN/1.73SQ M
GLUCOSE SERPL-MCNC: 216 MG/DL (ref 65–140)
GLUCOSE SERPL-MCNC: 226 MG/DL (ref 65–140)
GLUCOSE SERPL-MCNC: 249 MG/DL (ref 65–140)
GLUCOSE SERPL-MCNC: 253 MG/DL (ref 65–140)
GLUCOSE SERPL-MCNC: 311 MG/DL (ref 65–140)
HCT VFR BLD AUTO: 33.5 % (ref 34.8–46.1)
HGB BLD-MCNC: 11.2 G/DL (ref 11.5–15.4)
IMM GRANULOCYTES # BLD AUTO: 0.12 THOUSAND/UL (ref 0–0.2)
IMM GRANULOCYTES NFR BLD AUTO: 1 % (ref 0–2)
LYMPHOCYTES # BLD AUTO: 1.27 THOUSANDS/ÂΜL (ref 0.6–4.47)
LYMPHOCYTES NFR BLD AUTO: 11 % (ref 14–44)
MCH RBC QN AUTO: 31.5 PG (ref 26.8–34.3)
MCHC RBC AUTO-ENTMCNC: 33.4 G/DL (ref 31.4–37.4)
MCV RBC AUTO: 94 FL (ref 82–98)
MONOCYTES # BLD AUTO: 0.66 THOUSAND/ÂΜL (ref 0.17–1.22)
MONOCYTES NFR BLD AUTO: 6 % (ref 4–12)
NEUTROPHILS # BLD AUTO: 8.97 THOUSANDS/ÂΜL (ref 1.85–7.62)
NEUTS SEG NFR BLD AUTO: 81 % (ref 43–75)
NRBC BLD AUTO-RTO: 0 /100 WBCS
PLATELET # BLD AUTO: 358 THOUSANDS/UL (ref 149–390)
PMV BLD AUTO: 10 FL (ref 8.9–12.7)
POTASSIUM SERPL-SCNC: 3.2 MMOL/L (ref 3.5–5.3)
RBC # BLD AUTO: 3.56 MILLION/UL (ref 3.81–5.12)
SODIUM SERPL-SCNC: 135 MMOL/L (ref 135–147)
WBC # BLD AUTO: 11.15 THOUSAND/UL (ref 4.31–10.16)

## 2024-04-10 PROCEDURE — 82948 REAGENT STRIP/BLOOD GLUCOSE: CPT

## 2024-04-10 PROCEDURE — 99232 SBSQ HOSP IP/OBS MODERATE 35: CPT | Performed by: INTERNAL MEDICINE

## 2024-04-10 PROCEDURE — 80048 BASIC METABOLIC PNL TOTAL CA: CPT | Performed by: INTERNAL MEDICINE

## 2024-04-10 PROCEDURE — 85025 COMPLETE CBC W/AUTO DIFF WBC: CPT | Performed by: INTERNAL MEDICINE

## 2024-04-10 RX ORDER — KETOROLAC TROMETHAMINE 30 MG/ML
15 INJECTION, SOLUTION INTRAMUSCULAR; INTRAVENOUS EVERY 6 HOURS SCHEDULED
Status: DISPENSED | OUTPATIENT
Start: 2024-04-10 | End: 2024-04-13

## 2024-04-10 RX ORDER — OXYCODONE HCL 10 MG/1
10 TABLET, FILM COATED, EXTENDED RELEASE ORAL EVERY 12 HOURS SCHEDULED
Status: DISCONTINUED | OUTPATIENT
Start: 2024-04-10 | End: 2024-04-16

## 2024-04-10 RX ORDER — INSULIN LISPRO 100 [IU]/ML
9 INJECTION, SOLUTION INTRAVENOUS; SUBCUTANEOUS
Status: DISCONTINUED | OUTPATIENT
Start: 2024-04-10 | End: 2024-04-11

## 2024-04-10 RX ORDER — INSULIN GLARGINE 100 [IU]/ML
35 INJECTION, SOLUTION SUBCUTANEOUS
Status: DISCONTINUED | OUTPATIENT
Start: 2024-04-10 | End: 2024-04-11

## 2024-04-10 RX ORDER — POTASSIUM CHLORIDE 20 MEQ/1
40 TABLET, EXTENDED RELEASE ORAL ONCE
Status: COMPLETED | OUTPATIENT
Start: 2024-04-10 | End: 2024-04-10

## 2024-04-10 RX ADMIN — OXYCODONE HYDROCHLORIDE 5 MG: 5 TABLET ORAL at 11:37

## 2024-04-10 RX ADMIN — MORPHINE SULFATE 2 MG: 2 INJECTION, SOLUTION INTRAMUSCULAR; INTRAVENOUS at 04:31

## 2024-04-10 RX ADMIN — ACETAMINOPHEN 650 MG: 325 TABLET ORAL at 16:28

## 2024-04-10 RX ADMIN — INSULIN LISPRO 9 UNITS: 100 INJECTION, SOLUTION INTRAVENOUS; SUBCUTANEOUS at 16:23

## 2024-04-10 RX ADMIN — INSULIN GLARGINE 35 UNITS: 100 INJECTION, SOLUTION SUBCUTANEOUS at 21:15

## 2024-04-10 RX ADMIN — POTASSIUM CHLORIDE 40 MEQ: 1500 TABLET, EXTENDED RELEASE ORAL at 09:39

## 2024-04-10 RX ADMIN — ACETAMINOPHEN 650 MG: 325 TABLET ORAL at 00:33

## 2024-04-10 RX ADMIN — KETOROLAC TROMETHAMINE 15 MG: 30 INJECTION, SOLUTION INTRAMUSCULAR; INTRAVENOUS at 02:19

## 2024-04-10 RX ADMIN — KETOROLAC TROMETHAMINE 15 MG: 30 INJECTION, SOLUTION INTRAMUSCULAR; INTRAVENOUS at 18:07

## 2024-04-10 RX ADMIN — ACETAMINOPHEN 650 MG: 325 TABLET ORAL at 08:21

## 2024-04-10 RX ADMIN — KETOROLAC TROMETHAMINE 15 MG: 30 INJECTION, SOLUTION INTRAMUSCULAR; INTRAVENOUS at 08:15

## 2024-04-10 RX ADMIN — MORPHINE SULFATE 2 MG: 2 INJECTION, SOLUTION INTRAMUSCULAR; INTRAVENOUS at 17:23

## 2024-04-10 RX ADMIN — INSULIN LISPRO 6 UNITS: 100 INJECTION, SOLUTION INTRAVENOUS; SUBCUTANEOUS at 08:18

## 2024-04-10 RX ADMIN — SODIUM CHLORIDE, SODIUM GLUCONATE, SODIUM ACETATE, POTASSIUM CHLORIDE, MAGNESIUM CHLORIDE, SODIUM PHOSPHATE, DIBASIC, AND POTASSIUM PHOSPHATE 125 ML/HR: .53; .5; .37; .037; .03; .012; .00082 INJECTION, SOLUTION INTRAVENOUS at 11:31

## 2024-04-10 RX ADMIN — AMLODIPINE BESYLATE 10 MG: 10 TABLET ORAL at 08:26

## 2024-04-10 RX ADMIN — INSULIN LISPRO 10 UNITS: 100 INJECTION, SOLUTION INTRAVENOUS; SUBCUTANEOUS at 11:39

## 2024-04-10 RX ADMIN — SODIUM CHLORIDE, SODIUM GLUCONATE, SODIUM ACETATE, POTASSIUM CHLORIDE, MAGNESIUM CHLORIDE, SODIUM PHOSPHATE, DIBASIC, AND POTASSIUM PHOSPHATE 125 ML/HR: .53; .5; .37; .037; .03; .012; .00082 INJECTION, SOLUTION INTRAVENOUS at 17:26

## 2024-04-10 RX ADMIN — KETOROLAC TROMETHAMINE 15 MG: 30 INJECTION, SOLUTION INTRAMUSCULAR; INTRAVENOUS at 11:37

## 2024-04-10 RX ADMIN — MORPHINE SULFATE 2 MG: 2 INJECTION, SOLUTION INTRAMUSCULAR; INTRAVENOUS at 09:18

## 2024-04-10 RX ADMIN — INSULIN LISPRO 4 UNITS: 100 INJECTION, SOLUTION INTRAVENOUS; SUBCUTANEOUS at 08:18

## 2024-04-10 RX ADMIN — ENOXAPARIN SODIUM 40 MG: 40 INJECTION SUBCUTANEOUS at 08:19

## 2024-04-10 RX ADMIN — INSULIN LISPRO 9 UNITS: 100 INJECTION, SOLUTION INTRAVENOUS; SUBCUTANEOUS at 11:39

## 2024-04-10 RX ADMIN — KETOROLAC TROMETHAMINE 15 MG: 30 INJECTION, SOLUTION INTRAMUSCULAR; INTRAVENOUS at 23:31

## 2024-04-10 RX ADMIN — OXYCODONE HYDROCHLORIDE 5 MG: 5 TABLET ORAL at 18:07

## 2024-04-10 RX ADMIN — MORPHINE SULFATE 2 MG: 2 INJECTION, SOLUTION INTRAMUSCULAR; INTRAVENOUS at 23:39

## 2024-04-10 RX ADMIN — CEFTRIAXONE 2000 MG: 2 INJECTION, SOLUTION INTRAVENOUS at 17:27

## 2024-04-10 RX ADMIN — OXYCODONE HYDROCHLORIDE 10 MG: 10 TABLET, FILM COATED, EXTENDED RELEASE ORAL at 21:15

## 2024-04-10 RX ADMIN — OXYCODONE HYDROCHLORIDE 10 MG: 10 TABLET, FILM COATED, EXTENDED RELEASE ORAL at 09:39

## 2024-04-10 RX ADMIN — INSULIN LISPRO 4 UNITS: 100 INJECTION, SOLUTION INTRAVENOUS; SUBCUTANEOUS at 16:23

## 2024-04-10 RX ADMIN — ATORVASTATIN CALCIUM 20 MG: 20 TABLET, FILM COATED ORAL at 08:26

## 2024-04-10 RX ADMIN — SODIUM CHLORIDE, SODIUM GLUCONATE, SODIUM ACETATE, POTASSIUM CHLORIDE, MAGNESIUM CHLORIDE, SODIUM PHOSPHATE, DIBASIC, AND POTASSIUM PHOSPHATE 125 ML/HR: .53; .5; .37; .037; .03; .012; .00082 INJECTION, SOLUTION INTRAVENOUS at 04:18

## 2024-04-10 RX ADMIN — OXYCODONE HYDROCHLORIDE 5 MG: 5 TABLET ORAL at 04:18

## 2024-04-10 RX ADMIN — MORPHINE SULFATE 2 MG: 2 INJECTION, SOLUTION INTRAMUSCULAR; INTRAVENOUS at 13:32

## 2024-04-10 NOTE — ASSESSMENT & PLAN NOTE
Order right breast ultrasound to rule out abscess, however in-house technician does not specialize in breast ultrasound   Will have to call the radiologist at [phone] for further instructions   see above treatment plan in sepsis

## 2024-04-10 NOTE — RESPIRATORY THERAPY NOTE
RT Protocol Note  Janine Stafford 43 y.o. female MRN: [id number]  Unit/Bed#: 7T [facility] 702-01 Encounter: [id number]    Assessment    Principal Problem:    Sepsis (HCC)  Active Problems:    Morbid obesity with BMI of 40.0-44.9, adult ([facility])    Hypertension    Type 2 diabetes mellitus, without long-term current use of insulin (HCC)    Mastitis    Moderate asthma    Hypokalemia      Home Pulmonary Medications:  yes   04/10/24 1930   Respiratory Protocol   Protocol Initiated? Yes   Protocol Selection Respiratory   Language Barrier? No   Medical & Social History Reviewed? Yes   Diagnostic Studies Reviewed? Yes   Physical Assessment Performed? Yes   Respiratory Plan Home Bronchodilator Patient pathway   Respiratory Assessment   Assessment Type Assess only   General Appearance Alert;Awake   Respiratory Pattern Normal   Chest Assessment Chest expansion symmetrical   Bilateral Breath Sounds Clear;Diminished   Resp Comments Assessment of Respiratory protocol. Pt not here for respiratory issues. Has a hx of Asthma. Takes nebulizers and inhalers at home prn for wheezing. BS clear and diminsih. Pt on R/A 94%.  Will continue per home regiment            Past Medical History:   Diagnosis Date    Asthma     Dental abscess     Diabetes mellitus ([facility])     High cholesterol     Hypertension      Social History     Socioeconomic History    Marital status: /Civil Union     Spouse name: None    Number of children: None    Years of education: None    Highest education level: None   Occupational History    None   Tobacco Use    Smoking status: Every Day     Current packs/day: 0.50     Average packs/day: 0.5 packs/day for 10.0 years (5.0 ttl pk-yrs)     Types: Cigarettes    Smokeless tobacco: Never    Tobacco comments:     PT was educated on smoking and the effect is has on her asthma.   Substance and Sexual Activity    Alcohol use: Yes     Comment: Social    Drug use: No    Sexual activity: None   Other Topics Concern    None   Social  "History Narrative    None     Social Determinants of Health     Financial Resource Strain: Not on file   Food Insecurity: No Food Insecurity (4/10/2024)    Hunger Vital Sign     Worried About Running Out of Food in the Last Year: Never true     Ran Out of Food in the Last Year: Never true   Transportation Needs: No Transportation Needs (4/10/2024)    PRAPARE - Transportation     Lack of Transportation (Medical): No     Lack of Transportation (Non-Medical): No   Physical Activity: Not on file   Stress: Not on file   Social Connections: Not on file   Intimate Partner Violence: Not on file   Housing Stability: Low Risk  (4/10/2024)    Housing Stability Vital Sign     Unable to Pay for Housing in the Last Year: No     Number of Places Lived in the Last Year: 1     Unstable Housing in the Last Year: No       Subjective         Objective    Physical Exam:   Assessment Type: (P) Assess only  General Appearance: (P) Alert, Awake  Respiratory Pattern: (P) Normal  Chest Assessment: (P) Chest expansion symmetrical  Bilateral Breath Sounds: (P) Clear, Diminished    Vitals:  Blood pressure 142/86, pulse 98, temperature 99.3 °F (37.4 °C), temperature source Temporal, resp. rate 18, height 5' 10\" (1.778 m), weight 132 kg (290 lb), SpO2 94%.          Imaging and other studies:           Plan    Respiratory Plan: (P) Home Bronchodilator Patient pathway        Resp Comments: (P) Assessment of Respiratory protocol. Pt not here for respiratory issues. Has a hx of Asthma. Takes nebulizers and inhalers at home prn for wheezing. BS clear and diminsih. Pt on R/A 94%.  Will continue per home regiment   "

## 2024-04-10 NOTE — PLAN OF CARE
Problem: METABOLIC, FLUID AND ELECTROLYTES - ADULT  Goal: Glucose maintained within target range  Description: INTERVENTIONS:  - Monitor Blood Glucose as ordered  - Assess for signs and symptoms of hyperglycemia and hypoglycemia  - Administer ordered medications to maintain glucose within target range  - Assess nutritional intake and initiate nutrition service referral as needed  Outcome: Progressing     Problem: PAIN - ADULT  Goal: Verbalizes/displays adequate comfort level or baseline comfort level  Description: Interventions:  - Encourage patient to monitor pain and request assistance  - Assess pain using appropriate pain scale  - Administer analgesics based on type and severity of pain and evaluate response  - Implement non-pharmacological measures as appropriate and evaluate response  - Consider cultural and social influences on pain and pain management  - Notify physician/advanced practitioner if interventions unsuccessful or patient reports new pain  Outcome: Progressing     Problem: INFECTION - ADULT  Goal: Absence or prevention of progression during hospitalization  Description: INTERVENTIONS:  - Assess and monitor for signs and symptoms of infection  - Monitor lab/diagnostic results  - Monitor all insertion sites, i.e. indwelling lines, tubes, and drains  - Monitor endotracheal if appropriate and nasal secretions for changes in amount and color  - [place] appropriate cooling/warming therapies per order  - Administer medications as ordered  - Instruct and encourage patient and family to use good hand hygiene technique  - Identify and instruct in appropriate isolation precautions for identified infection/condition  Outcome: Progressing     Problem: Knowledge Deficit  Goal: Patient/family/caregiver demonstrates understanding of disease process, treatment plan, medications, and discharge instructions  Description: Complete learning assessment and assess knowledge base.  Interventions:  - Provide teaching at  level of understanding  - Provide teaching via preferred learning methods  Outcome: Progressing

## 2024-04-10 NOTE — PROGRESS NOTES
[facility]  Progress Note  Name: Janine Stafford I  MRN: [id number]  Unit/Bed#: 7T [facility] 702-01 I Date of Admission: 4/9/2024   Date of Service: 4/10/2024  Hospital Day: 1    Assessment/Plan   * Sepsis (HCC)  Assessment & Plan  Presents to ED with redness and pain of R breast  SIRS met: SIRS: 2/4; tachycardia, and WBC  Source of infection: mastitis  Received IV ceftriaxone    Lab Results   Component Value Date    WBC 11.15 (H) 04/10/2024    PROCALCITONI <0.05 09/30/2018    LACTICACID 1.5 04/09/2024       IVF given in total of 1L bolus in ED  Continue IVF  Cultures   Blood cultures pending    IV antibiotics; IV ceftriaxone initially given in the ED; will continue IV ceftriaxone; follow up with cultures and deescalate as appropriate     Hypokalemia  Assessment & Plan  Potassium 3.2  Will give p.o. potassium 40 mEq once    Moderate asthma  Assessment & Plan  Not in acute exacerbation  Continue home albuterol, duonebs    Mastitis  Assessment & Plan  Order right breast ultrasound to rule out abscess, however in-house technician does not specialize in breast ultrasound   Will have to call the radiologist at [phone] for further instructions   see above treatment plan in sepsis    Type 2 diabetes mellitus, without long-term current use of insulin ([facility])  Assessment & Plan  Lab Results   Component Value Date    HGBA1C 9.7 (H) 11/28/2022       Recent Labs     04/09/24  1857 04/09/24  2011 04/10/24  0538   POCGLU 223* 257* 226*       Blood Sugar Average: Last 72 hrs:  (P) 235.197872831998  Not well controlled   Patient was on Lantus 30 units daily and Humalog 6 units t.i.d. With meals  Will increase Lantus to 35 units nightly and Humalog to 9 units 3 times daily AC.  She was supposed to follow with endocrinology outpatient  Place on [facility] type II diet      Hypertension  Assessment & Plan  Continue amlodipine      Morbid obesity with BMI of 40.0-44.9, adult ([facility])  Assessment & Plan  Counseled  on diet and lifestyle modification           VTE Pharmacologic Prophylaxis:   Pharmacologic: Enoxaparin (Lovenox)  Mechanical VTE Prophylaxis in Place: Yes    Patient Centered Rounds: I have performed bedside rounds with nursing staff today.    Discussions with Specialists or Other Care Team Provider: Discussed with , RN    Education and Discussions with Family / Patient: Discussed with patient    Time Spent for Care:  35 minutes .  This time was spent on one or more of the following: performing physical exam; counseling and coordination of care; obtaining or reviewing history; documenting in the medical record; reviewing/ordering tests, medications or procedures; communicating with other healthcare professionals and discussing with patient's family/caregivers.    Current Length of Stay: 1 day(s)    Current Patient Status: Inpatient   Certification Statement: The patient will continue to require additional inpatient hospital stay due to sepsis due to mastitis    Discharge Plan / Estimated Discharge Date: In 48 to 72 hours      Code Status: Level 1 - Full Code      Subjective:   Patient was seen and examined at bedside. The patient has significant right breast pain, swelling along with redness.  She denies  chest pain, palpitation, shortness of breath, N/V, abd pain.      Objective:     Vitals:   Temp (24hrs), Av.1 °F (37.3 °C), Min:97.6 °F (36.4 °C), Max:100.3 °F (37.9 °C)    Temp:  [97.6 °F (36.4 °C)-100.3 °F (37.9 °C)] 99.4 °F (37.4 °C)  HR:  [] 104  Resp:  [18-24] 18  BP: (115-144)/(68-81) 122/81  SpO2:  [96 %-99 %] 97 %  Body mass index is 41.61 kg/m².     Input and Output Summary (last 24 hours):       Intake/Output Summary (Last 24 hours) at 4/10/2024 0933  Last data filed at 4/10/2024 0838  Gross per 24 hour   Intake 2755 ml   Output --   Net 2755 ml       Physical Exam:     Physical Exam  General: no acute distress  HEENT: NC/AT, PERRL, EOM - normal  Neck: Supple  Pulm/Chest: Normal  chest wall expansion, clear breath sounds on both side, right breast redness, swelling, induration and tenderness  CVS: normal S1&S2, capillary refill <2s  Abd: soft, non tender, non distended, bowel sounds +  MSK: move all 4 extremities spontaneously  Skin: warm  CNS: no acute focal neuro deficit      Additional Data:     Labs:    Results from last 7 days   Lab Units 04/10/24  0436   WBC Thousand/uL 11.15*   HEMOGLOBIN g/dL 11.2*   HEMATOCRIT % 33.5*   PLATELETS Thousands/uL 358   SEGS PCT % 81*   LYMPHO PCT % 11*   MONO PCT % 6   EOS PCT % 1     Results from last 7 days   Lab Units 04/10/24  0436 04/09/24  1656   POTASSIUM mmol/L 3.2* 3.5   CHLORIDE mmol/L 98 96   CO2 mmol/L 28 29   BUN mg/dL 7 6   CREATININE mg/dL 0.66 0.81   CALCIUM mg/dL 8.5 9.4   ALK PHOS U/L  --  82   ALT U/L  --  15   AST U/L  --  17     Results from last 7 days   Lab Units 04/09/24  1656   INR  1.04       * I Have Reviewed All Lab Data Listed Above.  * Additional Pertinent Lab Tests Reviewed: All Labs For Current Hospital Admission Reviewed    Imaging:      I have reviewed pertinent imaging.      Recent Cultures (last 7 days):     Results from last 7 days   Lab Units 04/09/24  1712 04/09/24  1657   BLOOD CULTURE  Received in Microbiology Lab. Culture in Progress. Received in Microbiology Lab. Culture in Progress.       Last 24 Hours Medication List:   Current Facility-Administered Medications   Medication Dose Route Frequency Provider Last Rate    acetaminophen  650 mg Oral Q6H PRN Daly Austin MD      albuterol  2 puff Inhalation Q6H PRN Daly Austin MD      albuterol  2.5 mg Nebulization Q6H PRN Daly Austin MD      aluminum-magnesium hydroxide-simethicone  30 mL Oral Q6H PRN Daly Austin MD      amLODIPine  10 mg Oral Daily Daly Austin MD      atorvastatin  20 mg Oral Daily Daly Austin MD      cefTRIAXone  2,000 mg Intravenous Q24H Daly Austin MD      enoxaparin  40 mg Subcutaneous Daily Daly Austin MD      insulin glargine  35 Units Subcutaneous HS  Daly Austin MD      insulin lispro  2-12 Units Subcutaneous TID AC Daly Austin MD      insulin lispro  9 Units Subcutaneous TID With Meals Daly Austin MD      ipratropium-albuterol  3 mL Nebulization Q6H PRN Daly Austin MD      ketorolac  15 mg Intravenous Q6H [facility] Daly Austin MD      morphine injection  2 mg Intravenous Q4H PRN Beto Gutierrez PA-C      multi-electrolyte  125 mL/hr Intravenous Continuous Daly Austin  mL/hr (04/10/24 0418)    nicotine  1 patch Transdermal Daily Daly Austin MD      ondansetron  4 mg Intravenous Q6H PRN Daly Austin MD      oxyCODONE  10 mg Oral Q12H ADAL Daly Austin MD      oxyCODONE  5 mg Oral Q6H PRN Malu Smith PA-C      polyethylene glycol  17 g Oral Daily PRN Daly Austin MD      potassium chloride  40 mEq Oral Once Daly Austin MD          Today, Patient Was Seen By: Daly Austin MD    ** Please Note: Dragon 360 Dictation voice to text software may have been used in the creation of this document. **

## 2024-04-10 NOTE — CASE MANAGEMENT
Case Management Assessment & Discharge Planning Note    Patient name Janine Stafford  Location 7T U 702/7T [facility] 702-01 MRN [id number]  : 1980 Date 4/10/2024       Current Admission Date: 2024  Current Admission Diagnosis:Sepsis ([facility])   Patient Active Problem List    Diagnosis Date Noted    Hypokalemia 04/10/2024    Sepsis (HCC) 2024    Mastitis 2024    Moderate asthma 2024    Type 2 diabetes mellitus, without long-term current use of insulin ([facility]) 2022    High cholesterol 10/19/2022    Mild intermittent asthma without complication 2022    Hypertension     Morbid obesity with BMI of 40.0-44.9, adult ([facility]) 10/02/2018    Lactic acid acidosis 10/02/2018    Acute respiratory failure with hypoxia ([facility]) 10/01/2018    Other headache syndrome 10/01/2018    Tobacco abuse 10/01/2018    Mastitis chronic, right 10/19/2017    Asthma exacerbation 2016    Dental abscess 2016    Obesity affecting pregnancy, antepartum 2014      LOS (days): 1  Geometric Mean LOS (GMLOS) (days):   Days to GMLOS:     OBJECTIVE:    Risk of Unplanned Readmission Score: 8.29       Current admission status: Inpatient     Preferred Pharmacy:   [facility] [address] [address]  Phone: [phone] Fax: [phone]    Primary Care Provider: Melvi Javier MD    Primary Insurance: [facility]  Secondary Insurance:     ASSESSMENT:  Active Health Care Proxies       Bobby Stafford Health Care Representative - Son   Primary Phone: [phone] (Mobile)                 Advance Directives  Does patient have a Health Care POA?: No  Was patient offered paperwork?: Yes  Does patient currently have a Health Care decision maker?: Yes, please see Health Care Proxy section  Does patient have Advance Directives?: No  Was patient offered paperwork?: Yes  Primary Contact: Bobby Stafford (son) [phone]     Readmission Root Cause  30 Day  Readmission: No    Patient Information  Admitted from:: Home  Mental Status: Alert  During Assessment patient was accompanied by: Not accompanied during assessment  Assessment information provided by:: Patient  Support Systems: Self, Son  County of Residence: [place]  What city do you live in?: [place]  Home entry access options. Select all that apply.: Stairs  Number of steps to enter home.: One Flight  Do the steps have railings?: Yes  Type of Current Residence: 3 story home  Upon entering residence, is there a bedroom on the main floor (no further steps)?: No  A bedroom is located on the following floor levels of residence (select all that apply):: 2nd Floor  Upon entering residence, is there a bathroom on the main floor (no further steps)?: Yes  Number of steps to 2nd floor from main floor: One Flight  Living Arrangements: Lives w/ Son, Other (Comment) (children)  Is patient a ?: No    Activities of Daily Living Prior to Admission  Functional Status: Independent  Completes ADLs independently?: Yes  Ambulates independently?: Yes  Does patient use assisted devices?: No  Does patient currently own DME?: No  Does patient have a history of Outpatient Therapy (PT/OT)?: No  Does the patient have a history of Short-Term Rehab?: No  Does patient have a history of HHC?: No  Does patient currently have HHC?: No     Patient Information Continued  Income Source: Unemployed  Does patient have prescription coverage?: Yes  Does patient receive dialysis treatments?: No  Does patient have a history of substance abuse?: No  Does patient have a history of Mental Health Diagnosis?: No    PHQ 2/9 Screening   Reviewed PHQ 2/9 Depression Screening Score?: No    Means of Transportation  Means of Transport to Appts:: Drives Self    Social Determinants of Health (SDOH)      Flowsheet Row Most Recent Value   Housing Stability    In the last 12 months, was there a time when you were not able to pay the mortgage or rent on time? N    In the last 12 months, how many places have you lived? 1   In the last 12 months, was there a time when you did not have a steady place to sleep or slept in a shelter (including now)? N   Transportation Needs    In the past 12 months, has lack of transportation kept you from medical appointments or from getting medications? no   In the past 12 months, has lack of transportation kept you from meetings, work, or from getting things needed for daily living? No   Food Insecurity    Within the past 12 months, you worried that your food would run out before you got the money to buy more. Never true   Within the past 12 months, the food you bought just didn't last and you didn't have money to get more. Never true   Utilities    In the past 12 months has the electric, gas, oil, or water company threatened to shut off services in your home? No          DISCHARGE DETAILS:    Discharge planning discussed with:: patient      CM contacted family/caregiver?: No- see comments       Contacts  Patient Contacts: Ladonna Stafford (son) [phone]  Relationship to Patient:: Family  Contact Method: Phone  Phone Number: [phone]  Reason/Outcome: Discharge Planning    Requested Home Health Care         Is the patient interested in HHC at discharge?: No    DME Referral Provided  Referral made for DME?: No    Other Referral/Resources/Interventions Provided:  Interventions: Advanced Directives    Would you like to participate in our [facility] service program?  : No - Declined    Treatment Team Recommendation: Home  Discharge Destination Plan:: Home      Additional Comments: CM met with the patient to complete CM intake/needs assessment.  Patient is awaiting ultrasound of the right breast and is receiving IV antibiotics.  Discharge pending ultrasound and course of IV antibiotics.  CM will follow through discharge.

## 2024-04-10 NOTE — ASSESSMENT & PLAN NOTE
Lab Results   Component Value Date    HGBA1C 9.7 (H) 11/28/2022       Recent Labs     04/09/24  1857 04/09/24  2011 04/10/24  0538   POCGLU 223* 257* 226*       Blood Sugar Average: Last 72 hrs:  (P) 235.0887508978720236  Not well controlled   Patient was on Lantus 30 units daily and Humalog 6 units t.i.d. With meals  Will increase Lantus to 35 units nightly and Humalog to 9 units 3 times daily AC.  She was supposed to follow with endocrinology outpatient  Place on [facility] type II diet

## 2024-04-10 NOTE — UTILIZATION REVIEW
Initial Clinical Review    Admission: Date/Time/Statement:   Admission Orders (From admission, onward)       Ordered        04/09/24 1751  INPATIENT ADMISSION  Once            04/09/24 1751  Inpatient Admission  Once                          Orders Placed This Encounter   Procedures    INPATIENT ADMISSION     Standing Status:   Standing     Number of Occurrences:   1     Order Specific Question:   Level of Care     Answer:   Med Surg [16]     Order Specific Question:   Estimated length of stay     Answer:   More than 2 Midnights     Order Specific Question:   Certification     Answer:   I certify that inpatient services are medically necessary for this patient for a duration of greater than two midnights. See H&P and MD Progress Notes for additional information about the patient's course of treatment.    Inpatient Admission     Standing Status:   Standing     Number of Occurrences:   1     Order Specific Question:   Level of Care     Answer:   Med Surg [16]     Order Specific Question:   Estimated length of stay     Answer:   More than 2 Midnights     Order Specific Question:   Certification     Answer:   I certify that inpatient services are medically necessary for this patient for a duration of greater than two midnights. See H&P and MD Progress Notes for additional information about the patient's course of treatment.     ED Arrival Information       Expected   -    Arrival   4/9/2024 16:29    Acuity   Urgent              Means of arrival   Ambulance    Escorted by   [facility] ([facility])    Service   Hospitalist    Admission type   Emergency              Arrival complaint   breast pain             Chief Complaint   Patient presents with    Breast Pain     R breast pain/ swelling. States this has happened about 2 years ago. Redness to R breast.        Initial Presentation: 43 y.o. female with PMH of IDDM, HTN, HLD, moderate asthma who presents with right breast pain along with redness and swelling for  few days. Patient had similar episode 2 years ago when she was seen by surgical oncology. She was told that it was possibly infection or clogged duct. It was associated with fever and chills at home. Patient also felt right breast induration which has been progressively worse. She denies any drainage, nipple discharges or other skin changes except redness. Plan: Inpatient admission for evaluation and treatment of sepsis, mastitis, DM, HTN, morbid obesity: IV fluids, blood cultures pending, IV ceftriaxone, Carb controlled diet, continue home insulin regimen, continue amlodipine.     Date: 4/10   Day 2:     Internal medicine: continue IV ceftriaxone, IV fluids, follow cultures, replete potassium and monitor, ordered right breast ultrasound to rule out abscess, however in-house technician does not specialize in breast ultrasound- will call radiologist for further instructions, increase Lantus from 30 to 35 units nightly and Humalog from 6 to 9 units tid, continue amlodipine.     ED Triage Vitals   Temperature Pulse Respirations Blood Pressure SpO2   04/09/24 1644 04/09/24 1630 04/09/24 1630 04/09/24 1630 04/09/24 1630   100.3 °F (37.9 °C) (!) 133 (!) 24 144/68 99 %      Temp Source Heart Rate Source Patient Position - Orthostatic VS BP Location FiO2 (%)   04/09/24 1644 04/09/24 1630 04/09/24 1630 04/09/24 1630 --   Oral Monitor Sitting Left arm       Pain Score       04/09/24 1630       10 - Worst Possible Pain          Wt Readings from Last 1 Encounters:   04/09/24 132 kg (290 lb)     Additional Vital Signs:     Date/Time Temp Pulse Resp BP MAP (mmHg) SpO2 O2 Device   04/10/24 0706 99.4 °F (37.4 °C) 104 18 122/81 90 97 % None (Room air)   04/10/24 0055 99.9 °F (37.7 °C) -- 19 -- -- -- --   04/09/24 2309 98.4 °F (36.9 °C) 96 18 115/72 77 96 % None (Room air)   04/09/24 1944 97.6 °F (36.4 °C) 86 18 124/74 86 96 % None (Room air)   04/09/24 1824 -- 100 18 -- -- 99 % None (Room air)   04/09/24 1718 -- 106 Abnormal  20 --  -- 99 % None (Room air)   04/09/24 1644 100.3 °F (37.9 °C) -- -- -- -- -- --     Pertinent Labs/Diagnostic Test Results:   No orders to display         Results from last 7 days   Lab Units 04/10/24  0436 04/09/24  1656   WBC Thousand/uL 11.15* 12.72*   HEMOGLOBIN g/dL 11.2* 12.3   HEMATOCRIT % 33.5* 36.2   PLATELETS Thousands/uL 358 405*   TOTAL NEUT ABS Thousands/µL 8.97* 9.88*         Results from last 7 days   Lab Units 04/10/24  0436 04/09/24  1656   SODIUM mmol/L 135 136   POTASSIUM mmol/L 3.2* 3.5   CHLORIDE mmol/L 98 96   CO2 mmol/L 28 29   ANION GAP mmol/L 9 11   BUN mg/dL 7 6   CREATININE mg/dL 0.66 0.81   EGFR ml/min/1.73sq m 107 87   CALCIUM mg/dL 8.5 9.4     Results from last 7 days   Lab Units 04/09/24  1656   AST U/L 17   ALT U/L 15   ALK PHOS U/L 82   TOTAL PROTEIN g/dL 7.9   ALBUMIN g/dL 3.9   TOTAL BILIRUBIN mg/dL 0.72     Results from last 7 days   Lab Units 04/10/24  1056 04/10/24  0538 04/09/24 2011 04/09/24  1857   POC GLUCOSE mg/dl 311* 226* 257* 223*     Results from last 7 days   Lab Units 04/10/24  0436 04/09/24  1656   GLUCOSE RANDOM mg/dL 249* 253*           Results from last 7 days   Lab Units 04/09/24  1656   PROTIME seconds 13.9   INR  1.04   PTT seconds 29             Results from last 7 days   Lab Units 04/09/24  1656   LACTIC ACID mmol/L 1.5           Results from last 7 days   Lab Units 04/09/24  1712 04/09/24  1657   BLOOD CULTURE  Received in Microbiology Lab. Culture in Progress. Received in Microbiology Lab. Culture in Progress.         ED Treatment:   Medication Administration from 04/09/2024 1629 to 04/09/2024 1936         Date/Time Order Dose Route Action     04/09/2024 1705 EDT acetaminophen (TYLENOL) tablet 975 mg 975 mg Oral Given     04/09/2024 1706 EDT ketorolac (TORADOL) injection 15 mg 15 mg Intravenous Given     04/09/2024 1714 EDT cefTRIAXone (ROCEPHIN) IVPB (premix in dextrose) 2,000 mg 50 mL 2,000 mg Intravenous New Bag     04/09/2024 1705 EDT sodium chloride 0.9  % bolus 1,000 mL 1,000 mL Intravenous New Bag     04/09/2024 1716 EDT morphine injection 4 mg 4 mg Intravenous Given     04/09/2024 1830 EDT multi-electrolyte (PLASMALYTE-A/ISOLYTE-S PH 7.4) IV solution 125 mL/hr Intravenous New Bag          Past Medical History:   Diagnosis Date    Asthma     Dental abscess     Diabetes mellitus (HCC)     High cholesterol     Hypertension      Present on Admission:   Type 2 diabetes mellitus, without long-term current use of insulin ([facility])   Hypertension      Admitting Diagnosis: Mastitis [N61.0]  Breast pain [N64.4]  Sepsis (HCC) [A41.9]  Age/Sex: 43 y.o. female  Admission Orders:  Scheduled Medications:  amLODIPine, 10 mg, Oral, Daily  atorvastatin, 20 mg, Oral, Daily  cefTRIAXone, 2,000 mg, Intravenous, Q24H  enoxaparin, 40 mg, Subcutaneous, Daily  insulin glargine, 35 Units, Subcutaneous, HS  insulin lispro, 2-12 Units, Subcutaneous, TID AC  insulin lispro, 9 Units, Subcutaneous, TID With Meals  ketorolac, 15 mg, Intravenous, Q6H ADAL  nicotine, 1 patch, Transdermal, Daily  oxyCODONE, 10 mg, Oral, Q12H ADAL      Continuous IV Infusions:  multi-electrolyte, 125 mL/hr, Intravenous, Continuous      PRN Meds:  acetaminophen, 650 mg, Oral, Q6H PRN  albuterol, 2 puff, Inhalation, Q6H PRN  albuterol, 2.5 mg, Nebulization, Q6H PRN  aluminum-magnesium hydroxide-simethicone, 30 mL, Oral, Q6H PRN  ipratropium-albuterol, 3 mL, Nebulization, Q6H PRN  morphine injection, 2 mg, Intravenous, Q4H PRN  ondansetron, 4 mg, Intravenous, Q6H PRN  oxyCODONE, 5 mg, Oral, Q6H PRN  polyethylene glycol, 17 g, Oral, Daily PRN        None    Network Utilization Review Department  ATTENTION: Please call with any questions or concerns to [phone] and carefully listen to the prompts so that you are directed to the right person. All voicemails are confidential.   For Discharge needs, contact Care Management DC Support Team at [phone] opt. [phone]  Send all requests for admission clinical reviews, approved or  denied determinations and any other requests to dedicated fax number below belonging to the campus where the patient is receiving treatment. List of dedicated fax numbers for the Facilities:  FACILITY NAME UR FAX NUMBER   ADMISSION DENIALS (Administrative/Medical Necessity) [phone]   DISCHARGE SUPPORT TEAM (NETWORK) [phone]   PARENT CHILD HEALTH (Maternity/NICU/Pediatrics) [phone]   [facility] [phone]   [facility] [phone]   [facility] [phone]   [facility] [phone]   [facility] [phone]   [facility] [phone]   [facility] [phone]   [facility] [phone]   [facility] [phone]   [facility] [phone]   [facility] [phone]   [facility] [phone]

## 2024-04-10 NOTE — ASSESSMENT & PLAN NOTE
Presents to ED with redness and pain of R breast  SIRS met: SIRS: 2/4; tachycardia, and WBC  Source of infection: mastitis  Received IV ceftriaxone    Lab Results   Component Value Date    WBC 11.15 (H) 04/10/2024    PROCALCITONI <0.05 09/30/2018    LACTICACID 1.5 04/09/2024       IVF given in total of 1L bolus in ED  Continue IVF  Cultures   Blood cultures pending    IV antibiotics; IV ceftriaxone initially given in the ED; will continue IV ceftriaxone; follow up with cultures and deescalate as appropriate

## 2024-04-10 NOTE — PLAN OF CARE
Problem: PAIN - ADULT  Goal: Verbalizes/displays adequate comfort level or baseline comfort level  Description: Interventions:  - Encourage patient to monitor pain and request assistance  - Assess pain using appropriate pain scale  - Administer analgesics based on type and severity of pain and evaluate response  - Implement non-pharmacological measures as appropriate and evaluate response  - Consider cultural and social influences on pain and pain management  - Notify physician/advanced practitioner if interventions unsuccessful or patient reports new pain  Outcome: Progressing     Problem: INFECTION - ADULT  Goal: Absence or prevention of progression during hospitalization  Description: INTERVENTIONS:  - Assess and monitor for signs and symptoms of infection  - Monitor lab/diagnostic results  - Monitor all insertion sites, i.e. indwelling lines, tubes, and drains  - Monitor endotracheal if appropriate and nasal secretions for changes in amount and color  - [place] appropriate cooling/warming therapies per order  - Administer medications as ordered  - Instruct and encourage patient and family to use good hand hygiene technique  - Identify and instruct in appropriate isolation precautions for identified infection/condition  Outcome: Progressing     Problem: METABOLIC, FLUID AND ELECTROLYTES - ADULT  Goal: Glucose maintained within target range  Description: INTERVENTIONS:  - Monitor Blood Glucose as ordered  - Assess for signs and symptoms of hyperglycemia and hypoglycemia  - Administer ordered medications to maintain glucose within target range  - Assess nutritional intake and initiate nutrition service referral as needed  Outcome: Progressing

## 2024-04-11 ENCOUNTER — ANESTHESIA EVENT (INPATIENT)
Dept: PERIOP | Facility: HOSPITAL | Age: 44
End: 2024-04-11
Payer: COMMERCIAL

## 2024-04-11 LAB
ANION GAP SERPL CALCULATED.3IONS-SCNC: 9 MMOL/L (ref 4–13)
ATRIAL RATE: 112 BPM
BASOPHILS # BLD AUTO: 0.04 THOUSANDS/ÂΜL (ref 0–0.1)
BASOPHILS NFR BLD AUTO: 0 % (ref 0–1)
BUN SERPL-MCNC: 6 MG/DL (ref 5–25)
CALCIUM SERPL-MCNC: 8.5 MG/DL (ref 8.4–10.2)
CHLORIDE SERPL-SCNC: 98 MMOL/L (ref 96–108)
CO2 SERPL-SCNC: 27 MMOL/L (ref 21–32)
CREAT SERPL-MCNC: 0.61 MG/DL (ref 0.6–1.3)
EOSINOPHIL # BLD AUTO: 0.18 THOUSAND/ÂΜL (ref 0–0.61)
EOSINOPHIL NFR BLD AUTO: 2 % (ref 0–6)
ERYTHROCYTE [DISTWIDTH] IN BLOOD BY AUTOMATED COUNT: 13 % (ref 11.6–15.1)
GFR SERPL CREATININE-BSD FRML MDRD: 109 ML/MIN/1.73SQ M
GLUCOSE SERPL-MCNC: 116 MG/DL (ref 65–140)
GLUCOSE SERPL-MCNC: 211 MG/DL (ref 65–140)
GLUCOSE SERPL-MCNC: 211 MG/DL (ref 65–140)
GLUCOSE SERPL-MCNC: 213 MG/DL (ref 65–140)
GLUCOSE SERPL-MCNC: 214 MG/DL (ref 65–140)
HCT VFR BLD AUTO: 32.3 % (ref 34.8–46.1)
HGB BLD-MCNC: 10.6 G/DL (ref 11.5–15.4)
IMM GRANULOCYTES # BLD AUTO: 0.14 THOUSAND/UL (ref 0–0.2)
IMM GRANULOCYTES NFR BLD AUTO: 1 % (ref 0–2)
LYMPHOCYTES # BLD AUTO: 1.82 THOUSANDS/ÂΜL (ref 0.6–4.47)
LYMPHOCYTES NFR BLD AUTO: 16 % (ref 14–44)
MCH RBC QN AUTO: 31 PG (ref 26.8–34.3)
MCHC RBC AUTO-ENTMCNC: 32.8 G/DL (ref 31.4–37.4)
MCV RBC AUTO: 94 FL (ref 82–98)
MONOCYTES # BLD AUTO: 0.63 THOUSAND/ÂΜL (ref 0.17–1.22)
MONOCYTES NFR BLD AUTO: 6 % (ref 4–12)
NEUTROPHILS # BLD AUTO: 8.62 THOUSANDS/ÂΜL (ref 1.85–7.62)
NEUTS SEG NFR BLD AUTO: 75 % (ref 43–75)
NRBC BLD AUTO-RTO: 0 /100 WBCS
P AXIS: 62 DEGREES
PLATELET # BLD AUTO: 361 THOUSANDS/UL (ref 149–390)
PMV BLD AUTO: 10.1 FL (ref 8.9–12.7)
POTASSIUM SERPL-SCNC: 3.5 MMOL/L (ref 3.5–5.3)
PR INTERVAL: 126 MS
QRS AXIS: 15 DEGREES
QRSD INTERVAL: 94 MS
QT INTERVAL: 346 MS
QTC INTERVAL: 472 MS
RBC # BLD AUTO: 3.42 MILLION/UL (ref 3.81–5.12)
SODIUM SERPL-SCNC: 134 MMOL/L (ref 135–147)
T WAVE AXIS: 61 DEGREES
VENTRICULAR RATE: 112 BPM
WBC # BLD AUTO: 11.43 THOUSAND/UL (ref 4.31–10.16)

## 2024-04-11 PROCEDURE — 80048 BASIC METABOLIC PNL TOTAL CA: CPT | Performed by: INTERNAL MEDICINE

## 2024-04-11 PROCEDURE — 93010 ELECTROCARDIOGRAM REPORT: CPT | Performed by: INTERNAL MEDICINE

## 2024-04-11 PROCEDURE — 99255 IP/OBS CONSLTJ NEW/EST HI 80: CPT | Performed by: INTERNAL MEDICINE

## 2024-04-11 PROCEDURE — 99254 IP/OBS CNSLTJ NEW/EST MOD 60: CPT | Performed by: SPECIALIST

## 2024-04-11 PROCEDURE — 87205 SMEAR GRAM STAIN: CPT | Performed by: INTERNAL MEDICINE

## 2024-04-11 PROCEDURE — 82948 REAGENT STRIP/BLOOD GLUCOSE: CPT

## 2024-04-11 PROCEDURE — 99232 SBSQ HOSP IP/OBS MODERATE 35: CPT | Performed by: INTERNAL MEDICINE

## 2024-04-11 PROCEDURE — 85025 COMPLETE CBC W/AUTO DIFF WBC: CPT | Performed by: INTERNAL MEDICINE

## 2024-04-11 PROCEDURE — 87070 CULTURE OTHR SPECIMN AEROBIC: CPT | Performed by: INTERNAL MEDICINE

## 2024-04-11 RX ORDER — INSULIN GLARGINE 100 [IU]/ML
40 INJECTION, SOLUTION SUBCUTANEOUS
Status: DISCONTINUED | OUTPATIENT
Start: 2024-04-11 | End: 2024-04-18 | Stop reason: HOSPADM

## 2024-04-11 RX ORDER — INSULIN LISPRO 100 [IU]/ML
2-12 INJECTION, SOLUTION INTRAVENOUS; SUBCUTANEOUS
Status: DISCONTINUED | OUTPATIENT
Start: 2024-04-11 | End: 2024-04-18 | Stop reason: HOSPADM

## 2024-04-11 RX ORDER — INSULIN LISPRO 100 [IU]/ML
12 INJECTION, SOLUTION INTRAVENOUS; SUBCUTANEOUS
Status: DISCONTINUED | OUTPATIENT
Start: 2024-04-11 | End: 2024-04-18 | Stop reason: HOSPADM

## 2024-04-11 RX ORDER — METRONIDAZOLE 500 MG/100ML
500 INJECTION, SOLUTION INTRAVENOUS EVERY 8 HOURS
Status: DISCONTINUED | OUTPATIENT
Start: 2024-04-11 | End: 2024-04-11

## 2024-04-11 RX ADMIN — ENOXAPARIN SODIUM 40 MG: 40 INJECTION SUBCUTANEOUS at 08:27

## 2024-04-11 RX ADMIN — INSULIN LISPRO 12 UNITS: 100 INJECTION, SOLUTION INTRAVENOUS; SUBCUTANEOUS at 15:56

## 2024-04-11 RX ADMIN — KETOROLAC TROMETHAMINE 15 MG: 30 INJECTION, SOLUTION INTRAMUSCULAR; INTRAVENOUS at 17:04

## 2024-04-11 RX ADMIN — AMLODIPINE BESYLATE 10 MG: 10 TABLET ORAL at 08:27

## 2024-04-11 RX ADMIN — OXYCODONE HYDROCHLORIDE 10 MG: 10 TABLET, FILM COATED, EXTENDED RELEASE ORAL at 20:50

## 2024-04-11 RX ADMIN — INSULIN LISPRO 12 UNITS: 100 INJECTION, SOLUTION INTRAVENOUS; SUBCUTANEOUS at 11:30

## 2024-04-11 RX ADMIN — VANCOMYCIN HYDROCHLORIDE 1500 MG: 1 INJECTION, POWDER, LYOPHILIZED, FOR SOLUTION INTRAVENOUS at 10:38

## 2024-04-11 RX ADMIN — SODIUM CHLORIDE, SODIUM GLUCONATE, SODIUM ACETATE, POTASSIUM CHLORIDE, MAGNESIUM CHLORIDE, SODIUM PHOSPHATE, DIBASIC, AND POTASSIUM PHOSPHATE 125 ML/HR: .53; .5; .37; .037; .03; .012; .00082 INJECTION, SOLUTION INTRAVENOUS at 08:31

## 2024-04-11 RX ADMIN — INSULIN LISPRO 4 UNITS: 100 INJECTION, SOLUTION INTRAVENOUS; SUBCUTANEOUS at 11:30

## 2024-04-11 RX ADMIN — SODIUM CHLORIDE, SODIUM GLUCONATE, SODIUM ACETATE, POTASSIUM CHLORIDE, MAGNESIUM CHLORIDE, SODIUM PHOSPHATE, DIBASIC, AND POTASSIUM PHOSPHATE 125 ML/HR: .53; .5; .37; .037; .03; .012; .00082 INJECTION, SOLUTION INTRAVENOUS at 01:16

## 2024-04-11 RX ADMIN — INSULIN LISPRO 9 UNITS: 100 INJECTION, SOLUTION INTRAVENOUS; SUBCUTANEOUS at 08:28

## 2024-04-11 RX ADMIN — OXYCODONE HYDROCHLORIDE 5 MG: 5 TABLET ORAL at 19:47

## 2024-04-11 RX ADMIN — ACETAMINOPHEN 650 MG: 325 TABLET ORAL at 08:27

## 2024-04-11 RX ADMIN — KETOROLAC TROMETHAMINE 15 MG: 30 INJECTION, SOLUTION INTRAMUSCULAR; INTRAVENOUS at 23:47

## 2024-04-11 RX ADMIN — CEFTRIAXONE 2000 MG: 2 INJECTION, SOLUTION INTRAVENOUS at 17:03

## 2024-04-11 RX ADMIN — ATORVASTATIN CALCIUM 20 MG: 20 TABLET, FILM COATED ORAL at 08:27

## 2024-04-11 RX ADMIN — OXYCODONE HYDROCHLORIDE 10 MG: 10 TABLET, FILM COATED, EXTENDED RELEASE ORAL at 08:27

## 2024-04-11 RX ADMIN — INSULIN LISPRO 4 UNITS: 100 INJECTION, SOLUTION INTRAVENOUS; SUBCUTANEOUS at 22:06

## 2024-04-11 RX ADMIN — KETOROLAC TROMETHAMINE 15 MG: 30 INJECTION, SOLUTION INTRAMUSCULAR; INTRAVENOUS at 05:20

## 2024-04-11 RX ADMIN — INSULIN LISPRO 4 UNITS: 100 INJECTION, SOLUTION INTRAVENOUS; SUBCUTANEOUS at 08:28

## 2024-04-11 RX ADMIN — INSULIN GLARGINE 40 UNITS: 100 INJECTION, SOLUTION SUBCUTANEOUS at 21:16

## 2024-04-11 RX ADMIN — MORPHINE SULFATE 2 MG: 2 INJECTION, SOLUTION INTRAMUSCULAR; INTRAVENOUS at 14:52

## 2024-04-11 RX ADMIN — ACETAMINOPHEN 650 MG: 325 TABLET ORAL at 20:50

## 2024-04-11 RX ADMIN — SODIUM CHLORIDE, SODIUM GLUCONATE, SODIUM ACETATE, POTASSIUM CHLORIDE, MAGNESIUM CHLORIDE, SODIUM PHOSPHATE, DIBASIC, AND POTASSIUM PHOSPHATE 125 ML/HR: .53; .5; .37; .037; .03; .012; .00082 INJECTION, SOLUTION INTRAVENOUS at 20:44

## 2024-04-11 RX ADMIN — OXYCODONE HYDROCHLORIDE 5 MG: 5 TABLET ORAL at 10:04

## 2024-04-11 RX ADMIN — METRONIDAZOLE 500 MG: 500 INJECTION, SOLUTION INTRAVENOUS at 10:03

## 2024-04-11 RX ADMIN — KETOROLAC TROMETHAMINE 15 MG: 30 INJECTION, SOLUTION INTRAMUSCULAR; INTRAVENOUS at 11:29

## 2024-04-11 RX ADMIN — OXYCODONE HYDROCHLORIDE 5 MG: 5 TABLET ORAL at 05:19

## 2024-04-11 RX ADMIN — VANCOMYCIN HYDROCHLORIDE 1500 MG: 1 INJECTION, POWDER, LYOPHILIZED, FOR SOLUTION INTRAVENOUS at 21:16

## 2024-04-11 NOTE — ASSESSMENT & PLAN NOTE
Order right breast ultrasound to rule out abscess, however in-house technician does not specialize in breast ultrasound   Discussed with Dr. Bradley Kocher -recommended to get general surgery consult if there is a concern of breast abscess before getting right breast ultrasound.    Diagnostic exams can be done on Tuesdays and Thursdays at [facility] with dedicated breast sonographers if necessary.  Still figuring out whether the right breast ultrasound can be done on 7T or will need to go to breast center.    Mammogram has to be done as an outpatient.

## 2024-04-11 NOTE — ASSESSMENT & PLAN NOTE
Presents to ED with redness and pain of R breast  SIRS met: SIRS: 2/4; tachycardia, and WBC  Source of infection: mastitis    Lab Results   Component Value Date    WBC 11.43 (H) 04/11/2024    PROCALCITONI <0.05 09/30/2018    LACTICACID 1.5 04/09/2024       IVF given in total of 1L bolus in ED  Continue IVF  Cultures   Blood cultures -no growth at 24 hours    IV antibiotics; IV ceftriaxone initially given in the ED  Initially was on IV ceftriaxone, added vancomycin and Flagyl  Wound culture and Gram stain sent  ID consult placed appreciate recommendations.

## 2024-04-11 NOTE — CONSULTS
Chief Complaint: Recurrent right breast abscess      History of Present Illness: Patient is a 43-year-old black female diabetic who presented to the emergency room 2 days ago with what appeared to be right breast mastitis.  She had redness, pain and edema of the right breast.  She apparently had a similar situation before in 2017 and was treated at [facility] primarily with antibiotics.  On presentation to [facility] she met sepsis criteria and and had blood cultures drawn.  She was started on IV antibiotics empirically with IV ceftriaxone.  An ultrasound of the breast was planned but there was some issues in obtaining this..Consultation was obtained with general surgery.    Patient denies any family history of carcinoma the breast.  She had mammograms in the past that were essentially within normal limits.      Past Medical History:   Past Medical History:   Diagnosis Date    Asthma     Dental abscess     Diabetes mellitus (HCC)     High cholesterol     Hypertension          Past Surgical History:    Past Surgical History:   Procedure Laterality Date    CHOLECYSTECTOMY      ECTOPIC PREGNANCY SURGERY      INCISION AND DRAINAGE INTRA ORAL ABSCESS Left 3/21/2016    Procedure: INCISION AND DRAINAGE  (I&D) WOUND ORAL Left  Space;  Surgeon: Henok Parrish MD;  Location: [facility];  Service:     MULTIPLE TOOTH EXTRACTIONS N/A 3/21/2016    Procedure: EXTRACTION TEETH MULTIPLE; 17,18,19,11,12,4,5,28,32;  Surgeon: Henok Parrish MD;  Location: [facility];  Service:     SALPINGECTOMY           Allergies:  No Known Allergies      Medications:    Current Facility-Administered Medications:     acetaminophen (TYLENOL) tablet 650 mg, 650 mg, Oral, Q6H PRN, Daly Austin MD, 650 mg at 04/11/24 0827    albuterol (PROVENTIL HFA,VENTOLIN HFA) inhaler 2 puff, 2 puff, Inhalation, Q6H PRN, Daly Austin MD    albuterol inhalation solution 2.5 mg, 2.5 mg, Nebulization, Q6H PRN, Daly  MD Norman    aluminum-magnesium hydroxide-simethicone (MAALOX) oral suspension 30 mL, 30 mL, Oral, Q6H PRN, Daly Austin MD    amLODIPine (NORVASC) tablet 10 mg, 10 mg, Oral, Daily, Daly Austin MD, 10 mg at 04/11/24 0827    atorvastatin (LIPITOR) tablet 20 mg, 20 mg, Oral, Daily, Daly Austin MD, 20 mg at 04/11/24 0827    cefTRIAXone (ROCEPHIN) IVPB (premix in dextrose) 2,000 mg 50 mL, 2,000 mg, Intravenous, Q24H, Daly Austin MD, Last Rate: 100 mL/hr at 04/10/24 1727, 2,000 mg at 04/10/24 1727    enoxaparin (LOVENOX) subcutaneous injection 40 mg, 40 mg, Subcutaneous, Daily, Daly Austin MD, 40 mg at 04/11/24 0827    insulin glargine (LANTUS) subcutaneous injection 40 Units 0.4 mL, 40 Units, Subcutaneous, HS, Daly Austin MD    insulin lispro (HumALOG/ADMELOG) 100 units/mL subcutaneous injection 12 Units, 12 Units, Subcutaneous, TID With Meals, Daly Austin MD, 12 Units at 04/11/24 1130    insulin lispro (HumALOG/ADMELOG) 100 units/mL subcutaneous injection 2-12 Units, 2-12 Units, Subcutaneous, TID AC, 4 Units at 04/11/24 1130 **AND** Fingerstick Glucose (POCT), , , TID AC, Daly Austin MD    ketorolac (TORADOL) injection 15 mg, 15 mg, Intravenous, Q6H ADAL, Daly Austin MD, 15 mg at 04/11/24 1129    morphine injection 2 mg, 2 mg, Intravenous, Q4H PRN, Beto Gutierrez PA-C, 2 mg at 04/10/24 2339    multi-electrolyte (PLASMALYTE-A/ISOLYTE-S PH 7.4) IV solution, 125 mL/hr, Intravenous, Continuous, Daly Austin MD, Last Rate: 125 mL/hr at 04/11/24 0831, 125 mL/hr at 04/11/24 0831    ondansetron (ZOFRAN) injection 4 mg, 4 mg, Intravenous, Q6H PRN, Daly Austin MD    oxyCODONE (OxyCONTIN) 12 hr tablet 10 mg, 10 mg, Oral, Q12H ADAL, Daly Austin MD, 10 mg at 04/11/24 0827    oxyCODONE (ROXICODONE) IR tablet 5 mg, 5 mg, Oral, Q6H PRN, Malu Smith PA-C, 5 mg at 04/11/24 1004    polyethylene glycol (MIRALAX) packet 17 g, 17 g, Oral, Daily PRN, Daly Austin MD    vancomycin (VANCOCIN) 1,500 mg in sodium chloride 0.9 % 500 mL IVPB, 15 mg/kg (Adjusted),  Intravenous, Q12H, Daly Austin MD, Last Rate: 250 mL/hr at 04/11/24 1038, 1,500 mg at 04/11/24 1038      Social History:  Social History     Social History     Substance and Sexual Activity   Alcohol Use Yes    Comment: Social     Social History     Substance and Sexual Activity   Drug Use No     Social History     Tobacco Use   Smoking Status Every Day    Current packs/day: 0.50    Average packs/day: 0.5 packs/day for 10.0 years (5.0 ttl pk-yrs)    Types: Cigarettes   Smokeless Tobacco Never   Tobacco Comments    PT was educated on smoking and the effect is has on her asthma.         Family History:    Family History   Problem Relation Age of Onset    Diabetes Mother     Heart failure Mother     Cancer Neg Hx          Review of Systems:    Please refer to the formal H&P    Vitals:  Vitals:    04/11/24 0716   BP: 114/73   Pulse: 94   Resp: 18   Temp: 98.3 °F (36.8 °C)   SpO2: 95%       Physical Exam:  Middle-aged black female 5 foot 10 inches 290 pounds who is awake and alert and some distress.    Vital signs as above    Breast: Right breast is swollen, erythematous, and draining purulent material from the nipple.      Lab Results: I have personally reviewed pertinent reports. See below.  Imaging: I have personally reviewed pertinent reports.   EKG, Pathology, and Other Studies: I have personally reviewed pertinent reports.     Admission on 04/09/2024   Component Date Value    EXT Preg Test, Ur 04/09/2024 Negative     Control 04/09/2024 Valid     WBC 04/09/2024 12.72 (H)     RBC 04/09/2024 3.91     Hemoglobin 04/09/2024 12.3     Hematocrit 04/09/2024 36.2     MCV 04/09/2024 93     MCH 04/09/2024 31.5     MCHC 04/09/2024 34.0     RDW 04/09/2024 12.8     MPV 04/09/2024 9.3     Platelets 04/09/2024 405 (H)     nRBC 04/09/2024 0     Segmented % 04/09/2024 78 (H)     Immature Grans % 04/09/2024 1     Lymphocytes % 04/09/2024 15     Monocytes % 04/09/2024 5     Eosinophils Relative 04/09/2024 1     Basophils Relative  04/09/2024 0     Absolute Neutrophils 04/09/2024 9.88 (H)     Absolute Immature Grans 04/09/2024 0.13     Absolute Lymphocytes 04/09/2024 1.88     Absolute Monocytes 04/09/2024 0.63     Eosinophils Absolute 04/09/2024 0.15     Basophils Absolute 04/09/2024 0.05     Sodium 04/09/2024 136     Potassium 04/09/2024 3.5     Chloride 04/09/2024 96     CO2 04/09/2024 29     ANION GAP 04/09/2024 11     BUN 04/09/2024 6     Creatinine 04/09/2024 0.81     Glucose 04/09/2024 253 (H)     Calcium 04/09/2024 9.4     AST 04/09/2024 17     ALT 04/09/2024 15     Alkaline Phosphatase 04/09/2024 82     Total Protein 04/09/2024 7.9     Albumin 04/09/2024 3.9     Total Bilirubin 04/09/2024 0.72     eGFR 04/09/2024 87     LACTIC ACID 04/09/2024 1.5     Protime 04/09/2024 13.9     INR 04/09/2024 1.04     PTT 04/09/2024 29     Blood Culture 04/09/2024 No Growth at 24 hrs.     Blood Culture 04/09/2024 No Growth at 24 hrs.     POC Glucose 04/09/2024 223 (H)     POC Glucose 04/09/2024 257 (H)     WBC 04/10/2024 11.15 (H)     RBC 04/10/2024 3.56 (L)     Hemoglobin 04/10/2024 11.2 (L)     Hematocrit 04/10/2024 33.5 (L)     MCV 04/10/2024 94     MCH 04/10/2024 31.5     MCHC 04/10/2024 33.4     RDW 04/10/2024 12.9     MPV 04/10/2024 10.0     Platelets 04/10/2024 358     nRBC 04/10/2024 0     Segmented % 04/10/2024 81 (H)     Immature Grans % 04/10/2024 1     Lymphocytes % 04/10/2024 11 (L)     Monocytes % 04/10/2024 6     Eosinophils Relative 04/10/2024 1     Basophils Relative 04/10/2024 0     Absolute Neutrophils 04/10/2024 8.97 (H)     Absolute Immature Grans 04/10/2024 0.12     Absolute Lymphocytes 04/10/2024 1.27     Absolute Monocytes 04/10/2024 0.66     Eosinophils Absolute 04/10/2024 0.09     Basophils Absolute 04/10/2024 0.04     Sodium 04/10/2024 135     Potassium 04/10/2024 3.2 (L)     Chloride 04/10/2024 98     CO2 04/10/2024 28     ANION GAP 04/10/2024 9     BUN 04/10/2024 7     Creatinine 04/10/2024 0.66     Glucose 04/10/2024 249  (H)     Calcium 04/10/2024 8.5     eGFR 04/10/2024 107     POC Glucose 04/10/2024 226 (H)     POC Glucose 04/10/2024 311 (H)     POC Glucose 04/10/2024 216 (H)     POC Glucose 04/10/2024 253 (H)     WBC 04/11/2024 11.43 (H)     RBC 04/11/2024 3.42 (L)     Hemoglobin 04/11/2024 10.6 (L)     Hematocrit 04/11/2024 32.3 (L)     MCV 04/11/2024 94     MCH 04/11/2024 31.0     MCHC 04/11/2024 32.8     RDW 04/11/2024 13.0     MPV 04/11/2024 10.1     Platelets 04/11/2024 361     nRBC 04/11/2024 0     Segmented % 04/11/2024 75     Immature Grans % 04/11/2024 1     Lymphocytes % 04/11/2024 16     Monocytes % 04/11/2024 6     Eosinophils Relative 04/11/2024 2     Basophils Relative 04/11/2024 0     Absolute Neutrophils 04/11/2024 8.62 (H)     Absolute Immature Grans 04/11/2024 0.14     Absolute Lymphocytes 04/11/2024 1.82     Absolute Monocytes 04/11/2024 0.63     Eosinophils Absolute 04/11/2024 0.18     Basophils Absolute 04/11/2024 0.04     Sodium 04/11/2024 134 (L)     Potassium 04/11/2024 3.5     Chloride 04/11/2024 98     CO2 04/11/2024 27     ANION GAP 04/11/2024 9     BUN 04/11/2024 6     Creatinine 04/11/2024 0.61     Glucose 04/11/2024 211 (H)     Calcium 04/11/2024 8.5     eGFR 04/11/2024 109     POC Glucose 04/11/2024 213 (H)     Ventricular Rate 04/09/2024 112     Atrial Rate 04/09/2024 112     AR Interval 04/09/2024 126     QRSD Interval 04/09/2024 94     QT Interval 04/09/2024 346     QTC Interval 04/09/2024 472     P Axis 04/09/2024 62     QRS [place] 04/09/2024 15     T Wave [place] 04/09/2024 61     POC Glucose 04/11/2024 214 (H)          Impression:  Recurrent right breast abscess.    Plan:  The plan is to I&D this in the operating room under local anesthesia with IV sedation at the earliest possible date.  Informed consent is obtained.  Examination was performed with a chaperone.

## 2024-04-11 NOTE — PROGRESS NOTES
[facility]  Progress Note  Name: Janine Stafford I  MRN: [id number]  Unit/Bed#: 7T [facility] 702-01 I Date of Admission: 4/9/2024   Date of Service: 4/11/2024 I Hospital Day: 2    Assessment/Plan   * Sepsis (HCC)  Assessment & Plan  Presents to ED with redness and pain of R breast  SIRS met: SIRS: 2/4; tachycardia, and WBC  Source of infection: mastitis    Lab Results   Component Value Date    WBC 11.43 (H) 04/11/2024    PROCALCITONI <0.05 09/30/2018    LACTICACID 1.5 04/09/2024       IVF given in total of 1L bolus in ED  Continue IVF  Cultures   Blood cultures -no growth at 24 hours    IV antibiotics; IV ceftriaxone initially given in the ED  Initially was on IV ceftriaxone, added vancomycin and Flagyl  Wound culture and Gram stain sent  ID consult placed appreciate recommendations.    Hypokalemia  Assessment & Plan  Potassium 3.5  Received p.o. potassium 40 mEq once    Mastitis  Assessment & Plan  Order right breast ultrasound to rule out abscess, however in-house technician does not specialize in breast ultrasound   Discussed with Dr. Bradley Kocher -recommended to get general surgery consult if there is a concern of breast abscess before getting right breast ultrasound.    Diagnostic exams can be done on Tuesdays and Thursdays at [place] with dedicated breast sonographers if necessary.  Still figuring out whether the right breast ultrasound can be done on 7T or will need to go to breast center.    Mammogram has to be done as an outpatient.    Type 2 diabetes mellitus, without long-term current use of insulin (HCC)  Assessment & Plan  Lab Results   Component Value Date    HGBA1C 9.7 (H) 11/28/2022       Recent Labs     04/10/24  1056 04/10/24  1552 04/10/24  2054 04/11/24  0513   POCGLU 311* 216* 253* 213*         Blood Sugar Average: Last 72 hrs:  (P) 242.6813755628288744  Not well controlled   Patient was on Lantus 30 units daily and Humalog 6 units t.i.d. With meals  Will increase  Lantus to 40 units nightly and Humalog to 12 units 3 times daily AC.  She was supposed to follow with endocrinology outpatient  Place on [facility] type II diet      Hypertension  Assessment & Plan  Continue amlodipine      Morbid obesity with BMI of 40.0-44.9, adult (HCC)  Assessment & Plan  Counseled on diet and lifestyle modification           VTE Pharmacologic Prophylaxis:   Pharmacologic: Enoxaparin (Lovenox)  Mechanical VTE Prophylaxis in Place: Yes    Patient Centered Rounds: I have performed bedside rounds with nursing staff today.    Discussions with Specialists or Other Care Team Provider: Discussed with breast radiologist, , RN    Education and Discussions with Family / Patient: Discussed with patient    Time Spent for Care:  35 minutes .  This time was spent on one or more of the following: performing physical exam; counseling and coordination of care; obtaining or reviewing history; documenting in the medical record; reviewing/ordering tests, medications or procedures; communicating with other healthcare professionals and discussing with patient's family/caregivers.    Current Length of Stay: 2 day(s)    Current Patient Status: Inpatient   Certification Statement: The patient will continue to require additional inpatient hospital stay due to sepsis due to mastitis    Discharge Plan / Estimated Discharge Date: Pending clinical course      Code Status: Level 1 - Full Code      Subjective:   Patient was seen and examined at bedside. The patient pain is better controlled this morning.  Patient has a lot of unilateral brownish nipple discharges.    Objective:     Vitals:   Temp (24hrs), Av.5 °F (36.9 °C), Min:97.8 °F (36.6 °C), Max:99.3 °F (37.4 °C)    Temp:  [97.8 °F (36.6 °C)-99.3 °F (37.4 °C)] 98.3 °F (36.8 °C)  HR:  [86-98] 94  Resp:  [18] 18  BP: (114-142)/(73-86) 114/73  SpO2:  [94 %-97 %] 95 %  Body mass index is 41.61 kg/m².     Input and Output Summary (last 24 hours):       Intake/Output  Summary (Last 24 hours) at 4/11/2024 1057  Last data filed at 4/11/2024 0900  Gross per 24 hour   Intake 4467.08 ml   Output --   Net 4467.08 ml       Physical Exam:     Physical Exam  General: no acute distress  HEENT: NC/AT, PERRL, EOM - normal  Neck: Supple  Pulm/Chest: Normal chest wall expansion, clear breath sounds on both side, right mastitis noted with brownish and serous discharges  CVS: normal S1&S2, capillary refill <2s  Abd: soft, non tender, non distended, bowel sounds +  MSK: move all 4 extremities spontaneously  Skin: warm  CNS: no acute focal neuro deficit      Additional Data:     Labs:    Results from last 7 days   Lab Units 04/11/24  0506   WBC Thousand/uL 11.43*   HEMOGLOBIN g/dL 10.6*   HEMATOCRIT % 32.3*   PLATELETS Thousands/uL 361   SEGS PCT % 75   LYMPHO PCT % 16   MONO PCT % 6   EOS PCT % 2     Results from last 7 days   Lab Units 04/11/24  0506 04/10/24  0436 04/09/24  1656   POTASSIUM mmol/L 3.5   < > 3.5   CHLORIDE mmol/L 98   < > 96   CO2 mmol/L 27   < > 29   BUN mg/dL 6   < > 6   CREATININE mg/dL 0.61   < > 0.81   CALCIUM mg/dL 8.5   < > 9.4   ALK PHOS U/L  --   --  82   ALT U/L  --   --  15   AST U/L  --   --  17    < > = values in this interval not displayed.     Results from last 7 days   Lab Units 04/09/24  1656   INR  1.04       * I Have Reviewed All Lab Data Listed Above.  * Additional Pertinent Lab Tests Reviewed: All Labs For Current Hospital Admission Reviewed    Imaging:      I have reviewed pertinent imaging.      Recent Cultures (last 7 days):     Results from last 7 days   Lab Units 04/09/24  1712 04/09/24  1657   BLOOD CULTURE  No Growth at 24 hrs. No Growth at 24 hrs.       Last 24 Hours Medication List:   Current Facility-Administered Medications   Medication Dose Route Frequency Provider Last Rate    acetaminophen  650 mg Oral Q6H PRN Daly Austin MD      albuterol  2 puff Inhalation Q6H PRMICHAELA Austin MD      albuterol  2.5 mg Nebulization Q6H PRMICHAELA Austin MD       aluminum-magnesium hydroxide-simethicone  30 mL Oral Q6H PRN Daly Austin MD      amLODIPine  10 mg Oral Daily Daly Austin MD      atorvastatin  20 mg Oral Daily Daly Austin MD      cefTRIAXone  2,000 mg Intravenous Q24H Daly Austin MD 2,000 mg (04/10/24 1727)    enoxaparin  40 mg Subcutaneous Daily Daly Austin MD      insulin glargine  40 Units Subcutaneous HS Daly Austin MD      insulin lispro  12 Units Subcutaneous TID With Meals Daly Austin MD      insulin lispro  2-12 Units Subcutaneous TID AC Daly Austin MD      ketorolac  15 mg Intravenous Q6H ADAL Daly Austin MD      metroNIDAZOLE  500 mg Intravenous Q8H Daly Austin  mg (04/11/24 1003)    morphine injection  2 mg Intravenous Q4H PRN Beto Gutierrez PA-C      multi-electrolyte  125 mL/hr Intravenous Continuous Daly Austin  mL/hr (04/11/24 0831)    ondansetron  4 mg Intravenous Q6H PRN Daly Austin MD      oxyCODONE  10 mg Oral Q12H [facility] Daly Austin MD      oxyCODONE  5 mg Oral Q6H PRN Malu Smith PA-C      polyethylene glycol  17 g Oral Daily PRN Daly Austin MD      vancomycin  15 mg/kg (Adjusted) Intravenous Q12H Daly Austin MD 1,500 mg (04/11/24 1038)        Today, Patient Was Seen By: Daly Austin MD    ** Please Note: Dragon 360 Dictation voice to text software may have been used in the creation of this document. **

## 2024-04-11 NOTE — ASSESSMENT & PLAN NOTE
Lab Results   Component Value Date    HGBA1C 9.7 (H) 11/28/2022       Recent Labs     04/10/24  1056 04/10/24  1552 04/10/24  2054 04/11/24  0513   POCGLU 311* 216* 253* 213*         Blood Sugar Average: Last 72 hrs:  (P) [id number]  Not well controlled   Patient was on Lantus 30 units daily and Humalog 6 units t.i.d. With meals  Will increase Lantus to 40 units nightly and Humalog to 12 units 3 times daily AC.  She was supposed to follow with endocrinology outpatient  Place on [facility] type II diet

## 2024-04-11 NOTE — RESPIRATORY THERAPY NOTE
04/11/24 0845   Respiratory Protocol   Protocol Initiated? Yes   Protocol Selection Respiratory   Language Barrier? No   Medical & Social History Reviewed? Yes   Diagnostic Studies Reviewed? Yes   Physical Assessment Performed? Yes   Respiratory Plan Home Bronchodilator Patient pathway   Respiratory Assessment   Assessment Type Assess only   General Appearance Alert;Awake   Respiratory Pattern Normal   Chest Assessment Chest expansion symmetrical   Bilateral Breath Sounds Diminished   Location Specific No   Cough Dry   Resp Comments BS equal, decreased, no wheezes. R/A ~ sp02 95-96%. Cough & deep breathing encouraged.   O2 Device R/A

## 2024-04-11 NOTE — PROGRESS NOTES
Janine Stafford is a 43 y.o. female who is currently ordered Vancomycin IV with management by the Pharmacy Consult service.  Relevant clinical data and objective / subjective history reviewed.  Vancomycin Assessment:  Indication and Goal AUC/Trough: Soft tissue (goal -600, trough >10)  Clinical Status:  new order   Micro:   Pend   Renal Function:  SCr: 0.61 mg/dL  CrCl: 172.7 mL/min  Renal replacement: Not on dialysis  Days of Therapy: 1  Current Dose:  1500 mg q12h   Vancomycin Plan:  New Dosing: new order    Estimated AUC: 506 mcg*hr/mL  Estimated Trough: 13.7 mcg/mL  Next Level: 4/13/24 am draw  Renal Function Monitoring: Daily BMP and UOP  Pharmacy will continue to follow closely for s/sx of nephrotoxicity, infusion reactions and appropriateness of therapy.  BMP and CBC will be ordered per protocol. We will continue to follow the patient’s culture results and clinical progress daily.    Henrietta Nolasco, Pharmacist

## 2024-04-11 NOTE — CONSULTS
Consultation - Infectious Disease   Janine Stafford 43 y.o. female MRN: [id number]  Unit/Bed#: 7T [facility] 702-01 Encounter: [id number]    IMPRESSION & RECOMMENDATIONS:   1. Sepsis. Present on admission. Tachycardia and leukocytosis. Secondary to R breast infection. No other clear source appreciated. Patient without respiratory, , or GI complaints. Blood cultures are negative >24 hours. Despite being systemically ill she remains hemodynamically stable. She remains afebrile.  -antibiotic treatment as below  -check CBCD and BMP tomorrow  -follow up blood cultures  -monitor vitals  -supportive care     2. R breast cellulitis vs nonlactational mastitis. Patient with history of mastitis in 2017. Breast imaging at that time showed masslike formation with central complex cystic area, marked skin thickening, and surrounding reactive lymph nodes. Patient was formally assessed by surgical oncology who felt this was infection. Patient declined aspiration at that time but she improved with Keflex. Now with recurring symptoms and new drainage from the R nipple. Culture is pending. Unclear what underlying structures are involved as there is no new breast imaging to review. General surgery has assessed patient and are planning for I&D in the OR tomorrow.  Antibiotic regimen has been broadened to IV flagyl, ceftriaxone, and vancomycin. I recommend stopping the flagyl now but will continue her on ceftriaxone and vancomycin. Will await culture and surgical updates and de-escalate antibiotic plan as able.  -stop IV flagyl   -continue IV ceftriaxone  -continue IV vancomycin   -continue pharmacy consult for guidance on vancomycin dosage  -check CBCD and BMP tomorrow  -serial R breast exams  -continue follow up with general surgery    3. Type 2 diabetes mellitus without long term insulin use. Patient's last HbA1c was 9.7% on 11/28/2024. Elevated blood glucose is risk factor for wounds and infection. Recommend right glycemic control.  -blood  glucose management per primary service     4. Tobacco abuse. This is risk factor for complications with healing.   -recommend patient commit to full cessation  -NRT per primary service     5. Morbid obesity. BMI = 41.61. This can affect antibiotic dosing.  -monitor patient weight and dose adjust antibiotic as needed    Above plan was discussed in detail with patient at the bedside.  Above plan was discussed in detail with SLIM who agrees with antibiotic plan.  Above plan was discussed in detail with general surgery who has already consented patient for the OR for tomorrow.    HISTORY OF PRESENT ILLNESS:  Reason for Consult: mastitis, sepsis  HPI: Janine Stafford is a 43 y.o. year old female who presented to the [facility] ED on 4/9/2024 with R breast pain, swelling, and redness. Reported this happened previously, approximately 2 years prior. At that time she was told she had a blocked/infected duct. Patient is not currently pregnant/breast feeding, last child was 10 years ago.    Upon arrival to the ED the patient's temperature was 100.3. HR was 133. RR was 24. O2 saturation was 99% on room air. BP was 144/68. WBC count was 12.72. Lactic acid was 1.5. Creatinine was 0.81 with GFR = 87. Glucose was 253. Blood cultures were sent. The patient was given ceftriaxone. She was admitted for additional medical management.    After her admission she was continued on IV ceftriaxone. She has been ordered to complete imaging of the R breast but no technician has been available. General surgery consult is pending. She developed brown nipple discharge. Culture has been sent. Antibiotic treatment was broadened by adding IV flagyl and vancomycin. We have been asked for formal consult for mastitis and sepsis.    The patient has DMII, obesity, HTN, asthma, and hyperlipidemia. She has a history of dental abscess, salpingectomy, headache, facial cellulitis, UTI, axilla abscess, asthma exacerbations, mastitis, dentalgia,  cholecystectomy, ectopic pregnancy surgery, kidney stone, and I&D of oral abscess. She is a cigarette smoker. She has no known drug allergies.    REVIEW OF SYSTEMS:  Patient reports she's feeling very emotional, is scared about having surgery tomorrow but knows it's necessary. She reports intense R breast pain which never fully resolves with current pain med regimen. She also reports the drainage has gotten worse today, is keeping a clean towel over the breast as dressings were too uncomfortable. She felt she was getting a fever earlier but feels that it passes. She denies cough, chest pain, difficulty breathing, SOB, nausea, vomiting, abdominal pain, and diarrhea. A complete 12 point system-based review of systems is otherwise negative.    PAST MEDICAL HISTORY:  Past Medical History:   Diagnosis Date    Asthma     Dental abscess     Diabetes mellitus (HCC)     High cholesterol     Hypertension      Past Surgical History:   Procedure Laterality Date    CHOLECYSTECTOMY      ECTOPIC PREGNANCY SURGERY      INCISION AND DRAINAGE INTRA ORAL ABSCESS Left 3/21/2016    Procedure: INCISION AND DRAINAGE  (I&D) WOUND ORAL Left  Space;  Surgeon: Henok Parrish MD;  Location: [facility] MA[facility] OR;  Service:     MULTIPLE TOOTH EXTRACTIONS N/A 3/21/2016    Procedure: EXTRACTION TEETH MULTIPLE; 17,18,19,11,12,4,5,28,32;  Surgeon: Henok Parrish MD;  Location: [facility];  Service:     SALPINGECTOMY       FAMILY HISTORY:  Non-contributory    SOCIAL HISTORY:  Social History   Social History     Substance and Sexual Activity   Alcohol Use Yes    Comment: Social     Social History     Substance and Sexual Activity   Drug Use No     Social History     Tobacco Use   Smoking Status Every Day    Current packs/day: 0.50    Average packs/day: 0.5 packs/day for 10.0 years (5.0 ttl pk-yrs)    Types: Cigarettes   Smokeless Tobacco Never   Tobacco Comments    PT was educated on smoking and the effect is has on her asthma.      ALLERGIES:  No Known Allergies    MEDICATIONS:  All current active medications have been reviewed.    ANTIBIOTICS:  Ceftriaxone 3  Flagyl - stop  Vancomycin 1  Antibiotics 3    PHYSICAL EXAM:  Temp:  [97.8 °F (36.6 °C)-99.3 °F (37.4 °C)] 98.3 °F (36.8 °C)  HR:  [86-98] 94  Resp:  [18] 18  BP: (114-142)/(73-86) 114/73  SpO2:  [94 %-97 %] 95 %  Temp (24hrs), Av.5 °F (36.9 °C), Min:97.8 °F (36.6 °C), Max:99.3 °F (37.4 °C)  Current: Temperature: 98.3 °F (36.8 °C)    Intake/Output Summary (Last 24 hours) at 2024 1143  Last data filed at 2024 0900  Gross per 24 hour   Intake 3565 ml   Output --   Net 3565 ml     General Appearance:  Appearing well, tearful and emotional, nontoxic, in no acute distress.   Head:  Normocephalic, without obvious abnormality, atraumatic.   Eyes:  Conjunctiva pink and sclera anicteric, both eyes.   Nose: Nares normal, mucosa normal, no drainage.   Throat: Oropharynx moist without lesions.   Neck: Supple, symmetrical, no adenopathy, no tenderness/mass/nodules.   Back:   Symmetric, ROM normal, no CVA tenderness.   Lungs:   Clear to auscultation bilaterally, respirations unlabored on room air.   Chest Wall:  R breast with significant swelling, firmness, and warmth, extremely tender to palpate, nipple with stringy yellow slough and thin purulent yellow discharge.   Heart:  Tachycardic; no murmur, rub or gallop.   Abdomen:   Soft, non-tender, non-distended, positive bowel sounds throughout.   Extremities: No cyanosis or clubbing, no edema.   Skin: No rashes or lesions. No draining wounds noted.   Lymph nodes: Cervical, supraclavicular nodes normal.   Neurologic: Alert and oriented times 3, follows commands, speech is organized and appropriate, symmetric facial movement.     LABS, IMAGING, & OTHER STUDIES:  Lab Results:  I have personally reviewed pertinent labs.  Results from last 7 days   Lab Units 24  0506 04/10/24  8026 24  1656   WBC Thousand/uL 11.43* 11.15* 12.72*    HEMOGLOBIN g/dL 10.6* 11.2* 12.3   PLATELETS Thousands/uL 361 358 405*     Results from last 7 days   Lab Units 04/11/24  0506 04/10/24  0436 04/09/24  1656   POTASSIUM mmol/L 3.5   < > 3.5   CHLORIDE mmol/L 98   < > 96   CO2 mmol/L 27   < > 29   BUN mg/dL 6   < > 6   CREATININE mg/dL 0.61   < > 0.81   EGFR ml/min/1.73sq m 109   < > 87   CALCIUM mg/dL 8.5   < > 9.4   AST U/L  --   --  17   ALT U/L  --   --  15   ALK PHOS U/L  --   --  82    < > = values in this interval not displayed.     Results from last 7 days   Lab Units 04/09/24  1712 04/09/24  1657   BLOOD CULTURE  No Growth at 24 hrs. No Growth at 24 hrs.     Imaging Studies:   No imaging so far during this hospitalization.    Other Studies:   R breast wound culture is pending.

## 2024-04-11 NOTE — PLAN OF CARE
Problem: PAIN - ADULT  Goal: Verbalizes/displays adequate comfort level or baseline comfort level  Description: Interventions:  - Encourage patient to monitor pain and request assistance  - Assess pain using appropriate pain scale  - Administer analgesics based on type and severity of pain and evaluate response  - Implement non-pharmacological measures as appropriate and evaluate response  - Consider cultural and social influences on pain and pain management  - Notify physician/advanced practitioner if interventions unsuccessful or patient reports new pain  Outcome: Progressing     Problem: INFECTION - ADULT  Goal: Absence or prevention of progression during hospitalization  Description: INTERVENTIONS:  - Assess and monitor for signs and symptoms of infection  - Monitor lab/diagnostic results  - Monitor all insertion sites, i.e. indwelling lines, tubes, and drains  - Monitor endotracheal if appropriate and nasal secretions for changes in amount and color  - [place] appropriate cooling/warming therapies per order  - Administer medications as ordered  - Instruct and encourage patient and family to use good hand hygiene technique  - Identify and instruct in appropriate isolation precautions for identified infection/condition  Outcome: Progressing     Problem: INFECTION - ADULT  Goal: Absence of fever/infection during neutropenic period  Description: INTERVENTIONS:  - Monitor WBC    Outcome: Progressing     Problem: METABOLIC, FLUID AND ELECTROLYTES - ADULT  Goal: Glucose maintained within target range  Description: INTERVENTIONS:  - Monitor Blood Glucose as ordered  - Assess for signs and symptoms of hyperglycemia and hypoglycemia  - Administer ordered medications to maintain glucose within target range  - Assess nutritional intake and initiate nutrition service referral as needed  Outcome: Progressing

## 2024-04-12 ENCOUNTER — ANESTHESIA (INPATIENT)
Dept: PERIOP | Facility: HOSPITAL | Age: 44
End: 2024-04-12
Payer: COMMERCIAL

## 2024-04-12 LAB
ANION GAP SERPL CALCULATED.3IONS-SCNC: 9 MMOL/L (ref 4–13)
BASOPHILS # BLD MANUAL: 0 THOUSAND/UL (ref 0–0.1)
BASOPHILS NFR MAR MANUAL: 0 % (ref 0–1)
BUN SERPL-MCNC: 5 MG/DL (ref 5–25)
CALCIUM SERPL-MCNC: 8.5 MG/DL (ref 8.4–10.2)
CHLORIDE SERPL-SCNC: 105 MMOL/L (ref 96–108)
CO2 SERPL-SCNC: 26 MMOL/L (ref 21–32)
CREAT SERPL-MCNC: 0.52 MG/DL (ref 0.6–1.3)
EOSINOPHIL # BLD MANUAL: 0.47 THOUSAND/UL (ref 0–0.4)
EOSINOPHIL NFR BLD MANUAL: 5 % (ref 0–6)
ERYTHROCYTE [DISTWIDTH] IN BLOOD BY AUTOMATED COUNT: 13 % (ref 11.6–15.1)
GFR SERPL CREATININE-BSD FRML MDRD: 115 ML/MIN/1.73SQ M
GLUCOSE SERPL-MCNC: 102 MG/DL (ref 65–140)
GLUCOSE SERPL-MCNC: 116 MG/DL (ref 65–140)
GLUCOSE SERPL-MCNC: 132 MG/DL (ref 65–140)
GLUCOSE SERPL-MCNC: 151 MG/DL (ref 65–140)
GLUCOSE SERPL-MCNC: 271 MG/DL (ref 65–140)
HCT VFR BLD AUTO: 31.6 % (ref 34.8–46.1)
HGB BLD-MCNC: 10.1 G/DL (ref 11.5–15.4)
LG PLATELETS BLD QL SMEAR: PRESENT
LYMPHOCYTES # BLD AUTO: 1.98 THOUSAND/UL (ref 0.6–4.47)
LYMPHOCYTES # BLD AUTO: 18 % (ref 14–44)
MCH RBC QN AUTO: 30.7 PG (ref 26.8–34.3)
MCHC RBC AUTO-ENTMCNC: 32 G/DL (ref 31.4–37.4)
MCV RBC AUTO: 96 FL (ref 82–98)
MONOCYTES # BLD AUTO: 0.66 THOUSAND/UL (ref 0–1.22)
MONOCYTES NFR BLD: 7 % (ref 4–12)
MYELOCYTES NFR BLD MANUAL: 1 % (ref 0–1)
NEUTROPHILS # BLD MANUAL: 6.23 THOUSAND/UL (ref 1.85–7.62)
NEUTS SEG NFR BLD AUTO: 66 % (ref 43–75)
PLATELET # BLD AUTO: 366 THOUSANDS/UL (ref 149–390)
PLATELET BLD QL SMEAR: ADEQUATE
PMV BLD AUTO: 10 FL (ref 8.9–12.7)
POTASSIUM SERPL-SCNC: 3.4 MMOL/L (ref 3.5–5.3)
RBC # BLD AUTO: 3.29 MILLION/UL (ref 3.81–5.12)
RBC MORPH BLD: NORMAL
SODIUM SERPL-SCNC: 140 MMOL/L (ref 135–147)
VARIANT LYMPHS # BLD AUTO: 3 %
WBC # BLD AUTO: 9.44 THOUSAND/UL (ref 4.31–10.16)

## 2024-04-12 PROCEDURE — 99232 SBSQ HOSP IP/OBS MODERATE 35: CPT | Performed by: INTERNAL MEDICINE

## 2024-04-12 PROCEDURE — 87075 CULTR BACTERIA EXCEPT BLOOD: CPT | Performed by: SPECIALIST

## 2024-04-12 PROCEDURE — 87186 SC STD MICRODIL/AGAR DIL: CPT | Performed by: SPECIALIST

## 2024-04-12 PROCEDURE — 85007 BL SMEAR W/DIFF WBC COUNT: CPT | Performed by: INTERNAL MEDICINE

## 2024-04-12 PROCEDURE — 19020 MASTOTOMY EXPL DRG ABSC DP: CPT | Performed by: SPECIALIST

## 2024-04-12 PROCEDURE — 87070 CULTURE OTHR SPECIMN AEROBIC: CPT | Performed by: SPECIALIST

## 2024-04-12 PROCEDURE — 87185 SC STD ENZYME DETCJ PER NZM: CPT | Performed by: SPECIALIST

## 2024-04-12 PROCEDURE — 0H9T3ZZ DRAINAGE OF RIGHT BREAST, PERCUTANEOUS APPROACH: ICD-10-PCS | Performed by: SPECIALIST

## 2024-04-12 PROCEDURE — 0JD63ZZ EXTRACTION OF CHEST SUBCUTANEOUS TISSUE AND FASCIA, PERCUTANEOUS APPROACH: ICD-10-PCS | Performed by: SPECIALIST

## 2024-04-12 PROCEDURE — 87077 CULTURE AEROBIC IDENTIFY: CPT | Performed by: SPECIALIST

## 2024-04-12 PROCEDURE — 82948 REAGENT STRIP/BLOOD GLUCOSE: CPT

## 2024-04-12 PROCEDURE — 85027 COMPLETE CBC AUTOMATED: CPT | Performed by: INTERNAL MEDICINE

## 2024-04-12 PROCEDURE — 87205 SMEAR GRAM STAIN: CPT | Performed by: SPECIALIST

## 2024-04-12 PROCEDURE — 80048 BASIC METABOLIC PNL TOTAL CA: CPT | Performed by: INTERNAL MEDICINE

## 2024-04-12 RX ORDER — HYDROMORPHONE HCL IN WATER/PF 6 MG/30 ML
0.2 PATIENT CONTROLLED ANALGESIA SYRINGE INTRAVENOUS
Status: DISCONTINUED | OUTPATIENT
Start: 2024-04-12 | End: 2024-04-12 | Stop reason: HOSPADM

## 2024-04-12 RX ORDER — PHENYLEPHRINE HCL IN 0.9% NACL 1 MG/10 ML
SYRINGE (ML) INTRAVENOUS AS NEEDED
Status: DISCONTINUED | OUTPATIENT
Start: 2024-04-12 | End: 2024-04-12

## 2024-04-12 RX ORDER — LIDOCAINE HYDROCHLORIDE 10 MG/ML
INJECTION, SOLUTION EPIDURAL; INFILTRATION; INTRACAUDAL; PERINEURAL AS NEEDED
Status: DISCONTINUED | OUTPATIENT
Start: 2024-04-12 | End: 2024-04-12 | Stop reason: HOSPADM

## 2024-04-12 RX ORDER — ONDANSETRON 2 MG/ML
INJECTION INTRAMUSCULAR; INTRAVENOUS AS NEEDED
Status: DISCONTINUED | OUTPATIENT
Start: 2024-04-12 | End: 2024-04-12

## 2024-04-12 RX ORDER — SODIUM CHLORIDE, SODIUM LACTATE, POTASSIUM CHLORIDE, CALCIUM CHLORIDE 600; 310; 30; 20 MG/100ML; MG/100ML; MG/100ML; MG/100ML
INJECTION, SOLUTION INTRAVENOUS CONTINUOUS PRN
Status: DISCONTINUED | OUTPATIENT
Start: 2024-04-12 | End: 2024-04-12

## 2024-04-12 RX ORDER — FENTANYL CITRATE/PF 50 MCG/ML
25 SYRINGE (ML) INJECTION
Status: DISCONTINUED | OUTPATIENT
Start: 2024-04-12 | End: 2024-04-12 | Stop reason: HOSPADM

## 2024-04-12 RX ORDER — MIDAZOLAM HYDROCHLORIDE 2 MG/2ML
INJECTION, SOLUTION INTRAMUSCULAR; INTRAVENOUS AS NEEDED
Status: DISCONTINUED | OUTPATIENT
Start: 2024-04-12 | End: 2024-04-12

## 2024-04-12 RX ORDER — POTASSIUM CHLORIDE 20 MEQ/1
40 TABLET, EXTENDED RELEASE ORAL ONCE
Status: COMPLETED | OUTPATIENT
Start: 2024-04-12 | End: 2024-04-12

## 2024-04-12 RX ORDER — ALPRAZOLAM 0.5 MG/1
0.5 TABLET ORAL 2 TIMES DAILY PRN
Status: DISCONTINUED | OUTPATIENT
Start: 2024-04-12 | End: 2024-04-18 | Stop reason: HOSPADM

## 2024-04-12 RX ORDER — MAGNESIUM HYDROXIDE 1200 MG/15ML
LIQUID ORAL AS NEEDED
Status: DISCONTINUED | OUTPATIENT
Start: 2024-04-12 | End: 2024-04-12 | Stop reason: HOSPADM

## 2024-04-12 RX ORDER — FENTANYL CITRATE 50 UG/ML
INJECTION, SOLUTION INTRAMUSCULAR; INTRAVENOUS AS NEEDED
Status: DISCONTINUED | OUTPATIENT
Start: 2024-04-12 | End: 2024-04-12

## 2024-04-12 RX ORDER — PROPOFOL 10 MG/ML
INJECTION, EMULSION INTRAVENOUS AS NEEDED
Status: DISCONTINUED | OUTPATIENT
Start: 2024-04-12 | End: 2024-04-12

## 2024-04-12 RX ORDER — ONDANSETRON 2 MG/ML
4 INJECTION INTRAMUSCULAR; INTRAVENOUS ONCE AS NEEDED
Status: DISCONTINUED | OUTPATIENT
Start: 2024-04-12 | End: 2024-04-12 | Stop reason: HOSPADM

## 2024-04-12 RX ADMIN — SODIUM CHLORIDE, SODIUM LACTATE, POTASSIUM CHLORIDE, AND CALCIUM CHLORIDE: .6; .31; .03; .02 INJECTION, SOLUTION INTRAVENOUS at 14:29

## 2024-04-12 RX ADMIN — INSULIN LISPRO 6 UNITS: 100 INJECTION, SOLUTION INTRAVENOUS; SUBCUTANEOUS at 21:24

## 2024-04-12 RX ADMIN — Medication 100 MCG: at 15:13

## 2024-04-12 RX ADMIN — KETOROLAC TROMETHAMINE 15 MG: 30 INJECTION, SOLUTION INTRAMUSCULAR; INTRAVENOUS at 13:17

## 2024-04-12 RX ADMIN — INSULIN GLARGINE 40 UNITS: 100 INJECTION, SOLUTION SUBCUTANEOUS at 21:24

## 2024-04-12 RX ADMIN — VANCOMYCIN HYDROCHLORIDE 1500 MG: 1 INJECTION, POWDER, LYOPHILIZED, FOR SOLUTION INTRAVENOUS at 21:25

## 2024-04-12 RX ADMIN — OXYCODONE HYDROCHLORIDE 5 MG: 5 TABLET ORAL at 16:11

## 2024-04-12 RX ADMIN — FENTANYL CITRATE 25 MCG: 50 INJECTION, SOLUTION INTRAMUSCULAR; INTRAVENOUS at 15:38

## 2024-04-12 RX ADMIN — OXYCODONE HYDROCHLORIDE 5 MG: 5 TABLET ORAL at 07:25

## 2024-04-12 RX ADMIN — POTASSIUM CHLORIDE 40 MEQ: 1500 TABLET, EXTENDED RELEASE ORAL at 09:33

## 2024-04-12 RX ADMIN — MIDAZOLAM 2 MG: 1 INJECTION INTRAMUSCULAR; INTRAVENOUS at 14:29

## 2024-04-12 RX ADMIN — ALPRAZOLAM 0.5 MG: 0.5 TABLET ORAL at 09:33

## 2024-04-12 RX ADMIN — MORPHINE SULFATE 2 MG: 2 INJECTION, SOLUTION INTRAMUSCULAR; INTRAVENOUS at 19:37

## 2024-04-12 RX ADMIN — OXYCODONE HYDROCHLORIDE 10 MG: 10 TABLET, FILM COATED, EXTENDED RELEASE ORAL at 21:24

## 2024-04-12 RX ADMIN — FENTANYL CITRATE 100 MCG: 50 INJECTION, SOLUTION INTRAMUSCULAR; INTRAVENOUS at 14:33

## 2024-04-12 RX ADMIN — OXYCODONE HYDROCHLORIDE 10 MG: 10 TABLET, FILM COATED, EXTENDED RELEASE ORAL at 09:37

## 2024-04-12 RX ADMIN — SODIUM CHLORIDE, SODIUM GLUCONATE, SODIUM ACETATE, POTASSIUM CHLORIDE, MAGNESIUM CHLORIDE, SODIUM PHOSPHATE, DIBASIC, AND POTASSIUM PHOSPHATE 125 ML/HR: .53; .5; .37; .037; .03; .012; .00082 INJECTION, SOLUTION INTRAVENOUS at 21:37

## 2024-04-12 RX ADMIN — AMLODIPINE BESYLATE 10 MG: 10 TABLET ORAL at 08:15

## 2024-04-12 RX ADMIN — INSULIN LISPRO 12 UNITS: 100 INJECTION, SOLUTION INTRAVENOUS; SUBCUTANEOUS at 17:56

## 2024-04-12 RX ADMIN — ATORVASTATIN CALCIUM 20 MG: 20 TABLET, FILM COATED ORAL at 08:15

## 2024-04-12 RX ADMIN — KETOROLAC TROMETHAMINE 15 MG: 30 INJECTION, SOLUTION INTRAMUSCULAR; INTRAVENOUS at 05:58

## 2024-04-12 RX ADMIN — ENOXAPARIN SODIUM 40 MG: 40 INJECTION SUBCUTANEOUS at 08:16

## 2024-04-12 RX ADMIN — PROPOFOL 200 MG: 10 INJECTION, EMULSION INTRAVENOUS at 14:33

## 2024-04-12 RX ADMIN — SODIUM CHLORIDE, SODIUM GLUCONATE, SODIUM ACETATE, POTASSIUM CHLORIDE, MAGNESIUM CHLORIDE, SODIUM PHOSPHATE, DIBASIC, AND POTASSIUM PHOSPHATE 125 ML/HR: .53; .5; .37; .037; .03; .012; .00082 INJECTION, SOLUTION INTRAVENOUS at 06:08

## 2024-04-12 RX ADMIN — ONDANSETRON 4 MG: 2 INJECTION INTRAMUSCULAR; INTRAVENOUS at 14:45

## 2024-04-12 RX ADMIN — CEFTRIAXONE 2000 MG: 2 INJECTION, SOLUTION INTRAVENOUS at 14:37

## 2024-04-12 RX ADMIN — VANCOMYCIN HYDROCHLORIDE 1500 MG: 1 INJECTION, POWDER, LYOPHILIZED, FOR SOLUTION INTRAVENOUS at 09:38

## 2024-04-12 NOTE — ASSESSMENT & PLAN NOTE
Lab Results   Component Value Date    HGBA1C 9.7 (H) 11/28/2022       Recent Labs     04/11/24  1054 04/11/24  1540 04/11/24 2049 04/12/24  0525   POCGLU 214* 116 211* 132         Blood Sugar Average: Last 72 hrs:  (P) 215.7929356831939361  BG control improved   patient was on Lantus 30 units daily and Humalog 6 units t.i.d. With meals  Recently increased Lantus to 40 units nightly and Humalog to 12 units 3 times daily AC.  She was supposed to follow with endocrinology outpatient  Place on [facility] type II diet

## 2024-04-12 NOTE — OP NOTE
OPERATIVE REPORT  PATIENT NAME: Janine Stafford    :  1980  MRN: [id number]  Pt Location:  OR ROOM 08    SURGERY DATE: 2024    Surgeons and Role:     * Danny Seymour MD - Primary    Preop Diagnosis:  Mastitis [N61.0]  Mastitis chronic, right [N60.11]  Breast abscess [N61.1]    Post-Op Diagnosis Codes:     * Mastitis [N61.0]     * Mastitis chronic, right [N60.11]     * Breast abscess [N61.1]    Procedure(s):  Right - INCISION AND DRAINAGE (I&D) BREAST  Debridement  Aerobic and anaerobic cultures obtained    Specimen(s):  ID Type Source Tests Collected by Time Destination   A : RIGHT BREAST WOUND Wound Breast, Right WOUND CULTURE Danny Seymour MD 2024  3:01 PM    B : RIGHT BREAST WOUND Wound Breast, Right ANAEROBIC CULTURE AND GRAM STAIN Danny Seymour MD 2024  3:03 PM        Estimated Blood Loss:   Minimal    Drains:  Open Drain Right Breast (Active)   Number of days: 0       Anesthesia Type:   IV Sedation with Anesthesia    Operative Indications:  Mastitis [N61.0]  Mastitis chronic, right [N60.11]  Breast abscess [N61.1]      Operative Findings:  Large amount of pus in the breast parenchyma forming a large abscess cavity.    Complications:   None    Procedure and Technique:  Patient brought the operating placed Table in a supine position.  Under LMA anesthesia the right breast was prepped and draped in usual sterile fashion.    1% lidocaine is used to infiltrate the periareolar and nipple area.  There had been a opening near the nipple with some purulent drainage noted.  At that time a hemostat was placed through this since .  There was a huge pocket present with a copious amount of pus present.  Aerobic and anaerobic cultures were taken.  The opening was made slightly larger and the gloved finger could be passed easily through this.  Once again a huge amount of pus was present and there was a large cavity that did appear to be chronic in nature.  The cavity extended peripherally in  the breast and could be palpated through the breast tissue.  A significant mount of subcutaneous edema was present also.  After draining the majority of the pus the gloved finger was used to bluntly debride the cavity breaking up any loculations that may have been present.  Eventually the majority of the purulent was removed and some bloody drainage was encountered.  The area was copiously irrigated with saline solution and this was suctioned from the field.  A sponge was placed in the wound to further debride the area and then once again irrigated.  At that point no residual pus appeared to remain and a Penrose drain was obtained.  Was passed through the opening and placed in the abscess cavity.  It was sutured in place with a single 3-0 nylon suture.  The area was copiously infiltrated with 1% lidocaine.  A bulky dressing was placed.  The estimated blood was minimal patient tolerated the procedure well she was delivered to the cover in stable condition.  Vale BYNUM was present for the entire procedure.    Patient Disposition:  PACU         SIGNATURE: Danny Seymour MD  DATE: April 12, 2024  TIME: 3:45 PM

## 2024-04-12 NOTE — PLAN OF CARE
Problem: PAIN - ADULT  Goal: Verbalizes/displays adequate comfort level or baseline comfort level  Description: Interventions:  - Encourage patient to monitor pain and request assistance  - Assess pain using appropriate pain scale  - Administer analgesics based on type and severity of pain and evaluate response  - Implement non-pharmacological measures as appropriate and evaluate response  - Consider cultural and social influences on pain and pain management  - Notify physician/advanced practitioner if interventions unsuccessful or patient reports new pain  Outcome: Progressing     Problem: INFECTION - ADULT  Goal: Absence or prevention of progression during hospitalization  Description: INTERVENTIONS:  - Assess and monitor for signs and symptoms of infection  - Monitor lab/diagnostic results  - Monitor all insertion sites, i.e. indwelling lines, tubes, and drains  - Monitor endotracheal if appropriate and nasal secretions for changes in amount and color  - [place] appropriate cooling/warming therapies per order  - Administer medications as ordered  - Instruct and encourage patient and family to use good hand hygiene technique  - Identify and instruct in appropriate isolation precautions for identified infection/condition  Outcome: Progressing  Goal: Absence of fever/infection during neutropenic period  Description: INTERVENTIONS:  - Monitor WBC    Outcome: Progressing     Problem: SAFETY ADULT  Goal: Patient will remain free of falls  Description: INTERVENTIONS:  - Educate patient/family on patient safety including physical limitations  - Instruct patient to call for assistance with activity   - Consult OT/PT to assist with strengthening/mobility   - Keep Call bell within reach  - Keep bed low and locked with side rails adjusted as appropriate  - Keep care items and personal belongings within reach  - Initiate and maintain comfort rounds  - Make Fall Risk Sign visible to staff  - Apply yellow socks and bracelet  for high fall risk patients  - Consider moving patient to room near nurses station  Outcome: Progressing     Problem: DISCHARGE PLANNING  Goal: Discharge to home or other facility with appropriate resources  Description: INTERVENTIONS:  - Identify barriers to discharge w/patient and caregiver  - Arrange for needed discharge resources and transportation as appropriate  - Identify discharge learning needs (meds, wound care, etc.)  - Arrange for interpretive services to assist at discharge as needed  - Refer to Case Management Department for coordinating discharge planning if the patient needs post-hospital services based on physician/advanced practitioner order or complex needs related to functional status, cognitive ability, or social support system  Outcome: Progressing     Problem: Knowledge Deficit  Goal: Patient/family/caregiver demonstrates understanding of disease process, treatment plan, medications, and discharge instructions  Description: Complete learning assessment and assess knowledge base.  Interventions:  - Provide teaching at level of understanding  - Provide teaching via preferred learning methods  Outcome: Progressing

## 2024-04-12 NOTE — ANESTHESIA PREPROCEDURE EVALUATION
Medical History    History Comments   Asthma    Dental abscess    Hypertension    Diabetes mellitus (HCC)    High cholesterol    Procedure:  INCISION AND DRAINAGE (I&D) BREAST (Right: Breast)    Relevant Problems   CARDIO   (+) High cholesterol   (+) Hypertension      ENDO   (+) Type 2 diabetes mellitus, without long-term current use of insulin (HCC)      NEURO/PSYCH   (+) Other headache syndrome      PULMONARY   (+) Acute respiratory failure with hypoxia (HCC)   (+) Asthma exacerbation   (+) Mild intermittent asthma without complication   (+) Moderate asthma        Physical Exam    Airway    Mallampati score: III  TM Distance: <3 FB  Neck ROM: full     Dental       Cardiovascular  Rate: normal    Pulmonary  Pulmonary exam normal     Other Findings  Per pt denies anything remaining that is loose or removeablepost-pubertal.      Anesthesia Plan  ASA Score- 3     Anesthesia Type- general with ASA Monitors.         Additional Monitors:     Airway Plan: LMA.           Plan Factors-Exercise tolerance (METS): >4 METS.    Chart reviewed.    Patient summary reviewed.    Patient is a current smoker.              Induction- intravenous.    Postoperative Plan- Plan for postoperative opioid use.     Informed Consent- Anesthetic plan and risks discussed with patient.  I personally reviewed this patient with the CRNA. Discussed and agreed on the Anesthesia Plan with the CRNA..

## 2024-04-12 NOTE — ASSESSMENT & PLAN NOTE
Order right breast ultrasound to rule out abscess, however in-house technician does not specialize in breast ultrasound   Discussed with Dr. Bradley Kocher -recommended to get general surgery consult if there is a concern of breast abscess before getting right breast ultrasound.    Diagnostic exams can be done on Tuesdays and Thursdays at [place] with dedicated breast sonographers if necessary.  Discussed with Dr. Seymour -plan for OR drainage this afternoon.  Mammogram has to be done as an outpatient.

## 2024-04-12 NOTE — PROGRESS NOTES
[facility]  Progress Note  Name: Janine Stafford I  MRN: [id number]  Unit/Bed#: 7T [facility] 702-01 I Date of Admission: 4/9/2024   Date of Service: 4/12/2024 I Hospital Day: 3    Assessment/Plan   * Sepsis (HCC)  Assessment & Plan  Presents to ED with redness and pain of R breast  SIRS met: SIRS: 2/4; tachycardia, and WBC  Source of infection: mastitis and possible breast abscess    Lab Results   Component Value Date    WBC 9.44 04/12/2024    PROCALCITONI <0.05 09/30/2018    LACTICACID 1.5 04/09/2024       IVF given in total of 1L bolus in ED  Continue IVF  Cultures   Blood cultures -no growth at 48 hours  Wound culture grows rare gram-positive cocci in cluster    IV antibiotics; IV ceftriaxone initially given in the ED  Initially was on IV ceftriaxone, added vancomycin and Flagyl on 4/11/2024  ID consult placed appreciate recommendations.  Recommended to continue IV vancomycin and ceftriaxone for now.    Hypokalemia  Assessment & Plan  Potassium 3.4  Will give p.o. potassium 40 mEq once    Moderate asthma  Assessment & Plan  Not in acute exacerbation  Continue home albuterol, duonebs    Mastitis  Assessment & Plan  Order right breast ultrasound to rule out abscess, however in-house technician does not specialize in breast ultrasound   Discussed with Dr. Bradley Kocher -recommended to get general surgery consult if there is a concern of breast abscess before getting right breast ultrasound.    Diagnostic exams can be done on Tuesdays and Thursdays at [place] with dedicated breast sonographers if necessary.  Discussed with Dr. Seymour -plan for OR drainage this afternoon.  Mammogram has to be done as an outpatient.    Type 2 diabetes mellitus, without long-term current use of insulin (HCC)  Assessment & Plan  Lab Results   Component Value Date    HGBA1C 9.7 (H) 11/28/2022       Recent Labs     04/11/24  1054 04/11/24  1540 04/11/24  2049 04/12/24  0525   POCGLU 214* 116 211* 132          Blood Sugar Average: Last 72 hrs:  (P) 215.3960528585097603  BG control improved   patient was on Lantus 30 units daily and Humalog 6 units t.i.d. With meals  Recently increased Lantus to 40 units nightly and Humalog to 12 units 3 times daily AC.  She was supposed to follow with endocrinology outpatient  Place on [facility] type II diet      Hypertension  Assessment & Plan  Continue amlodipine      Morbid obesity with BMI of 40.0-44.9, adult (HCC)  Assessment & Plan  Counseled on diet and lifestyle modification           VTE Pharmacologic Prophylaxis:   Pharmacologic: Enoxaparin (Lovenox)  Mechanical VTE Prophylaxis in Place: Yes    Patient Centered Rounds: I have performed bedside rounds with nursing staff today.    Discussions with Specialists or Other Care Team Provider: Discussed with ID, general surgery, breast radiologist    Education and Discussions with Family / Patient: Discussed with patient    Time Spent for Care:  35 minutes .  This time was spent on one or more of the following: performing physical exam; counseling and coordination of care; obtaining or reviewing history; documenting in the medical record; reviewing/ordering tests, medications or procedures; communicating with other healthcare professionals and discussing with patient's family/caregivers.    Current Length of Stay: 3 day(s)    Current Patient Status: Inpatient   Certification Statement: The patient will continue to require additional inpatient hospital stay due to sepsis due to mastitis requiring IV antibiotic    Discharge Plan / Estimated Discharge Date: Pending clinical course      Code Status: Level 1 - Full Code      Subjective:   Patient was seen and examined at bedside. The patient pain is well-controlled on current regimen.  Patient is anxious about upcoming OR drainage.  Patient would like to try Xanax to calm her down.    Objective:     Vitals:   Temp (24hrs), Av.8 °F (36.6 °C), Min:97.6 °F (36.4 °C), Max:98 °F (36.7  °C)    Temp:  [97.6 °F (36.4 °C)-98 °F (36.7 °C)] 97.6 °F (36.4 °C)  HR:  [64-91] 91  Resp:  [18] 18  BP: (114-151)/(77-87) 119/77  SpO2:  [95 %-97 %] 97 %  Body mass index is 41.61 kg/m².     Input and Output Summary (last 24 hours):       Intake/Output Summary (Last 24 hours) at 4/12/2024 0920  Last data filed at 4/12/2024 0559  Gross per 24 hour   Intake 3663.33 ml   Output --   Net 3663.33 ml       Physical Exam:     Physical Exam  General: no acute distress  HEENT: NC/AT, PERRL, EOM - normal  Neck: Supple  Pulm/Chest: Normal chest wall expansion, clear breath sounds on both side, right breast with redness, warmth, induration, significant tenderness, purulent nipple discharge  CVS: normal S1&S2, capillary refill <2s  Abd: soft, non tender, non distended, bowel sounds +  MSK: move all 4 extremities spontaneously  Skin: warm  CNS: no acute focal neuro deficit      Additional Data:     Labs:    Results from last 7 days   Lab Units 04/12/24  0434 04/11/24  0506   WBC Thousand/uL 9.44 11.43*   HEMOGLOBIN g/dL 10.1* 10.6*   HEMATOCRIT % 31.6* 32.3*   PLATELETS Thousands/uL 366 361   SEGS PCT %  --  75   LYMPHO PCT % 18 16   MONO PCT % 7 6   EOS PCT % 5 2     Results from last 7 days   Lab Units 04/12/24  0434 04/10/24  0436 04/09/24  1656   POTASSIUM mmol/L 3.4*   < > 3.5   CHLORIDE mmol/L 105   < > 96   CO2 mmol/L 26   < > 29   BUN mg/dL 5   < > 6   CREATININE mg/dL 0.52*   < > 0.81   CALCIUM mg/dL 8.5   < > 9.4   ALK PHOS U/L  --   --  82   ALT U/L  --   --  15   AST U/L  --   --  17    < > = values in this interval not displayed.     Results from last 7 days   Lab Units 04/09/24  1656   INR  1.04       * I Have Reviewed All Lab Data Listed Above.  * Additional Pertinent Lab Tests Reviewed: All Labs For Current Hospital Admission Reviewed    Imaging:      I have reviewed pertinent imaging.      Recent Cultures (last 7 days):     Results from last 7 days   Lab Units 04/11/24  0954 04/09/24  1712 04/09/24  1659    BLOOD CULTURE   --  No Growth at 48 hrs. No Growth at 48 hrs.   GRAM STAIN RESULT  No polys seen*  Rare Gram positive cocci in clusters*  --   --        Last 24 Hours Medication List:   Current Facility-Administered Medications   Medication Dose Route Frequency Provider Last Rate    acetaminophen  650 mg Oral Q6H PRN Daly Austin MD      albuterol  2 puff Inhalation Q6H PRN Daly Austin MD      albuterol  2.5 mg Nebulization Q6H PRN Daly Austin MD      ALPRAZolam  0.5 mg Oral BID PRN Daly Austin MD      aluminum-magnesium hydroxide-simethicone  30 mL Oral Q6H PRN Daly Austin MD      amLODIPine  10 mg Oral Daily Daly Austin MD      atorvastatin  20 mg Oral Daily Daly Austin MD      cefTRIAXone  2,000 mg Intravenous Q24H Daly Austin MD 2,000 mg (04/11/24 1703)    enoxaparin  40 mg Subcutaneous Daily Daly Austin MD      insulin glargine  40 Units Subcutaneous HS Daly Austin MD      insulin lispro  12 Units Subcutaneous TID With Meals aDly Austin MD      insulin lispro  2-12 Units Subcutaneous 4x Daily (AC & HS) Sam Vora PA-C      ketorolac  15 mg Intravenous Q6H ADAL Daly Austin MD      morphine injection  2 mg Intravenous Q4H PRN Beto Gutierrez PA-C      multi-electrolyte  125 mL/hr Intravenous Continuous Daly Austin  mL/hr (04/12/24 0608)    ondansetron  4 mg Intravenous Q6H PRN Daly Austin MD      oxyCODONE  10 mg Oral Q12H ADAL Daly Austin MD      oxyCODONE  5 mg Oral Q6H PRN Malu Smith PA-C      polyethylene glycol  17 g Oral Daily PRN Daly Austin MD      potassium chloride  40 mEq Oral Once Daly Austin MD      vancomycin  15 mg/kg (Adjusted) Intravenous Q12H Daly Austin MD 1,500 mg (04/11/24 2116)        Today, Patient Was Seen By: Daly Austin MD    ** Please Note: Dragon 360 Dictation voice to text software may have been used in the creation of this document. **

## 2024-04-12 NOTE — PLAN OF CARE
Problem: PAIN - ADULT  Goal: Verbalizes/displays adequate comfort level or baseline comfort level  Description: Interventions:  - Encourage patient to monitor pain and request assistance  - Assess pain using appropriate pain scale  - Administer analgesics based on type and severity of pain and evaluate response  - Implement non-pharmacological measures as appropriate and evaluate response  - Consider cultural and social influences on pain and pain management  - Notify physician/advanced practitioner if interventions unsuccessful or patient reports new pain  Outcome: Progressing     Problem: INFECTION - ADULT  Goal: Absence or prevention of progression during hospitalization  Description: INTERVENTIONS:  - Assess and monitor for signs and symptoms of infection  - Monitor lab/diagnostic results  - Monitor all insertion sites, i.e. indwelling lines, tubes, and drains  - Monitor endotracheal if appropriate and nasal secretions for changes in amount and color  - [place] appropriate cooling/warming therapies per order  - Administer medications as ordered  - Instruct and encourage patient and family to use good hand hygiene technique  - Identify and instruct in appropriate isolation precautions for identified infection/condition  Outcome: Progressing     Problem: METABOLIC, FLUID AND ELECTROLYTES - ADULT  Goal: Glucose maintained within target range  Description: INTERVENTIONS:  - Monitor Blood Glucose as ordered  - Assess for signs and symptoms of hyperglycemia and hypoglycemia  - Administer ordered medications to maintain glucose within target range  - Assess nutritional intake and initiate nutrition service referral as needed  Outcome: Progressing

## 2024-04-12 NOTE — ASSESSMENT & PLAN NOTE
Presents to ED with redness and pain of R breast  SIRS met: SIRS: 2/4; tachycardia, and WBC  Source of infection: mastitis and possible breast abscess    Lab Results   Component Value Date    WBC 9.44 04/12/2024    PROCALCITONI <0.05 09/30/2018    LACTICACID 1.5 04/09/2024       IVF given in total of 1L bolus in ED  Continue IVF  Cultures   Blood cultures -no growth at 48 hours  Wound culture grows rare gram-positive cocci in cluster    IV antibiotics; IV ceftriaxone initially given in the ED  Initially was on IV ceftriaxone, added vancomycin and Flagyl on 4/11/2024  ID consult placed appreciate recommendations.  Recommended to continue IV vancomycin and ceftriaxone for now.

## 2024-04-12 NOTE — PROGRESS NOTES
Janine Stafford is a 43 y.o. female who is currently ordered Vancomycin IV with management by the Pharmacy Consult service.  Relevant clinical data and objective / subjective history reviewed.  Vancomycin Assessment:  Indication and Goal AUC/Trough: Soft tissue (goal -600, trough >10)  Clinical Status: stable  Micro:     Renal Function:  SCr: 0.52 mg/dL  CrCl: 202 mL/min  Renal replacement: Not on dialysis  Days of Therapy: 2  Current Dose:  1500 mg q12h   Vancomycin Plan:  New Dosing:  same dose  Estimated AUC: 448 mcg*hr/mL  Estimated Trough: 11.4 mcg/mL  Next Level: 4/13/ 24 am draw  Renal Function Monitoring: Daily BMP and UOP  Pharmacy will continue to follow closely for s/sx of nephrotoxicity, infusion reactions and appropriateness of therapy.  BMP and CBC will be ordered per protocol. We will continue to follow the patient’s culture results and clinical progress daily.    Henrietta Nolasco, Pharmacist

## 2024-04-12 NOTE — PLAN OF CARE
Problem: PAIN - ADULT  Goal: Verbalizes/displays adequate comfort level or baseline comfort level  Description: Interventions:  - Encourage patient to monitor pain and request assistance  - Assess pain using appropriate pain scale  - Administer analgesics based on type and severity of pain and evaluate response  - Implement non-pharmacological measures as appropriate and evaluate response  - Consider cultural and social influences on pain and pain management  - Notify physician/advanced practitioner if interventions unsuccessful or patient reports new pain  Outcome: Progressing     Problem: INFECTION - ADULT  Goal: Absence or prevention of progression during hospitalization  Description: INTERVENTIONS:  - Assess and monitor for signs and symptoms of infection  - Monitor lab/diagnostic results  - Monitor all insertion sites, i.e. indwelling lines, tubes, and drains  - Monitor endotracheal if appropriate and nasal secretions for changes in amount and color  - [place] appropriate cooling/warming therapies per order  - Administer medications as ordered  - Instruct and encourage patient and family to use good hand hygiene technique  - Identify and instruct in appropriate isolation precautions for identified infection/condition  Outcome: Progressing  Goal: Absence of fever/infection during neutropenic period  Description: INTERVENTIONS:  - Monitor WBC    Outcome: Progressing     Problem: SAFETY ADULT  Goal: Patient will remain free of falls  Description: INTERVENTIONS:  - Educate patient/family on patient safety including physical limitations  - Instruct patient to call for assistance with activity   - Consult OT/PT to assist with strengthening/mobility   - Keep Call bell within reach  - Keep bed low and locked with side rails adjusted as appropriate  - Keep care items and personal belongings within reach  - Initiate and maintain comfort rounds  - Make Fall Risk Sign visible to staff  - Apply yellow socks and bracelet  for high fall risk patients  - Consider moving patient to room near nurses station  Outcome: Progressing     Problem: DISCHARGE PLANNING  Goal: Discharge to home or other facility with appropriate resources  Description: INTERVENTIONS:  - Identify barriers to discharge w/patient and caregiver  - Arrange for needed discharge resources and transportation as appropriate  - Identify discharge learning needs (meds, wound care, etc.)  - Arrange for interpretive services to assist at discharge as needed  - Refer to Case Management Department for coordinating discharge planning if the patient needs post-hospital services based on physician/advanced practitioner order or complex needs related to functional status, cognitive ability, or social support system  Outcome: Progressing     Problem: Knowledge Deficit  Goal: Patient/family/caregiver demonstrates understanding of disease process, treatment plan, medications, and discharge instructions  Description: Complete learning assessment and assess knowledge base.  Interventions:  - Provide teaching at level of understanding  - Provide teaching via preferred learning methods  Outcome: Progressing

## 2024-04-12 NOTE — ANESTHESIA POSTPROCEDURE EVALUATION
Post-Op Assessment Note    CV Status:  Stable    Pain management: satisfactory to patient       Mental Status:  Alert and awake   Hydration Status:  Euvolemic   PONV Controlled:  Controlled   Airway Patency:  Patent     Post Op Vitals Reviewed: Yes    No anethesia notable event occurred.    Staff: CRNA, Anesthesiologist               /75 (04/12/24 1538)    Temp      Pulse 84 (04/12/24 1538)   Resp 14 (04/12/24 1538)    SpO2 96 % (04/12/24 1538)

## 2024-04-13 PROBLEM — N61.1 BREAST ABSCESS: Status: ACTIVE | Noted: 2024-04-09

## 2024-04-13 LAB
ANION GAP SERPL CALCULATED.3IONS-SCNC: 9 MMOL/L (ref 4–13)
BACTERIA WND AEROBE CULT: ABNORMAL
BASOPHILS # BLD MANUAL: 0 THOUSAND/UL (ref 0–0.1)
BASOPHILS NFR MAR MANUAL: 0 % (ref 0–1)
BUN SERPL-MCNC: 3 MG/DL (ref 5–25)
CALCIUM SERPL-MCNC: 8.5 MG/DL (ref 8.4–10.2)
CHLORIDE SERPL-SCNC: 103 MMOL/L (ref 96–108)
CO2 SERPL-SCNC: 25 MMOL/L (ref 21–32)
CREAT SERPL-MCNC: 0.55 MG/DL (ref 0.6–1.3)
EOSINOPHIL # BLD MANUAL: 0.36 THOUSAND/UL (ref 0–0.4)
EOSINOPHIL NFR BLD MANUAL: 4 % (ref 0–6)
ERYTHROCYTE [DISTWIDTH] IN BLOOD BY AUTOMATED COUNT: 13.1 % (ref 11.6–15.1)
GFR SERPL CREATININE-BSD FRML MDRD: 113 ML/MIN/1.73SQ M
GLUCOSE SERPL-MCNC: 131 MG/DL (ref 65–140)
GLUCOSE SERPL-MCNC: 137 MG/DL (ref 65–140)
GLUCOSE SERPL-MCNC: 141 MG/DL (ref 65–140)
GLUCOSE SERPL-MCNC: 159 MG/DL (ref 65–140)
GLUCOSE SERPL-MCNC: 203 MG/DL (ref 65–140)
GRAM STN SPEC: ABNORMAL
GRAM STN SPEC: ABNORMAL
HCT VFR BLD AUTO: 29.6 % (ref 34.8–46.1)
HGB BLD-MCNC: 9.6 G/DL (ref 11.5–15.4)
LYMPHOCYTES # BLD AUTO: 1.88 THOUSAND/UL (ref 0.6–4.47)
LYMPHOCYTES # BLD AUTO: 20 % (ref 14–44)
MCH RBC QN AUTO: 31 PG (ref 26.8–34.3)
MCHC RBC AUTO-ENTMCNC: 32.4 G/DL (ref 31.4–37.4)
MCV RBC AUTO: 96 FL (ref 82–98)
METAMYELOCYTES NFR BLD MANUAL: 1 % (ref 0–1)
MONOCYTES # BLD AUTO: 0.36 THOUSAND/UL (ref 0–1.22)
MONOCYTES NFR BLD: 4 % (ref 4–12)
MYELOCYTES NFR BLD MANUAL: 2 % (ref 0–1)
NEUTROPHILS # BLD MANUAL: 6.08 THOUSAND/UL (ref 1.85–7.62)
NEUTS BAND NFR BLD MANUAL: 1 % (ref 0–8)
NEUTS SEG NFR BLD AUTO: 67 % (ref 43–75)
PLATELET # BLD AUTO: 405 THOUSANDS/UL (ref 149–390)
PLATELET BLD QL SMEAR: ABNORMAL
PMV BLD AUTO: 9.7 FL (ref 8.9–12.7)
POTASSIUM SERPL-SCNC: 3.5 MMOL/L (ref 3.5–5.3)
RBC # BLD AUTO: 3.1 MILLION/UL (ref 3.81–5.12)
RBC MORPH BLD: NORMAL
SODIUM SERPL-SCNC: 137 MMOL/L (ref 135–147)
VANCOMYCIN SERPL-MCNC: 9.4 UG/ML (ref 10–20)
VARIANT LYMPHS # BLD AUTO: 1 %
WBC # BLD AUTO: 8.94 THOUSAND/UL (ref 4.31–10.16)

## 2024-04-13 PROCEDURE — 85027 COMPLETE CBC AUTOMATED: CPT | Performed by: SPECIALIST

## 2024-04-13 PROCEDURE — 82948 REAGENT STRIP/BLOOD GLUCOSE: CPT

## 2024-04-13 PROCEDURE — 80048 BASIC METABOLIC PNL TOTAL CA: CPT | Performed by: SPECIALIST

## 2024-04-13 PROCEDURE — 80202 ASSAY OF VANCOMYCIN: CPT | Performed by: SPECIALIST

## 2024-04-13 PROCEDURE — 85007 BL SMEAR W/DIFF WBC COUNT: CPT | Performed by: SPECIALIST

## 2024-04-13 PROCEDURE — 99232 SBSQ HOSP IP/OBS MODERATE 35: CPT | Performed by: INTERNAL MEDICINE

## 2024-04-13 PROCEDURE — 99024 POSTOP FOLLOW-UP VISIT: CPT | Performed by: SPECIALIST

## 2024-04-13 RX ADMIN — VANCOMYCIN HYDROCHLORIDE 1500 MG: 1 INJECTION, POWDER, LYOPHILIZED, FOR SOLUTION INTRAVENOUS at 09:06

## 2024-04-13 RX ADMIN — AMLODIPINE BESYLATE 10 MG: 10 TABLET ORAL at 08:19

## 2024-04-13 RX ADMIN — MORPHINE SULFATE 2 MG: 2 INJECTION, SOLUTION INTRAMUSCULAR; INTRAVENOUS at 09:04

## 2024-04-13 RX ADMIN — OXYCODONE HYDROCHLORIDE 5 MG: 5 TABLET ORAL at 06:35

## 2024-04-13 RX ADMIN — ENOXAPARIN SODIUM 40 MG: 40 INJECTION SUBCUTANEOUS at 08:18

## 2024-04-13 RX ADMIN — VANCOMYCIN HYDROCHLORIDE 2000 MG: 1 INJECTION, POWDER, LYOPHILIZED, FOR SOLUTION INTRAVENOUS at 18:03

## 2024-04-13 RX ADMIN — MORPHINE SULFATE 2 MG: 2 INJECTION, SOLUTION INTRAMUSCULAR; INTRAVENOUS at 13:46

## 2024-04-13 RX ADMIN — OXYCODONE HYDROCHLORIDE 10 MG: 10 TABLET, FILM COATED, EXTENDED RELEASE ORAL at 22:20

## 2024-04-13 RX ADMIN — CEFTRIAXONE 2000 MG: 2 INJECTION, SOLUTION INTRAVENOUS at 15:01

## 2024-04-13 RX ADMIN — INSULIN LISPRO 12 UNITS: 100 INJECTION, SOLUTION INTRAVENOUS; SUBCUTANEOUS at 11:44

## 2024-04-13 RX ADMIN — INSULIN LISPRO 12 UNITS: 100 INJECTION, SOLUTION INTRAVENOUS; SUBCUTANEOUS at 08:21

## 2024-04-13 RX ADMIN — INSULIN LISPRO 12 UNITS: 100 INJECTION, SOLUTION INTRAVENOUS; SUBCUTANEOUS at 16:40

## 2024-04-13 RX ADMIN — OXYCODONE HYDROCHLORIDE 10 MG: 10 TABLET, FILM COATED, EXTENDED RELEASE ORAL at 08:19

## 2024-04-13 RX ADMIN — MORPHINE SULFATE 2 MG: 2 INJECTION, SOLUTION INTRAMUSCULAR; INTRAVENOUS at 19:48

## 2024-04-13 RX ADMIN — OXYCODONE HYDROCHLORIDE 5 MG: 5 TABLET ORAL at 17:28

## 2024-04-13 RX ADMIN — INSULIN GLARGINE 40 UNITS: 100 INJECTION, SOLUTION SUBCUTANEOUS at 22:20

## 2024-04-13 RX ADMIN — MORPHINE SULFATE 2 MG: 2 INJECTION, SOLUTION INTRAMUSCULAR; INTRAVENOUS at 03:30

## 2024-04-13 RX ADMIN — SODIUM CHLORIDE, SODIUM GLUCONATE, SODIUM ACETATE, POTASSIUM CHLORIDE, MAGNESIUM CHLORIDE, SODIUM PHOSPHATE, DIBASIC, AND POTASSIUM PHOSPHATE 125 ML/HR: .53; .5; .37; .037; .03; .012; .00082 INJECTION, SOLUTION INTRAVENOUS at 09:07

## 2024-04-13 RX ADMIN — ATORVASTATIN CALCIUM 20 MG: 20 TABLET, FILM COATED ORAL at 08:19

## 2024-04-13 RX ADMIN — OXYCODONE HYDROCHLORIDE 5 MG: 5 TABLET ORAL at 00:28

## 2024-04-13 RX ADMIN — INSULIN LISPRO 4 UNITS: 100 INJECTION, SOLUTION INTRAVENOUS; SUBCUTANEOUS at 11:44

## 2024-04-13 NOTE — ASSESSMENT & PLAN NOTE
Lab Results   Component Value Date    HGBA1C 9.7 (H) 11/28/2022       Recent Labs     04/12/24  1046 04/12/24  1535 04/12/24  2030 04/13/24  0518   POCGLU 151* 102 271* 159*         Blood Sugar Average: Last 72 hrs:  (P) 198.4598296112163126  BG control improved   patient was on Lantus 30 units daily and Humalog 6 units t.i.d. With meals  Recently increased Lantus to 40 units nightly and Humalog to 12 units 3 times daily AC.  She was supposed to follow with endocrinology outpatient  Place on [facility] type II diet

## 2024-04-13 NOTE — PROGRESS NOTES
[facility]  Progress Note  Name: Janine Stafford I  MRN: [id number]  Unit/Bed#: 7T [facility] 702-01 I Date of Admission: 4/9/2024   Date of Service: 4/13/2024 I Hospital Day: 4    Assessment/Plan   * Sepsis (HCC)  Assessment & Plan  Presents to ED with redness and pain of R breast  SIRS met: SIRS: 2/4; tachycardia, and WBC  Source of infection: mastitis and possible breast abscess    Lab Results   Component Value Date    WBC 8.94 04/13/2024    PROCALCITONI <0.05 09/30/2018    LACTICACID 1.5 04/09/2024       IVF given in total of 1L bolus in ED  Continue IVF  Cultures   Blood cultures -no growth at 72 hours  Wound culture 4/11 grows rare gram-positive cocci in cluster  Wound culture 4/12 showed gram-positive cocci in pairs    IV antibiotics; IV ceftriaxone initially given in the ED  Initially was on IV ceftriaxone, added vancomycin and Flagyl on 4/11/2024  ID consult placed appreciate recommendations.  Recommended to continue IV vancomycin and ceftriaxone for now.      Hypokalemia  Assessment & Plan  Potassium 3.5  Repleted as needed  Follow BMP    Moderate asthma  Assessment & Plan  Not in acute exacerbation  Continue home albuterol, duonebs    Breast abscess  Assessment & Plan  Order right breast ultrasound to rule out abscess, however in-house technician does not specialize in breast ultrasound   Discussed with Dr. Bradley Kocher -recommended to get general surgery consult if there is a concern of breast abscess before getting right breast ultrasound.    Diagnostic exams can be done on Tuesdays and Thursdays at [place] with dedicated breast sonographers if necessary.  Dr. Seymour on board appreciate help and recommendations  On 4/12/2024 patient underwent OR drainage with large amount of pus in the breast parenchyma forming a large abscess cavity.   Mammogram cannot be done as an inpatient.  Recommended outpatient mammogram.    Type 2 diabetes mellitus, without long-term current use of  insulin ([facility])  Assessment & Plan  Lab Results   Component Value Date    HGBA1C 9.7 (H) 11/28/2022       Recent Labs     04/12/24  1046 04/12/24  1535 04/12/24  2030 04/13/24  0518   POCGLU 151* 102 271* 159*         Blood Sugar Average: Last 72 hrs:  (P) 198.2623807825293949  BG control improved   patient was on Lantus 30 units daily and Humalog 6 units t.i.d. With meals  Recently increased Lantus to 40 units nightly and Humalog to 12 units 3 times daily AC.  She was supposed to follow with endocrinology outpatient  Place on [facility] type II diet      Hypertension  Assessment & Plan  Continue amlodipine      Morbid obesity with BMI of 40.0-44.9, adult ([facility])  Assessment & Plan  Counseled on diet and lifestyle modification           VTE Pharmacologic Prophylaxis:   Pharmacologic: Enoxaparin (Lovenox)  Mechanical VTE Prophylaxis in Place: Yes    Patient Centered Rounds: I have performed bedside rounds with nursing staff today.    Discussions with Specialists or Other Care Team Provider: Discussed with RN    Education and Discussions with Family / Patient: Discussed with patient    Time Spent for Care:  35 minutes .  This time was spent on one or more of the following: performing physical exam; counseling and coordination of care; obtaining or reviewing history; documenting in the medical record; reviewing/ordering tests, medications or procedures; communicating with other healthcare professionals and discussing with patient's family/caregivers.    Current Length of Stay: 4 day(s)    Current Patient Status: Inpatient   Certification Statement: The patient will continue to require additional inpatient hospital stay due to right breast abscess requiring IV antibiotic and cultures to follow    Discharge Plan / Estimated Discharge Date: Pending clinical course      Code Status: Level 1 - Full Code      Subjective:   Patient was seen and examined at bedside. The patient rated her breast pain is 9/10.  Oxycodone with minimal  relief in symptoms.  Will give IV morphine for breakthrough pain.    Objective:     Vitals:   Temp (24hrs), Av.6 °F (36.4 °C), Min:96.7 °F (35.9 °C), Max:98.6 °F (37 °C)    Temp:  [96.7 °F (35.9 °C)-98.6 °F (37 °C)] 96.7 °F (35.9 °C)  HR:  [79-88] 80  Resp:  [14-20] 20  BP: (119-156)/(75-92) 121/83  SpO2:  [91 %-98 %] 95 %  Body mass index is 41.61 kg/m².     Input and Output Summary (last 24 hours):       Intake/Output Summary (Last 24 hours) at 2024 09  Last data filed at 2024 09  Gross per 24 hour   Intake 6831.25 ml   Output --   Net 6831.25 ml       Physical Exam:     Physical Exam  General: no acute distress  HEENT: NC/AT, PERRL, EOM - normal  Neck: Supple  Pulm/Chest: Normal chest wall expansion, clear breath sounds on both side, right breast covered in dressing  CVS: normal S1&S2, capillary refill <2s  Abd: soft, non tender, non distended, bowel sounds +  MSK: move all 4 extremities spontaneously  Skin: warm  CNS: no acute focal neuro deficit      Additional Data:     Labs:    Results from last 7 days   Lab Units 24  0506   WBC Thousand/uL 8.94   < > 11.43*   HEMOGLOBIN g/dL 9.6*   < > 10.6*   HEMATOCRIT % 29.6*   < > 32.3*   PLATELETS Thousands/uL 405*   < > 361   SEGS PCT %  --   --  75   LYMPHO PCT % 20   < > 16   MONO PCT % 4   < > 6   EOS PCT % 4   < > 2    < > = values in this interval not displayed.     Results from last 7 days   Lab Units 04/13/24  0519 04/10/24  04324  1656   POTASSIUM mmol/L 3.5   < > 3.5   CHLORIDE mmol/L 103   < > 96   CO2 mmol/L 25   < > 29   BUN mg/dL 3*   < > 6   CREATININE mg/dL 0.55*   < > 0.81   CALCIUM mg/dL 8.5   < > 9.4   ALK PHOS U/L  --   --  82   ALT U/L  --   --  15   AST U/L  --   --  17    < > = values in this interval not displayed.     Results from last 7 days   Lab Units 24  1656   INR  1.04       * I Have Reviewed All Lab Data Listed Above.  * Additional Pertinent Lab Tests Reviewed: All Labs  For Current Hospital Admission Reviewed    Imaging:      I have reviewed pertinent imaging.      Recent Cultures (last 7 days):     Results from last 7 days   Lab Units 04/12/24  1501 04/11/24  0954 04/09/24  1712 04/09/24  1657   BLOOD CULTURE   --   --  No Growth at 72 hrs. No Growth at 72 hrs.   GRAM STAIN RESULT  4+ Polys*  4+ Gram positive cocci in pairs*  1+ Gram negative rods* No polys seen*  Rare Gram positive cocci in clusters*  --   --    WOUND CULTURE   --  1+ Growth of  --   --        Last 24 Hours Medication List:   Current Facility-Administered Medications   Medication Dose Route Frequency Provider Last Rate    acetaminophen  650 mg Oral Q6H PRN Danny Seymour MD      albuterol  2 puff Inhalation Q6H PRN Danny Seymour MD      albuterol  2.5 mg Nebulization Q6H PRN Danny Seymour MD      ALPRAZolam  0.5 mg Oral BID PRN Danny Seymour MD      aluminum-magnesium hydroxide-simethicone  30 mL Oral Q6H PRN Danny Seymour MD      amLODIPine  10 mg Oral Daily Danny Seymour MD      atorvastatin  20 mg Oral Daily Danny Seymour MD      cefTRIAXone  2,000 mg Intravenous Q24H Danny Seymour MD 2,000 mg (04/11/24 1703)    enoxaparin  40 mg Subcutaneous Daily Danny Seymour MD      insulin glargine  40 Units Subcutaneous HS Danny Seymour MD      insulin lispro  12 Units Subcutaneous TID With Meals Danny Seymour MD      insulin lispro  2-12 Units Subcutaneous 4x Daily (AC & HS) Danny Seymour MD      morphine injection  2 mg Intravenous Q4H PRN Danny Seymour MD      multi-electrolyte  125 mL/hr Intravenous Continuous Danny Seymour  mL/hr (04/13/24 0907)    ondansetron  4 mg Intravenous Q6H PRN Danny Seymour MD      oxyCODONE  10 mg Oral Q12H [facility] Danny Seymour MD      oxyCODONE  5 mg Oral Q6H PRN Danny Seymour MD      polyethylene glycol  17 g Oral Daily PRN Danny Seymour MD      vancomycin  15 mg/kg (Adjusted) Intravenous Q12H Danny Seymour MD 1,500 mg (04/13/24 0906)        Today, Patient Was Seen By: Daly Austin,  MD    ** Please Note: Dragon 360 Dictation voice to text software may have been used in the creation of this document. **

## 2024-04-13 NOTE — ASSESSMENT & PLAN NOTE
Presents to ED with redness and pain of R breast  SIRS met: SIRS: 2/4; tachycardia, and WBC  Source of infection: mastitis and possible breast abscess    Lab Results   Component Value Date    WBC 8.94 04/13/2024    PROCALCITONI <0.05 09/30/2018    LACTICACID 1.5 04/09/2024       IVF given in total of 1L bolus in ED  Continue IVF  Cultures   Blood cultures -no growth at 72 hours  Wound culture 4/11 grows rare gram-positive cocci in cluster  Wound culture 4/12 showed gram-positive cocci in pairs    IV antibiotics; IV ceftriaxone initially given in the ED  Initially was on IV ceftriaxone, added vancomycin and Flagyl on 4/11/2024  ID consult placed appreciate recommendations.  Recommended to continue IV vancomycin and ceftriaxone for now.

## 2024-04-13 NOTE — PLAN OF CARE
Problem: PAIN - ADULT  Goal: Verbalizes/displays adequate comfort level or baseline comfort level  Description: Interventions:  - Encourage patient to monitor pain and request assistance  - Assess pain using appropriate pain scale  - Administer analgesics based on type and severity of pain and evaluate response  - Implement non-pharmacological measures as appropriate and evaluate response  - Consider cultural and social influences on pain and pain management  - Notify physician/advanced practitioner if interventions unsuccessful or patient reports new pain  Outcome: Progressing     Problem: INFECTION - ADULT  Goal: Absence or prevention of progression during hospitalization  Description: INTERVENTIONS:  - Assess and monitor for signs and symptoms of infection  - Monitor lab/diagnostic results  - Monitor all insertion sites, i.e. indwelling lines, tubes, and drains  - Monitor endotracheal if appropriate and nasal secretions for changes in amount and color  - [place] appropriate cooling/warming therapies per order  - Administer medications as ordered  - Instruct and encourage patient and family to use good hand hygiene technique  - Identify and instruct in appropriate isolation precautions for identified infection/condition  Outcome: Progressing  Goal: Absence of fever/infection during neutropenic period  Description: INTERVENTIONS:  - Monitor WBC    Outcome: Progressing     Problem: SAFETY ADULT  Goal: Patient will remain free of falls  Description: INTERVENTIONS:  - Educate patient/family on patient safety including physical limitations  - Instruct patient to call for assistance with activity   - Consult OT/PT to assist with strengthening/mobility   - Keep Call bell within reach  - Keep bed low and locked with side rails adjusted as appropriate  - Keep care items and personal belongings within reach  - Initiate and maintain comfort rounds  - Make Fall Risk Sign visible to staff  - Offer Toileting every 2 Hours,  in advance of need  - Apply yellow socks and bracelet for high fall risk patients  - Consider moving patient to room near nurses station  Outcome: Progressing     Problem: DISCHARGE PLANNING  Goal: Discharge to home or other facility with appropriate resources  Description: INTERVENTIONS:  - Identify barriers to discharge w/patient and caregiver  - Arrange for needed discharge resources and transportation as appropriate  - Identify discharge learning needs (meds, wound care, etc.)  - Arrange for interpretive services to assist at discharge as needed  - Refer to Case Management Department for coordinating discharge planning if the patient needs post-hospital services based on physician/advanced practitioner order or complex needs related to functional status, cognitive ability, or social support system  Outcome: Progressing     Problem: Knowledge Deficit  Goal: Patient/family/caregiver demonstrates understanding of disease process, treatment plan, medications, and discharge instructions  Description: Complete learning assessment and assess knowledge base.  Interventions:  - Provide teaching at level of understanding  - Provide teaching via preferred learning methods  Outcome: Progressing     Problem: METABOLIC, FLUID AND ELECTROLYTES - ADULT  Goal: Glucose maintained within target range  Description: INTERVENTIONS:  - Monitor Blood Glucose as ordered  - Assess for signs and symptoms of hyperglycemia and hypoglycemia  - Administer ordered medications to maintain glucose within target range  - Assess nutritional intake and initiate nutrition service referral as needed  Outcome: Progressing

## 2024-04-13 NOTE — ASSESSMENT & PLAN NOTE
Order right breast ultrasound to rule out abscess, however in-house technician does not specialize in breast ultrasound   Discussed with Dr. Bradley Kocher -recommended to get general surgery consult if there is a concern of breast abscess before getting right breast ultrasound.    Diagnostic exams can be done on Tuesdays and Thursdays at [facility] with dedicated breast sonographers if necessary.  Dr. Seymour on board appreciate help and recommendations  On 4/12/2024 patient underwent OR drainage with large amount of pus in the breast parenchyma forming a large abscess cavity.   Mammogram cannot be done as an inpatient.  Recommended outpatient mammogram.

## 2024-04-13 NOTE — PROGRESS NOTES
Janine Stafford is a 43 y.o. female who is currently ordered Vancomycin IV with management by the Pharmacy Consult service.  Relevant clinical data and objective / subjective history reviewed.  Vancomycin Assessment:  Indication and Goal AUC/Trough: Soft tissue (goal -600, trough >10)  Clinical Status: stable  Micro:     Renal Function:  SCr: 0.55 mg/dL  CrCl: 190.7 mL/min  Renal replacement: Not on dialysis  Days of Therapy: 3  Current Dose: 1500mg IV every 12 hours  Vancomycin Plan:  New Dosinmg IV every 12 hours  Estimated AUC: 475 mcg*hr/mL  Estimated Trough: 11.3 mcg/mL  Next Level: 24 with am labs  Renal Function Monitoring: Daily BMP and UOP  Pharmacy will continue to follow closely for s/sx of nephrotoxicity, infusion reactions and appropriateness of therapy.  BMP and CBC will be ordered per protocol. We will continue to follow the patient’s culture results and clinical progress daily.    Rosana Marquez, Pharmacist

## 2024-04-13 NOTE — PROGRESS NOTES
Postop day #1 status post I&D of large right breast abscess.    Complaint of right breast pain but less than preop.  Wound redressed.  Drain in place.  Moderate drainage noted.    Gram stain noted gram-positive cocci and gram-negative rods.  Cultures pending.    Antibiotics as per ID.    Continue drain additional 24 to 48 hours.

## 2024-04-14 PROBLEM — E87.6 HYPOKALEMIA: Status: RESOLVED | Noted: 2024-04-10 | Resolved: 2024-04-14

## 2024-04-14 LAB
ANION GAP SERPL CALCULATED.3IONS-SCNC: 9 MMOL/L (ref 4–13)
BACTERIA BLD CULT: NORMAL
BACTERIA BLD CULT: NORMAL
BACTERIA SPEC ANAEROBE CULT: ABNORMAL
BASOPHILS # BLD MANUAL: 0.08 THOUSAND/UL (ref 0–0.1)
BASOPHILS NFR MAR MANUAL: 1 % (ref 0–1)
BUN SERPL-MCNC: 4 MG/DL (ref 5–25)
CALCIUM SERPL-MCNC: 9.2 MG/DL (ref 8.4–10.2)
CHLORIDE SERPL-SCNC: 101 MMOL/L (ref 96–108)
CO2 SERPL-SCNC: 28 MMOL/L (ref 21–32)
CREAT SERPL-MCNC: 0.58 MG/DL (ref 0.6–1.3)
EOSINOPHIL # BLD MANUAL: 0.25 THOUSAND/UL (ref 0–0.4)
EOSINOPHIL NFR BLD MANUAL: 3 % (ref 0–6)
ERYTHROCYTE [DISTWIDTH] IN BLOOD BY AUTOMATED COUNT: 13.1 % (ref 11.6–15.1)
GFR SERPL CREATININE-BSD FRML MDRD: 111 ML/MIN/1.73SQ M
GLUCOSE SERPL-MCNC: 120 MG/DL (ref 65–140)
GLUCOSE SERPL-MCNC: 129 MG/DL (ref 65–140)
GLUCOSE SERPL-MCNC: 147 MG/DL (ref 65–140)
GLUCOSE SERPL-MCNC: 155 MG/DL (ref 65–140)
GLUCOSE SERPL-MCNC: 225 MG/DL (ref 65–140)
HCT VFR BLD AUTO: 32.1 % (ref 34.8–46.1)
HGB BLD-MCNC: 10.5 G/DL (ref 11.5–15.4)
LYMPHOCYTES # BLD AUTO: 2.47 THOUSAND/UL (ref 0.6–4.47)
LYMPHOCYTES # BLD AUTO: 28 % (ref 14–44)
MCH RBC QN AUTO: 31.2 PG (ref 26.8–34.3)
MCHC RBC AUTO-ENTMCNC: 32.7 G/DL (ref 31.4–37.4)
MCV RBC AUTO: 95 FL (ref 82–98)
MONOCYTES # BLD AUTO: 0.58 THOUSAND/UL (ref 0–1.22)
MONOCYTES NFR BLD: 7 % (ref 4–12)
NEUTROPHILS # BLD MANUAL: 4.85 THOUSAND/UL (ref 1.85–7.62)
NEUTS SEG NFR BLD AUTO: 59 % (ref 43–75)
PLATELET # BLD AUTO: 464 THOUSANDS/UL (ref 149–390)
PLATELET BLD QL SMEAR: ABNORMAL
PMV BLD AUTO: 9.2 FL (ref 8.9–12.7)
POTASSIUM SERPL-SCNC: 3.5 MMOL/L (ref 3.5–5.3)
RBC # BLD AUTO: 3.37 MILLION/UL (ref 3.81–5.12)
RBC MORPH BLD: NORMAL
SODIUM SERPL-SCNC: 138 MMOL/L (ref 135–147)
VARIANT LYMPHS # BLD AUTO: 2 %
WBC # BLD AUTO: 8.22 THOUSAND/UL (ref 4.31–10.16)

## 2024-04-14 PROCEDURE — 85007 BL SMEAR W/DIFF WBC COUNT: CPT | Performed by: SPECIALIST

## 2024-04-14 PROCEDURE — 82948 REAGENT STRIP/BLOOD GLUCOSE: CPT

## 2024-04-14 PROCEDURE — 80048 BASIC METABOLIC PNL TOTAL CA: CPT | Performed by: SPECIALIST

## 2024-04-14 PROCEDURE — 99024 POSTOP FOLLOW-UP VISIT: CPT | Performed by: SPECIALIST

## 2024-04-14 PROCEDURE — 99232 SBSQ HOSP IP/OBS MODERATE 35: CPT | Performed by: INTERNAL MEDICINE

## 2024-04-14 PROCEDURE — 85027 COMPLETE CBC AUTOMATED: CPT | Performed by: SPECIALIST

## 2024-04-14 RX ADMIN — SODIUM CHLORIDE, SODIUM GLUCONATE, SODIUM ACETATE, POTASSIUM CHLORIDE, MAGNESIUM CHLORIDE, SODIUM PHOSPHATE, DIBASIC, AND POTASSIUM PHOSPHATE 125 ML/HR: .53; .5; .37; .037; .03; .012; .00082 INJECTION, SOLUTION INTRAVENOUS at 08:26

## 2024-04-14 RX ADMIN — MORPHINE SULFATE 2 MG: 2 INJECTION, SOLUTION INTRAMUSCULAR; INTRAVENOUS at 11:50

## 2024-04-14 RX ADMIN — INSULIN LISPRO 2 UNITS: 100 INJECTION, SOLUTION INTRAVENOUS; SUBCUTANEOUS at 16:01

## 2024-04-14 RX ADMIN — VANCOMYCIN HYDROCHLORIDE 2000 MG: 1 INJECTION, POWDER, LYOPHILIZED, FOR SOLUTION INTRAVENOUS at 08:28

## 2024-04-14 RX ADMIN — INSULIN LISPRO 4 UNITS: 100 INJECTION, SOLUTION INTRAVENOUS; SUBCUTANEOUS at 22:25

## 2024-04-14 RX ADMIN — SODIUM CHLORIDE, SODIUM GLUCONATE, SODIUM ACETATE, POTASSIUM CHLORIDE, MAGNESIUM CHLORIDE, SODIUM PHOSPHATE, DIBASIC, AND POTASSIUM PHOSPHATE 125 ML/HR: .53; .5; .37; .037; .03; .012; .00082 INJECTION, SOLUTION INTRAVENOUS at 02:08

## 2024-04-14 RX ADMIN — AMLODIPINE BESYLATE 10 MG: 10 TABLET ORAL at 08:20

## 2024-04-14 RX ADMIN — CEFTRIAXONE 2000 MG: 2 INJECTION, SOLUTION INTRAVENOUS at 13:44

## 2024-04-14 RX ADMIN — INSULIN LISPRO 12 UNITS: 100 INJECTION, SOLUTION INTRAVENOUS; SUBCUTANEOUS at 08:24

## 2024-04-14 RX ADMIN — INSULIN GLARGINE 40 UNITS: 100 INJECTION, SOLUTION SUBCUTANEOUS at 22:25

## 2024-04-14 RX ADMIN — INSULIN LISPRO 12 UNITS: 100 INJECTION, SOLUTION INTRAVENOUS; SUBCUTANEOUS at 11:48

## 2024-04-14 RX ADMIN — ATORVASTATIN CALCIUM 20 MG: 20 TABLET, FILM COATED ORAL at 08:20

## 2024-04-14 RX ADMIN — OXYCODONE HYDROCHLORIDE 10 MG: 10 TABLET, FILM COATED, EXTENDED RELEASE ORAL at 20:18

## 2024-04-14 RX ADMIN — ENOXAPARIN SODIUM 40 MG: 40 INJECTION SUBCUTANEOUS at 08:20

## 2024-04-14 RX ADMIN — OXYCODONE HYDROCHLORIDE 10 MG: 10 TABLET, FILM COATED, EXTENDED RELEASE ORAL at 08:21

## 2024-04-14 RX ADMIN — MORPHINE SULFATE 2 MG: 2 INJECTION, SOLUTION INTRAMUSCULAR; INTRAVENOUS at 17:40

## 2024-04-14 RX ADMIN — VANCOMYCIN HYDROCHLORIDE 2000 MG: 1 INJECTION, POWDER, LYOPHILIZED, FOR SOLUTION INTRAVENOUS at 18:08

## 2024-04-14 RX ADMIN — INSULIN LISPRO 12 UNITS: 100 INJECTION, SOLUTION INTRAVENOUS; SUBCUTANEOUS at 16:02

## 2024-04-14 NOTE — ASSESSMENT & PLAN NOTE
Presents to ED with redness and pain of R breast  SIRS met: SIRS: 2/4; tachycardia, and WBC  Source of infection: mastitis and possible breast abscess    Lab Results   Component Value Date    WBC 8.22 04/14/2024    PROCALCITONI <0.05 09/30/2018    LACTICACID 1.5 04/09/2024       IVF given in total of 1L bolus in ED  Continue IVF  Cultures   Blood cultures -no growth at 72 hours  Wound culture 4/11 grows rare gram-positive cocci in cluster  Wound culture 4/12 showed gram-positive cocci in pairs, GNR, awaiting anaerobic culture    IV antibiotics; IV ceftriaxone initially given in the ED  Initially was on IV ceftriaxone, added vancomycin and Flagyl on 4/11/2024  ID consult placed appreciate recommendations.  Recommended to continue IV vancomycin and ceftriaxone for now.

## 2024-04-14 NOTE — PLAN OF CARE
Problem: PAIN - ADULT  Goal: Verbalizes/displays adequate comfort level or baseline comfort level  Description: Interventions:  - Encourage patient to monitor pain and request assistance  - Assess pain using appropriate pain scale  - Administer analgesics based on type and severity of pain and evaluate response  - Implement non-pharmacological measures as appropriate and evaluate response  - Consider cultural and social influences on pain and pain management  - Notify physician/advanced practitioner if interventions unsuccessful or patient reports new pain  Outcome: Progressing     Problem: INFECTION - ADULT  Goal: Absence or prevention of progression during hospitalization  Description: INTERVENTIONS:  - Assess and monitor for signs and symptoms of infection  - Monitor lab/diagnostic results  - Monitor all insertion sites, i.e. indwelling lines, tubes, and drains  - Monitor endotracheal if appropriate and nasal secretions for changes in amount and color  - [place] appropriate cooling/warming therapies per order  - Administer medications as ordered  - Instruct and encourage patient and family to use good hand hygiene technique  - Identify and instruct in appropriate isolation precautions for identified infection/condition  Outcome: Progressing  Goal: Absence of fever/infection during neutropenic period  Description: INTERVENTIONS:  - Monitor WBC    Outcome: Progressing     Problem: SAFETY ADULT  Goal: Patient will remain free of falls  Description: INTERVENTIONS:  - Educate patient/family on patient safety including physical limitations  - Instruct patient to call for assistance with activity   - Consult OT/PT to assist with strengthening/mobility   - Keep Call bell within reach  - Keep bed low and locked with side rails adjusted as appropriate  - Keep care items and personal belongings within reach  - Initiate and maintain comfort rounds  - Make Fall Risk Sign visible to staff  - Offer Toileting every 2 Hours,  in advance of need  - Apply yellow socks and bracelet for high fall risk patients  - Consider moving patient to room near nurses station  Outcome: Progressing     Problem: DISCHARGE PLANNING  Goal: Discharge to home or other facility with appropriate resources  Description: INTERVENTIONS:  - Identify barriers to discharge w/patient and caregiver  - Arrange for needed discharge resources and transportation as appropriate  - Identify discharge learning needs (meds, wound care, etc.)  - Arrange for interpretive services to assist at discharge as needed  - Refer to Case Management Department for coordinating discharge planning if the patient needs post-hospital services based on physician/advanced practitioner order or complex needs related to functional status, cognitive ability, or social support system  Outcome: Progressing     Problem: Knowledge Deficit  Goal: Patient/family/caregiver demonstrates understanding of disease process, treatment plan, medications, and discharge instructions  Description: Complete learning assessment and assess knowledge base.  Interventions:  - Provide teaching at level of understanding  - Provide teaching via preferred learning methods  Outcome: Progressing     Problem: METABOLIC, FLUID AND ELECTROLYTES - ADULT  Goal: Glucose maintained within target range  Description: INTERVENTIONS:  - Monitor Blood Glucose as ordered  - Assess for signs and symptoms of hyperglycemia and hypoglycemia  - Administer ordered medications to maintain glucose within target range  - Assess nutritional intake and initiate nutrition service referral as needed  Outcome: Progressing

## 2024-04-14 NOTE — PROGRESS NOTES
Patient seen and examined.  Postop day #2 I&D of large right breast abscess.    Right breast still edematous but decreasing.  Drain minimal amount of purulent drainage.  Tweaked.  Gram stain demonstrates gram-positive cocci in pairs and gram-negative rods with polys.  Culture pending    Continue drain.  Continue IV antibiotics.

## 2024-04-14 NOTE — ASSESSMENT & PLAN NOTE
Lab Results   Component Value Date    HGBA1C 9.7 (H) 11/28/2022       Recent Labs     04/13/24  1110 04/13/24  1526 04/13/24 2032 04/14/24  0532   POCGLU 203* 131 141* 120         Blood Sugar Average: Last 72 hrs:  (P) 166.9568931603046880  BG control improved   patient was on Lantus 30 units daily and Humalog 6 units t.i.d. With meals  Recently increased Lantus to 40 units nightly and Humalog to 12 units 3 times daily AC.  She was supposed to follow with endocrinology outpatient  Place on [facility] type II diet

## 2024-04-14 NOTE — PROGRESS NOTES
[facility]  Progress Note  Name: Janine Stafford I  MRN: [id number]  Unit/Bed#: 7T [facility] 702-01 I Date of Admission: 4/9/2024   Date of Service: 4/14/2024 I Hospital Day: 5    Assessment/Plan   * Sepsis (HCC)  Assessment & Plan  Presents to ED with redness and pain of R breast  SIRS met: SIRS: 2/4; tachycardia, and WBC  Source of infection: mastitis and possible breast abscess    Lab Results   Component Value Date    WBC 8.22 04/14/2024    PROCALCITONI <0.05 09/30/2018    LACTICACID 1.5 04/09/2024       IVF given in total of 1L bolus in ED  Continue IVF  Cultures   Blood cultures -no growth at 72 hours  Wound culture 4/11 grows rare gram-positive cocci in cluster  Wound culture 4/12 showed gram-positive cocci in pairs, GNR, awaiting anaerobic culture    IV antibiotics; IV ceftriaxone initially given in the ED  Initially was on IV ceftriaxone, added vancomycin and Flagyl on 4/11/2024  ID consult placed appreciate recommendations.  Recommended to continue IV vancomycin and ceftriaxone for now.      Moderate asthma  Assessment & Plan  Not in acute exacerbation  Continue home albuterol, duonebs    Breast abscess  Assessment & Plan  Order right breast ultrasound to rule out abscess, however in-house technician does not specialize in breast ultrasound   Discussed with Dr. Bradley Kocher -recommended to get general surgery consult if there is a concern of breast abscess before getting right breast ultrasound.    Dr. Seymour on board appreciate help and recommendations  On 4/12/2024 patient underwent OR drainage with large amount of pus in the breast parenchyma forming a large abscess cavity.   Mammogram cannot be done as an inpatient.  Recommended outpatient mammogram.    Type 2 diabetes mellitus, without long-term current use of insulin (HCC)  Assessment & Plan  Lab Results   Component Value Date    HGBA1C 9.7 (H) 11/28/2022       Recent Labs     04/13/24  1110 04/13/24  1526 04/13/24 2032  04/14/24  0532   POCGLU 203* 131 141* 120         Blood Sugar Average: Last 72 hrs:  (P) [id number]  BG control improved   patient was on Lantus 30 units daily and Humalog 6 units t.i.d. With meals  Recently increased Lantus to 40 units nightly and Humalog to 12 units 3 times daily AC.  She was supposed to follow with endocrinology outpatient  Place on [facility] type II diet      Hypertension  Assessment & Plan  Continue amlodipine      Morbid obesity with BMI of 40.0-44.9, adult ([facility])  Assessment & Plan  Counseled on diet and lifestyle modification    Hypokalemia-resolved as of 4/14/2024  Assessment & Plan  Potassium 3.5  Repleted as needed  Follow BMP           VTE Pharmacologic Prophylaxis:   Pharmacologic: Enoxaparin (Lovenox)  Mechanical VTE Prophylaxis in Place: Yes    Patient Centered Rounds: I have performed bedside rounds with nursing staff today.    Discussions with Specialists or Other Care Team Provider: Discussed with RN    Education and Discussions with Family / Patient: Discussed with patient    Time Spent for Care:  35 minutes .  This time was spent on one or more of the following: performing physical exam; counseling and coordination of care; obtaining or reviewing history; documenting in the medical record; reviewing/ordering tests, medications or procedures; communicating with other healthcare professionals and discussing with patient's family/caregivers.    Current Length of Stay: 5 day(s)    Current Patient Status: Inpatient   Certification Statement: The patient will continue to require additional inpatient hospital stay due to sepsis due to mastitis and breast abscess, awaiting wound culture    Discharge Plan / Estimated Discharge Date: Discharge home once final wound cultures come back      Code Status: Level 1 - Full Code      Subjective:   Patient was seen and examined at bedside. The patient stated her pain is manageable this morning.  No acute overnight changes.    Objective:      Vitals:   Temp (24hrs), Av.8 °F (36.6 °C), Min:97.7 °F (36.5 °C), Max:98 °F (36.7 °C)    Temp:  [97.7 °F (36.5 °C)-98 °F (36.7 °C)] 97.7 °F (36.5 °C)  HR:  [75-90] 77  Resp:  [18-20] 20  BP: (118-142)/(79-91) 142/91  SpO2:  [95 %-98 %] 95 %  Body mass index is 41.61 kg/m².     Input and Output Summary (last 24 hours):       Intake/Output Summary (Last 24 hours) at 2024 0948  Last data filed at 2024 0621  Gross per 24 hour   Intake 3350.41 ml   Output --   Net 3350.41 ml       Physical Exam:     Physical Exam  General: no acute distress  HEENT: NC/AT, PERRL, EOM - normal  Neck: Supple  Pulm/Chest: Normal chest wall expansion, clear breath sounds on both side, breast wound covered in dressing, drain present  CVS: normal S1&S2, capillary refill <2s  Abd: soft, non tender, non distended, bowel sounds +  MSK: move all 4 extremities spontaneously  Skin: warm  CNS: no acute focal neuro deficit      Additional Data:     Labs:    Results from last 7 days   Lab Units 24  0824 24  0519 24  0434 24  0506   WBC Thousand/uL 8.22 8.94   < > 11.43*   HEMOGLOBIN g/dL 10.5* 9.6*   < > 10.6*   HEMATOCRIT % 32.1* 29.6*   < > 32.3*   PLATELETS Thousands/uL 464* 405*   < > 361   SEGS PCT %  --   --   --  75   LYMPHO PCT %  --  20   < > 16   MONO PCT %  --  4   < > 6   EOS PCT %  --  4   < > 2    < > = values in this interval not displayed.     Results from last 7 days   Lab Units 24  0824 04/10/24  0436 24  1656   POTASSIUM mmol/L 3.5   < > 3.5   CHLORIDE mmol/L 101   < > 96   CO2 mmol/L 28   < > 29   BUN mg/dL 4*   < > 6   CREATININE mg/dL 0.58*   < > 0.81   CALCIUM mg/dL 9.2   < > 9.4   ALK PHOS U/L  --   --  82   ALT U/L  --   --  15   AST U/L  --   --  17    < > = values in this interval not displayed.     Results from last 7 days   Lab Units 24  1656   INR  1.04       * I Have Reviewed All Lab Data Listed Above.  * Additional Pertinent Lab Tests Reviewed: All Labs For  Current Hospital Admission Reviewed    Imaging:      I have reviewed pertinent imaging.      Recent Cultures (last 7 days):     Results from last 7 days   Lab Units 04/12/24  1501 04/11/24  0954 04/09/24  1712 04/09/24  1657   BLOOD CULTURE   --   --  No Growth After 4 Days. No Growth After 4 Days.   GRAM STAIN RESULT  4+ Polys*  4+ Gram positive cocci in pairs*  1+ Gram negative rods* No polys seen*  Rare Gram positive cocci in clusters*  --   --    WOUND CULTURE  No growth 1+ Growth of  --   --        Last 24 Hours Medication List:   Current Facility-Administered Medications   Medication Dose Route Frequency Provider Last Rate    acetaminophen  650 mg Oral Q6H PRN Danny Seymour MD      albuterol  2 puff Inhalation Q6H PRN Danny Seymour MD      albuterol  2.5 mg Nebulization Q6H PRN Danny Seymour MD      ALPRAZolam  0.5 mg Oral BID PRN Danny Seymour MD      aluminum-magnesium hydroxide-simethicone  30 mL Oral Q6H PRN Danny Seymour MD      amLODIPine  10 mg Oral Daily Danny Seymour MD      atorvastatin  20 mg Oral Daily Danny Seymour MD      cefTRIAXone  2,000 mg Intravenous Q24H Danny Seymour MD 2,000 mg (04/13/24 1501)    enoxaparin  40 mg Subcutaneous Daily Danny Seymour MD      insulin glargine  40 Units Subcutaneous HS Danny Seymour MD      insulin lispro  12 Units Subcutaneous TID With Meals Danny Seymour MD      insulin lispro  2-12 Units Subcutaneous 4x Daily (AC & HS) Danny Seymour MD      morphine injection  2 mg Intravenous Q4H PRN Danny Seymour MD      multi-electrolyte  125 mL/hr Intravenous Continuous Danny Seymour  mL/hr (04/14/24 0826)    ondansetron  4 mg Intravenous Q6H PRN Danny Seymour MD      oxyCODONE  10 mg Oral Q12H [facility] Danny Seymour MD      oxyCODONE  5 mg Oral Q6H PRN Danny Seymour MD      polyethylene glycol  17 g Oral Daily PRN Danny Seymour MD      vancomycin  2,000 mg Intravenous Q12H Daly Austin MD 2,000 mg (04/14/24 0828)        Today, Patient Was Seen By: Daly Austin MD    **  Please Note: Dragon 360 Dictation voice to text software may have been used in the creation of this document. **

## 2024-04-14 NOTE — PLAN OF CARE
Problem: PAIN - ADULT  Goal: Verbalizes/displays adequate comfort level or baseline comfort level  Description: Interventions:  - Encourage patient to monitor pain and request assistance  - Assess pain using appropriate pain scale  - Administer analgesics based on type and severity of pain and evaluate response  - Implement non-pharmacological measures as appropriate and evaluate response  - Consider cultural and social influences on pain and pain management  - Notify physician/advanced practitioner if interventions unsuccessful or patient reports new pain  Outcome: Progressing     Problem: INFECTION - ADULT  Goal: Absence or prevention of progression during hospitalization  Description: INTERVENTIONS:  - Assess and monitor for signs and symptoms of infection  - Monitor lab/diagnostic results  - Monitor all insertion sites, i.e. indwelling lines, tubes, and drains  - Monitor endotracheal if appropriate and nasal secretions for changes in amount and color  - [place] appropriate cooling/warming therapies per order  - Administer medications as ordered  - Instruct and encourage patient and family to use good hand hygiene technique  - Identify and instruct in appropriate isolation precautions for identified infection/condition  Outcome: Progressing  Goal: Absence of fever/infection during neutropenic period  Description: INTERVENTIONS:  - Monitor WBC    Outcome: Progressing     Problem: SAFETY ADULT  Goal: Patient will remain free of falls  Description: INTERVENTIONS:  - Educate patient/family on patient safety including physical limitations  - Instruct patient to call for assistance with activity   - Consult OT/PT to assist with strengthening/mobility   - Keep Call bell within reach  - Keep bed low and locked with side rails adjusted as appropriate  - Keep care items and personal belongings within reach  - Initiate and maintain comfort rounds  - Make Fall Risk Sign visible to staff  - Apply yellow socks and bracelet  for high fall risk patients  - Consider moving patient to room near nurses station  Outcome: Progressing     Problem: DISCHARGE PLANNING  Goal: Discharge to home or other facility with appropriate resources  Description: INTERVENTIONS:  - Identify barriers to discharge w/patient and caregiver  - Arrange for needed discharge resources and transportation as appropriate  - Identify discharge learning needs (meds, wound care, etc.)  - Arrange for interpretive services to assist at discharge as needed  - Refer to Case Management Department for coordinating discharge planning if the patient needs post-hospital services based on physician/advanced practitioner order or complex needs related to functional status, cognitive ability, or social support system  Outcome: Progressing     Problem: DISCHARGE PLANNING  Goal: Discharge to home or other facility with appropriate resources  Description: INTERVENTIONS:  - Identify barriers to discharge w/patient and caregiver  - Arrange for needed discharge resources and transportation as appropriate  - Identify discharge learning needs (meds, wound care, etc.)  - Arrange for interpretive services to assist at discharge as needed  - Refer to Case Management Department for coordinating discharge planning if the patient needs post-hospital services based on physician/advanced practitioner order or complex needs related to functional status, cognitive ability, or social support system  Outcome: Progressing     Problem: Knowledge Deficit  Goal: Patient/family/caregiver demonstrates understanding of disease process, treatment plan, medications, and discharge instructions  Description: Complete learning assessment and assess knowledge base.  Interventions:  - Provide teaching at level of understanding  - Provide teaching via preferred learning methods  Outcome: Progressing

## 2024-04-14 NOTE — PROGRESS NOTES
Janine Stafford is a 43 y.o. female who is currently ordered Vancomycin IV with management by the Pharmacy Consult service.  Relevant clinical data and objective / subjective history reviewed.  Vancomycin Assessment:  Indication and Goal AUC/Trough: Soft tissue (goal -600, trough >10)  Clinical Status: stable  Micro:     Renal Function:  SCr: 0.58 mg/dL  CrCl: 180.8 mL/min  Renal replacement: Not on dialysis  Days of Therapy: 4  Current Dose: 2000 mg IV q12h  Vancomycin Plan:  New Dosing: No new recommendations at this time  Estimated AUC: 499 mcg*hr/mL  Estimated Trough: 12.1 mcg/mL  Next Level: random at 0600 4/20/24  Renal Function Monitoring: Daily BMP and UOP  Pharmacy will continue to follow closely for s/sx of nephrotoxicity, infusion reactions and appropriateness of therapy.  BMP and CBC will be ordered per protocol. We will continue to follow the patient’s culture results and clinical progress daily.    Sherrell Wakefield, Pharmacist

## 2024-04-14 NOTE — ASSESSMENT & PLAN NOTE
Order right breast ultrasound to rule out abscess, however in-house technician does not specialize in breast ultrasound   Discussed with Dr. Bradley Kocher -recommended to get general surgery consult if there is a concern of breast abscess before getting right breast ultrasound.    Dr. Seymour on board appreciate help and recommendations  On 4/12/2024 patient underwent OR drainage with large amount of pus in the breast parenchyma forming a large abscess cavity.   Mammogram cannot be done as an inpatient.  Recommended outpatient mammogram.

## 2024-04-15 PROBLEM — K59.03 DRUG-INDUCED CONSTIPATION: Status: ACTIVE | Noted: 2024-04-15

## 2024-04-15 PROBLEM — A41.9 SEPSIS (HCC): Status: RESOLVED | Noted: 2024-04-09 | Resolved: 2024-04-15

## 2024-04-15 LAB
ANION GAP SERPL CALCULATED.3IONS-SCNC: 8 MMOL/L (ref 4–13)
BASOPHILS # BLD MANUAL: 0 THOUSAND/UL (ref 0–0.1)
BASOPHILS NFR MAR MANUAL: 0 % (ref 0–1)
BUN SERPL-MCNC: 6 MG/DL (ref 5–25)
CALCIUM SERPL-MCNC: 9.2 MG/DL (ref 8.4–10.2)
CHLORIDE SERPL-SCNC: 102 MMOL/L (ref 96–108)
CO2 SERPL-SCNC: 29 MMOL/L (ref 21–32)
CREAT SERPL-MCNC: 0.57 MG/DL (ref 0.6–1.3)
EOSINOPHIL # BLD MANUAL: 0.13 THOUSAND/UL (ref 0–0.4)
EOSINOPHIL NFR BLD MANUAL: 2 % (ref 0–6)
ERYTHROCYTE [DISTWIDTH] IN BLOOD BY AUTOMATED COUNT: 13.1 % (ref 11.6–15.1)
GFR SERPL CREATININE-BSD FRML MDRD: 112 ML/MIN/1.73SQ M
GLUCOSE SERPL-MCNC: 106 MG/DL (ref 65–140)
GLUCOSE SERPL-MCNC: 112 MG/DL (ref 65–140)
GLUCOSE SERPL-MCNC: 162 MG/DL (ref 65–140)
GLUCOSE SERPL-MCNC: 172 MG/DL (ref 65–140)
GLUCOSE SERPL-MCNC: 99 MG/DL (ref 65–140)
HCT VFR BLD AUTO: 32.6 % (ref 34.8–46.1)
HGB BLD-MCNC: 10.7 G/DL (ref 11.5–15.4)
LYMPHOCYTES # BLD AUTO: 1.78 THOUSAND/UL (ref 0.6–4.47)
LYMPHOCYTES # BLD AUTO: 23 % (ref 14–44)
MCH RBC QN AUTO: 30.9 PG (ref 26.8–34.3)
MCHC RBC AUTO-ENTMCNC: 32.8 G/DL (ref 31.4–37.4)
MCV RBC AUTO: 94 FL (ref 82–98)
MONOCYTES # BLD AUTO: 0.2 THOUSAND/UL (ref 0–1.22)
MONOCYTES NFR BLD: 3 % (ref 4–12)
NEUTROPHILS # BLD MANUAL: 4.49 THOUSAND/UL (ref 1.85–7.62)
NEUTS BAND NFR BLD MANUAL: 1 % (ref 0–8)
NEUTS SEG NFR BLD AUTO: 67 % (ref 43–75)
PLATELET # BLD AUTO: 475 THOUSANDS/UL (ref 149–390)
PLATELET BLD QL SMEAR: ABNORMAL
PMV BLD AUTO: 9.4 FL (ref 8.9–12.7)
POTASSIUM SERPL-SCNC: 3.4 MMOL/L (ref 3.5–5.3)
RBC # BLD AUTO: 3.46 MILLION/UL (ref 3.81–5.12)
RBC MORPH BLD: NORMAL
SODIUM SERPL-SCNC: 139 MMOL/L (ref 135–147)
VARIANT LYMPHS # BLD AUTO: 4 %
WBC # BLD AUTO: 6.61 THOUSAND/UL (ref 4.31–10.16)

## 2024-04-15 PROCEDURE — 82948 REAGENT STRIP/BLOOD GLUCOSE: CPT

## 2024-04-15 PROCEDURE — 99233 SBSQ HOSP IP/OBS HIGH 50: CPT | Performed by: INTERNAL MEDICINE

## 2024-04-15 PROCEDURE — 99232 SBSQ HOSP IP/OBS MODERATE 35: CPT | Performed by: STUDENT IN AN ORGANIZED HEALTH CARE EDUCATION/TRAINING PROGRAM

## 2024-04-15 PROCEDURE — 85007 BL SMEAR W/DIFF WBC COUNT: CPT | Performed by: SPECIALIST

## 2024-04-15 PROCEDURE — 99024 POSTOP FOLLOW-UP VISIT: CPT | Performed by: SPECIALIST

## 2024-04-15 PROCEDURE — 80048 BASIC METABOLIC PNL TOTAL CA: CPT | Performed by: SPECIALIST

## 2024-04-15 PROCEDURE — 85027 COMPLETE CBC AUTOMATED: CPT | Performed by: SPECIALIST

## 2024-04-15 RX ORDER — OXYCODONE HYDROCHLORIDE 5 MG/1
5 TABLET ORAL EVERY 6 HOURS PRN
Status: DISCONTINUED | OUTPATIENT
Start: 2024-04-15 | End: 2024-04-18 | Stop reason: HOSPADM

## 2024-04-15 RX ORDER — ACETAMINOPHEN 325 MG/1
650 TABLET ORAL EVERY 8 HOURS SCHEDULED
Status: DISCONTINUED | OUTPATIENT
Start: 2024-04-15 | End: 2024-04-18 | Stop reason: HOSPADM

## 2024-04-15 RX ORDER — BISACODYL 5 MG/1
10 TABLET, DELAYED RELEASE ORAL ONCE
Status: COMPLETED | OUTPATIENT
Start: 2024-04-15 | End: 2024-04-15

## 2024-04-15 RX ORDER — METHOCARBAMOL 500 MG/1
500 TABLET, FILM COATED ORAL EVERY 8 HOURS SCHEDULED
Status: DISCONTINUED | OUTPATIENT
Start: 2024-04-15 | End: 2024-04-18 | Stop reason: HOSPADM

## 2024-04-15 RX ORDER — METRONIDAZOLE 500 MG/1
500 TABLET ORAL EVERY 12 HOURS SCHEDULED
Status: DISCONTINUED | OUTPATIENT
Start: 2024-04-15 | End: 2024-04-18 | Stop reason: HOSPADM

## 2024-04-15 RX ORDER — GABAPENTIN 100 MG/1
100 CAPSULE ORAL 3 TIMES DAILY
Status: DISCONTINUED | OUTPATIENT
Start: 2024-04-15 | End: 2024-04-18 | Stop reason: HOSPADM

## 2024-04-15 RX ORDER — AMOXICILLIN 250 MG
2 CAPSULE ORAL
Status: DISCONTINUED | OUTPATIENT
Start: 2024-04-15 | End: 2024-04-18 | Stop reason: HOSPADM

## 2024-04-15 RX ADMIN — INSULIN LISPRO 12 UNITS: 100 INJECTION, SOLUTION INTRAVENOUS; SUBCUTANEOUS at 11:47

## 2024-04-15 RX ADMIN — MORPHINE SULFATE 2 MG: 2 INJECTION, SOLUTION INTRAMUSCULAR; INTRAVENOUS at 15:23

## 2024-04-15 RX ADMIN — GABAPENTIN 100 MG: 100 CAPSULE ORAL at 15:16

## 2024-04-15 RX ADMIN — SODIUM CHLORIDE, SODIUM GLUCONATE, SODIUM ACETATE, POTASSIUM CHLORIDE, MAGNESIUM CHLORIDE, SODIUM PHOSPHATE, DIBASIC, AND POTASSIUM PHOSPHATE 125 ML/HR: .53; .5; .37; .037; .03; .012; .00082 INJECTION, SOLUTION INTRAVENOUS at 10:50

## 2024-04-15 RX ADMIN — ACETAMINOPHEN 650 MG: 325 TABLET ORAL at 15:16

## 2024-04-15 RX ADMIN — METHOCARBAMOL TABLETS 500 MG: 500 TABLET, COATED ORAL at 21:00

## 2024-04-15 RX ADMIN — INSULIN LISPRO 12 UNITS: 100 INJECTION, SOLUTION INTRAVENOUS; SUBCUTANEOUS at 15:30

## 2024-04-15 RX ADMIN — INSULIN GLARGINE 40 UNITS: 100 INJECTION, SOLUTION SUBCUTANEOUS at 21:00

## 2024-04-15 RX ADMIN — AMLODIPINE BESYLATE 10 MG: 10 TABLET ORAL at 08:47

## 2024-04-15 RX ADMIN — ACETAMINOPHEN 650 MG: 325 TABLET ORAL at 21:00

## 2024-04-15 RX ADMIN — INSULIN LISPRO 2 UNITS: 100 INJECTION, SOLUTION INTRAVENOUS; SUBCUTANEOUS at 11:47

## 2024-04-15 RX ADMIN — INSULIN LISPRO 12 UNITS: 100 INJECTION, SOLUTION INTRAVENOUS; SUBCUTANEOUS at 08:50

## 2024-04-15 RX ADMIN — METHOCARBAMOL TABLETS 500 MG: 500 TABLET, COATED ORAL at 09:14

## 2024-04-15 RX ADMIN — BISACODYL 10 MG: 5 TABLET, COATED ORAL at 09:55

## 2024-04-15 RX ADMIN — INSULIN LISPRO 2 UNITS: 100 INJECTION, SOLUTION INTRAVENOUS; SUBCUTANEOUS at 21:00

## 2024-04-15 RX ADMIN — SODIUM CHLORIDE, SODIUM GLUCONATE, SODIUM ACETATE, POTASSIUM CHLORIDE, MAGNESIUM CHLORIDE, SODIUM PHOSPHATE, DIBASIC, AND POTASSIUM PHOSPHATE 125 ML/HR: .53; .5; .37; .037; .03; .012; .00082 INJECTION, SOLUTION INTRAVENOUS at 22:22

## 2024-04-15 RX ADMIN — VANCOMYCIN HYDROCHLORIDE 2000 MG: 1 INJECTION, POWDER, LYOPHILIZED, FOR SOLUTION INTRAVENOUS at 17:29

## 2024-04-15 RX ADMIN — METRONIDAZOLE 500 MG: 500 TABLET ORAL at 21:00

## 2024-04-15 RX ADMIN — POLYETHYLENE GLYCOL 3350 17 G: 17 POWDER, FOR SOLUTION ORAL at 15:23

## 2024-04-15 RX ADMIN — SODIUM CHLORIDE, SODIUM GLUCONATE, SODIUM ACETATE, POTASSIUM CHLORIDE, MAGNESIUM CHLORIDE, SODIUM PHOSPHATE, DIBASIC, AND POTASSIUM PHOSPHATE 125 ML/HR: .53; .5; .37; .037; .03; .012; .00082 INJECTION, SOLUTION INTRAVENOUS at 00:52

## 2024-04-15 RX ADMIN — SENNOSIDES AND DOCUSATE SODIUM 2 TABLET: 8.6; 5 TABLET ORAL at 09:14

## 2024-04-15 RX ADMIN — OXYCODONE HYDROCHLORIDE 5 MG: 5 TABLET ORAL at 17:29

## 2024-04-15 RX ADMIN — SENNOSIDES AND DOCUSATE SODIUM 2 TABLET: 8.6; 5 TABLET ORAL at 21:00

## 2024-04-15 RX ADMIN — OXYCODONE HYDROCHLORIDE 10 MG: 10 TABLET, FILM COATED, EXTENDED RELEASE ORAL at 08:47

## 2024-04-15 RX ADMIN — VANCOMYCIN HYDROCHLORIDE 2000 MG: 1 INJECTION, POWDER, LYOPHILIZED, FOR SOLUTION INTRAVENOUS at 06:13

## 2024-04-15 RX ADMIN — OXYCODONE HYDROCHLORIDE 10 MG: 10 TABLET, FILM COATED, EXTENDED RELEASE ORAL at 20:34

## 2024-04-15 RX ADMIN — ACETAMINOPHEN 650 MG: 325 TABLET ORAL at 09:14

## 2024-04-15 RX ADMIN — METHOCARBAMOL TABLETS 500 MG: 500 TABLET, COATED ORAL at 15:16

## 2024-04-15 RX ADMIN — ATORVASTATIN CALCIUM 20 MG: 20 TABLET, FILM COATED ORAL at 08:47

## 2024-04-15 RX ADMIN — MORPHINE SULFATE 2 MG: 2 INJECTION, SOLUTION INTRAMUSCULAR; INTRAVENOUS at 07:22

## 2024-04-15 RX ADMIN — MORPHINE SULFATE 2 MG: 2 INJECTION, SOLUTION INTRAMUSCULAR; INTRAVENOUS at 11:28

## 2024-04-15 RX ADMIN — GABAPENTIN 100 MG: 100 CAPSULE ORAL at 09:14

## 2024-04-15 RX ADMIN — GABAPENTIN 100 MG: 100 CAPSULE ORAL at 20:34

## 2024-04-15 RX ADMIN — ENOXAPARIN SODIUM 40 MG: 40 INJECTION SUBCUTANEOUS at 08:48

## 2024-04-15 NOTE — PROGRESS NOTES
[facility]  Progress Note  Name: Janine Stafford I  MRN: [id number]  Unit/Bed#: 7T [facility] 702-01 I Date of Admission: 4/9/2024   Date of Service: 4/15/2024 I Hospital Day: 6    Assessment/Plan   * Breast abscess  Assessment & Plan  Presents to ED with redness and pain of R breast  SIRS met: SIRS: 2/4; tachycardia, and WBC  IVF given in total of 1L bolus in ED  Cultures   Blood cultures -no growth at 72 hours  Wound culture 4/11 grows rare gram-positive cocci in cluster  Wound culture 4/12 showed gram-positive cocci in pairs, GNR, awaiting anaerobic culture     IV antibiotics; IV ceftriaxone initially given in the ED  Initially was on IV ceftriaxone, added vancomycin and Flagyl on 4/11/2024  ID consult placed appreciate recommendations.  Recommended to continue IV vancomycin and ceftriaxone for now.  General surgery consulted  On 4/12/2024 patient underwent OR drainage with large amount of pus in the breast parenchyma forming a large abscess cavity.   Mammogram cannot be done as an inpatient.  Recommended outpatient mammogram.  Pending or cultures    Admits to having significant pain  Placed on Tylenol 675 mg 3 times daily, Robaxin 500 mg 3 times daily, gabapentin 100 mg 3 times daily  Continue oxycodone 10 mg extended release twice daily  Change oxycodone 5 mg to as needed for moderate pain and morphine 2 mg IV for severe pain    \    Drug-induced constipation  Assessment & Plan  Admits to no bowel movement since admission about 4 days  States that normally she goes every 48 hours  Currently denies any symptoms of constipation but admits to straining on a regular basis  Currently on opioids for pain control  Will give Dulcolax 10 mg times once and placed on standing senna docusate 2 tablets to be given now then nightly going forward    Moderate asthma  Assessment & Plan  Not in acute exacerbation  Continue home albuterol, duonebs    Type 2 diabetes mellitus, without long-term current use  of insulin ([facility])  Assessment & Plan  Lab Results   Component Value Date    HGBA1C 9.7 (H) 11/28/2022       Recent Labs     04/14/24  1056 04/14/24  1532 04/14/24  1954 04/15/24  0614   POCGLU 129 155* 225* 99         Blood Sugar Average: Last 72 hrs:  (P) 155.4093740966573049  BG control improved   patient was on Lantus 30 units daily and Humalog 6 units t.i.d. With meals  Recently increased Lantus to 40 units nightly and Humalog to 12 units 3 times daily AC.  She was supposed to follow with endocrinology outpatient  Place on [facility] type II diet      Hypertension  Assessment & Plan  Continue amlodipine      Morbid obesity with BMI of 40.0-44.9, adult ([facility])  Assessment & Plan  Counseled on diet and lifestyle modification    Sepsis ([facility])-resolved as of 4/15/2024  Assessment & Plan  Presents to ED with redness and pain of R breast  SIRS met: SIRS: 2/4; tachycardia, and WBC  Source of infection: mastitis and possible breast abscess    Lab Results   Component Value Date    WBC 6.61 04/15/2024    PROCALCITONI <0.05 09/30/2018    LACTICACID 1.5 04/09/2024       IVF given in total of 1L bolus in ED  Continue IVF  Cultures   Blood cultures -no growth at 72 hours  Wound culture 4/11 grows rare gram-positive cocci in cluster  Wound culture 4/12 showed gram-positive cocci in pairs, GNR, awaiting anaerobic culture    IV antibiotics; IV ceftriaxone initially given in the ED  Initially was on IV ceftriaxone, added vancomycin and Flagyl on 4/11/2024  ID consult placed appreciate recommendations.  Recommended to continue IV vancomycin and ceftriaxone for now.                 VTE Pharmacologic Prophylaxis:   Moderate Risk (Score 3-4) - Pharmacological DVT Prophylaxis Ordered: enoxaparin (Lovenox).    Mobility:   Basic Mobility Inpatient Raw Score: 24  JH-HLM Goal: 8: Walk 250 feet or more  JH-HLM Achieved: 8: Walk 250 feet ot more  JH-HLM Goal achieved. Continue to encourage appropriate mobility.    Patient Centered Rounds: I performed  bedside rounds with nursing staff today.   Discussions with Specialists or Other Care Team Provider: case management    Education and Discussions with Family / Patient: Updated  (significant other) at bedside.    Total Time Spent on Date of Encounter in care of patient: 35 mins. This time was spent on one or more of the following: performing physical exam; counseling and coordination of care; obtaining or reviewing history; documenting in the medical record; reviewing/ordering tests, medications or procedures; communicating with other healthcare professionals and discussing with patient's family/caregivers.    Current Length of Stay: 6 day(s)  Current Patient Status: Inpatient   Certification Statement: The patient will continue to require additional inpatient hospital stay due to pain control, constipation pending cultures  Discharge Plan: Anticipate discharge in 24-48 hrs to home.    Code Status: Level 1 - Full Code    Subjective:   Patient admits to having significant pain this morning.  States that she has not been getting her pain meds at night.  Also states that she has not had a bowel movement since admission    Objective:     Vitals:   Temp (24hrs), Av.6 °F (37 °C), Min:98 °F (36.7 °C), Max:99.1 °F (37.3 °C)    Temp:  [98 °F (36.7 °C)-99.1 °F (37.3 °C)] 98.7 °F (37.1 °C)  HR:  [] 74  Resp:  [20] 20  BP: (118-147)/(81-90) 130/83  SpO2:  [96 %-98 %] 98 %  Body mass index is 35.79 kg/m².     Input and Output Summary (last 24 hours):     Intake/Output Summary (Last 24 hours) at 4/15/2024 0918  Last data filed at 4/15/2024 0911  Gross per 24 hour   Intake 1388 ml   Output --   Net 1388 ml       Physical Exam:   Physical Exam  Vitals and nursing note reviewed.   Constitutional:       General: She is not in acute distress.     Appearance: She is well-developed.   HENT:      Head: Normocephalic and atraumatic.   Eyes:      Conjunctiva/sclera: Conjunctivae normal.   Cardiovascular:      Rate  and Rhythm: Normal rate and regular rhythm.      Heart sounds: No murmur heard.  Pulmonary:      Effort: Pulmonary effort is normal. No respiratory distress.      Breath sounds: Normal breath sounds.   Abdominal:      Palpations: Abdomen is soft.      Tenderness: There is no abdominal tenderness.   Musculoskeletal:         General: No swelling.      Cervical back: Neck supple.   Skin:     General: Skin is warm and dry.   Neurological:      Mental Status: She is alert. Mental status is at baseline.   Psychiatric:         Mood and Affect: Mood normal.          Additional Data:     Labs:  Results from last 7 days   Lab Units 04/15/24  0508 04/12/24  0434 04/11/24  0506   WBC Thousand/uL 6.61   < > 11.43*   HEMOGLOBIN g/dL 10.7*   < > 10.6*   HEMATOCRIT % 32.6*   < > 32.3*   PLATELETS Thousands/uL 475*   < > 361   BANDS PCT % 1   < >  --    SEGS PCT %  --   --  75   LYMPHO PCT % 23   < > 16   MONO PCT % 3*   < > 6   EOS PCT % 2   < > 2    < > = values in this interval not displayed.     Results from last 7 days   Lab Units 04/15/24  0508 04/10/24  0436 04/09/24  1656   SODIUM mmol/L 139   < > 136   POTASSIUM mmol/L 3.4*   < > 3.5   CHLORIDE mmol/L 102   < > 96   CO2 mmol/L 29   < > 29   BUN mg/dL 6   < > 6   CREATININE mg/dL 0.57*   < > 0.81   ANION GAP mmol/L 8   < > 11   CALCIUM mg/dL 9.2   < > 9.4   ALBUMIN g/dL  --   --  3.9   TOTAL BILIRUBIN mg/dL  --   --  0.72   ALK PHOS U/L  --   --  82   ALT U/L  --   --  15   AST U/L  --   --  17   GLUCOSE RANDOM mg/dL 112   < > 253*    < > = values in this interval not displayed.     Results from last 7 days   Lab Units 04/09/24  1656   INR  1.04     Results from last 7 days   Lab Units 04/15/24  0614 04/14/24 1954 04/14/24  1532 04/14/24  1056 04/14/24  0532 04/13/24  2032 04/13/24  1526 04/13/24  1110 04/13/24  0518 04/12/24  2030 04/12/24  1535 04/12/24  1046   POC GLUCOSE mg/dl 99 225* 155* 129 120 141* 131 203* 159* 271* 102 151*         Results from last 7 days   Lab  Units 04/09/24  1656   LACTIC ACID mmol/L 1.5       Lines/Drains:  Invasive Devices       Peripheral Intravenous Line  Duration             Peripheral IV 04/13/24 Right Antecubital 1 day              Drain  Duration             Open Drain Right Breast 2 days                          Imaging: Reviewed radiology reports from this admission including: abdominal/pelvic CT    Recent Cultures (last 7 days):   Results from last 7 days   Lab Units 04/12/24  1501 04/11/24  0954 04/09/24  1712 04/09/24  1657   BLOOD CULTURE   --   --  No Growth After 5 Days. No Growth After 5 Days.   GRAM STAIN RESULT  4+ Polys*  4+ Gram positive cocci in pairs*  1+ Gram negative rods* No polys seen*  Rare Gram positive cocci in clusters*  --   --    WOUND CULTURE  Culture results to follow. 1+ Growth of  --   --        Last 24 Hours Medication List:   Current Facility-Administered Medications   Medication Dose Route Frequency Provider Last Rate    acetaminophen  650 mg Oral Q8H [facility] Elicia Falcon, DO      albuterol  2 puff Inhalation Q6H PRN Danny Seymour MD      albuterol  2.5 mg Nebulization Q6H PRN Danny Seymour MD      ALPRAZolam  0.5 mg Oral BID PRN Danny Seymour MD      aluminum-magnesium hydroxide-simethicone  30 mL Oral Q6H PRN Danny Seymour MD      amLODIPine  10 mg Oral Daily Danny Seymour MD      atorvastatin  20 mg Oral Daily Danny Seymour MD      bisacodyl  10 mg Oral Once Elicia Falcon DO      cefTRIAXone  2,000 mg Intravenous Q24H Danny Seymour MD 2,000 mg (04/14/24 1344)    enoxaparin  40 mg Subcutaneous Daily Danny Seymour MD      gabapentin  100 mg Oral TID Elicia Falcon DO      insulin glargine  40 Units Subcutaneous HS Danny Seymour MD      insulin lispro  12 Units Subcutaneous TID With Meals Danny Seymour MD      insulin lispro  2-12 Units Subcutaneous 4x Daily (AC & HS) Danny Seymour MD      methocarbamol  500 mg Oral Q8H [facility] Elicia Falcon DO      morphine injection  2 mg Intravenous Q4H PRN Elicia Falcon DO       multi-electrolyte  125 mL/hr Intravenous Continuous Danny Seymour  mL/hr (04/15/24 0052)    ondansetron  4 mg Intravenous Q6H PRN Danny Seymour MD      oxyCODONE  10 mg Oral Q12H ADAL Danny Seymour MD      oxyCODONE  5 mg Oral Q6H PRN Elicia Falcon DO      polyethylene glycol  17 g Oral Daily PRN Danny Seymour MD      senna-docusate sodium  2 tablet Oral HS Elicia Falcon DO      vancomycin  2,000 mg Intravenous Q12H Daly Austin MD 2,000 mg (04/15/24 0613)        Today, Patient Was Seen By: Elicia Falcon DO    **Please Note: This note may have been constructed using a voice recognition system.**

## 2024-04-15 NOTE — CONSULTS
Vancomycin IV Pharmacy-to-Dose Consultation     Vancomycin has been discontinued.  Pharmacy will sign off.  Please contact or re-consult with questions.    Lindy Perdomo, Pharmacist

## 2024-04-15 NOTE — PLAN OF CARE
Problem: PAIN - ADULT  Goal: Verbalizes/displays adequate comfort level or baseline comfort level  Description: Interventions:  - Encourage patient to monitor pain and request assistance  - Assess pain using appropriate pain scale  - Administer analgesics based on type and severity of pain and evaluate response  - Implement non-pharmacological measures as appropriate and evaluate response  - Consider cultural and social influences on pain and pain management  - Notify physician/advanced practitioner if interventions unsuccessful or patient reports new pain  Outcome: Progressing     Problem: INFECTION - ADULT  Goal: Absence or prevention of progression during hospitalization  Description: INTERVENTIONS:  - Assess and monitor for signs and symptoms of infection  - Monitor lab/diagnostic results  - Monitor all insertion sites, i.e. indwelling lines, tubes, and drains  - Monitor endotracheal if appropriate and nasal secretions for changes in amount and color  - [place] appropriate cooling/warming therapies per order  - Administer medications as ordered  - Instruct and encourage patient and family to use good hand hygiene technique  - Identify and instruct in appropriate isolation precautions for identified infection/condition  Outcome: Progressing     Problem: METABOLIC, FLUID AND ELECTROLYTES - ADULT  Goal: Glucose maintained within target range  Description: INTERVENTIONS:  - Monitor Blood Glucose as ordered  - Assess for signs and symptoms of hyperglycemia and hypoglycemia  - Administer ordered medications to maintain glucose within target range  - Assess nutritional intake and initiate nutrition service referral as needed  Outcome: Progressing

## 2024-04-15 NOTE — ASSESSMENT & PLAN NOTE
Admits to no bowel movement since admission about 4 days  States that normally she goes every 48 hours  Currently denies any symptoms of constipation but admits to straining on a regular basis  Currently on opioids for pain control  Will give Dulcolax 10 mg times once and placed on standing senna docusate 2 tablets to be given now then nightly going forward

## 2024-04-15 NOTE — ASSESSMENT & PLAN NOTE
Lab Results   Component Value Date    HGBA1C 9.7 (H) 11/28/2022       Recent Labs     04/14/24  1056 04/14/24  1532 04/14/24  1954 04/15/24  0614   POCGLU 129 155* 225* 99         Blood Sugar Average: Last 72 hrs:  (P) [id number]  BG control improved   patient was on Lantus 30 units daily and Humalog 6 units t.i.d. With meals  Recently increased Lantus to 40 units nightly and Humalog to 12 units 3 times daily AC.  She was supposed to follow with endocrinology outpatient  Place on [facility] type II diet

## 2024-04-15 NOTE — PROGRESS NOTES
Progress Note - [facility]   Janinetanmay Stafford 43 y.o. female MRN: [id number]  Encounter: [id number]    Impression/Plan:  1. Sepsis. Present on admission. Tachycardia and leukocytosis. Secondary to R breast infection. No other clear source appreciated. Patient without respiratory, , or GI complaints. Blood cultures are negative >5 days. Despite being systemically ill she remains hemodynamically stable. She remains afebrile. Her WBC count is trending down.  -antibiotic treatment as below  -monitor CBCD and BMP  -monitor vitals  -supportive care      2. R breast cellulitis vs nonlactational mastitis. Patient with history of mastitis in 2017. Breast imaging at that time showed masslike formation with central complex cystic area, marked skin thickening, and surrounding reactive lymph nodes. Patient was formally assessed by surgical oncology who felt this was infection. Patient declined aspiration at that time but she improved with Keflex. Now with recurring symptoms and new drainage from the R nipple. Swab culture with growth of mixed enoc. No new breast imaging obtained.  Patient was assessed by general surgery and underwent surgical I&D on 4/12/2024. Large amount of pus encountered in large abscess cavity. Penrose drain has been placed. Operative culture with growth of prevotella and coag negative staph. I requested microlab perform further work up on the coag negative staph which they informed me is preliminarily looking like a staph epidermidis. Given this update I will stop patient's ceftriaxone now. She should remain on IV vancomycin and I will additionally start oral Flagyl. I will continue to follow up with final identification and sensitivity in hopes to eventually transition patient to fully oral antibiotic regimen.  -stop IV ceftriaxone  -continue IV vancomycin   -continue pharmacy consult for guidance on vancomycin dosage  -start PO Flagyl, 500mg q12  -monitor CBCD and BMP  -serial R  breast exams  -surgical site care per general surgery   -continue follow up with general surgery     3. Type 2 diabetes mellitus without long term insulin use. Patient's last HbA1c was 9.7% on 11/28/2024. Elevated blood glucose is risk factor for wounds and infection. Recommend right glycemic control.  -blood glucose management per primary service      4. Tobacco abuse. This is risk factor for complications with healing.   -recommend patient commit to full cessation  -NRT per primary service      5. Morbid obesity. BMI = 35.79. This can affect antibiotic dosing.  -monitor patient weight and dose adjust antibiotic as needed    Above plan was discussed in detail with patient at the bedside.  Above plan was discussed in detail with primary service who agrees with plan to change antibiotic treatment.    Antibiotics:  Ceftriaxone - stop  Vancomycin 5  Unasyn 1  Antibiotics 7    Subjective:  Patient reports she's still having quite a bit of pain in her R breast but is overall better than she was last week. Pain is worsened when the breast is pressed or examined. She feels it is shrinking down closer to its normal size and is getting softer. She has no fever, chills, sweats, shakes; no nausea, vomiting, abdominal pain; no cough, shortness of breath, or chest pain. No new symptoms.    Objective:  Vitals:  Temp 97.8  HR 75  RR 18  /70    Physical Exam:   General Appearance:  Alert, interactive, nontoxic, no acute distress. She appears comfortable semi supine in bed leaning towards her R.   Throat: Oropharynx moist without lesions.    Lungs:   Clear to auscultation bilaterally; no wheezes, rhonchi or rales; respirations unlabored on room air.   Heart:  RRR; no murmur, rub or gallop.   Chest: R breast with heavy drainage pad intact, no breakthrough drainage. R breast with overall less swelling but still remains firm around her nipple, is very tender to touch.   Abdomen:   Soft, non-tender, non-distended, positive bowel  sounds.     Extremities: No clubbing or cyanosis, no edema.   Skin: No new rashes noted on exposed skin.     Labs, Imaging, & Other studies:   All pertinent labs and imaging studies were personally reviewed by me including  Results from last 7 days   Lab Units 04/15/24  0508 04/14/24  0824 04/13/24  0519   WBC Thousand/uL 6.61 8.22 8.94   HEMOGLOBIN g/dL 10.7* 10.5* 9.6*   PLATELETS Thousands/uL 475* 464* 405*     Results from last 7 days   Lab Units 04/15/24  0508 04/10/24  0436 04/09/24  1656   POTASSIUM mmol/L 3.4*   < > 3.5   CHLORIDE mmol/L 102   < > 96   CO2 mmol/L 29   < > 29   BUN mg/dL 6   < > 6   CREATININE mg/dL 0.57*   < > 0.81   EGFR ml/min/1.73sq m 112   < > 87   CALCIUM mg/dL 9.2   < > 9.4   AST U/L  --   --  17   ALT U/L  --   --  15   ALK PHOS U/L  --   --  82    < > = values in this interval not displayed.     Results from last 7 days   Lab Units 04/12/24  1501 04/11/24  0954 04/09/24  1712 04/09/24  1657   BLOOD CULTURE   --   --  No Growth After 5 Days. No Growth After 5 Days.   GRAM STAIN RESULT  4+ Polys*  4+ Gram positive cocci in pairs*  1+ Gram negative rods* No polys seen*  Rare Gram positive cocci in clusters*  --   --    WOUND CULTURE  Culture results to follow. 1+ Growth of  --   --       PROCEDURES:  Bronchoscopy, with tracheostomy tube replacement 18-Dec-2020 16:05:32  Olu Elaine   PROCEDURES:  Bronchoscopy, with tracheostomy tube replacement 18-Dec-2020 16:05:32  Olu Elaine

## 2024-04-15 NOTE — PROGRESS NOTES
Janine Stafford is a 43 y.o. female who is currently ordered Vancomycin IV with management by the Pharmacy Consult service.  Relevant clinical data and objective / subjective history reviewed.  Vancomycin Assessment:  Indication and Goal AUC/Trough: Soft tissue (goal -600, trough >10)  Clinical Status: stable  Micro:   See previous notes  Renal Function:  SCr: 0.57 mg/dL  CrCl: 128.7 mL/min  Renal replacement:   Days of Therapy: 5  Current Dose: 2000 mg IV q12h  Vancomycin Plan:  New Dosing: No new recommendations at this time  Estimated AUC: 465 mcg*hr/mL  Estimated Trough: 11 mcg/mL  Next Level: 04/20/24 at 6 am  Renal Function Monitoring: Daily BMP and UOP  Pharmacy will continue to follow closely for s/sx of nephrotoxicity, infusion reactions and appropriateness of therapy.  BMP and CBC will be ordered per protocol. We will continue to follow the patient’s culture results and clinical progress daily.    Cornell Charlton, Pharmacist

## 2024-04-15 NOTE — PLAN OF CARE
Problem: PAIN - ADULT  Goal: Verbalizes/displays adequate comfort level or baseline comfort level  Description: Interventions:  - Encourage patient to monitor pain and request assistance  - Assess pain using appropriate pain scale  - Administer analgesics based on type and severity of pain and evaluate response  - Implement non-pharmacological measures as appropriate and evaluate response  - Consider cultural and social influences on pain and pain management  - Notify physician/advanced practitioner if interventions unsuccessful or patient reports new pain  Outcome: Progressing     Problem: INFECTION - ADULT  Goal: Absence or prevention of progression during hospitalization  Description: INTERVENTIONS:  - Assess and monitor for signs and symptoms of infection  - Monitor lab/diagnostic results  - Monitor all insertion sites, i.e. indwelling lines, tubes, and drains  - Monitor endotracheal if appropriate and nasal secretions for changes in amount and color  - [place] appropriate cooling/warming therapies per order  - Administer medications as ordered  - Instruct and encourage patient and family to use good hand hygiene technique  - Identify and instruct in appropriate isolation precautions for identified infection/condition  Outcome: Progressing  Goal: Absence of fever/infection during neutropenic period  Description: INTERVENTIONS:  - Monitor WBC    Outcome: Progressing

## 2024-04-15 NOTE — PROGRESS NOTES
Patient seen and examined.    Dressing taken down.  Purulent drainage from Penrose but decreasing.  Breast edema also less although persists.    Wound cultures coag negative Staphylococcus and Prevotella.  ID adjusting antibiotics.    Remove drain soon.

## 2024-04-15 NOTE — PLAN OF CARE
Problem: PAIN - ADULT  Goal: Verbalizes/displays adequate comfort level or baseline comfort level  Description: Interventions:  - Encourage patient to monitor pain and request assistance  - Assess pain using appropriate pain scale  - Administer analgesics based on type and severity of pain and evaluate response  - Implement non-pharmacological measures as appropriate and evaluate response  - Consider cultural and social influences on pain and pain management  - Notify physician/advanced practitioner if interventions unsuccessful or patient reports new pain  Outcome: Progressing     Problem: INFECTION - ADULT  Goal: Absence or prevention of progression during hospitalization  Description: INTERVENTIONS:  - Assess and monitor for signs and symptoms of infection  - Monitor lab/diagnostic results  - Monitor all insertion sites, i.e. indwelling lines, tubes, and drains  - Monitor endotracheal if appropriate and nasal secretions for changes in amount and color  - [place] appropriate cooling/warming therapies per order  - Administer medications as ordered  - Instruct and encourage patient and family to use good hand hygiene technique  - Identify and instruct in appropriate isolation precautions for identified infection/condition  Outcome: Progressing  Goal: Absence of fever/infection during neutropenic period  Description: INTERVENTIONS:  - Monitor WBC    Outcome: Progressing     Problem: SAFETY ADULT  Goal: Patient will remain free of falls  Description: INTERVENTIONS:  - Educate patient/family on patient safety including physical limitations  - Instruct patient to call for assistance with activity   - Consult OT/PT to assist with strengthening/mobility   - Keep Call bell within reach  - Keep bed low and locked with side rails adjusted as appropriate  - Keep care items and personal belongings within reach  - Initiate and maintain comfort rounds  - Make Fall Risk Sign visible to staff  - Offer Toileting every 2 Hours,  in advance of need  - Apply yellow socks and bracelet for high fall risk patients  - Consider moving patient to room near nurses station  Outcome: Progressing     Problem: DISCHARGE PLANNING  Goal: Discharge to home or other facility with appropriate resources  Description: INTERVENTIONS:  - Identify barriers to discharge w/patient and caregiver  - Arrange for needed discharge resources and transportation as appropriate  - Identify discharge learning needs (meds, wound care, etc.)  - Arrange for interpretive services to assist at discharge as needed  - Refer to Case Management Department for coordinating discharge planning if the patient needs post-hospital services based on physician/advanced practitioner order or complex needs related to functional status, cognitive ability, or social support system  Outcome: Progressing     Problem: Knowledge Deficit  Goal: Patient/family/caregiver demonstrates understanding of disease process, treatment plan, medications, and discharge instructions  Description: Complete learning assessment and assess knowledge base.  Interventions:  - Provide teaching at level of understanding  - Provide teaching via preferred learning methods  Outcome: Progressing     Problem: METABOLIC, FLUID AND ELECTROLYTES - ADULT  Goal: Glucose maintained within target range  Description: INTERVENTIONS:  - Monitor Blood Glucose as ordered  - Assess for signs and symptoms of hyperglycemia and hypoglycemia  - Administer ordered medications to maintain glucose within target range  - Assess nutritional intake and initiate nutrition service referral as needed  Outcome: Progressing

## 2024-04-15 NOTE — ASSESSMENT & PLAN NOTE
Presents to ED with redness and pain of R breast  SIRS met: SIRS: 2/4; tachycardia, and WBC  Source of infection: mastitis and possible breast abscess    Lab Results   Component Value Date    WBC 6.61 04/15/2024    PROCALCITONI <0.05 09/30/2018    LACTICACID 1.5 04/09/2024       IVF given in total of 1L bolus in ED  Continue IVF  Cultures   Blood cultures -no growth at 72 hours  Wound culture 4/11 grows rare gram-positive cocci in cluster  Wound culture 4/12 showed gram-positive cocci in pairs, GNR, awaiting anaerobic culture    IV antibiotics; IV ceftriaxone initially given in the ED  Initially was on IV ceftriaxone, added vancomycin and Flagyl on 4/11/2024  ID consult placed appreciate recommendations.  Recommended to continue IV vancomycin and ceftriaxone for now.

## 2024-04-15 NOTE — ASSESSMENT & PLAN NOTE
Presents to ED with redness and pain of R breast  SIRS met: SIRS: 2/4; tachycardia, and WBC  IVF given in total of 1L bolus in ED  Cultures   Blood cultures -no growth at 72 hours  Wound culture 4/11 grows rare gram-positive cocci in cluster  Wound culture 4/12 showed gram-positive cocci in pairs, GNR, awaiting anaerobic culture     IV antibiotics; IV ceftriaxone initially given in the ED  Initially was on IV ceftriaxone, added vancomycin and Flagyl on 4/11/2024  ID consult placed appreciate recommendations.  Recommended to continue IV vancomycin and ceftriaxone for now.  General surgery consulted  On 4/12/2024 patient underwent OR drainage with large amount of pus in the breast parenchyma forming a large abscess cavity.   Mammogram cannot be done as an inpatient.  Recommended outpatient mammogram.  Pending or cultures    Admits to having significant pain  Placed on Tylenol 675 mg 3 times daily, Robaxin 500 mg 3 times daily, gabapentin 100 mg 3 times daily  Continue oxycodone 10 mg extended release twice daily  Change oxycodone 5 mg to as needed for moderate pain and morphine 2 mg IV for severe pain    \

## 2024-04-16 ENCOUNTER — HOME HEALTH ADMISSION (OUTPATIENT)
Dept: HOME HEALTH SERVICES | Facility: HOME HEALTHCARE | Age: 44
End: 2024-04-16

## 2024-04-16 LAB
ALBUMIN SERPL BCP-MCNC: 3.6 G/DL (ref 3.5–5)
ANION GAP SERPL CALCULATED.3IONS-SCNC: 8 MMOL/L (ref 4–13)
BACTERIA WND AEROBE CULT: ABNORMAL
BASOPHILS # BLD MANUAL: 0 THOUSAND/UL (ref 0–0.1)
BASOPHILS NFR MAR MANUAL: 0 % (ref 0–1)
BUN SERPL-MCNC: 7 MG/DL (ref 5–25)
CALCIUM SERPL-MCNC: 9.2 MG/DL (ref 8.4–10.2)
CHLORIDE SERPL-SCNC: 102 MMOL/L (ref 96–108)
CO2 SERPL-SCNC: 28 MMOL/L (ref 21–32)
CREAT SERPL-MCNC: 0.61 MG/DL (ref 0.6–1.3)
EOSINOPHIL # BLD MANUAL: 0.2 THOUSAND/UL (ref 0–0.4)
EOSINOPHIL NFR BLD MANUAL: 3 % (ref 0–6)
ERYTHROCYTE [DISTWIDTH] IN BLOOD BY AUTOMATED COUNT: 13.3 % (ref 11.6–15.1)
GFR SERPL CREATININE-BSD FRML MDRD: 109 ML/MIN/1.73SQ M
GLUCOSE SERPL-MCNC: 144 MG/DL (ref 65–140)
GLUCOSE SERPL-MCNC: 163 MG/DL (ref 65–140)
GLUCOSE SERPL-MCNC: 170 MG/DL (ref 65–140)
GLUCOSE SERPL-MCNC: 184 MG/DL (ref 65–140)
GLUCOSE SERPL-MCNC: 186 MG/DL (ref 65–140)
GRAM STN SPEC: ABNORMAL
HCT VFR BLD AUTO: 35.5 % (ref 34.8–46.1)
HGB BLD-MCNC: 11.6 G/DL (ref 11.5–15.4)
LG PLATELETS BLD QL SMEAR: PRESENT
LYMPHOCYTES # BLD AUTO: 1.45 THOUSAND/UL (ref 0.6–4.47)
LYMPHOCYTES # BLD AUTO: 22 % (ref 14–44)
MAGNESIUM SERPL-MCNC: 1.8 MG/DL (ref 1.9–2.7)
MCH RBC QN AUTO: 31.3 PG (ref 26.8–34.3)
MCHC RBC AUTO-ENTMCNC: 32.7 G/DL (ref 31.4–37.4)
MCV RBC AUTO: 96 FL (ref 82–98)
METAMYELOCYTES NFR BLD MANUAL: 2 % (ref 0–1)
MONOCYTES # BLD AUTO: 0.66 THOUSAND/UL (ref 0–1.22)
MONOCYTES NFR BLD: 10 % (ref 4–12)
MYELOCYTES NFR BLD MANUAL: 1 % (ref 0–1)
NEUTROPHILS # BLD MANUAL: 4.09 THOUSAND/UL (ref 1.85–7.62)
NEUTS SEG NFR BLD AUTO: 62 % (ref 43–75)
PHOSPHATE SERPL-MCNC: 4.2 MG/DL (ref 2.7–4.5)
PLATELET # BLD AUTO: 515 THOUSANDS/UL (ref 149–390)
PLATELET BLD QL SMEAR: ABNORMAL
PMV BLD AUTO: 9 FL (ref 8.9–12.7)
POTASSIUM SERPL-SCNC: 3.7 MMOL/L (ref 3.5–5.3)
RBC # BLD AUTO: 3.71 MILLION/UL (ref 3.81–5.12)
RBC MORPH BLD: NORMAL
SODIUM SERPL-SCNC: 138 MMOL/L (ref 135–147)
WBC # BLD AUTO: 6.59 THOUSAND/UL (ref 4.31–10.16)

## 2024-04-16 PROCEDURE — 80069 RENAL FUNCTION PANEL: CPT | Performed by: STUDENT IN AN ORGANIZED HEALTH CARE EDUCATION/TRAINING PROGRAM

## 2024-04-16 PROCEDURE — 99024 POSTOP FOLLOW-UP VISIT: CPT | Performed by: SPECIALIST

## 2024-04-16 PROCEDURE — 83735 ASSAY OF MAGNESIUM: CPT | Performed by: STUDENT IN AN ORGANIZED HEALTH CARE EDUCATION/TRAINING PROGRAM

## 2024-04-16 PROCEDURE — 85007 BL SMEAR W/DIFF WBC COUNT: CPT | Performed by: SPECIALIST

## 2024-04-16 PROCEDURE — 85027 COMPLETE CBC AUTOMATED: CPT | Performed by: SPECIALIST

## 2024-04-16 PROCEDURE — 99232 SBSQ HOSP IP/OBS MODERATE 35: CPT | Performed by: STUDENT IN AN ORGANIZED HEALTH CARE EDUCATION/TRAINING PROGRAM

## 2024-04-16 PROCEDURE — 82948 REAGENT STRIP/BLOOD GLUCOSE: CPT

## 2024-04-16 RX ORDER — OXYCODONE HCL 10 MG/1
10 TABLET, FILM COATED, EXTENDED RELEASE ORAL EVERY 12 HOURS SCHEDULED
Status: DISCONTINUED | OUTPATIENT
Start: 2024-04-16 | End: 2024-04-18 | Stop reason: HOSPADM

## 2024-04-16 RX ORDER — POLYETHYLENE GLYCOL 3350 17 G/17G
17 POWDER, FOR SOLUTION ORAL DAILY
Status: DISCONTINUED | OUTPATIENT
Start: 2024-04-16 | End: 2024-04-18 | Stop reason: HOSPADM

## 2024-04-16 RX ADMIN — SENNOSIDES AND DOCUSATE SODIUM 2 TABLET: 8.6; 5 TABLET ORAL at 21:14

## 2024-04-16 RX ADMIN — METHOCARBAMOL TABLETS 500 MG: 500 TABLET, COATED ORAL at 21:14

## 2024-04-16 RX ADMIN — VANCOMYCIN HYDROCHLORIDE 2000 MG: 1 INJECTION, POWDER, LYOPHILIZED, FOR SOLUTION INTRAVENOUS at 17:41

## 2024-04-16 RX ADMIN — MORPHINE SULFATE 2 MG: 2 INJECTION, SOLUTION INTRAMUSCULAR; INTRAVENOUS at 11:17

## 2024-04-16 RX ADMIN — OXYCODONE HYDROCHLORIDE 10 MG: 10 TABLET, FILM COATED, EXTENDED RELEASE ORAL at 21:14

## 2024-04-16 RX ADMIN — MORPHINE SULFATE 2 MG: 2 INJECTION, SOLUTION INTRAMUSCULAR; INTRAVENOUS at 17:45

## 2024-04-16 RX ADMIN — GABAPENTIN 100 MG: 100 CAPSULE ORAL at 16:06

## 2024-04-16 RX ADMIN — SODIUM CHLORIDE, SODIUM GLUCONATE, SODIUM ACETATE, POTASSIUM CHLORIDE, MAGNESIUM CHLORIDE, SODIUM PHOSPHATE, DIBASIC, AND POTASSIUM PHOSPHATE 125 ML/HR: .53; .5; .37; .037; .03; .012; .00082 INJECTION, SOLUTION INTRAVENOUS at 16:49

## 2024-04-16 RX ADMIN — ENOXAPARIN SODIUM 40 MG: 40 INJECTION SUBCUTANEOUS at 08:54

## 2024-04-16 RX ADMIN — SODIUM CHLORIDE, SODIUM GLUCONATE, SODIUM ACETATE, POTASSIUM CHLORIDE, MAGNESIUM CHLORIDE, SODIUM PHOSPHATE, DIBASIC, AND POTASSIUM PHOSPHATE 125 ML/HR: .53; .5; .37; .037; .03; .012; .00082 INJECTION, SOLUTION INTRAVENOUS at 07:22

## 2024-04-16 RX ADMIN — INSULIN LISPRO 12 UNITS: 100 INJECTION, SOLUTION INTRAVENOUS; SUBCUTANEOUS at 08:57

## 2024-04-16 RX ADMIN — MORPHINE SULFATE 2 MG: 2 INJECTION, SOLUTION INTRAMUSCULAR; INTRAVENOUS at 07:22

## 2024-04-16 RX ADMIN — ACETAMINOPHEN 650 MG: 325 TABLET ORAL at 21:14

## 2024-04-16 RX ADMIN — OXYCODONE HYDROCHLORIDE 10 MG: 10 TABLET, FILM COATED, EXTENDED RELEASE ORAL at 08:54

## 2024-04-16 RX ADMIN — METHOCARBAMOL TABLETS 500 MG: 500 TABLET, COATED ORAL at 14:08

## 2024-04-16 RX ADMIN — POLYETHYLENE GLYCOL 3350 17 G: 17 POWDER, FOR SOLUTION ORAL at 11:16

## 2024-04-16 RX ADMIN — INSULIN LISPRO 2 UNITS: 100 INJECTION, SOLUTION INTRAVENOUS; SUBCUTANEOUS at 21:18

## 2024-04-16 RX ADMIN — AMLODIPINE BESYLATE 10 MG: 10 TABLET ORAL at 08:54

## 2024-04-16 RX ADMIN — INSULIN LISPRO 12 UNITS: 100 INJECTION, SOLUTION INTRAVENOUS; SUBCUTANEOUS at 11:26

## 2024-04-16 RX ADMIN — ACETAMINOPHEN 650 MG: 325 TABLET ORAL at 14:07

## 2024-04-16 RX ADMIN — INSULIN LISPRO 2 UNITS: 100 INJECTION, SOLUTION INTRAVENOUS; SUBCUTANEOUS at 11:27

## 2024-04-16 RX ADMIN — INSULIN LISPRO 2 UNITS: 100 INJECTION, SOLUTION INTRAVENOUS; SUBCUTANEOUS at 16:07

## 2024-04-16 RX ADMIN — ATORVASTATIN CALCIUM 20 MG: 20 TABLET, FILM COATED ORAL at 08:54

## 2024-04-16 RX ADMIN — OXYCODONE HYDROCHLORIDE 5 MG: 5 TABLET ORAL at 06:33

## 2024-04-16 RX ADMIN — METRONIDAZOLE 500 MG: 500 TABLET ORAL at 21:14

## 2024-04-16 RX ADMIN — INSULIN LISPRO 2 UNITS: 100 INJECTION, SOLUTION INTRAVENOUS; SUBCUTANEOUS at 08:57

## 2024-04-16 RX ADMIN — ACETAMINOPHEN 650 MG: 325 TABLET ORAL at 05:21

## 2024-04-16 RX ADMIN — METHOCARBAMOL TABLETS 500 MG: 500 TABLET, COATED ORAL at 05:21

## 2024-04-16 RX ADMIN — VANCOMYCIN HYDROCHLORIDE 2000 MG: 1 INJECTION, POWDER, LYOPHILIZED, FOR SOLUTION INTRAVENOUS at 05:25

## 2024-04-16 RX ADMIN — GABAPENTIN 100 MG: 100 CAPSULE ORAL at 08:54

## 2024-04-16 RX ADMIN — GABAPENTIN 100 MG: 100 CAPSULE ORAL at 21:14

## 2024-04-16 RX ADMIN — METRONIDAZOLE 500 MG: 500 TABLET ORAL at 08:54

## 2024-04-16 RX ADMIN — INSULIN GLARGINE 40 UNITS: 100 INJECTION, SOLUTION SUBCUTANEOUS at 21:18

## 2024-04-16 RX ADMIN — INSULIN LISPRO 12 UNITS: 100 INJECTION, SOLUTION INTRAVENOUS; SUBCUTANEOUS at 16:06

## 2024-04-16 NOTE — PROGRESS NOTES
Patient:    MRN:  [id number]    Aidin Request ID:  [id number]    Level of care reserved:  Home Health Agency    Partner Reserved:  [facility], [place] [address] [phone]    Clinical needs requested:    Geography searched:  [id number]    Start of Service:    Request sent:  8:29am EDT on 4/16/2024 by Teodora Norwood    Partner reserved:  11:30am EDT on 4/16/2024 by Teodora Norwood    Choice list shared:  11:10am EDT on 4/16/2024 by Teodora Norwood

## 2024-04-16 NOTE — ASSESSMENT & PLAN NOTE
Presents to ED with redness and pain of R breast  SIRS met: SIRS: 2/4; tachycardia, and WBC  Source of infection: mastitis and possible breast abscess    Lab Results   Component Value Date    WBC 6.59 04/16/2024    PROCALCITONI <0.05 09/30/2018    LACTICACID 1.5 04/09/2024       IVF given in total of 1L bolus in ED  Continue IVF  Cultures   Blood cultures -no growth at 72 hours  Wound culture 4/11 grows rare gram-positive cocci in cluster  Wound culture 4/12 showed gram-positive cocci in pairs, GNR, awaiting anaerobic culture    IV antibiotics; IV ceftriaxone initially given in the ED  Initially was on IV ceftriaxone, added vancomycin and Flagyl on 4/11/2024  ID consult placed appreciate recommendations.  Recommended to continue IV vancomycin and ceftriaxone for now.

## 2024-04-16 NOTE — PROGRESS NOTES
Postop day #4 status post I&D of right breast abscess.    Penrose drain in place.  Purulent drainage from breast but minimal.  Erythema and edema decreasing.    Antibiotics per ID.    Remove drain in AM.
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
## 2024-04-16 NOTE — ASSESSMENT & PLAN NOTE
Lab Results   Component Value Date    HGBA1C 9.7 (H) 11/28/2022       Recent Labs     04/17/24  1504 04/17/24 2013 04/18/24  0530 04/18/24  1055   POCGLU 101 162* 97 219*       Blood Sugar Average: Last 72 hrs:  (P) 151.5058675344398491  BG control improved   patient was on Lantus 30 units daily and Humalog 6 units t.i.d. With meals  Recently increased Lantus to 40 units nightly and Humalog to 12 units 3 times daily AC.  She was supposed to follow with endocrinology outpatient  Will discharge patient on Lantus 40 units nightly and 12 units with meals as this regimen has been working well while hospitalized

## 2024-04-16 NOTE — ASSESSMENT & PLAN NOTE
Presents to ED with redness and pain of R breast  SIRS met: SIRS: 2/4; tachycardia, and WBC  IVF given in total of 1L bolus in ED  Cultures   Blood cultures -no growth at 72 hours  Wound culture 4/11 grows rare gram-positive cocci in cluster  Wound culture 4/12 showed gram-positive cocci in pairs, GNR, awaiting anaerobic culture     IV antibiotics; IV ceftriaxone initially given in the ED  Initially was on IV ceftriaxone, added vancomycin and Flagyl on 4/11/2024  ID consulted placed appreciate recommendations.  Recommended to continue IV vancomycin and ceftriaxone  4/16 -stop IV ceftriaxone, continue IV vancomycin, start PO Flagyl, 500mg q12  General surgery consulted  On 4/12/2024 patient underwent OR drainage with large amount of pus in the breast parenchyma forming a large abscess cavity.   Mammogram cannot be done as an inpatient.  Recommended outpatient mammogram.  Wound cultures growing Staph epidermis and Prevotella.   Sensitive to clinda, bactrim, tetracyclines  S/P MELISSA drain removal by general surgery on 4/17  Cleared for discharge by ID on oral Bactrim -160 bid and flagyl 500 mg bid through 4/26/24 (14 day course post I&D)

## 2024-04-16 NOTE — PROGRESS NOTES
[facility]  Progress Note  Name: Janine Stafford I  MRN: [id number]  Unit/Bed#: 7T [facility] 702-01 I Date of Admission: 4/9/2024   Date of Service: 4/16/2024 I Hospital Day: 7    Assessment/Plan   * Breast abscess  Assessment & Plan  Presents to ED with redness and pain of R breast  SIRS met: SIRS: 2/4; tachycardia, and WBC  IVF given in total of 1L bolus in ED  Cultures   Blood cultures -no growth at 72 hours  Wound culture 4/11 grows rare gram-positive cocci in cluster  Wound culture 4/12 showed gram-positive cocci in pairs, GNR, awaiting anaerobic culture     IV antibiotics; IV ceftriaxone initially given in the ED  Initially was on IV ceftriaxone, added vancomycin and Flagyl on 4/11/2024  ID consulted placed appreciate recommendations.  Recommended to continue IV vancomycin and ceftriaxone  4/16 -stop IV ceftriaxone, continue IV vancomycin, start PO Flagyl, 500mg q12  General surgery consulted  On 4/12/2024 patient underwent OR drainage with large amount of pus in the breast parenchyma forming a large abscess cavity.   Mammogram cannot be done as an inpatient.  Recommended outpatient mammogram.  Wound cultures growing Staph epidermis and Prevotella.       Admits to having significant pain  Placed on Tylenol 675 mg 3 times daily, Robaxin 500 mg 3 times daily, gabapentin 100 mg 3 times daily  Continue oxycodone 10 mg extended release twice daily  Change oxycodone 5 mg to as needed for moderate pain and morphine 2 mg IV for severe pain    \    Drug-induced constipation  Assessment & Plan  4/15 Admits to no bowel movement since admission about 4 days.   States that normally she goes every 48 hours  Currently denies any symptoms of constipation but admits to straining on a regular basis  Currently on opioids for pain control  Given Dulcolax 10 mg times once and placed on standing senna docusate 2 tablets to be given now then nightly going forward    4/16 Still no bowel movement, will give  PAL called and spoke with pt   -Scheduled pt for pre-op appt with Dr. Noel 9/28/23, cancelled 10/5/23 follow-up appt- pt's HTN to be addressed during 9/28 visit     Patient was given an opportunity to ask questions, verbalized understanding of plan, and is agreeable.    Jazmin RUDD RN  Patient Advocate Liaison - PAL RN  [facility]  [phone]   milk of magnesia x1, place on standing miralax 17 g daily, continue senna-docusate 2 tabs qhs      Moderate asthma  Assessment & Plan  Not in acute exacerbation  Continue home albuterol, duonebs    Type 2 diabetes mellitus, without long-term current use of insulin ([facility])  Assessment & Plan  Lab Results   Component Value Date    HGBA1C 9.7 (H) 11/28/2022       Recent Labs     04/15/24  1057 04/15/24  1503 04/15/24  2040 04/16/24  0524   POCGLU 162* 106 172* 163*         Blood Sugar Average: Last 72 hrs:  (P) [id number]  BG control improved   patient was on Lantus 30 units daily and Humalog 6 units t.i.d. With meals  Recently increased Lantus to 40 units nightly and Humalog to 12 units 3 times daily AC.  She was supposed to follow with endocrinology outpatient  Place on CCH type II diet      Hypertension  Assessment & Plan  Continue amlodipine      Morbid obesity with BMI of 40.0-44.9, adult ([facility])  Assessment & Plan  Counseled on diet and lifestyle modification    Sepsis ([facility])-resolved as of 4/15/2024  Assessment & Plan  Presents to ED with redness and pain of R breast  SIRS met: SIRS: 2/4; tachycardia, and WBC  Source of infection: mastitis and possible breast abscess    Lab Results   Component Value Date    WBC 6.59 04/16/2024    PROCALCITONI <0.05 09/30/2018    LACTICACID 1.5 04/09/2024       IVF given in total of 1L bolus in ED  Continue IVF  Cultures   Blood cultures -no growth at 72 hours  Wound culture 4/11 grows rare gram-positive cocci in cluster  Wound culture 4/12 showed gram-positive cocci in pairs, GNR, awaiting anaerobic culture    IV antibiotics; IV ceftriaxone initially given in the ED  Initially was on IV ceftriaxone, added vancomycin and Flagyl on 4/11/2024  ID consult placed appreciate recommendations.  Recommended to continue IV vancomycin and ceftriaxone for now.                 VTE Pharmacologic Prophylaxis:   Moderate Risk (Score 3-4) - Pharmacological DVT Prophylaxis Ordered:  enoxaparin (Lovenox).    Mobility:   Basic Mobility Inpatient Raw Score: 24  JH-HLM Goal: 8: Walk 250 feet or more  JH-HLM Achieved: 7: Walk 25 feet or more  JH-HLM Goal NOT achieved. Continue with multidisciplinary rounding and encourage appropriate mobility to improve upon JH-HLM goals.    Patient Centered Rounds: I performed bedside rounds with nursing staff today.   Discussions with Specialists or Other Care Team Provider: case management    Education and Discussions with Family / Patient: Patient declined call to .     Total Time Spent on Date of Encounter in care of patient: 35 mins. This time was spent on one or more of the following: performing physical exam; counseling and coordination of care; obtaining or reviewing history; documenting in the medical record; reviewing/ordering tests, medications or procedures; communicating with other healthcare professionals and discussing with patient's family/caregivers.    Current Length of Stay: 7 day(s)  Current Patient Status: Inpatient   Certification Statement: The patient will continue to require additional inpatient hospital stay due to pain control, constipation pending final sensitivities, MELISSA drain  Discharge Plan: Anticipate discharge in 24-48 hrs to home.    Code Status: Level 1 - Full Code    Subjective:   Pt tates she continues to have pain, especially during the night and when she first wakes up.    Objective:     Vitals:   Temp (24hrs), Av.6 °F (36.4 °C), Min:96.6 °F (35.9 °C), Max:98.5 °F (36.9 °C)    Temp:  [96.6 °F (35.9 °C)-98.5 °F (36.9 °C)] 98.5 °F (36.9 °C)  HR:  [71-79] 73  Resp:  [18] 18  BP: (101-127)/(70-84) 127/84  SpO2:  [96 %-98 %] 98 %  Body mass index is 35.79 kg/m².     Input and Output Summary (last 24 hours):     Intake/Output Summary (Last 24 hours) at 2024 1044  Last data filed at 2024 0912  Gross per 24 hour   Intake 1904.93 ml   Output --   Net 1904.93 ml       Physical Exam:   Physical Exam  Vitals and  nursing note reviewed.   Constitutional:       General: She is not in acute distress.     Appearance: She is well-developed.   HENT:      Head: Normocephalic and atraumatic.   Eyes:      Conjunctiva/sclera: Conjunctivae normal.   Cardiovascular:      Rate and Rhythm: Normal rate and regular rhythm.      Heart sounds: No murmur heard.  Pulmonary:      Effort: Pulmonary effort is normal. No respiratory distress.      Breath sounds: Normal breath sounds.   Abdominal:      Palpations: Abdomen is soft.      Tenderness: There is no abdominal tenderness.   Musculoskeletal:         General: No swelling.      Cervical back: Neck supple.   Skin:     General: Skin is warm and dry.   Neurological:      Mental Status: She is alert. Mental status is at baseline.   Psychiatric:         Mood and Affect: Mood normal.          Additional Data:     Labs:  Results from last 7 days   Lab Units 04/16/24  0809 04/15/24  0508 04/12/24  0434 04/11/24  0506   WBC Thousand/uL 6.59 6.61   < > 11.43*   HEMOGLOBIN g/dL 11.6 10.7*   < > 10.6*   HEMATOCRIT % 35.5 32.6*   < > 32.3*   PLATELETS Thousands/uL 515* 475*   < > 361   BANDS PCT %  --  1   < >  --    SEGS PCT %  --   --   --  75   LYMPHO PCT % 22 23   < > 16   MONO PCT % 10 3*   < > 6   EOS PCT % 3 2   < > 2    < > = values in this interval not displayed.     Results from last 7 days   Lab Units 04/16/24  0809 04/10/24  0436 04/09/24  1656   SODIUM mmol/L 138   < > 136   POTASSIUM mmol/L 3.7   < > 3.5   CHLORIDE mmol/L 102   < > 96   CO2 mmol/L 28   < > 29   BUN mg/dL 7   < > 6   CREATININE mg/dL 0.61   < > 0.81   ANION GAP mmol/L 8   < > 11   CALCIUM mg/dL 9.2   < > 9.4   ALBUMIN g/dL 3.6  --  3.9   TOTAL BILIRUBIN mg/dL  --   --  0.72   ALK PHOS U/L  --   --  82   ALT U/L  --   --  15   AST U/L  --   --  17   GLUCOSE RANDOM mg/dL 144*   < > 253*    < > = values in this interval not displayed.     Results from last 7 days   Lab Units 04/09/24  1656   INR  1.04     Results from last 7 days    Lab Units 04/16/24  0524 04/15/24  2040 04/15/24  1503 04/15/24  1057 04/15/24  0614 04/14/24  1954 04/14/24  1532 04/14/24  1056 04/14/24  0532 04/13/24  2032 04/13/24  1526 04/13/24  1110   POC GLUCOSE mg/dl 163* 172* 106 162* 99 225* 155* 129 120 141* 131 203*         Results from last 7 days   Lab Units 04/09/24  1656   LACTIC ACID mmol/L 1.5       Lines/Drains:  Invasive Devices       Peripheral Intravenous Line  Duration             Peripheral IV 04/13/24 Right Antecubital 2 days              Drain  Duration             Open Drain Right Breast 3 days                          Imaging: Reviewed radiology reports from this admission including: abdominal/pelvic CT    Recent Cultures (last 7 days):   Results from last 7 days   Lab Units 04/12/24  1501 04/11/24  0954 04/09/24  1712 04/09/24  1657   BLOOD CULTURE   --   --  No Growth After 5 Days. No Growth After 5 Days.   GRAM STAIN RESULT  4+ Polys*  4+ Gram positive cocci in pairs*  1+ Gram negative rods* No polys seen*  Rare Gram positive cocci in clusters*  --   --    WOUND CULTURE  Growth in Broth culture only Staphylococcus epidermidis* 1+ Growth of  --   --        Last 24 Hours Medication List:   Current Facility-Administered Medications   Medication Dose Route Frequency Provider Last Rate    acetaminophen  650 mg Oral Q8H [facility] Elicia Falcon DO      albuterol  2 puff Inhalation Q6H PRN Danny Seymour MD      albuterol  2.5 mg Nebulization Q6H PRN Danny Seymour MD      ALPRAZolam  0.5 mg Oral BID PRN Danny Seymour MD      aluminum-magnesium hydroxide-simethicone  30 mL Oral Q6H PRN Danny Seymour MD      amLODIPine  10 mg Oral Daily Danny Seymour MD      atorvastatin  20 mg Oral Daily Danny Seymour MD      enoxaparin  40 mg Subcutaneous Daily Danny Seymour MD      gabapentin  100 mg Oral TID Elicia Falcon DO      insulin glargine  40 Units Subcutaneous HS Danny Seymour MD      insulin lispro  12 Units Subcutaneous TID With Meals Danny Seymour MD      insulin  lispro  2-12 Units Subcutaneous 4x Daily (AC & HS) Danny Seymour MD      magnesium hydroxide  30 mL Oral Daily PRN Elicia Falcon DO      methocarbamol  500 mg Oral Q8H ADAL Elicia Falcon, DO      metroNIDAZOLE  500 mg Oral Q12H ADAL NICO Calles      morphine injection  2 mg Intravenous Q4H PRN Elicia Falcon DO      multi-electrolyte  125 mL/hr Intravenous Continuous Danny Seymour  mL/hr (04/16/24 0722)    ondansetron  4 mg Intravenous Q6H PRN Danny Seymour MD      oxyCODONE  10 mg Oral Q12H [facility] Elicia Falcon, DO      oxyCODONE  5 mg Oral Q6H PRN Elicia Falcon DO      polyethylene glycol  17 g Oral Daily Elicia Falcon DO      senna-docusate sodium  2 tablet Oral HS Elicia Falcon DO      vancomycin  17.5 mg/kg Intravenous Q12H ARLINE CallesNP 2,000 mg (04/16/24 0538)        Today, Patient Was Seen By: Elicia Falcon DO    **Please Note: This note may have been constructed using a voice recognition system.**

## 2024-04-16 NOTE — ASSESSMENT & PLAN NOTE
4/15 Admits to no bowel movement since admission about 4 days.   States that normally she goes every 48 hours  Currently denies any symptoms of constipation but admits to straining on a regular basis  Currently on opioids for pain control  Given Dulcolax 10 mg times once and placed on standing senna docusate 2 tablets to be given now then nightly going forward    4/16 Still no bowel movement, will give milk of magnesia x1, place on standing miralax 17 g daily, continue senna-docusate 2 tabs qhs  Resolved

## 2024-04-16 NOTE — PLAN OF CARE
Problem: PAIN - ADULT  Goal: Verbalizes/displays adequate comfort level or baseline comfort level  Description: Interventions:  - Encourage patient to monitor pain and request assistance  - Assess pain using appropriate pain scale  - Administer analgesics based on type and severity of pain and evaluate response  - Implement non-pharmacological measures as appropriate and evaluate response  - Consider cultural and social influences on pain and pain management  - Notify physician/advanced practitioner if interventions unsuccessful or patient reports new pain  Outcome: Progressing     Problem: INFECTION - ADULT  Goal: Absence or prevention of progression during hospitalization  Description: INTERVENTIONS:  - Assess and monitor for signs and symptoms of infection  - Monitor lab/diagnostic results  - Monitor all insertion sites, i.e. indwelling lines, tubes, and drains  - Monitor endotracheal if appropriate and nasal secretions for changes in amount and color  - [place] appropriate cooling/warming therapies per order  - Administer medications as ordered  - Instruct and encourage patient and family to use good hand hygiene technique  - Identify and instruct in appropriate isolation precautions for identified infection/condition  Outcome: Progressing  Goal: Absence of fever/infection during neutropenic period  Description: INTERVENTIONS:  - Monitor WBC    Outcome: Progressing     Problem: SAFETY ADULT  Goal: Patient will remain free of falls  Description: INTERVENTIONS:  - Educate patient/family on patient safety including physical limitations  - Instruct patient to call for assistance with activity   - Consult OT/PT to assist with strengthening/mobility   - Keep Call bell within reach  - Keep bed low and locked with side rails adjusted as appropriate  - Keep care items and personal belongings within reach  - Initiate and maintain comfort rounds  - Make Fall Risk Sign visible to staff  - - Apply yellow socks and  bracelet for high fall risk patients  - Consider moving patient to room near nurses station  Outcome: Progressing     Problem: Knowledge Deficit  Goal: Patient/family/caregiver demonstrates understanding of disease process, treatment plan, medications, and discharge instructions  Description: Complete learning assessment and assess knowledge base.  Interventions:  - Provide teaching at level of understanding  - Provide teaching via preferred learning methods  Outcome: Progressing     Problem: METABOLIC, FLUID AND ELECTROLYTES - ADULT  Goal: Glucose maintained within target range  Description: INTERVENTIONS:  - Monitor Blood Glucose as ordered  - Assess for signs and symptoms of hyperglycemia and hypoglycemia  - Administer ordered medications to maintain glucose within target range  - Assess nutritional intake and initiate nutrition service referral as needed  Outcome: Progressing

## 2024-04-17 LAB
ALBUMIN SERPL BCP-MCNC: 3.6 G/DL (ref 3.5–5)
ANION GAP SERPL CALCULATED.3IONS-SCNC: 9 MMOL/L (ref 4–13)
BUN SERPL-MCNC: 6 MG/DL (ref 5–25)
CALCIUM SERPL-MCNC: 9.2 MG/DL (ref 8.4–10.2)
CHLORIDE SERPL-SCNC: 103 MMOL/L (ref 96–108)
CO2 SERPL-SCNC: 24 MMOL/L (ref 21–32)
CREAT SERPL-MCNC: 0.6 MG/DL (ref 0.6–1.3)
ERYTHROCYTE [DISTWIDTH] IN BLOOD BY AUTOMATED COUNT: 13.7 % (ref 11.6–15.1)
GFR SERPL CREATININE-BSD FRML MDRD: 110 ML/MIN/1.73SQ M
GLUCOSE SERPL-MCNC: 101 MG/DL (ref 65–140)
GLUCOSE SERPL-MCNC: 149 MG/DL (ref 65–140)
GLUCOSE SERPL-MCNC: 149 MG/DL (ref 65–140)
GLUCOSE SERPL-MCNC: 162 MG/DL (ref 65–140)
GLUCOSE SERPL-MCNC: 190 MG/DL (ref 65–140)
HCT VFR BLD AUTO: 33.7 % (ref 34.8–46.1)
HGB BLD-MCNC: 10.8 G/DL (ref 11.5–15.4)
MAGNESIUM SERPL-MCNC: 1.9 MG/DL (ref 1.9–2.7)
MCH RBC QN AUTO: 31.7 PG (ref 26.8–34.3)
MCHC RBC AUTO-ENTMCNC: 32 G/DL (ref 31.4–37.4)
MCV RBC AUTO: 99 FL (ref 82–98)
PHOSPHATE SERPL-MCNC: 4 MG/DL (ref 2.7–4.5)
PLATELET # BLD AUTO: 460 THOUSANDS/UL (ref 149–390)
PMV BLD AUTO: 9.5 FL (ref 8.9–12.7)
POTASSIUM SERPL-SCNC: 4.2 MMOL/L (ref 3.5–5.3)
RBC # BLD AUTO: 3.41 MILLION/UL (ref 3.81–5.12)
SODIUM SERPL-SCNC: 136 MMOL/L (ref 135–147)
WBC # BLD AUTO: 5.71 THOUSAND/UL (ref 4.31–10.16)

## 2024-04-17 PROCEDURE — 83735 ASSAY OF MAGNESIUM: CPT | Performed by: STUDENT IN AN ORGANIZED HEALTH CARE EDUCATION/TRAINING PROGRAM

## 2024-04-17 PROCEDURE — 85027 COMPLETE CBC AUTOMATED: CPT | Performed by: SPECIALIST

## 2024-04-17 PROCEDURE — 80069 RENAL FUNCTION PANEL: CPT | Performed by: STUDENT IN AN ORGANIZED HEALTH CARE EDUCATION/TRAINING PROGRAM

## 2024-04-17 PROCEDURE — 99024 POSTOP FOLLOW-UP VISIT: CPT | Performed by: SPECIALIST

## 2024-04-17 PROCEDURE — 99232 SBSQ HOSP IP/OBS MODERATE 35: CPT | Performed by: STUDENT IN AN ORGANIZED HEALTH CARE EDUCATION/TRAINING PROGRAM

## 2024-04-17 PROCEDURE — 82948 REAGENT STRIP/BLOOD GLUCOSE: CPT

## 2024-04-17 RX ADMIN — POLYETHYLENE GLYCOL 3350 17 G: 17 POWDER, FOR SOLUTION ORAL at 08:37

## 2024-04-17 RX ADMIN — METHOCARBAMOL TABLETS 500 MG: 500 TABLET, COATED ORAL at 06:21

## 2024-04-17 RX ADMIN — METRONIDAZOLE 500 MG: 500 TABLET ORAL at 21:12

## 2024-04-17 RX ADMIN — METHOCARBAMOL TABLETS 500 MG: 500 TABLET, COATED ORAL at 14:03

## 2024-04-17 RX ADMIN — OXYCODONE HYDROCHLORIDE 10 MG: 10 TABLET, FILM COATED, EXTENDED RELEASE ORAL at 21:12

## 2024-04-17 RX ADMIN — INSULIN LISPRO 12 UNITS: 100 INJECTION, SOLUTION INTRAVENOUS; SUBCUTANEOUS at 11:44

## 2024-04-17 RX ADMIN — GABAPENTIN 100 MG: 100 CAPSULE ORAL at 21:12

## 2024-04-17 RX ADMIN — OXYCODONE HYDROCHLORIDE 5 MG: 5 TABLET ORAL at 17:14

## 2024-04-17 RX ADMIN — SENNOSIDES AND DOCUSATE SODIUM 2 TABLET: 8.6; 5 TABLET ORAL at 21:12

## 2024-04-17 RX ADMIN — INSULIN GLARGINE 40 UNITS: 100 INJECTION, SOLUTION SUBCUTANEOUS at 21:12

## 2024-04-17 RX ADMIN — INSULIN LISPRO 12 UNITS: 100 INJECTION, SOLUTION INTRAVENOUS; SUBCUTANEOUS at 08:54

## 2024-04-17 RX ADMIN — OXYCODONE HYDROCHLORIDE 10 MG: 10 TABLET, FILM COATED, EXTENDED RELEASE ORAL at 06:21

## 2024-04-17 RX ADMIN — INSULIN LISPRO 2 UNITS: 100 INJECTION, SOLUTION INTRAVENOUS; SUBCUTANEOUS at 08:54

## 2024-04-17 RX ADMIN — VANCOMYCIN HYDROCHLORIDE 2000 MG: 1 INJECTION, POWDER, LYOPHILIZED, FOR SOLUTION INTRAVENOUS at 05:07

## 2024-04-17 RX ADMIN — ENOXAPARIN SODIUM 40 MG: 40 INJECTION SUBCUTANEOUS at 08:37

## 2024-04-17 RX ADMIN — AMLODIPINE BESYLATE 10 MG: 10 TABLET ORAL at 08:37

## 2024-04-17 RX ADMIN — METHOCARBAMOL TABLETS 500 MG: 500 TABLET, COATED ORAL at 21:12

## 2024-04-17 RX ADMIN — SODIUM CHLORIDE, SODIUM GLUCONATE, SODIUM ACETATE, POTASSIUM CHLORIDE, MAGNESIUM CHLORIDE, SODIUM PHOSPHATE, DIBASIC, AND POTASSIUM PHOSPHATE 125 ML/HR: .53; .5; .37; .037; .03; .012; .00082 INJECTION, SOLUTION INTRAVENOUS at 05:07

## 2024-04-17 RX ADMIN — ACETAMINOPHEN 650 MG: 325 TABLET ORAL at 21:12

## 2024-04-17 RX ADMIN — METRONIDAZOLE 500 MG: 500 TABLET ORAL at 08:37

## 2024-04-17 RX ADMIN — GABAPENTIN 100 MG: 100 CAPSULE ORAL at 16:42

## 2024-04-17 RX ADMIN — ACETAMINOPHEN 650 MG: 325 TABLET ORAL at 06:21

## 2024-04-17 RX ADMIN — VANCOMYCIN HYDROCHLORIDE 2000 MG: 1 INJECTION, POWDER, LYOPHILIZED, FOR SOLUTION INTRAVENOUS at 21:16

## 2024-04-17 RX ADMIN — ATORVASTATIN CALCIUM 20 MG: 20 TABLET, FILM COATED ORAL at 08:37

## 2024-04-17 RX ADMIN — INSULIN LISPRO 12 UNITS: 100 INJECTION, SOLUTION INTRAVENOUS; SUBCUTANEOUS at 16:41

## 2024-04-17 RX ADMIN — INSULIN LISPRO 2 UNITS: 100 INJECTION, SOLUTION INTRAVENOUS; SUBCUTANEOUS at 21:13

## 2024-04-17 RX ADMIN — ACETAMINOPHEN 650 MG: 325 TABLET ORAL at 14:03

## 2024-04-17 RX ADMIN — GABAPENTIN 100 MG: 100 CAPSULE ORAL at 08:37
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
## 2024-04-17 NOTE — PLAN OF CARE
Problem: SKIN/TISSUE INTEGRITY - ADULT  Goal: Incision(s), wounds(s) or drain site(s) healing without S/S of infection  Description: INTERVENTIONS  - Assess and document dressing, incision, wound bed, drain sites and surrounding tissue  - Provide patient and family education  - Perform skin care/dressing changes every  Outcome: Progressing     Problem: METABOLIC, FLUID AND ELECTROLYTES - ADULT  Goal: Glucose maintained within target range  Description: INTERVENTIONS:  - Monitor Blood Glucose as ordered  - Assess for signs and symptoms of hyperglycemia and hypoglycemia  - Administer ordered medications to maintain glucose within target range  - Assess nutritional intake and initiate nutrition service referral as needed  Outcome: Progressing     Problem: PAIN - ADULT  Goal: Verbalizes/displays adequate comfort level or baseline comfort level  Description: Interventions:  - Encourage patient to monitor pain and request assistance  - Assess pain using appropriate pain scale  - Administer analgesics based on type and severity of pain and evaluate response  - Implement non-pharmacological measures as appropriate and evaluate response  - Consider cultural and social influences on pain and pain management  - Notify physician/advanced practitioner if interventions unsuccessful or patient reports new pain  Outcome: Progressing

## 2024-04-17 NOTE — PROGRESS NOTES
Continues to improve.  Drain DC'd.  Breast better  Discussed with medicine.  Needs to transition IV antibiotics to p.o. (ID)

## 2024-04-17 NOTE — PROGRESS NOTES
[facility]  Progress Note  Name: Janine Stafford I  MRN: [id number]  Unit/Bed#: 7T [facility] 702-01 I Date of Admission: 4/9/2024   Date of Service: 4/17/2024 I Hospital Day: 8    Assessment/Plan   * Breast abscess  Assessment & Plan  Presents to ED with redness and pain of R breast  SIRS met: SIRS: 2/4; tachycardia, and WBC  IVF given in total of 1L bolus in ED  Cultures   Blood cultures -no growth at 72 hours  Wound culture 4/11 grows rare gram-positive cocci in cluster  Wound culture 4/12 showed gram-positive cocci in pairs, GNR, awaiting anaerobic culture     IV antibiotics; IV ceftriaxone initially given in the ED  Initially was on IV ceftriaxone, added vancomycin and Flagyl on 4/11/2024  ID consulted placed appreciate recommendations.  Recommended to continue IV vancomycin and ceftriaxone  4/16 -stop IV ceftriaxone, continue IV vancomycin, start PO Flagyl, 500mg q12  General surgery consulted  On 4/12/2024 patient underwent OR drainage with large amount of pus in the breast parenchyma forming a large abscess cavity.   Mammogram cannot be done as an inpatient.  Recommended outpatient mammogram.  Wound cultures growing Staph epidermis and Prevotella.   Sensitive to clinda, bactrim, tetracyclines  Possible d/c today or tomorrow depending on clearance by surgery and ID  Possible drain removal later today    Pain improving with Tylenol 675 mg 3 times daily, Robaxin 500 mg 3 times daily, gabapentin 100 mg 3 times daily and prn pain regiment.        Drug-induced constipation  Assessment & Plan  4/15 Admits to no bowel movement since admission about 4 days.   States that normally she goes every 48 hours  Currently denies any symptoms of constipation but admits to straining on a regular basis  Currently on opioids for pain control  Given Dulcolax 10 mg times once and placed on standing senna docusate 2 tablets to be given now then nightly going forward    4/16 Still no bowel movement, will give  milk of magnesia x1, place on standing miralax 17 g daily, continue senna-docusate 2 tabs qhs    Resolved      Moderate asthma  Assessment & Plan  Not in acute exacerbation  Continue home albuterol, duonebs    Type 2 diabetes mellitus, without long-term current use of insulin ([facility])  Assessment & Plan  Lab Results   Component Value Date    HGBA1C 9.7 (H) 11/28/2022       Recent Labs     04/15/24  1057 04/15/24  1503 04/15/24  2040 04/16/24  0524   POCGLU 162* 106 172* 163*         Blood Sugar Average: Last 72 hrs:  (P) [id number]  BG control improved   patient was on Lantus 30 units daily and Humalog 6 units t.i.d. With meals  Recently increased Lantus to 40 units nightly and Humalog to 12 units 3 times daily AC.  She was supposed to follow with endocrinology outpatient  Place on CCH type II diet      Hypertension  Assessment & Plan  Continue amlodipine      Morbid obesity with BMI of 40.0-44.9, adult ([facility])  Assessment & Plan  Counseled on diet and lifestyle modification    Sepsis ([facility])-resolved as of 4/15/2024  Assessment & Plan  Presents to ED with redness and pain of R breast  SIRS met: SIRS: 2/4; tachycardia, and WBC  Source of infection: mastitis and possible breast abscess    Lab Results   Component Value Date    WBC 6.59 04/16/2024    PROCALCITONI <0.05 09/30/2018    LACTICACID 1.5 04/09/2024       IVF given in total of 1L bolus in ED  Continue IVF  Cultures   Blood cultures -no growth at 72 hours  Wound culture 4/11 grows rare gram-positive cocci in cluster  Wound culture 4/12 showed gram-positive cocci in pairs, GNR, awaiting anaerobic culture    IV antibiotics; IV ceftriaxone initially given in the ED  Initially was on IV ceftriaxone, added vancomycin and Flagyl on 4/11/2024  ID consult placed appreciate recommendations.  Recommended to continue IV vancomycin and ceftriaxone for now.                 VTE Pharmacologic Prophylaxis: VTE Score: 2 Low Risk (Score 0-2) - Encourage  Ambulation.    Mobility:   Basic Mobility Inpatient Raw Score: 24  JH-HLM Goal: 8: Walk 250 feet or more  JH-HLM Achieved: 7: Walk 25 feet or more  JH-HLM Goal NOT achieved. Continue with multidisciplinary rounding and encourage appropriate mobility to improve upon JH-HLM goals.    Patient Centered Rounds: I performed bedside rounds with nursing staff today.   Discussions with Specialists or Other Care Team Provider: case management    Education and Discussions with Family / Patient: Patient declined call to .     Total Time Spent on Date of Encounter in care of patient: 35 mins. This time was spent on one or more of the following: performing physical exam; counseling and coordination of care; obtaining or reviewing history; documenting in the medical record; reviewing/ordering tests, medications or procedures; communicating with other healthcare professionals and discussing with patient's family/caregivers.    Current Length of Stay: 8 day(s)  Current Patient Status: Inpatient   Certification Statement: The patient will continue to require additional inpatient hospital stay due to pending surgery and ID clearance. Removal of MELISSA drain  Discharge Plan: Anticipate discharge in 24-48 hrs to home.    Code Status: Level 1 - Full Code    Subjective:   Patient states that she feels better and is excited to possibly be going home today or tomorrow    Objective:     Vitals:   Temp (24hrs), Av.6 °F (36.4 °C), Min:97.3 °F (36.3 °C), Max:98 °F (36.7 °C)    Temp:  [97.3 °F (36.3 °C)-98 °F (36.7 °C)] 98 °F (36.7 °C)  HR:  [78-81] 78  Resp:  [18] 18  BP: (107-119)/(68-80) 119/80  SpO2:  [97 %-98 %] 97 %  Body mass index is 35.79 kg/m².     Input and Output Summary (last 24 hours):     Intake/Output Summary (Last 24 hours) at 2024 1115  Last data filed at 2024 0900  Gross per 24 hour   Intake 840 ml   Output --   Net 840 ml       Physical Exam:   Physical Exam  Vitals and nursing note reviewed.    Constitutional:       General: She is not in acute distress.     Appearance: She is well-developed.   HENT:      Head: Normocephalic and atraumatic.   Eyes:      Conjunctiva/sclera: Conjunctivae normal.   Cardiovascular:      Rate and Rhythm: Normal rate and regular rhythm.      Heart sounds: No murmur heard.  Pulmonary:      Effort: Pulmonary effort is normal. No respiratory distress.      Breath sounds: Normal breath sounds.   Abdominal:      Palpations: Abdomen is soft.      Tenderness: There is no abdominal tenderness.   Musculoskeletal:         General: No swelling.      Cervical back: Neck supple.   Skin:     General: Skin is warm and dry.   Neurological:      Mental Status: She is alert. Mental status is at baseline.   Psychiatric:         Mood and Affect: Mood normal.          Additional Data:     Labs:  Results from last 7 days   Lab Units 04/17/24  0902 04/16/24  0809 04/15/24  0508 04/12/24  0434 04/11/24  0506   WBC Thousand/uL 5.71 6.59 6.61   < > 11.43*   HEMOGLOBIN g/dL 10.8* 11.6 10.7*   < > 10.6*   HEMATOCRIT % 33.7* 35.5 32.6*   < > 32.3*   PLATELETS Thousands/uL 460* 515* 475*   < > 361   BANDS PCT %  --   --  1   < >  --    SEGS PCT %  --   --   --   --  75   LYMPHO PCT %  --  22 23   < > 16   MONO PCT %  --  10 3*   < > 6   EOS PCT %  --  3 2   < > 2    < > = values in this interval not displayed.     Results from last 7 days   Lab Units 04/17/24  0902   SODIUM mmol/L 136   POTASSIUM mmol/L 4.2   CHLORIDE mmol/L 103   CO2 mmol/L 24   BUN mg/dL 6   CREATININE mg/dL 0.60   ANION GAP mmol/L 9   CALCIUM mg/dL 9.2   ALBUMIN g/dL 3.6   GLUCOSE RANDOM mg/dL 149*           Results from last 7 days   Lab Units 04/17/24  1046 04/17/24  0623 04/16/24  2100 04/16/24  1530 04/16/24  1043 04/16/24  0524 04/15/24  2040 04/15/24  1503 04/15/24  1057 04/15/24  0614 04/14/24  1954 04/14/24  1532   POC GLUCOSE mg/dl 149* 190* 184* 170* 186* 163* 172* 106 162* 99 225* 155*                 Lines/Drains:  Invasive  Devices       Peripheral Intravenous Line  Duration             Peripheral IV 04/17/24 Right;Ventral (anterior) Forearm <1 day              Drain  Duration             Open Drain Right Breast 4 days                          Imaging: Reviewed radiology reports from this admission including: abdominal/pelvic CT    Recent Cultures (last 7 days):   Results from last 7 days   Lab Units 04/12/24  1501 04/11/24  0954   GRAM STAIN RESULT  4+ Polys*  4+ Gram positive cocci in pairs*  1+ Gram negative rods* No polys seen*  Rare Gram positive cocci in clusters*   WOUND CULTURE  Growth in Broth culture only Staphylococcus epidermidis* 1+ Growth of       Last 24 Hours Medication List:   Current Facility-Administered Medications   Medication Dose Route Frequency Provider Last Rate    acetaminophen  650 mg Oral Q8H ADAL Elicia Falcon, DO      albuterol  2 puff Inhalation Q6H PRN Danny Seymour MD      albuterol  2.5 mg Nebulization Q6H PRN Danny Seymour MD      ALPRAZolam  0.5 mg Oral BID PRN Danny Seymour MD      aluminum-magnesium hydroxide-simethicone  30 mL Oral Q6H PRN Danny Seymour MD      amLODIPine  10 mg Oral Daily Danny Seymour MD      atorvastatin  20 mg Oral Daily Danny Seymour MD      enoxaparin  40 mg Subcutaneous Daily Danny Seymour MD      gabapentin  100 mg Oral TID Elicia Falcon, DO      insulin glargine  40 Units Subcutaneous HS Danny Seymour MD      insulin lispro  12 Units Subcutaneous TID With Meals Danny Seymour MD      insulin lispro  2-12 Units Subcutaneous 4x Daily (AC & HS) Danny Seymour MD      magnesium hydroxide  30 mL Oral Daily PRN Elicia Wilsone, DO      methocarbamol  500 mg Oral Q8H ADAL Elicia Falcon, DO      metroNIDAZOLE  500 mg Oral Q12H [facility] NICO Calles      morphine injection  2 mg Intravenous Q4H PRN Elicia Falcon, DO      multi-electrolyte  125 mL/hr Intravenous Continuous Danny Seymour MD Stopped (04/17/24 0637)    ondansetron  4 mg Intravenous Q6H PRN Danny Seymour MD       oxyCODONE  10 mg Oral Q12H ADAL Elicia Falcon DO      oxyCODONE  5 mg Oral Q6H PRN Elicia Falcon DO      polyethylene glycol  17 g Oral Daily Elicia Falcon DO      senna-docusate sodium  2 tablet Oral HS Elicia Falcon DO      vancomycin  17.5 mg/kg Intravenous Q12H Ayanna Kincaid, CRNP 0 mL/hr at 04/17/24 0916        Today, Patient Was Seen By: Elicia Falcon DO    **Please Note: This note may have been constructed using a voice recognition system.**

## 2024-04-17 NOTE — PLAN OF CARE
Problem: PAIN - ADULT  Goal: Verbalizes/displays adequate comfort level or baseline comfort level  Description: Interventions:  - Encourage patient to monitor pain and request assistance  - Assess pain using appropriate pain scale  - Administer analgesics based on type and severity of pain and evaluate response  - Implement non-pharmacological measures as appropriate and evaluate response  - Consider cultural and social influences on pain and pain management  - Notify physician/advanced practitioner if interventions unsuccessful or patient reports new pain  Outcome: Progressing     Problem: INFECTION - ADULT  Goal: Absence or prevention of progression during hospitalization  Description: INTERVENTIONS:  - Assess and monitor for signs and symptoms of infection  - Monitor lab/diagnostic results  - Monitor all insertion sites, i.e. indwelling lines, tubes, and drains  - Monitor endotracheal if appropriate and nasal secretions for changes in amount and color  - [place] appropriate cooling/warming therapies per order  - Administer medications as ordered  - Instruct and encourage patient and family to use good hand hygiene technique  - Identify and instruct in appropriate isolation precautions for identified infection/condition  Outcome: Progressing  Goal: Absence of fever/infection during neutropenic period  Description: INTERVENTIONS:  - Monitor WBC    Outcome: Progressing     Problem: SAFETY ADULT  Goal: Patient will remain free of falls  Description: INTERVENTIONS:  - Educate patient/family on patient safety including physical limitations  - Instruct patient to call for assistance with activity   - Consult OT/PT to assist with strengthening/mobility   - Keep Call bell within reach  - Keep bed low and locked with side rails adjusted as appropriate  - Keep care items and personal belongings within reach  - Initiate and maintain comfort rounds  - Make Fall Risk Sign visible to staff  -  Apply yellow socks and  bracelet for high fall risk patients  - Consider moving patient to room near nurses station  Outcome: Progressing     Problem: DISCHARGE PLANNING  Goal: Discharge to home or other facility with appropriate resources  Description: INTERVENTIONS:  - Identify barriers to discharge w/patient and caregiver  - Arrange for needed discharge resources and transportation as appropriate  - Identify discharge learning needs (meds, wound care, etc.)  - Arrange for interpretive services to assist at discharge as needed  - Refer to Case Management Department for coordinating discharge planning if the patient needs post-hospital services based on physician/advanced practitioner order or complex needs related to functional status, cognitive ability, or social support system  Outcome: Progressing     Problem: Knowledge Deficit  Goal: Patient/family/caregiver demonstrates understanding of disease process, treatment plan, medications, and discharge instructions  Description: Complete learning assessment and assess knowledge base.  Interventions:  - Provide teaching at level of understanding  - Provide teaching via preferred learning methods  Outcome: Progressing     Problem: METABOLIC, FLUID AND ELECTROLYTES - ADULT  Goal: Glucose maintained within target range  Description: INTERVENTIONS:  - Monitor Blood Glucose as ordered  - Assess for signs and symptoms of hyperglycemia and hypoglycemia  - Administer ordered medications to maintain glucose within target range  - Assess nutritional intake and initiate nutrition service referral as needed  Outcome: Progressing     Problem: SKIN/TISSUE INTEGRITY - ADULT  Goal: Incision(s), wounds(s) or drain site(s) healing without S/S of infection  Description: INTERVENTIONS  - Assess and document dressing, incision, wound bed, drain sites and surrounding tissue  - Provide patient and family education  - Outcome: Progressing

## 2024-04-18 VITALS
RESPIRATION RATE: 18 BRPM | WEIGHT: 256.62 LBS | BODY MASS INDEX: 35.93 KG/M2 | DIASTOLIC BLOOD PRESSURE: 73 MMHG | HEIGHT: 71 IN | OXYGEN SATURATION: 97 % | SYSTOLIC BLOOD PRESSURE: 112 MMHG | HEART RATE: 77 BPM | TEMPERATURE: 97.5 F

## 2024-04-18 LAB
ALBUMIN SERPL BCP-MCNC: 3.5 G/DL (ref 3.5–5)
ANION GAP SERPL CALCULATED.3IONS-SCNC: 7 MMOL/L (ref 4–13)
BASOPHILS # BLD MANUAL: 0 THOUSAND/UL (ref 0–0.1)
BASOPHILS NFR MAR MANUAL: 0 % (ref 0–1)
BUN SERPL-MCNC: 5 MG/DL (ref 5–25)
CALCIUM SERPL-MCNC: 9.1 MG/DL (ref 8.4–10.2)
CHLORIDE SERPL-SCNC: 104 MMOL/L (ref 96–108)
CO2 SERPL-SCNC: 28 MMOL/L (ref 21–32)
CREAT SERPL-MCNC: 0.61 MG/DL (ref 0.6–1.3)
EOSINOPHIL # BLD MANUAL: 0.19 THOUSAND/UL (ref 0–0.4)
EOSINOPHIL NFR BLD MANUAL: 4 % (ref 0–6)
ERYTHROCYTE [DISTWIDTH] IN BLOOD BY AUTOMATED COUNT: 13.7 % (ref 11.6–15.1)
GFR SERPL CREATININE-BSD FRML MDRD: 109 ML/MIN/1.73SQ M
GLUCOSE SERPL-MCNC: 219 MG/DL (ref 65–140)
GLUCOSE SERPL-MCNC: 95 MG/DL (ref 65–140)
GLUCOSE SERPL-MCNC: 97 MG/DL (ref 65–140)
HCT VFR BLD AUTO: 33.5 % (ref 34.8–46.1)
HGB BLD-MCNC: 10.8 G/DL (ref 11.5–15.4)
LYMPHOCYTES # BLD AUTO: 1.1 THOUSAND/UL (ref 0.6–4.47)
LYMPHOCYTES # BLD AUTO: 21 % (ref 14–44)
MAGNESIUM SERPL-MCNC: 1.8 MG/DL (ref 1.9–2.7)
MCH RBC QN AUTO: 30.4 PG (ref 26.8–34.3)
MCHC RBC AUTO-ENTMCNC: 32.2 G/DL (ref 31.4–37.4)
MCV RBC AUTO: 94 FL (ref 82–98)
METAMYELOCYTE ABSOLUTE CT: 0.1 THOUSAND/UL (ref 0–0.1)
METAMYELOCYTES NFR BLD MANUAL: 2 % (ref 0–1)
MONOCYTES # BLD AUTO: 0.24 THOUSAND/UL (ref 0–1.22)
MONOCYTES NFR BLD: 5 % (ref 4–12)
MYELOCYTE ABSOLUTE CT: 0.05 THOUSAND/UL (ref 0–0.1)
MYELOCYTES NFR BLD MANUAL: 1 % (ref 0–1)
NEUTROPHILS # BLD MANUAL: 3.12 THOUSAND/UL (ref 1.85–7.62)
NEUTS BAND NFR BLD MANUAL: 1 % (ref 0–8)
NEUTS SEG NFR BLD AUTO: 64 % (ref 43–75)
PHOSPHATE SERPL-MCNC: 4.7 MG/DL (ref 2.7–4.5)
PLATELET # BLD AUTO: 505 THOUSANDS/UL (ref 149–390)
PLATELET BLD QL SMEAR: ABNORMAL
PMV BLD AUTO: 9.2 FL (ref 8.9–12.7)
POTASSIUM SERPL-SCNC: 3.7 MMOL/L (ref 3.5–5.3)
RBC # BLD AUTO: 3.55 MILLION/UL (ref 3.81–5.12)
RBC MORPH BLD: NORMAL
SODIUM SERPL-SCNC: 139 MMOL/L (ref 135–147)
VARIANT LYMPHS # BLD AUTO: 2 %
WBC # BLD AUTO: 4.8 THOUSAND/UL (ref 4.31–10.16)

## 2024-04-18 PROCEDURE — 85027 COMPLETE CBC AUTOMATED: CPT | Performed by: SPECIALIST

## 2024-04-18 PROCEDURE — 83735 ASSAY OF MAGNESIUM: CPT | Performed by: STUDENT IN AN ORGANIZED HEALTH CARE EDUCATION/TRAINING PROGRAM

## 2024-04-18 PROCEDURE — 85007 BL SMEAR W/DIFF WBC COUNT: CPT | Performed by: SPECIALIST

## 2024-04-18 PROCEDURE — 82948 REAGENT STRIP/BLOOD GLUCOSE: CPT

## 2024-04-18 PROCEDURE — 99024 POSTOP FOLLOW-UP VISIT: CPT | Performed by: SPECIALIST

## 2024-04-18 PROCEDURE — 99239 HOSP IP/OBS DSCHRG MGMT >30: CPT | Performed by: STUDENT IN AN ORGANIZED HEALTH CARE EDUCATION/TRAINING PROGRAM

## 2024-04-18 PROCEDURE — 80069 RENAL FUNCTION PANEL: CPT | Performed by: STUDENT IN AN ORGANIZED HEALTH CARE EDUCATION/TRAINING PROGRAM

## 2024-04-18 PROCEDURE — 99233 SBSQ HOSP IP/OBS HIGH 50: CPT | Performed by: INTERNAL MEDICINE

## 2024-04-18 RX ORDER — INSULIN GLARGINE 100 [IU]/ML
40 INJECTION, SOLUTION SUBCUTANEOUS DAILY
Qty: 12 ML | Refills: 0 | Status: SHIPPED | OUTPATIENT
Start: 2024-04-18

## 2024-04-18 RX ORDER — SULFAMETHOXAZOLE AND TRIMETHOPRIM 800; 160 MG/1; MG/1
1 TABLET ORAL EVERY 12 HOURS SCHEDULED
Status: DISCONTINUED | OUTPATIENT
Start: 2024-04-18 | End: 2024-04-18 | Stop reason: HOSPADM

## 2024-04-18 RX ORDER — INSULIN GLARGINE 100 [IU]/ML
40 INJECTION, SOLUTION SUBCUTANEOUS DAILY
Qty: 10 ML | Refills: 0 | Status: CANCELLED | OUTPATIENT
Start: 2024-04-18

## 2024-04-18 RX ORDER — MAGNESIUM SULFATE HEPTAHYDRATE 40 MG/ML
2 INJECTION, SOLUTION INTRAVENOUS ONCE
Status: COMPLETED | OUTPATIENT
Start: 2024-04-18 | End: 2024-04-18

## 2024-04-18 RX ORDER — INSULIN LISPRO 100 [IU]/ML
12 INJECTION, SOLUTION INTRAVENOUS; SUBCUTANEOUS
Qty: 15 ML | Refills: 0 | Status: CANCELLED | OUTPATIENT
Start: 2024-04-18

## 2024-04-18 RX ORDER — INSULIN LISPRO 100 [IU]/ML
12 INJECTION, SOLUTION INTRAVENOUS; SUBCUTANEOUS
Qty: 15 ML | Refills: 0 | Status: SHIPPED | OUTPATIENT
Start: 2024-04-18

## 2024-04-18 RX ORDER — METRONIDAZOLE 500 MG/1
500 TABLET ORAL EVERY 12 HOURS SCHEDULED
Qty: 17 TABLET | Refills: 0 | Status: SHIPPED | OUTPATIENT
Start: 2024-04-18 | End: 2024-04-27

## 2024-04-18 RX ORDER — SULFAMETHOXAZOLE AND TRIMETHOPRIM 800; 160 MG/1; MG/1
1 TABLET ORAL EVERY 12 HOURS SCHEDULED
Qty: 18 TABLET | Refills: 0 | Status: CANCELLED | OUTPATIENT
Start: 2024-04-18 | End: 2024-04-27

## 2024-04-18 RX ORDER — OXYCODONE HYDROCHLORIDE AND ACETAMINOPHEN 5; 325 MG/1; MG/1
1 TABLET ORAL EVERY 8 HOURS PRN
Qty: 9 TABLET | Refills: 0 | Status: SHIPPED | OUTPATIENT
Start: 2024-04-18

## 2024-04-18 RX ORDER — SULFAMETHOXAZOLE AND TRIMETHOPRIM 800; 160 MG/1; MG/1
1 TABLET ORAL EVERY 12 HOURS SCHEDULED
Qty: 17 TABLET | Refills: 0 | Status: SHIPPED | OUTPATIENT
Start: 2024-04-18 | End: 2024-04-27

## 2024-04-18 RX ORDER — METRONIDAZOLE 500 MG/1
500 TABLET ORAL EVERY 12 HOURS SCHEDULED
Qty: 18 TABLET | Refills: 0 | Status: CANCELLED | OUTPATIENT
Start: 2024-04-18 | End: 2024-04-27

## 2024-04-18 RX ADMIN — GABAPENTIN 100 MG: 100 CAPSULE ORAL at 09:02

## 2024-04-18 RX ADMIN — INSULIN LISPRO 4 UNITS: 100 INJECTION, SOLUTION INTRAVENOUS; SUBCUTANEOUS at 11:38

## 2024-04-18 RX ADMIN — INSULIN LISPRO 12 UNITS: 100 INJECTION, SOLUTION INTRAVENOUS; SUBCUTANEOUS at 11:38

## 2024-04-18 RX ADMIN — POLYETHYLENE GLYCOL 3350 17 G: 17 POWDER, FOR SOLUTION ORAL at 09:04

## 2024-04-18 RX ADMIN — AMLODIPINE BESYLATE 10 MG: 10 TABLET ORAL at 09:02

## 2024-04-18 RX ADMIN — ENOXAPARIN SODIUM 40 MG: 40 INJECTION SUBCUTANEOUS at 09:03

## 2024-04-18 RX ADMIN — ATORVASTATIN CALCIUM 20 MG: 20 TABLET, FILM COATED ORAL at 09:02

## 2024-04-18 RX ADMIN — MAGNESIUM SULFATE HEPTAHYDRATE 2 G: 40 INJECTION, SOLUTION INTRAVENOUS at 10:55

## 2024-04-18 RX ADMIN — INSULIN LISPRO 12 UNITS: 100 INJECTION, SOLUTION INTRAVENOUS; SUBCUTANEOUS at 09:04

## 2024-04-18 RX ADMIN — ACETAMINOPHEN 650 MG: 325 TABLET ORAL at 06:36

## 2024-04-18 RX ADMIN — METHOCARBAMOL TABLETS 500 MG: 500 TABLET, COATED ORAL at 06:36

## 2024-04-18 RX ADMIN — METRONIDAZOLE 500 MG: 500 TABLET ORAL at 09:02

## 2024-04-18 RX ADMIN — OXYCODONE HYDROCHLORIDE 10 MG: 10 TABLET, FILM COATED, EXTENDED RELEASE ORAL at 06:36

## 2024-04-18 RX ADMIN — SULFAMETHOXAZOLE AND TRIMETHOPRIM 1 TABLET: 800; 160 TABLET ORAL at 09:06

## 2024-04-18 NOTE — PLAN OF CARE
Problem: PAIN - ADULT  Goal: Verbalizes/displays adequate comfort level or baseline comfort level  Description: Interventions:  - Encourage patient to monitor pain and request assistance  - Assess pain using appropriate pain scale  - Administer analgesics based on type and severity of pain and evaluate response  - Implement non-pharmacological measures as appropriate and evaluate response  - Consider cultural and social influences on pain and pain management  - Notify physician/advanced practitioner if interventions unsuccessful or patient reports new pain  Outcome: Progressing     Problem: INFECTION - ADULT  Goal: Absence or prevention of progression during hospitalization  Description: INTERVENTIONS:  - Assess and monitor for signs and symptoms of infection  - Monitor lab/diagnostic results  - Monitor all insertion sites, i.e. indwelling lines, tubes, and drains  - Monitor endotracheal if appropriate and nasal secretions for changes in amount and color  - [place] appropriate cooling/warming therapies per order  - Administer medications as ordered  - Instruct and encourage patient and family to use good hand hygiene technique  - Identify and instruct in appropriate isolation precautions for identified infection/condition  Outcome: Progressing  Goal: Absence of fever/infection during neutropenic period  Description: INTERVENTIONS:  - Monitor WBC    Outcome: Progressing     Problem: SAFETY ADULT  Goal: Patient will remain free of falls  Description: INTERVENTIONS:  - Educate patient/family on patient safety including physical limitations  - Instruct patient to call for assistance with activity   - Consult OT/PT to assist with strengthening/mobility   - Keep Call bell within reach  - Keep bed low and locked with side rails adjusted as appropriate  - Keep care items and personal belongings within reach  - Initiate and maintain comfort rounds  - Make Fall Risk Sign visible to staff  - - Apply yellow socks and  bracelet for high fall risk patients  - Consider moving patient to room near nurses station  Outcome: Progressing     Problem: DISCHARGE PLANNING  Goal: Discharge to home or other facility with appropriate resources  Description: INTERVENTIONS:  - Identify barriers to discharge w/patient and caregiver  - Arrange for needed discharge resources and transportation as appropriate  - Identify discharge learning needs (meds, wound care, etc.)  - Arrange for interpretive services to assist at discharge as needed  - Refer to Case Management Department for coordinating discharge planning if the patient needs post-hospital services based on physician/advanced practitioner order or complex needs related to functional status, cognitive ability, or social support system  Outcome: Progressing     Problem: Knowledge Deficit  Goal: Patient/family/caregiver demonstrates understanding of disease process, treatment plan, medications, and discharge instructions  Description: Complete learning assessment and assess knowledge base.  Interventions:  - Provide teaching at level of understanding  - Provide teaching via preferred learning methods  Outcome: Progressing     Problem: METABOLIC, FLUID AND ELECTROLYTES - ADULT  Goal: Glucose maintained within target range  Description: INTERVENTIONS:  - Monitor Blood Glucose as ordered  - Assess for signs and symptoms of hyperglycemia and hypoglycemia  - Administer ordered medications to maintain glucose within target range  - Assess nutritional intake and initiate nutrition service referral as needed  Outcome: Progressing     Problem: SKIN/TISSUE INTEGRITY - ADULT  Goal: Incision(s), wounds(s) or drain site(s) healing without S/S of infection  Description: INTERVENTIONS  - Assess and document dressing, incision, wound bed, drain sites and surrounding tissue  - Provide patient and family education  -   Outcome: Progressing

## 2024-04-18 NOTE — DISCHARGE SUMMARY
[facility]  Discharge- Janine Stafford 1980, 43 y.o. female MRN: [id number]  Unit/Bed#: 7T [facility] 702-01 Encounter: [id number]  Primary Care Provider: Melvi Javier MD   Date and time admitted to hospital: 4/9/2024  4:33 PM    * Breast abscess  Assessment & Plan  Presents to ED with redness and pain of R breast  SIRS met: SIRS: 2/4; tachycardia, and WBC  IVF given in total of 1L bolus in ED  Cultures   Blood cultures -no growth  Wound culture 4/11 grows rare gram-positive cocci in cluster  Wound culture 4/12 staph epidermidis  Anaerobic culture 4/12 growing Prevotella species  IV antibiotics; IV ceftriaxone initially given in the ED  ID consulted patient placed on IV vancomycin and ceftriaxone   4/16 -stopped IV ceftriaxone, start PO Flagyl, 500mg q12  General surgery consulted  On 4/12/2024 patient underwent I&D in OR with Dr. Seymour   Mammogram cannot be done as an inpatient.  Recommended outpatient mammogram.  Sensitive to clinda, bactrim, tetracyclines  S/P MELISSA drain removal by general surgery on 4/17  Cleared for discharge by ID on oral Bactrim -160 bid and flagyl 500 mg bid through 4/26/24 (14 day course post I&D)  F/U with surgery and ID in outpatient setting        Drug-induced constipation  Assessment & Plan  4/15 Admits to no bowel movement since admission about 4 days.   States that normally she goes every 48 hours  Currently denies any symptoms of constipation but admits to straining on a regular basis  Currently on opioids for pain control  Given Dulcolax 10 mg times once and placed on standing senna docusate 2 tablets to be given now then nightly going forward    4/16 Still no bowel movement, will give milk of magnesia x1, place on standing miralax 17 g daily, continue senna-docusate 2 tabs qhs  Resolved      Moderate asthma  Assessment & Plan  Not in acute exacerbation  Continue home albuterol, duonebs    Type 2 diabetes mellitus, without long-term current use  of insulin ([facility])  Assessment & Plan  Lab Results   Component Value Date    HGBA1C 9.7 (H) 11/28/2022       Recent Labs     04/17/24  1504 04/17/24 2013 04/18/24  0530 04/18/24  1055   POCGLU 101 162* 97 219*       Blood Sugar Average: Last 72 hrs:  (P) 151.5716051145724137  BG control improved   patient was on Lantus 30 units daily and Humalog 6 units t.i.d. With meals  Recently increased Lantus to 40 units nightly and Humalog to 12 units 3 times daily AC.  She was supposed to follow with endocrinology outpatient  Will discharge patient on Lantus 40 units nightly and 12 units with meals as this regimen has been working well while hospitalized      Hypertension  Assessment & Plan  Continue amlodipine      Morbid obesity with BMI of 40.0-44.9, adult ([facility])  Assessment & Plan  Counseled on diet and lifestyle modification    Sepsis (HCC)-resolved as of 4/15/2024  Assessment & Plan  Presents to ED with redness and pain of R breast  SIRS met: SIRS: 2/4; tachycardia, and WBC  Source of infection: mastitis and possible breast abscess    Lab Results   Component Value Date    WBC 6.59 04/16/2024    PROCALCITONI <0.05 09/30/2018    LACTICACID 1.5 04/09/2024       IVF given in total of 1L bolus in ED  Continue IVF  Cultures   Blood cultures -no growth at 72 hours  Wound culture 4/11 grows rare gram-positive cocci in cluster  Wound culture 4/12 showed gram-positive cocci in pairs, GNR, awaiting anaerobic culture    IV antibiotics; IV ceftriaxone initially given in the ED  Initially was on IV ceftriaxone, added vancomycin and Flagyl on 4/11/2024  ID consult placed appreciate recommendations.  Recommended to continue IV vancomycin and ceftriaxone for now.          Medical Problems       Resolved Problems  Date Reviewed: 4/18/2024            Resolved    Sepsis (HCC) 4/15/2024     Resolved by  Elicia Falcon DO        Discharging Physician / Practitioner: Elicia Falcon DO  PCP: Melvi Javier MD  Admission Date:   Admission Orders  (From admission, onward)       Ordered        04/09/24 1751  INPATIENT ADMISSION  Once            04/09/24 1751  Inpatient Admission  Once                          Discharge Date: 04/18/24    Consultations During Hospital Stay:  ID  General surgery    Procedures Performed:   4/12/2024  s/p I&D in OR with Dr. Seymour     Significant Findings / Test Results:   Breast abscess    Incidental Findings:   See above   I reviewed the above mentioned incidental findings with the patient and/or family and they expressed understanding.    Test Results Pending at Discharge (will require follow up):   None     Outpatient Tests Requested:  Mammogram    Complications:  none    Reason for Admission: Right breast abscess    Hospital Course:   Janine Stafford is a 43 y.o. female patient who originally presented to the hospital on 4/9/2024 due to right breast worsening pain and erythema.  Patient was found to be septic in setting of right breast abscess.  Patient was given IV fluids per sepsis protocol and placed on IV vancomycin and ceftriaxone.  General surgery and infectious disease were consulted.  Patient had incision and drainage of right breast abscess performed by Dr. Seymour in the OR on 4/12/2024.  Cultures grew Staph epidermidis resistant to penicillins/cephalosporins and anaerobic culture grew Prevotella.  Patient has been switched over to oral Bactrim -160 mg twice daily and Flagyl 500 mg twice daily and to continue for 14 days postop through 4/26/2024.  Patient to follow-up with ID and general surgery in outpatient setting.        Please see above list of diagnoses and related plan for additional information.     Condition at Discharge: good    Discharge Day Visit / Exam:   Subjective: Patient states that she feels well currently has no acute complaints  Vitals: Blood Pressure: 112/73 (04/18/24 0655)  Pulse: 77 (04/18/24 0655)  Temperature: 97.5 °F (36.4 °C) (04/18/24 0655)  Temp Source: Temporal (04/18/24  "0655)  Respirations: 18 (04/18/24 0655)  Height: 5' 11\" (180.3 cm) (04/14/24 1241)  Weight - Scale: 116 kg (256 lb 9.9 oz) (04/14/24 1241)  SpO2: 97 % (04/18/24 0655)  Exam:   Physical Exam  Vitals and nursing note reviewed.   Constitutional:       General: She is not in acute distress.     Appearance: She is well-developed.   HENT:      Head: Normocephalic and atraumatic.   Eyes:      Conjunctiva/sclera: Conjunctivae normal.   Cardiovascular:      Rate and Rhythm: Normal rate and regular rhythm.      Heart sounds: No murmur heard.  Pulmonary:      Effort: Pulmonary effort is normal. No respiratory distress.      Breath sounds: Normal breath sounds.   Abdominal:      Palpations: Abdomen is soft.      Tenderness: There is no abdominal tenderness.   Musculoskeletal:         General: No swelling.      Cervical back: Neck supple.   Skin:     General: Skin is warm and dry.      Capillary Refill: Capillary refill takes less than 2 seconds.   Neurological:      Mental Status: She is alert.   Psychiatric:         Mood and Affect: Mood normal.          Discussion with Family: Patient declined call to .     Discharge instructions/Information to patient and family:   See after visit summary for information provided to patient and family.      Provisions for Follow-Up Care:  See after visit summary for information related to follow-up care and any pertinent home health orders.      Mobility at time of Discharge:   Basic Mobility Inpatient Raw Score: 22  JH-HLM Goal: 7: Walk 25 feet or more  JH-HLM Achieved: 7: Walk 25 feet or more  HLM Goal achieved. Continue to encourage appropriate mobility.     Disposition:   Home with VNA Services (Reminder: Complete face to face encounter)    Planned Readmission: no     Discharge Statement:  I spent 45 minutes discharging the patient. This time was spent on the day of discharge. I had direct contact with the patient on the day of discharge. Greater than 50% of the total time " was spent examining patient, answering all patient questions, arranging and discussing plan of care with patient as well as directly providing post-discharge instructions.  Additional time then spent on discharge activities.    Discharge Medications:  See after visit summary for reconciled discharge medications provided to patient and/or family.      **Please Note: This note may have been constructed using a voice recognition system**

## 2024-04-18 NOTE — NURSING NOTE
PT was D/C to home D/C instruction was given to PT and PT verbalized understanding of D/C instruction. All personal belonging was given to PT. IV was D/C no distress noted. 7 T staff accompany PT to lobby.

## 2024-04-18 NOTE — DISCHARGE INSTR - AVS FIRST PAGE
Continue taking oral Bactrim -160 bid and flagyl 500 mg bid through 4/26/24 (14 day course post I&D)    Increase your Lantus to 40 units daily and lispro to 12 units 3 times with meals    Follow up with Dr. Seymour on discharge

## 2024-04-18 NOTE — PROGRESS NOTES
No complaints.  Breast pain diminished significantly.  Wound improving.  On oral antibiotics.    Agree with DC today.  Office visit 4/30 or 5 /1 for follow-up.

## 2024-04-18 NOTE — PROGRESS NOTES
Progress Note - Infectious Disease   Janine Stafford 43 y.o. female MRN: [id number]  Unit/Bed#: 7T [facility] 702-01 Encounter: [id number]    Impression/Plan:  1. Sepsis. Present on admission. Tachycardia and leukocytosis. Secondary to R breast infection. No other clear source appreciated. Patient without respiratory, , or GI complaints. Blood cultures are negative >5 days. Despite being systemically ill she remains hemodynamically stable. She remains afebrile. Her WBC count has trended down.  -antibiotic treatment as below  -monitor CBCD and BMP  -monitor vitals  -supportive care      2. R breast cellulitis vs nonlactational mastitis. Patient with history of mastitis in 2017. Breast imaging at that time showed masslike formation with central complex cystic area, marked skin thickening, and surrounding reactive lymph nodes. Patient was formally assessed by surgical oncology who felt this was infection. Patient declined aspiration at that time but she improved with Keflex. Now with recurring symptoms and new drainage from the R nipple. Swab culture with growth of mixed enoc. No new breast imaging obtained.  Patient was assessed by general surgery and underwent surgical I&D on 4/12/2024. Large amount of pus encountered in large abscess cavity. Penrose drain was placed. Operative culture with growth of prevotella and staph epidermidis. Microlab performed further work for sensitivity. The patient has been receiving IV vancomycin and oral Flagyl. She has been tolerating antibiotic without difficulty. Given final culture results I will transition her to fully oral regimen of Bactrim DS and flagyl now. She should complete 14-days total antibiotic treatment after surgical washout.  -stop IV vancomycin   -continue PO Flagyl, 500mg q12, through 4/26/2024 for 14 days of post-op antibiotic treatment  -start PO Bactrim DS, BID, through 4/26/2024 for 14 days of post-op antibiotics  -monitor CBCD and BMP  -serial R breast  exams  -surgical site care per general surgery   -continue follow up with general surgery     3. Type 2 diabetes mellitus without long term insulin use. Patient's last HbA1c was 9.7% on 2024. Elevated blood glucose is risk factor for wounds and infection. Recommend right glycemic control.  -blood glucose management per primary service      4. Tobacco abuse. This is risk factor for complications with healing.   -recommend patient commit to full cessation  -NRT per primary service      5. Morbid obesity. BMI = 35.79. This can affect antibiotic dosing.  -monitor patient weight and dose adjust antibiotic as needed    The patient is stable for discharge from ID standpoint.  Above plan was discussed in detail with patient at the bedside.  Above plan was discussed in detail with SLIM who agree with plan to transition to PO antibiotic regimen.    Antibiotics:  Vancomycin - stop  Bactrim DS 1  Flagyl 4  Antibiotics 10    Subjective:  Patient reports she feels a lot better. Does feel comfortable with plan to go home later today. Has ongoing breast pain but feels it is slowly improving. She reports her R breast continues to get smaller and is less firm. She hasn't noticed any leakage through her dressing since the removal of her penrose drain yesterday. She has no fever, chills, sweats, shakes; no nausea, vomiting, abdominal pain; no cough, shortness of breath, or chest pain. No new symptoms.    Objective:  Vitals:  Temp:  [97.5 °F (36.4 °C)-98 °F (36.7 °C)] 97.5 °F (36.4 °C)  HR:  [75-78] 77  Resp:  [18] 18  BP: (112-125)/(73-90) 112/73  SpO2:  [96 %-97 %] 97 %  Temp (24hrs), Av.7 °F (36.5 °C), Min:97.5 °F (36.4 °C), Max:98 °F (36.7 °C)  Current: Temperature: 97.5 °F (36.4 °C)    Physical Exam:   General Appearance:  Alert, interactive, nontoxic, no acute distress. She appears comfortable supine in bed.   Throat: Oropharynx moist without lesions.    Lungs:   Clear to auscultation bilaterally; no wheezes, rhonchi or  rales; respirations unlabored on room air.   Heart:  RRR; no murmur, rub or gallop.   Chest: R breast with significant reduction in swelling, skin is not mostly soft with small area of firmness remaining from 1-4 o'clock around the nipple. Surgical site with thin serous/yellow drainage on overlying gauze, no thick purulence seen. R breast remains somewhat tender during exam.   Abdomen:   Soft, non-tender, non-distended, positive bowel sounds.     Extremities: No clubbing or cyanosis, no edema.   Skin: No new rashes noted on exposed skin.     Labs, Imaging, & Other studies:   All pertinent labs and imaging studies were personally reviewed  Results from last 7 days   Lab Units 04/18/24  0832 04/17/24  0902 04/16/24  0809   WBC Thousand/uL 4.80 5.71 6.59   HEMOGLOBIN g/dL 10.8* 10.8* 11.6   PLATELETS Thousands/uL 505* 460* 515*     Results from last 7 days   Lab Units 04/18/24  0832   POTASSIUM mmol/L 3.7   CHLORIDE mmol/L 104   CO2 mmol/L 28   BUN mg/dL 5   CREATININE mg/dL 0.61   EGFR ml/min/1.73sq m 109   CALCIUM mg/dL 9.1     Results from last 7 days   Lab Units 04/12/24  1501   GRAM STAIN RESULT  4+ Polys*  4+ Gram positive cocci in pairs*  1+ Gram negative rods*   WOUND CULTURE  Growth in [place] culture only Staphylococcus epidermidis*

## 2024-04-18 NOTE — PLAN OF CARE
Problem: PAIN - ADULT  Goal: Verbalizes/displays adequate comfort level or baseline comfort level  Description: Interventions:  - Encourage patient to monitor pain and request assistance  - Assess pain using appropriate pain scale  - Administer analgesics based on type and severity of pain and evaluate response  - Implement non-pharmacological measures as appropriate and evaluate response  - Consider cultural and social influences on pain and pain management  - Notify physician/advanced practitioner if interventions unsuccessful or patient reports new pain  4/18/2024 1153 by Nils Sterling RN  Outcome: Adequate for Discharge  4/18/2024 0800 by Nils Sterling RN  Outcome: Progressing     Problem: INFECTION - ADULT  Goal: Absence or prevention of progression during hospitalization  Description: INTERVENTIONS:  - Assess and monitor for signs and symptoms of infection  - Monitor lab/diagnostic results  - Monitor all insertion sites, i.e. indwelling lines, tubes, and drains  - Monitor endotracheal if appropriate and nasal secretions for changes in amount and color  - [place] appropriate cooling/warming therapies per order  - Administer medications as ordered  - Instruct and encourage patient and family to use good hand hygiene technique  - Identify and instruct in appropriate isolation precautions for identified infection/condition  4/18/2024 1153 by Nils Sterling RN  Outcome: Adequate for Discharge  4/18/2024 0800 by Nils Sterling RN  Outcome: Progressing  Goal: Absence of fever/infection during neutropenic period  Description: INTERVENTIONS:  - Monitor WBC    4/18/2024 1153 by Nils Sterling RN  Outcome: Adequate for Discharge  4/18/2024 0800 by Nils Sterling RN  Outcome: Progressing     Problem: SAFETY ADULT  Goal: Patient will remain free of falls  Description: INTERVENTIONS:  - Educate patient/family on patient safety including physical limitations  - Instruct patient to call for assistance with  activity   - Consult OT/PT to assist with strengthening/mobility   - Keep Call bell within reach  - Keep bed low and locked with side rails adjusted as appropriate  - Keep care items and personal belongings within reach  - Initiate and maintain comfort rounds  - Make Fall Risk Sign visible to staff  - Apply yellow socks and bracelet for high fall risk patients  - Consider moving patient to room near nurses station  4/18/2024 1153 by Nils Sterling RN  Outcome: Adequate for Discharge  4/18/2024 0800 by Nils Sterling RN  Outcome: Progressing     Problem: DISCHARGE PLANNING  Goal: Discharge to home or other facility with appropriate resources  Description: INTERVENTIONS:  - Identify barriers to discharge w/patient and caregiver  - Arrange for needed discharge resources and transportation as appropriate  - Identify discharge learning needs (meds, wound care, etc.)  - Arrange for interpretive services to assist at discharge as needed  - Refer to Case Management Department for coordinating discharge planning if the patient needs post-hospital services based on physician/advanced practitioner order or complex needs related to functional status, cognitive ability, or social support system  4/18/2024 1153 by Nils Sterling RN  Outcome: Adequate for Discharge  4/18/2024 0800 by Nils Sterling RN  Outcome: Progressing     Problem: Knowledge Deficit  Goal: Patient/family/caregiver demonstrates understanding of disease process, treatment plan, medications, and discharge instructions  Description: Complete learning assessment and assess knowledge base.  Interventions:  - Provide teaching at level of understanding  - Provide teaching via preferred learning methods  4/18/2024 1153 by Nils Sterling RN  Outcome: Adequate for Discharge  4/18/2024 0800 by Nils Sterling RN  Outcome: Progressing

## 2024-04-18 NOTE — PLAN OF CARE
Problem: PAIN - ADULT  Goal: Verbalizes/displays adequate comfort level or baseline comfort level  Description: Interventions:  - Encourage patient to monitor pain and request assistance  - Assess pain using appropriate pain scale  - Administer analgesics based on type and severity of pain and evaluate response  - Implement non-pharmacological measures as appropriate and evaluate response  - Consider cultural and social influences on pain and pain management  - Notify physician/advanced practitioner if interventions unsuccessful or patient reports new pain  Outcome: Adequate for Discharge     Problem: INFECTION - ADULT  Goal: Absence or prevention of progression during hospitalization  Description: INTERVENTIONS:  - Assess and monitor for signs and symptoms of infection  - Monitor lab/diagnostic results  - Monitor all insertion sites, i.e. indwelling lines, tubes, and drains  - Monitor endotracheal if appropriate and nasal secretions for changes in amount and color  - [place] appropriate cooling/warming therapies per order  - Administer medications as ordered  - Instruct and encourage patient and family to use good hand hygiene technique  - Identify and instruct in appropriate isolation precautions for identified infection/condition  Outcome: Adequate for Discharge  Goal: Absence of fever/infection during neutropenic period  Description: INTERVENTIONS:  - Monitor WBC    Outcome: Adequate for Discharge     Problem: SAFETY ADULT  Goal: Patient will remain free of falls  Description: INTERVENTIONS:  - Educate patient/family on patient safety including physical limitations  - Instruct patient to call for assistance with activity   - Consult OT/PT to assist with strengthening/mobility   - Keep Call bell within reach  - Keep bed low and locked with side rails adjusted as appropriate  - Keep care items and personal belongings within reach  - Initiate and maintain comfort rounds  - Make Fall Risk Sign visible to  staff  - Offer Toileting every *** Hours, in advance of need  - Initiate/Maintain ***alarm  - Obtain necessary fall risk management equipment: ***  - Apply yellow socks and bracelet for high fall risk patients  - Consider moving patient to room near nurses station  Outcome: Adequate for Discharge     Problem: DISCHARGE PLANNING  Goal: Discharge to home or other facility with appropriate resources  Description: INTERVENTIONS:  - Identify barriers to discharge w/patient and caregiver  - Arrange for needed discharge resources and transportation as appropriate  - Identify discharge learning needs (meds, wound care, etc.)  - Arrange for interpretive services to assist at discharge as needed  - Refer to Case Management Department for coordinating discharge planning if the patient needs post-hospital services based on physician/advanced practitioner order or complex needs related to functional status, cognitive ability, or social support system  Outcome: Adequate for Discharge     Problem: Knowledge Deficit  Goal: Patient/family/caregiver demonstrates understanding of disease process, treatment plan, medications, and discharge instructions  Description: Complete learning assessment and assess knowledge base.  Interventions:  - Provide teaching at level of understanding  - Provide teaching via preferred learning methods  Outcome: Adequate for Discharge     Problem: METABOLIC, FLUID AND ELECTROLYTES - ADULT  Goal: Glucose maintained within target range  Description: INTERVENTIONS:  - Monitor Blood Glucose as ordered  - Assess for signs and symptoms of hyperglycemia and hypoglycemia  - Administer ordered medications to maintain glucose within target range  - Assess nutritional intake and initiate nutrition service referral as needed  Outcome: Adequate for Discharge     Problem: SKIN/TISSUE INTEGRITY - ADULT  Goal: Incision(s), wounds(s) or drain site(s) healing without S/S of infection  Description: INTERVENTIONS  - Assess  and document dressing, incision, wound bed, drain sites and surrounding tissue  - Provide patient and family education  - Perform skin care/dressing changes every shift  Outcome: Adequate for Discharge

## 2024-04-19 ENCOUNTER — TELEPHONE (OUTPATIENT)
Dept: PSYCHIATRY | Facility: CLINIC | Age: 44
End: 2024-04-19

## 2024-04-19 NOTE — TELEPHONE ENCOUNTER
Patient has been added to the [facility] wait list with a referral.    Insurance: [facility]  Insurance Type:    Commercial []   Medicaid [x]   [facility] (if applicable)   Medicare []  Location Preference: [place]  Provider Preference: female  Virtual: Yes [] No [x]  Were outside resources sent: Yes [] No [x]

## 2024-04-19 NOTE — UTILIZATION REVIEW
NOTIFICATION OF ADMISSION DISCHARGE   This is a Notification of Discharge from [facility]. Please be advised that this patient has been discharge from our facility. Below you will find the admission and discharge date and time including the patient’s disposition.   UTILIZATION REVIEW CONTACT:  Magi Swift MA  Utilization   Network Utilization Review Department  Phone: [phone] x carefully listen to the prompts. All voicemails are confidential.  Email: [email or web address]     ADMISSION INFORMATION  PRESENTATION DATE: 4/9/2024  4:33 PM  OBERVATION ADMISSION DATE:   INPATIENT ADMISSION DATE: 4/9/24  5:51 PM   DISCHARGE DATE: 4/18/2024  1:30 PM   DISPOSITION:Home/Self Care    Network Utilization Review Department  ATTENTION: Please call with any questions or concerns to [phone] and carefully listen to the prompts so that you are directed to the right person. All voicemails are confidential.   For Discharge needs, contact Care Management DC Support Team at [phone] opt. 2  Send all requests for admission clinical reviews, approved or denied determinations and any other requests to dedicated fax number below belonging to the campus where the patient is receiving treatment. List of dedicated fax numbers for the Facilities:  FACILITY NAME UR FAX NUMBER   ADMISSION DENIALS (Administrative/Medical Necessity) [phone]   DISCHARGE SUPPORT TEAM ([facility]) [phone]   PARENT CHILD HEALTH (Maternity/NICU/Pediatrics) [phone]   [facility] [phone]   [facility] [phone]   [facility] [phone]   [facility] [phone]   [facility] [phone]   [facility] [phone]   [facility] [phone]   [facility] [phone]    [facility] [phone]   [facility] [phone]   [facility] [phone]   [facility] [phone]

## 2024-10-14 ENCOUNTER — TELEPHONE (OUTPATIENT)
Age: 44
End: 2024-10-14

## 2024-10-20 ENCOUNTER — HOSPITAL ENCOUNTER (EMERGENCY)
Facility: HOSPITAL | Age: 44
Discharge: HOME | End: 2024-10-20
Attending: EMERGENCY MEDICINE
Payer: COMMERCIAL

## 2024-10-20 VITALS
TEMPERATURE: 98.7 F | BODY MASS INDEX: 40.59 KG/M2 | RESPIRATION RATE: 20 BRPM | SYSTOLIC BLOOD PRESSURE: 172 MMHG | DIASTOLIC BLOOD PRESSURE: 91 MMHG | OXYGEN SATURATION: 99 % | HEART RATE: 101 BPM | WEIGHT: 291.01 LBS

## 2024-10-20 DIAGNOSIS — N64.4 BREAST PAIN, RIGHT: Primary | ICD-10-CM

## 2024-10-20 DIAGNOSIS — N61.0 CELLULITIS OF RIGHT BREAST: ICD-10-CM

## 2024-10-20 PROCEDURE — 99282 EMERGENCY DEPT VISIT SF MDM: CPT

## 2024-10-20 PROCEDURE — 99284 EMERGENCY DEPT VISIT MOD MDM: CPT | Performed by: PHYSICIAN ASSISTANT

## 2024-10-20 RX ORDER — IBUPROFEN 600 MG/1
600 TABLET, FILM COATED ORAL EVERY 6 HOURS PRN
Qty: 30 TABLET | Refills: 0 | Status: SHIPPED | OUTPATIENT
Start: 2024-10-20

## 2024-10-20 RX ORDER — CLINDAMYCIN HYDROCHLORIDE 300 MG/1
300 CAPSULE ORAL 4 TIMES DAILY
Qty: 40 CAPSULE | Refills: 0 | Status: SHIPPED | OUTPATIENT
Start: 2024-10-20 | End: 2024-10-30

## 2024-10-20 NOTE — ED PROVIDER NOTES
"Time reflects when diagnosis was documented in both MDM as applicable and the Disposition within this note       Time User Action Codes Description Comment    10/20/2024  2:21 PM Zaira Bah Add [N64.4] Breast pain, right     10/20/2024  2:21 PM Zaira Bah Add [N61.0] Cellulitis of right breast           ED Disposition       ED Disposition   Discharge    Condition   Stable    Date/Time   Sun Oct 20, 2024  2:21 PM    Comment   Janine Stafford discharge to home/self care.                   Assessment & Plan       Medical Decision Making  Differential diagnosis includes but not limited to: Breast cellulitis, breast abscess    Problems Addressed:  Breast pain, right: acute illness or injury  Cellulitis of right breast: acute illness or injury    Risk  Prescription drug management.             Medications - No data to display    ED Risk Strat Scores                           SBIRT 22yo+      Flowsheet Row Most Recent Value   Initial Alcohol Screen: US AUDIT-C     1. How often do you have a drink containing alcohol? 1 Filed at: 10/20/2024 1406   2. How many drinks containing alcohol do you have on a typical day you are drinking?  0 Filed at: 10/20/2024 1406   3a. Male UNDER 65: How often do you have five or more drinks on one occasion? 0 Filed at: 10/20/2024 1406   3b. FEMALE Any Age, or MALE 65+: How often do you have 4 or more drinks on one occassion? 0 Filed at: 10/20/2024 1406   Audit-C Score 1 Filed at: 10/20/2024 1406   DAVY: How many times in the past year have you...    Used an illegal drug or used a prescription medication for non-medical reasons? Never Filed at: 10/20/2024 1406                            History of Present Illness       Chief Complaint   Patient presents with    Breast Pain     Patient reports right breast pain for a couple of days, hx of pus pocket surgery. No meds taken this am, took motrin last night. Pt admits to not taking her BP, Insulin or cholesterol meds \"I think its too much " "medicine\".       Past Medical History:   Diagnosis Date    Asthma     Dental abscess     Diabetes mellitus (HCC)     High cholesterol     Hypertension       Past Surgical History:   Procedure Laterality Date    BREAST CYST INCISION AND DRAINAGE Right 4/12/2024    Procedure: INCISION AND DRAINAGE (I&D) BREAST;  Surgeon: Danny Seymour MD;  Location:  MAIN OR;  Service: General    CHOLECYSTECTOMY      ECTOPIC PREGNANCY SURGERY      INCISION AND DRAINAGE INTRA ORAL ABSCESS Left 3/21/2016    Procedure: INCISION AND DRAINAGE  (I&D) WOUND ORAL Left  Space;  Surgeon: Henok Parrish MD;  Location:  MAIN OR;  Service:     MULTIPLE TOOTH EXTRACTIONS N/A 3/21/2016    Procedure: EXTRACTION TEETH MULTIPLE; 17,18,19,11,12,4,5,28,32;  Surgeon: Henok Parrish MD;  Location:  MAIN OR;  Service:     SALPINGECTOMY        Family History   Problem Relation Age of Onset    Diabetes Mother     Heart failure Mother     Cancer Neg Hx       Social History     Tobacco Use    Smoking status: Every Day     Current packs/day: 0.50     Average packs/day: 0.5 packs/day for 10.0 years (5.0 ttl pk-yrs)     Types: Cigarettes    Smokeless tobacco: Never    Tobacco comments:     PT was educated on smoking and the effect is has on her asthma.   Vaping Use    Vaping status: Never Used   Substance Use Topics    Alcohol use: Yes     Comment: Social    Drug use: No      E-Cigarette/Vaping    E-Cigarette Use Never User       E-Cigarette/Vaping Substances      I have reviewed and agree with the history as documented.     43-year-old female presents the emergency department complaints of right-sided breast pain.  States that she has had worsening pain over the past 2 to 3 days.  Denies associated fevers, chills, or discharge from the nipple.  States that she has a history of previous right-sided breast cellulitis and abscess with incision and drainage in April 2024.      History provided by:  Patient   used: No  "       Review of Systems   Constitutional:  Negative for chills and fever.   HENT:  Negative for congestion, dental problem and sore throat.    Respiratory:  Negative for cough.    Cardiovascular:  Negative for chest pain.   Gastrointestinal:  Negative for abdominal pain.   Musculoskeletal:  Negative for back pain and neck pain.   Skin:  Negative for rash and wound.        Breast pain     All other systems reviewed and are negative.          Objective       ED Triage Vitals [10/20/24 1402]   Temperature Pulse Blood Pressure Respirations SpO2 Patient Position - Orthostatic VS   98.7 °F (37.1 °C) 101 (!) 172/91 20 99 % Sitting      Temp Source Heart Rate Source BP Location FiO2 (%) Pain Score    Oral Monitor Left arm -- 10 - Worst Possible Pain      Vitals      Date and Time Temp Pulse SpO2 Resp BP Pain Score FACES Pain Rating User   10/20/24 1402 98.7 °F (37.1 °C) 101 99 % 20 172/91 10 - Worst Possible Pain -- SA            Physical Exam  Vitals and nursing note reviewed.   Constitutional:       Appearance: She is well-developed.   HENT:      Head: Normocephalic and atraumatic.   Cardiovascular:      Rate and Rhythm: Normal rate and regular rhythm.   Pulmonary:      Effort: Pulmonary effort is normal. No respiratory distress.      Breath sounds: No wheezing, rhonchi or rales.   Chest:       Skin:     General: Skin is warm and dry.   Neurological:      General: No focal deficit present.      Mental Status: She is alert and oriented to person, place, and time.   Psychiatric:         Mood and Affect: Mood normal.         Behavior: Behavior normal.         Results Reviewed       None            No orders to display       Procedures    ED Medication and Procedure Management   Prior to Admission Medications   Prescriptions Last Dose Informant Patient Reported? Taking?   Insulin Pen Needle (BD Pen Needle Matilda 2nd Gen) 32G X 4 MM MISC   No No   Sig: For use with insulin pen. Pharmacy may dispense brand covered by insurance.    albuterol (2.5 mg/3 mL) 0.083 % nebulizer solution   No No   Sig: Take 3 mL (2.5 mg total) by nebulization every 6 (six) hours as needed for wheezing or shortness of breath   albuterol (PROVENTIL HFA,VENTOLIN HFA) 90 mcg/act inhaler   No No   Sig: Inhale 2 puffs every 6 (six) hours as needed for wheezing or shortness of breath   amLODIPine (NORVASC) 10 mg tablet   No No   Sig: Take 1 tablet (10 mg total) by mouth daily   atorvastatin (LIPITOR) 20 mg tablet   Yes No   Sig: Take 20 mg by mouth daily   cetirizine (ZyrTEC) 10 mg tablet   Yes No   Sig: Take 10 mg by mouth daily   glipiZIDE-metFORMIN (METAGLIP) 2.5-500 MG per tablet   Yes No   Sig: Take 2 tablets by mouth 2 (two) times a day   insulin glargine (LANTUS) 100 units/mL subcutaneous injection   No No   Sig: Inject 40 Units under the skin daily   insulin lispro (HumaLOG KwikPen) 100 units/mL injection pen   No No   Sig: Inject 12 Units under the skin 3 (three) times a day with meals   ipratropium-albuterol (DUO-NEB) 0.5-2.5 mg/3 mL nebulizer solution   Yes No   Sig: INHALE THE CONTENTS OF 1 VIAL VIA NEBULIZER 4 (FOUR) TIMES A DAY AS NEEDED FOR WHEEZING.      Facility-Administered Medications: None     Discharge Medication List as of 10/20/2024  2:29 PM        START taking these medications    Details   clindamycin (CLEOCIN) 300 MG capsule Take 1 capsule (300 mg total) by mouth 4 (four) times a day for 10 days, Starting Sun 10/20/2024, Until Wed 10/30/2024, Normal      ibuprofen (MOTRIN) 600 mg tablet Take 1 tablet (600 mg total) by mouth every 6 (six) hours as needed for moderate pain, Starting Sun 10/20/2024, Normal           CONTINUE these medications which have NOT CHANGED    Details   albuterol (2.5 mg/3 mL) 0.083 % nebulizer solution Take 3 mL (2.5 mg total) by nebulization every 6 (six) hours as needed for wheezing or shortness of breath, Starting Mon 10/11/2021, Normal      albuterol (PROVENTIL HFA,VENTOLIN HFA) 90 mcg/act inhaler Inhale 2 puffs every  6 (six) hours as needed for wheezing or shortness of breath, Starting Mon 10/11/2021, Normal      amLODIPine (NORVASC) 10 mg tablet Take 1 tablet (10 mg total) by mouth daily, Starting Mon 10/11/2021, Normal      atorvastatin (LIPITOR) 20 mg tablet Take 20 mg by mouth daily, Starting Wed 8/3/2022, Historical Med      cetirizine (ZyrTEC) 10 mg tablet Take 10 mg by mouth daily, Starting Fri 5/13/2022, Until Sat 5/13/2023, Historical Med      glipiZIDE-metFORMIN (METAGLIP) 2.5-500 MG per tablet Take 2 tablets by mouth 2 (two) times a day, Starting Fri 9/23/2022, Until Tue 4/9/2024, Historical Med      insulin glargine (LANTUS) 100 units/mL subcutaneous injection Inject 40 Units under the skin daily, Starting Thu 4/18/2024, Normal      insulin lispro (HumaLOG KwikPen) 100 units/mL injection pen Inject 12 Units under the skin 3 (three) times a day with meals, Starting Thu 4/18/2024, Normal      Insulin Pen Needle (BD Pen Needle Matilda 2nd Gen) 32G X 4 MM MISC For use with insulin pen. Pharmacy may dispense brand covered by insurance., Normal      ipratropium-albuterol (DUO-NEB) 0.5-2.5 mg/3 mL nebulizer solution INHALE THE CONTENTS OF 1 VIAL VIA NEBULIZER 4 (FOUR) TIMES A DAY AS NEEDED FOR WHEEZING., Historical Med             ED SEPSIS DOCUMENTATION   Time reflects when diagnosis was documented in both MDM as applicable and the Disposition within this note       Time User Action Codes Description Comment    10/20/2024  2:21 PM Ziara Bah Add [N64.4] Breast pain, right     10/20/2024  2:21 PM Zaira Bah Add [N61.0] Cellulitis of right breast                  Zaira Bah PA-C  10/20/24 9675

## 2025-01-09 ENCOUNTER — HOSPITAL ENCOUNTER (EMERGENCY)
Facility: HOSPITAL | Age: 45
Discharge: HOME | End: 2025-01-09
Attending: EMERGENCY MEDICINE
Payer: COMMERCIAL

## 2025-01-09 VITALS
BODY MASS INDEX: 41.19 KG/M2 | WEIGHT: 293 LBS | DIASTOLIC BLOOD PRESSURE: 52 MMHG | HEART RATE: 98 BPM | OXYGEN SATURATION: 100 % | TEMPERATURE: 98.6 F | SYSTOLIC BLOOD PRESSURE: 116 MMHG | RESPIRATION RATE: 20 BRPM

## 2025-01-09 DIAGNOSIS — R19.7 NAUSEA VOMITING AND DIARRHEA: Primary | ICD-10-CM

## 2025-01-09 DIAGNOSIS — R11.2 NAUSEA VOMITING AND DIARRHEA: Primary | ICD-10-CM

## 2025-01-09 DIAGNOSIS — A59.9 TRICHOMONAS INFECTION: ICD-10-CM

## 2025-01-09 LAB
ALBUMIN SERPL BCG-MCNC: 4.2 G/DL (ref 3.5–5)
ALP SERPL-CCNC: 74 U/L (ref 34–104)
ALT SERPL W P-5'-P-CCNC: 14 U/L (ref 7–52)
ANION GAP SERPL CALCULATED.3IONS-SCNC: 9 MMOL/L (ref 4–13)
AST SERPL W P-5'-P-CCNC: 14 U/L (ref 13–39)
BACTERIA UR QL AUTO: ABNORMAL /HPF
BASOPHILS # BLD MANUAL: 0 THOUSAND/UL (ref 0–0.1)
BASOPHILS NFR MAR MANUAL: 0 % (ref 0–1)
BILIRUB SERPL-MCNC: 0.52 MG/DL (ref 0.2–1)
BILIRUB UR QL STRIP: NEGATIVE
BUN SERPL-MCNC: 11 MG/DL (ref 5–25)
CALCIUM SERPL-MCNC: 9.6 MG/DL (ref 8.4–10.2)
CHLORIDE SERPL-SCNC: 101 MMOL/L (ref 96–108)
CLARITY UR: ABNORMAL
CO2 SERPL-SCNC: 29 MMOL/L (ref 21–32)
COLOR UR: YELLOW
CREAT SERPL-MCNC: 0.7 MG/DL (ref 0.6–1.3)
EOSINOPHIL # BLD MANUAL: 0.08 THOUSAND/UL (ref 0–0.4)
EOSINOPHIL NFR BLD MANUAL: 1 % (ref 0–6)
ERYTHROCYTE [DISTWIDTH] IN BLOOD BY AUTOMATED COUNT: 12.9 % (ref 11.6–15.1)
EXT PREGNANCY TEST URINE: NEGATIVE
EXT. CONTROL: NORMAL
FLUAV AG UPPER RESP QL IA.RAPID: NEGATIVE
FLUBV AG UPPER RESP QL IA.RAPID: NEGATIVE
GFR SERPL CREATININE-BSD FRML MDRD: 104 ML/MIN/1.73SQ M
GLUCOSE SERPL-MCNC: 251 MG/DL (ref 65–140)
GLUCOSE SERPL-MCNC: 269 MG/DL (ref 65–140)
GLUCOSE UR STRIP-MCNC: ABNORMAL MG/DL
HCT VFR BLD AUTO: 38.5 % (ref 34.8–46.1)
HGB BLD-MCNC: 12.6 G/DL (ref 11.5–15.4)
HGB UR QL STRIP.AUTO: NEGATIVE
KETONES UR STRIP-MCNC: NEGATIVE MG/DL
LEUKOCYTE ESTERASE UR QL STRIP: 100
LIPASE SERPL-CCNC: 35 U/L (ref 11–82)
LYMPHOCYTES # BLD AUTO: 0.57 THOUSAND/UL (ref 0.6–4.47)
LYMPHOCYTES # BLD AUTO: 7 % (ref 14–44)
MCH RBC QN AUTO: 31.2 PG (ref 26.8–34.3)
MCHC RBC AUTO-ENTMCNC: 32.7 G/DL (ref 31.4–37.4)
MCV RBC AUTO: 95 FL (ref 82–98)
MONOCYTES # BLD AUTO: 0.16 THOUSAND/UL (ref 0–1.22)
MONOCYTES NFR BLD: 2 % (ref 4–12)
MUCOUS THREADS UR QL AUTO: ABNORMAL
NEUTROPHILS # BLD MANUAL: 7.34 THOUSAND/UL (ref 1.85–7.62)
NEUTS SEG NFR BLD AUTO: 90 % (ref 43–75)
NITRITE UR QL STRIP: NEGATIVE
NON-SQ EPI CELLS URNS QL MICRO: ABNORMAL /HPF
OTHER STN SPEC: ABNORMAL
PH UR STRIP.AUTO: 8 [PH]
PLATELET # BLD AUTO: 321 THOUSANDS/UL (ref 149–390)
PLATELET BLD QL SMEAR: ADEQUATE
PMV BLD AUTO: 9.8 FL (ref 8.9–12.7)
POTASSIUM SERPL-SCNC: 3.7 MMOL/L (ref 3.5–5.3)
PROT SERPL-MCNC: 7.8 G/DL (ref 6.4–8.4)
PROT UR STRIP-MCNC: ABNORMAL MG/DL
RBC # BLD AUTO: 4.04 MILLION/UL (ref 3.81–5.12)
RBC #/AREA URNS AUTO: ABNORMAL /HPF
RBC MORPH BLD: NORMAL
SARS-COV+SARS-COV-2 AG RESP QL IA.RAPID: NEGATIVE
SODIUM SERPL-SCNC: 139 MMOL/L (ref 135–147)
SP GR UR STRIP.AUTO: 1.01 (ref 1–1.04)
UROBILINOGEN UA: 1 MG/DL
WBC # BLD AUTO: 8.15 THOUSAND/UL (ref 4.31–10.16)
WBC #/AREA URNS AUTO: ABNORMAL /HPF

## 2025-01-09 PROCEDURE — 99284 EMERGENCY DEPT VISIT MOD MDM: CPT | Performed by: PHYSICIAN ASSISTANT

## 2025-01-09 PROCEDURE — 80053 COMPREHEN METABOLIC PANEL: CPT | Performed by: PHYSICIAN ASSISTANT

## 2025-01-09 PROCEDURE — 87804 INFLUENZA ASSAY W/OPTIC: CPT | Performed by: PHYSICIAN ASSISTANT

## 2025-01-09 PROCEDURE — 85007 BL SMEAR W/DIFF WBC COUNT: CPT | Performed by: PHYSICIAN ASSISTANT

## 2025-01-09 PROCEDURE — 81001 URINALYSIS AUTO W/SCOPE: CPT | Performed by: PHYSICIAN ASSISTANT

## 2025-01-09 PROCEDURE — 85027 COMPLETE CBC AUTOMATED: CPT | Performed by: PHYSICIAN ASSISTANT

## 2025-01-09 PROCEDURE — 96374 THER/PROPH/DIAG INJ IV PUSH: CPT

## 2025-01-09 PROCEDURE — 99284 EMERGENCY DEPT VISIT MOD MDM: CPT

## 2025-01-09 PROCEDURE — 96375 TX/PRO/DX INJ NEW DRUG ADDON: CPT

## 2025-01-09 PROCEDURE — 83690 ASSAY OF LIPASE: CPT | Performed by: PHYSICIAN ASSISTANT

## 2025-01-09 PROCEDURE — 96361 HYDRATE IV INFUSION ADD-ON: CPT

## 2025-01-09 PROCEDURE — 82948 REAGENT STRIP/BLOOD GLUCOSE: CPT

## 2025-01-09 PROCEDURE — 87811 SARS-COV-2 COVID19 W/OPTIC: CPT | Performed by: PHYSICIAN ASSISTANT

## 2025-01-09 PROCEDURE — 81025 URINE PREGNANCY TEST: CPT | Performed by: PHYSICIAN ASSISTANT

## 2025-01-09 RX ORDER — KETOROLAC TROMETHAMINE 30 MG/ML
15 INJECTION, SOLUTION INTRAMUSCULAR; INTRAVENOUS ONCE
Status: COMPLETED | OUTPATIENT
Start: 2025-01-09 | End: 2025-01-09

## 2025-01-09 RX ORDER — ONDANSETRON 4 MG/1
4 TABLET, ORALLY DISINTEGRATING ORAL EVERY 8 HOURS PRN
Qty: 20 TABLET | Refills: 0 | Status: SHIPPED | OUTPATIENT
Start: 2025-01-09 | End: 2025-01-19

## 2025-01-09 RX ORDER — ACETAMINOPHEN 500 MG
500 TABLET ORAL EVERY 6 HOURS PRN
Qty: 30 TABLET | Refills: 0 | Status: SHIPPED | OUTPATIENT
Start: 2025-01-09

## 2025-01-09 RX ORDER — ONDANSETRON 2 MG/ML
4 INJECTION INTRAMUSCULAR; INTRAVENOUS ONCE
Status: COMPLETED | OUTPATIENT
Start: 2025-01-09 | End: 2025-01-09

## 2025-01-09 RX ORDER — METRONIDAZOLE 7.5 MG/G
GEL TOPICAL 2 TIMES DAILY
Qty: 45 G | Refills: 0 | Status: SHIPPED | OUTPATIENT
Start: 2025-01-09 | End: 2025-01-16

## 2025-01-09 RX ORDER — ALBUTEROL SULFATE 90 UG/1
2 INHALANT RESPIRATORY (INHALATION) ONCE
Status: COMPLETED | OUTPATIENT
Start: 2025-01-09 | End: 2025-01-09

## 2025-01-09 RX ADMIN — ALBUTEROL SULFATE 2 PUFF: 90 AEROSOL, METERED RESPIRATORY (INHALATION) at 10:09

## 2025-01-09 RX ADMIN — KETOROLAC TROMETHAMINE 15 MG: 30 INJECTION, SOLUTION INTRAMUSCULAR; INTRAVENOUS at 10:02

## 2025-01-09 RX ADMIN — SODIUM CHLORIDE 1000 ML: 0.9 INJECTION, SOLUTION INTRAVENOUS at 09:58

## 2025-01-09 RX ADMIN — ONDANSETRON 4 MG: 2 INJECTION INTRAMUSCULAR; INTRAVENOUS at 10:00

## 2025-01-09 NOTE — ED PROVIDER NOTES
"Time reflects when diagnosis was documented in both MDM as applicable and the Disposition within this note       Time User Action Codes Description Comment    1/9/2025 10:43 AM Edilma Davies Add [R11.2,  R19.7] Nausea vomiting and diarrhea     1/9/2025 10:46 AM Edilma Davies Add [A59.9] Trichomonas infection           ED Disposition       ED Disposition   Discharge    Condition   Stable    Date/Time   Thu Jan 9, 2025 10:43 AM    Comment   Janine A Rivera discharge to home/self care.                   Assessment & Plan       Medical Decision Making  45 y/o F hx of IDDM presenting for 2 days n/v/d and epigastric abd discomfort. No urinary complaints, no chest pain, no coughing, no dyspnea. Epigastric abd discomfort on exam, otherwise unremarkable exam. Patient works in healthcare.     DDX includes but is not limited to - DKA, metabolic disturbance/electrolyte abnormalities, COVID/FLU, Pancreatitis, gastroenteritis, gastritis, BILL, and others.     Workup demonstrates - no evidence for DKA, positive trichomonal cells in urinalysis - metrogel rx given.   Patient requests albuterol pump in ER - bibasilar wheezing reported by nursing staff, no auscultated on exam however patient requests albuterol no contraindication - provided the same.   Patient with improvement after ondansetron and ketorolac able to tolerate PO intake.    All imaging and/or lab testing discussed with patient, strict return to ED precautions discussed. Patient recommended to follow up promptly with appropriate outpatient provider.Patient and/or family members verbalizes understanding and agrees with plan. Patient is stable for discharge.     Portions of the record may have been created with voice recognition software. Occasional wrong word or \"sound a like\" substitutions may have occurred due to the inherent limitations of voice recognition software. Read the chart carefully and recognize, using context, where substitutions have " occurred.    Amount and/or Complexity of Data Reviewed  Labs: ordered. Decision-making details documented in ED Course.    Risk  OTC drugs.  Prescription drug management.        ED Course as of 01/09/25 1048   Thu Jan 09, 2025   1029 Ketones, UA: Negative   1029 OTHER OBSERVATIONS: Trichomonas Organisms Present   1038 No evidence for DKA or metabolic disturbance. Metrogel Rx given for Trochomonas cell report on microscopy.     Patient was reexamined at this time and informed of laboratory and/or imaging results and was found to be stable for discharge.  Return to emergency department criteria was reviewed with the patient who verbalized understanding and was agreeable to discharge and the treatment plan at this time.           Medications   sodium chloride 0.9 % bolus 1,000 mL (1,000 mL Intravenous New Bag 1/9/25 0958)   ondansetron (ZOFRAN) injection 4 mg (4 mg Intravenous Given 1/9/25 1000)   ketorolac (TORADOL) injection 15 mg (15 mg Intravenous Given 1/9/25 1002)   albuterol (PROVENTIL HFA,VENTOLIN HFA) inhaler 2 puff (2 puffs Inhalation Given 1/9/25 1009)       ED Risk Strat Scores                          SBIRT 22yo+      Flowsheet Row Most Recent Value   Initial Alcohol Screen: US AUDIT-C     1. How often do you have a drink containing alcohol? 0 Filed at: 01/09/2025 1021   2. How many drinks containing alcohol do you have on a typical day you are drinking?  0 Filed at: 01/09/2025 1021   3a. Male UNDER 65: How often do you have five or more drinks on one occasion? 0 Filed at: 01/09/2025 1021   3b. FEMALE Any Age, or MALE 65+: How often do you have 4 or more drinks on one occassion? 0 Filed at: 01/09/2025 1021   Audit-C Score 0 Filed at: 01/09/2025 1021   DAVY: How many times in the past year have you...    Used an illegal drug or used a prescription medication for non-medical reasons? Never Filed at: 01/09/2025 1021                            History of Present Illness       Chief Complaint   Patient presents  with    Vomiting     Vomiting/diarrhea and weakness, generalized body aches. Started last night       Past Medical History:   Diagnosis Date    Asthma     Dental abscess     Diabetes mellitus (HCC)     High cholesterol     Hypertension       Past Surgical History:   Procedure Laterality Date    BREAST CYST INCISION AND DRAINAGE Right 4/12/2024    Procedure: INCISION AND DRAINAGE (I&D) BREAST;  Surgeon: Danny Seymour MD;  Location:  MAIN OR;  Service: General    CHOLECYSTECTOMY      ECTOPIC PREGNANCY SURGERY      INCISION AND DRAINAGE INTRA ORAL ABSCESS Left 3/21/2016    Procedure: INCISION AND DRAINAGE  (I&D) WOUND ORAL Left  Space;  Surgeon: Henok Parrish MD;  Location:  MAIN OR;  Service:     MULTIPLE TOOTH EXTRACTIONS N/A 3/21/2016    Procedure: EXTRACTION TEETH MULTIPLE; 17,18,19,11,12,4,5,28,32;  Surgeon: Henok Parrish MD;  Location:  MAIN OR;  Service:     SALPINGECTOMY        Family History   Problem Relation Age of Onset    Diabetes Mother     Heart failure Mother     Cancer Neg Hx       Social History     Tobacco Use    Smoking status: Every Day     Current packs/day: 0.50     Average packs/day: 0.5 packs/day for 10.0 years (5.0 ttl pk-yrs)     Types: Cigarettes    Smokeless tobacco: Never    Tobacco comments:     PT was educated on smoking and the effect is has on her asthma.   Vaping Use    Vaping status: Never Used   Substance Use Topics    Alcohol use: Yes     Comment: Social    Drug use: No      E-Cigarette/Vaping    E-Cigarette Use Never User       E-Cigarette/Vaping Substances      I have reviewed and agree with the history as documented.     Patient is a 44-year-old female history of insulin-dependent diabetes presenting for evaluation of 2 days of nausea, vomiting, diarrhea, generalized abdominal discomfort, fatigue, generalized weakness, subjective fevers and chills.  She reports no urinary complaints and denies any blood in the bowel movements, vomit or urine.  She  "reports abdominal surgical history consistent with a cholecystectomy and a prior ectopic pregnancy.  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      Vomiting  Associated symptoms: abdominal pain, chills, diarrhea and myalgias    Associated symptoms: no fever and no sore throat        Review of Systems   Constitutional:  Positive for chills and fatigue. Negative for fever.   HENT:  Negative for congestion, ear pain, rhinorrhea and sore throat.    Eyes:  Negative for redness.   Respiratory:  Negative for chest tightness and shortness of breath.    Cardiovascular:  Negative for chest pain and palpitations.   Gastrointestinal:  Positive for abdominal pain, diarrhea, nausea and vomiting.   Genitourinary:  Negative for dysuria and hematuria.   Musculoskeletal:  Positive for myalgias.   Skin:  Negative for rash.   Neurological:  Positive for weakness. Negative for dizziness, syncope, light-headedness and numbness.           Objective       ED Triage Vitals   Temperature Pulse Blood Pressure Respirations SpO2 Patient Position - Orthostatic VS   01/09/25 0938 01/09/25 0938 01/09/25 0938 01/09/25 0938 01/09/25 0938 01/09/25 0938   98.6 °F (37 °C) 98 116/52 20 100 % Sitting      Temp Source Heart Rate Source BP Location FiO2 (%) Pain Score    01/09/25 0938 01/09/25 0938 01/09/25 0938 -- 01/09/25 1002    Oral Monitor Left arm  8      Vitals      Date and Time Temp Pulse SpO2 Resp BP Pain Score FACES Pain Rating User   01/09/25 1002 -- -- -- -- -- 8 --    01/09/25 0938 98.6 °F (37 °C) 98 100 % 20 116/52 -- -- KLL            Physical Exam  Vitals and nursing note reviewed.   Constitutional:       Appearance: She is well-developed.   HENT:      Head: Normocephalic.   Eyes:      General: No scleral icterus.  Cardiovascular:      Rate and Rhythm: Normal rate and regular rhythm.   Pulmonary:      Effort: Pulmonary effort is normal.      " Breath sounds: Normal breath sounds. No stridor.   Abdominal:      General: There is no distension.      Palpations: Abdomen is soft.      Tenderness: There is abdominal tenderness (epigastric). There is no guarding or rebound.   Musculoskeletal:         General: Normal range of motion.   Skin:     General: Skin is warm and dry.      Capillary Refill: Capillary refill takes less than 2 seconds.   Neurological:      Mental Status: She is alert and oriented to person, place, and time.         Results Reviewed       Procedure Component Value Units Date/Time    Urine Microscopic [[id number]]  (Abnormal) Collected: 01/09/25 0953    Lab Status: Final result Specimen: Urine, Clean Catch Updated: 01/09/25 1025     RBC, UA None Seen /hpf      WBC, UA 2-4 /hpf      Epithelial Cells Moderate /hpf      Bacteria, UA Occasional /hpf      OTHER OBSERVATIONS Trichomonas Organisms Present     MUCUS THREADS Occasional    Comprehensive metabolic panel [[id number]]  (Abnormal) Collected: 01/09/25 0953    Lab Status: Final result Specimen: Blood from Arm, Left Updated: 01/09/25 1024     Sodium 139 mmol/L      Potassium 3.7 mmol/L      Chloride 101 mmol/L      CO2 29 mmol/L      ANION GAP 9 mmol/L      BUN 11 mg/dL      Creatinine 0.70 mg/dL      Glucose 269 mg/dL      Calcium 9.6 mg/dL      AST 14 U/L      ALT 14 U/L      Alkaline Phosphatase 74 U/L      Total Protein 7.8 g/dL      Albumin 4.2 g/dL      Total Bilirubin 0.52 mg/dL      eGFR 104 ml/min/1.73sq m     Narrative:      National Kidney Disease Foundation guidelines for Chronic Kidney Disease (CKD):     Stage 1 with normal or high GFR (GFR > 90 mL/min/1.73 square meters)    Stage 2 Mild CKD (GFR = 60-89 mL/min/1.73 square meters)    Stage 3A Moderate CKD (GFR = 45-59 mL/min/1.73 square meters)    Stage 3B Moderate CKD (GFR = 30-44 mL/min/1.73 square meters)    Stage 4 Severe CKD (GFR = 15-29 mL/min/1.73 square meters)    Stage 5 End Stage CKD (GFR <15 mL/min/1.73 square  meters)  Note: GFR calculation is accurate only with a steady state creatinine    Lipase [[id number]]  (Normal) Collected: 01/09/25 0953    Lab Status: Final result Specimen: Blood from Arm, Left Updated: 01/09/25 1024     Lipase 35 u/L     FLU/COVID Rapid Antigen (30 min. TAT) - Preferred screening test in ED [[id number]]  (Normal) Collected: 01/09/25 0953    Lab Status: Final result Specimen: Nares from Nose Updated: 01/09/25 1024     SARS COV Rapid Antigen Negative     Influenza A Rapid Antigen Negative     Influenza B Rapid Antigen Negative    Narrative:      This test has been performed using the Travee Rahel 2 FLU+SARS Antigen test under the Emergency Use Authorization (EUA). This test has been validated by the  and verified by the performing laboratory. The Rahel uses lateral flow immunofluorescent sandwich assay to detect SARS-COV, Influenza A and Influenza B Antigen.     The Quidel Rahel 2 SARS Antigen test does not differentiate between SARS-CoV and SARS-CoV-2.     Negative results are presumptive and may be confirmed with a molecular assay, if necessary, for patient management. Negative results do not rule out SARS-CoV-2 or influenza infection and should not be used as the sole basis for treatment or patient management decisions. A negative test result may occur if the level of antigen in a sample is below the limit of detection of this test.     Positive results are indicative of the presence of viral antigens, but do not rule out bacterial infection or co-infection with other viruses.     All test results should be used as an adjunct to clinical observations and other information available to the provider.    FOR PEDIATRIC PATIENTS - copy/paste COVID Guidelines URL to browser: [email or web address]    UA (URINE) with reflex to Scope [[id number]]  (Abnormal) Collected: 01/09/25 0953    Lab Status: Final result Specimen: Urine, Clean Catch  Updated: 01/09/25 1016     Color, UA Yellow     Clarity, UA Slightly Cloudy     Specific Gravity, UA 1.010     pH, UA 8.0     Leukocytes, .0     Nitrite, UA Negative     Protein, UA 30 (1+) mg/dl      Glucose, UA 50 (1/25%) mg/dl      Ketones, UA Negative mg/dl      Bilirubin, UA Negative     Occult Blood, UA Negative     UROBILINOGEN UA 1.0 mg/dL     CBC and differential [[id number]]  (Normal) Collected: 01/09/25 0953    Lab Status: Final result Specimen: Blood from Arm, Left Updated: 01/09/25 1009     WBC 8.15 Thousand/uL      RBC 4.04 Million/uL      Hemoglobin 12.6 g/dL      Hematocrit 38.5 %      MCV 95 fL      MCH 31.2 pg      MCHC 32.7 g/dL      RDW 12.9 %      MPV 9.8 fL      Platelets 321 Thousands/uL     Narrative:      This is an appended report.  These results have been appended to a previously verified report.    Manual Differential(PHLEBS Do Not Order) [[id number]] Collected: 01/09/25 0953    Lab Status: In process Specimen: Blood from Arm, Left Updated: 01/09/25 1009    POCT pregnancy, urine [[id number]]  (Normal) Collected: 01/09/25 0956    Lab Status: Final result Updated: 01/09/25 0956     EXT Preg Test, Ur Negative     Control Valid    Fingerstick Glucose (POCT) [[id number]]  (Abnormal) Collected: 01/09/25 0931    Lab Status: Final result Specimen: Blood Updated: 01/09/25 0934     POC Glucose 251 mg/dl             No orders to display       Procedures    ED Medication and Procedure Management   Prior to Admission Medications   Prescriptions Last Dose Informant Patient Reported? Taking?   Insulin Pen Needle (BD Pen Needle Matilda 2nd Gen) 32G X 4 MM MISC   No No   Sig: For use with insulin pen. Pharmacy may dispense brand covered by insurance.   albuterol (2.5 mg/3 mL) 0.083 % nebulizer solution   No No   Sig: Take 3 mL (2.5 mg total) by nebulization every 6 (six) hours as needed for wheezing or shortness of breath   albuterol (PROVENTIL HFA,VENTOLIN HFA) 90 mcg/act inhaler   No No   Sig: Inhale 2  puffs every 6 (six) hours as needed for wheezing or shortness of breath   amLODIPine (NORVASC) 10 mg tablet   No No   Sig: Take 1 tablet (10 mg total) by mouth daily   atorvastatin (LIPITOR) 20 mg tablet   Yes No   Sig: Take 20 mg by mouth daily   cetirizine (ZyrTEC) 10 mg tablet   Yes No   Sig: Take 10 mg by mouth daily   glipiZIDE-metFORMIN (METAGLIP) 2.5-500 MG per tablet   Yes No   Sig: Take 2 tablets by mouth 2 (two) times a day   ibuprofen (MOTRIN) 600 mg tablet   No No   Sig: Take 1 tablet (600 mg total) by mouth every 6 (six) hours as needed for moderate pain   insulin glargine (LANTUS) 100 units/mL subcutaneous injection   No No   Sig: Inject 40 Units under the skin daily   insulin lispro (HumaLOG KwikPen) 100 units/mL injection pen   No No   Sig: Inject 12 Units under the skin 3 (three) times a day with meals   ipratropium-albuterol (DUO-NEB) 0.5-2.5 mg/3 mL nebulizer solution   Yes No   Sig: INHALE THE CONTENTS OF 1 VIAL VIA NEBULIZER 4 (FOUR) TIMES A DAY AS NEEDED FOR WHEEZING.      Facility-Administered Medications: None     Patient's Medications   Discharge Prescriptions    ACETAMINOPHEN (TYLENOL) 500 MG TABLET    Take 1 tablet (500 mg total) by mouth every 6 (six) hours as needed for mild pain       Start Date: 1/9/2025  End Date: --       Order Dose: 500 mg       Quantity: 30 tablet    Refills: 0    METRONIDAZOLE (METROGEL) 0.75 % GEL    Apply topically 2 (two) times a day for 7 days       Start Date: 1/9/2025  End Date: 1/16/2025       Order Dose: --       Quantity: 45 g    Refills: 0    ONDANSETRON (ZOFRAN-ODT) 4 MG DISINTEGRATING TABLET    Take 1 tablet (4 mg total) by mouth every 8 (eight) hours as needed for nausea for up to 10 days       Start Date: 1/9/2025  End Date: 1/19/2025       Order Dose: 4 mg       Quantity: 20 tablet    Refills: 0     No discharge procedures on file.  ED SEPSIS DOCUMENTATION   Time reflects when diagnosis was documented in both MDM as applicable and the Disposition  within this note       Time User Action Codes Description Comment    1/9/2025 10:43 AM Edilma Davies Add [R11.2,  R19.7] Nausea vomiting and diarrhea     1/9/2025 10:46 AM Edilma Davies [A59.9] Trichomonas infection                  Edilma Davies PA-C  01/09/25 1048

## 2025-01-09 NOTE — Clinical Note
Janine Stafford was seen and treated in our emergency department on 1/9/2025.                Diagnosis:     Janine  may return to work on return date.    She may return on this date: 01/13/2025         If you have any questions or concerns, please don't hesitate to call.      Edilma Davies PA-C    ______________________________           _______________          _______________  Hospital Representative                              Date                                Time

## 2025-02-24 ENCOUNTER — HOSPITAL ENCOUNTER (EMERGENCY)
Facility: HOSPITAL | Age: 45
Discharge: HOME | End: 2025-02-24
Attending: STUDENT IN AN ORGANIZED HEALTH CARE EDUCATION/TRAINING PROGRAM | Admitting: STUDENT IN AN ORGANIZED HEALTH CARE EDUCATION/TRAINING PROGRAM
Payer: COMMERCIAL

## 2025-02-24 VITALS
HEART RATE: 98 BPM | SYSTOLIC BLOOD PRESSURE: 128 MMHG | WEIGHT: 293 LBS | BODY MASS INDEX: 41.14 KG/M2 | OXYGEN SATURATION: 99 % | DIASTOLIC BLOOD PRESSURE: 82 MMHG | RESPIRATION RATE: 20 BRPM | TEMPERATURE: 99.2 F

## 2025-02-24 DIAGNOSIS — H00.015 HORDEOLUM EXTERNUM OF LEFT LOWER EYELID: Primary | ICD-10-CM

## 2025-02-24 LAB
EXT PREGNANCY TEST URINE: NEGATIVE
EXT. CONTROL: NORMAL

## 2025-02-24 PROCEDURE — 99284 EMERGENCY DEPT VISIT MOD MDM: CPT | Performed by: STUDENT IN AN ORGANIZED HEALTH CARE EDUCATION/TRAINING PROGRAM

## 2025-02-24 PROCEDURE — 99283 EMERGENCY DEPT VISIT LOW MDM: CPT

## 2025-02-24 PROCEDURE — 81025 URINE PREGNANCY TEST: CPT | Performed by: STUDENT IN AN ORGANIZED HEALTH CARE EDUCATION/TRAINING PROGRAM

## 2025-02-24 RX ORDER — IBUPROFEN 400 MG/1
800 TABLET, FILM COATED ORAL ONCE
Status: COMPLETED | OUTPATIENT
Start: 2025-02-24 | End: 2025-02-24

## 2025-02-24 RX ORDER — DOXYCYCLINE 100 MG/1
100 CAPSULE ORAL 2 TIMES DAILY
Qty: 14 CAPSULE | Refills: 0 | Status: SHIPPED | OUTPATIENT
Start: 2025-02-24 | End: 2025-03-03

## 2025-02-24 RX ORDER — DOXYCYCLINE 100 MG/1
100 CAPSULE ORAL ONCE
Status: COMPLETED | OUTPATIENT
Start: 2025-02-24 | End: 2025-02-24

## 2025-02-24 RX ORDER — IBUPROFEN 800 MG/1
800 TABLET, FILM COATED ORAL 3 TIMES DAILY
Qty: 21 TABLET | Refills: 0 | Status: SHIPPED | OUTPATIENT
Start: 2025-02-24

## 2025-02-24 RX ORDER — ERYTHROMYCIN 5 MG/G
OINTMENT OPHTHALMIC
Qty: 3.5 G | Refills: 0 | Status: SHIPPED | OUTPATIENT
Start: 2025-02-24

## 2025-02-24 RX ADMIN — IBUPROFEN 800 MG: 400 TABLET, FILM COATED ORAL at 17:19

## 2025-02-24 RX ADMIN — DOXYCYCLINE 100 MG: 100 CAPSULE ORAL at 17:19

## 2025-02-24 NOTE — Clinical Note
Janine Stafford was seen and treated in our emergency department on 2/24/2025.                Diagnosis:     Janine  may return to work on return date.    She may return on this date: 02/26/2025         If you have any questions or concerns, please don't hesitate to call.      Christelle Burton RN    ______________________________           _______________          _______________  Hospital Representative                              Date                                Time

## 2025-02-24 NOTE — DISCHARGE INSTRUCTIONS
You were seen in the emergency department today for left eyelid swelling.  You were diagnosed with a hordeolum (stye).  You were given a dose of ibuprofen and antibiotic in the emergency department.  You were prescribed antibiotics and ibuprofen.  Please take as directed.  Please follow-up with your eye doctor for your diabetic retinal exam and as needed for further evaluation of this eye infection.  Please return to the emergency department for any new or worsening symptoms or concerns.

## 2025-02-24 NOTE — ED PROVIDER NOTES
Time reflects when diagnosis was documented in both MDM as applicable and the Disposition within this note       Time User Action Codes Description Comment    2/24/2025  5:42 PM Saundra, Mignon Add [E11.69] Type 2 diabetes mellitus with other specified complication, without long-term current use of insulin (HCC)     2/24/2025  5:43 PM Saundra, Mignon Add [H00.015] Hordeolum externum of left lower eyelid     2/24/2025  5:43 PM Saundra, Mignon Modify [E11.69] Type 2 diabetes mellitus with other specified complication, without long-term current use of insulin (HCC)     2/24/2025  5:43 PM Saundra, Mignon Modify [H00.015] Hordeolum externum of left lower eyelid     2/24/2025  5:45 PM Saundra, Mignon Remove [E11.69] Type 2 diabetes mellitus with other specified complication, without long-term current use of insulin (HCC)           ED Disposition       ED Disposition   Discharge    Condition   Stable    Date/Time   Mon Feb 24, 2025  5:40 PM    Comment   Janine Stafford discharge to home/self care.                   Assessment & Plan       Medical Decision Making  44-year-old female, past medical history as above, presenting to the emergency department for 2 days of left lower eyelid swelling and pain.  AVSS. Clinical hx and exam c/w acute hordeolum.  Recommended continued supportive care measurements at home with warm compresses to the affected area 5 to 10 minutes 4 times a day, massage and gentle wiping of the affected eyelid after warm compress to aid in drainage, discontinuation of any eye make-up to support healing, and given purulent drainage patient described at home and extent of swelling and pain will start course of doxycycline and gave a dose of ibuprofen for symptom management.  Patient states she has to follow-up appointment with ophthalmology for her diabetic eye exam soon and will make an appointment.  Will give patient [facility] for sight referral information as well if needed for follow-up  in regards to acute hordeolum. Pt discharged with strict return precautions and PCP follow-up. Well appearing, AVSS upon discharge. Pt verbalized understanding of discharge instructions and return precautions. All questions patient had were answered.    Amount and/or Complexity of Data Reviewed  Labs: ordered.    Risk  OTC drugs.  Prescription drug management.             Medications   doxycycline hyclate (VIBRAMYCIN) capsule 100 mg (100 mg Oral Given 2/24/25 1719)   ibuprofen (MOTRIN) tablet 800 mg (800 mg Oral Given 2/24/25 1719)       ED Risk Strat Scores                            SBIRT 20yo+      Flowsheet Row Most Recent Value   Initial Alcohol Screen: US AUDIT-C     1. How often do you have a drink containing alcohol? 0 Filed at: 02/24/2025 1645   2. How many drinks containing alcohol do you have on a typical day you are drinking?  0 Filed at: 02/24/2025 1645   3a. Male UNDER 65: How often do you have five or more drinks on one occasion? 0 Filed at: 02/24/2025 1645   3b. FEMALE Any Age, or MALE 65+: How often do you have 4 or more drinks on one occassion? 0 Filed at: 02/24/2025 1645   Audit-C Score 0 Filed at: 02/24/2025 1645   DAVY: How many times in the past year have you...    Used an illegal drug or used a prescription medication for non-medical reasons? Never Filed at: 02/24/2025 1645                            History of Present Illness       Chief Complaint   Patient presents with    Eye Swelling     Left - painful - put lashes in on Friday and now eye is sore and painful.       Past Medical History:   Diagnosis Date    Asthma     Dental abscess     Diabetes mellitus (HCC)     High cholesterol     Hypertension       Past Surgical History:   Procedure Laterality Date    BREAST CYST INCISION AND DRAINAGE Right 4/12/2024    Procedure: INCISION AND DRAINAGE (I&D) BREAST;  Surgeon: Danny Seymour MD;  Location:  MAIN OR;  Service: General    CHOLECYSTECTOMY      ECTOPIC PREGNANCY SURGERY      INCISION AND  DRAINAGE INTRA ORAL ABSCESS Left 3/21/2016    Procedure: INCISION AND DRAINAGE  (I&D) WOUND ORAL Left  Space;  Surgeon: Henok Parrish MD;  Location: [facility];  Service:     MULTIPLE TOOTH EXTRACTIONS N/A 3/21/2016    Procedure: EXTRACTION TEETH MULTIPLE; 17,18,19,11,12,4,5,28,32;  Surgeon: Henok Parrish MD;  Location: [facility];  Service:     SALPINGECTOMY        Family History   Problem Relation Age of Onset    Diabetes Mother     Heart failure Mother     Cancer Neg Hx       Social History     Tobacco Use    Smoking status: Every Day     Current packs/day: 0.50     Average packs/day: 0.5 packs/day for 10.0 years (5.0 ttl pk-yrs)     Types: Cigarettes    Smokeless tobacco: Never    Tobacco comments:     PT was educated on smoking and the effect is has on her asthma.   Vaping Use    Vaping status: Never Used   Substance Use Topics    Alcohol use: Yes     Comment: Social    Drug use: No      E-Cigarette/Vaping    E-Cigarette Use Never User       E-Cigarette/Vaping Substances      I have reviewed and agree with the history as documented.     44-year-old female, past medical history as above, presenting to the emergency department for 2 days of left lower eyelid swelling and pain.  Patient states she has been putting warm towel on her eye with some improvement.  She reports swelling has been getting worse and in the morning she sees some pus draining from the area.  She states she did not get anything in her eye and denies any make-up use. Denies any fevers, chills, visual changes or any other complaints.         Review of Systems        Objective       ED Triage Vitals   Temperature Pulse Blood Pressure Respirations SpO2 Patient Position - Orthostatic VS   02/24/25 1635 02/24/25 1635 02/24/25 1635 02/24/25 1635 02/24/25 1635 02/24/25 1635   99.2 °F (37.3 °C) 98 128/82 20 99 % Sitting      Temp Source Heart Rate Source BP Location FiO2 (%) Pain Score    02/24/25 1635 02/24/25 1635 02/24/25  1635 -- 02/24/25 1719    Oral Monitor Left arm  10 - Worst Possible Pain      Vitals      Date and Time Temp Pulse SpO2 Resp BP Pain Score FACES Pain Rating User   02/24/25 1719 -- -- -- -- -- 10 - Worst Possible Pain -- KR   02/24/25 1635 99.2 °F (37.3 °C) 98 99 % 20 128/82 -- -- CO            Physical Exam  Vitals and nursing note reviewed.   Constitutional:       General: She is not in acute distress.     Appearance: She is well-developed. She is not ill-appearing, toxic-appearing or diaphoretic.   HENT:      Head: Normocephalic and atraumatic.      Mouth/Throat:      Pharynx: Oropharynx is clear.   Eyes:      General:         Left eye: Hordeolum present.     Extraocular Movements: Extraocular movements intact.      Conjunctiva/sclera: Conjunctivae normal.      Pupils: Pupils are equal, round, and reactive to light.     Cardiovascular:      Rate and Rhythm: Normal rate and regular rhythm.   Pulmonary:      Effort: Pulmonary effort is normal. No respiratory distress.   Musculoskeletal:      Cervical back: Neck supple.      Right lower leg: No edema.      Left lower leg: No edema.   Skin:     General: Skin is warm and dry.   Neurological:      General: No focal deficit present.      Mental Status: She is alert and oriented to person, place, and time. Mental status is at baseline.   Psychiatric:         Mood and Affect: Mood normal.         Behavior: Behavior normal.         Results Reviewed       Procedure Component Value Units Date/Time    POCT pregnancy, urine [[id number]]  (Normal) Collected: 02/24/25 1715    Lab Status: Final result Specimen: Urine Updated: 02/24/25 1715     EXT Preg Test, Ur Negative     Control Valid            No orders to display       Procedures    ED Medication and Procedure Management   Prior to Admission Medications   Prescriptions Last Dose Informant Patient Reported? Taking?   Insulin Pen Needle (BD Pen Needle Matilda 2nd Gen) 32G X 4 MM MISC   No No   Sig: For use with insulin pen.  Pharmacy may dispense brand covered by insurance.   acetaminophen (TYLENOL) 500 mg tablet   No No   Sig: Take 1 tablet (500 mg total) by mouth every 6 (six) hours as needed for mild pain   albuterol (2.5 mg/3 mL) 0.083 % nebulizer solution   No No   Sig: Take 3 mL (2.5 mg total) by nebulization every 6 (six) hours as needed for wheezing or shortness of breath   albuterol (PROVENTIL HFA,VENTOLIN HFA) 90 mcg/act inhaler   No No   Sig: Inhale 2 puffs every 6 (six) hours as needed for wheezing or shortness of breath   amLODIPine (NORVASC) 10 mg tablet   No No   Sig: Take 1 tablet (10 mg total) by mouth daily   atorvastatin (LIPITOR) 20 mg tablet   Yes No   Sig: Take 20 mg by mouth daily   cetirizine (ZyrTEC) 10 mg tablet   Yes No   Sig: Take 10 mg by mouth daily   glipiZIDE-metFORMIN (METAGLIP) 2.5-500 MG per tablet   Yes No   Sig: Take 2 tablets by mouth 2 (two) times a day   ibuprofen (MOTRIN) 600 mg tablet   No No   Sig: Take 1 tablet (600 mg total) by mouth every 6 (six) hours as needed for moderate pain   insulin glargine (LANTUS) 100 units/mL subcutaneous injection   No No   Sig: Inject 40 Units under the skin daily   insulin lispro (HumaLOG KwikPen) 100 units/mL injection pen   No No   Sig: Inject 12 Units under the skin 3 (three) times a day with meals   ipratropium-albuterol (DUO-NEB) 0.5-2.5 mg/3 mL nebulizer solution   Yes No   Sig: INHALE THE CONTENTS OF 1 VIAL VIA NEBULIZER 4 (FOUR) TIMES A DAY AS NEEDED FOR WHEEZING.   metroNIDAZOLE (METROGEL) 0.75 % gel   No No   Sig: Apply topically 2 (two) times a day for 7 days   ondansetron (ZOFRAN-ODT) 4 mg disintegrating tablet   No No   Sig: Take 1 tablet (4 mg total) by mouth every 8 (eight) hours as needed for nausea for up to 10 days      Facility-Administered Medications: None     Discharge Medication List as of 2/24/2025  5:53 PM        START taking these medications    Details   doxycycline monohydrate (MONODOX) 100 mg capsule Take 1 capsule (100 mg total) by  mouth 2 (two) times a day for 7 days, Starting Mon 2/24/2025, Until Mon 3/3/2025, Normal      erythromycin (ILOTYCIN) ophthalmic ointment Place a 1/2 inch ribbon of ointment into the left lower eyelid., Normal      !! ibuprofen (MOTRIN) 800 mg tablet Take 1 tablet (800 mg total) by mouth 3 (three) times a day With food, Starting Mon 2/24/2025, Normal       !! - Potential duplicate medications found. Please discuss with provider.        CONTINUE these medications which have NOT CHANGED    Details   acetaminophen (TYLENOL) 500 mg tablet Take 1 tablet (500 mg total) by mouth every 6 (six) hours as needed for mild pain, Starting Thu 1/9/2025, Normal      albuterol (2.5 mg/3 mL) 0.083 % nebulizer solution Take 3 mL (2.5 mg total) by nebulization every 6 (six) hours as needed for wheezing or shortness of breath, Starting Mon 10/11/2021, Normal      albuterol (PROVENTIL HFA,VENTOLIN HFA) 90 mcg/act inhaler Inhale 2 puffs every 6 (six) hours as needed for wheezing or shortness of breath, Starting Mon 10/11/2021, Normal      amLODIPine (NORVASC) 10 mg tablet Take 1 tablet (10 mg total) by mouth daily, Starting Mon 10/11/2021, Normal      atorvastatin (LIPITOR) 20 mg tablet Take 20 mg by mouth daily, Starting Wed 8/3/2022, Historical Med      cetirizine (ZyrTEC) 10 mg tablet Take 10 mg by mouth daily, Starting Fri 5/13/2022, Until Sat 5/13/2023, Historical Med      glipiZIDE-metFORMIN (METAGLIP) 2.5-500 MG per tablet Take 2 tablets by mouth 2 (two) times a day, Starting Fri 9/23/2022, Until Tue 4/9/2024, Historical Med      !! ibuprofen (MOTRIN) 600 mg tablet Take 1 tablet (600 mg total) by mouth every 6 (six) hours as needed for moderate pain, Starting Sun 10/20/2024, Normal      insulin glargine (LANTUS) 100 units/mL subcutaneous injection Inject 40 Units under the skin daily, Starting Thu 4/18/2024, Normal      insulin lispro (HumaLOG KwikPen) 100 units/mL injection pen Inject 12 Units under the skin 3 (three) times a day  with meals, Starting u 4/18/2024, Normal      Insulin Pen Needle (BD Pen Needle Matilda 2nd Gen) 32G X 4 MM MISC For use with insulin pen. Pharmacy may dispense brand covered by insurance., Normal      ipratropium-albuterol (DUO-NEB) 0.5-2.5 mg/3 mL nebulizer solution INHALE THE CONTENTS OF 1 VIAL VIA NEBULIZER 4 (FOUR) TIMES A DAY AS NEEDED FOR WHEEZING., Historical Med      metroNIDAZOLE (METROGEL) 0.75 % gel Apply topically 2 (two) times a day for 7 days, Starting u 1/9/2025, Until u 1/16/2025, Normal      ondansetron (ZOFRAN-ODT) 4 mg disintegrating tablet Take 1 tablet (4 mg total) by mouth every 8 (eight) hours as needed for nausea for up to 10 days, Starting u 1/9/2025, Until Sun 1/19/2025 at 2359, Normal       !! - Potential duplicate medications found. Please discuss with provider.        No discharge procedures on file.  ED SEPSIS DOCUMENTATION   Time reflects when diagnosis was documented in both MDM as applicable and the Disposition within this note       Time User Action Codes Description Comment    2/24/2025  5:42 PM Mignon Arguello Add [E11.69] Type 2 diabetes mellitus with other specified complication, without long-term current use of insulin ([facility])     2/24/2025  5:43 PM Mignon Arguello Add [H00.015] Hordeolum externum of left lower eyelid     2/24/2025  5:43 PM Mignon Arguello Modify [E11.69] Type 2 diabetes mellitus with other specified complication, without long-term current use of insulin ([facility])     2/24/2025  5:43 PM Mignon Arguello Modify [H00.015] Hordeolum externum of left lower eyelid     2/24/2025  5:45 PM Mignon Arguello Remove [E11.69] Type 2 diabetes mellitus with other specified complication, without long-term current use of insulin ([facility])                  Mignon Arguello DO  02/24/25 1940

## 2025-02-26 ENCOUNTER — HOSPITAL ENCOUNTER (EMERGENCY)
Facility: HOSPITAL | Age: 45
Discharge: HOME | End: 2025-02-26
Attending: EMERGENCY MEDICINE
Payer: COMMERCIAL

## 2025-02-26 VITALS
DIASTOLIC BLOOD PRESSURE: 82 MMHG | TEMPERATURE: 98.2 F | HEART RATE: 103 BPM | SYSTOLIC BLOOD PRESSURE: 124 MMHG | BODY MASS INDEX: 41.21 KG/M2 | RESPIRATION RATE: 18 BRPM | OXYGEN SATURATION: 99 % | WEIGHT: 293 LBS

## 2025-02-26 DIAGNOSIS — N76.0 ACUTE VAGINITIS: Primary | ICD-10-CM

## 2025-02-26 LAB
EXT PREGNANCY TEST URINE: NEGATIVE
EXT. CONTROL: NORMAL

## 2025-02-26 PROCEDURE — 99284 EMERGENCY DEPT VISIT MOD MDM: CPT

## 2025-02-26 PROCEDURE — 81025 URINE PREGNANCY TEST: CPT

## 2025-02-26 PROCEDURE — 87491 CHLMYD TRACH DNA AMP PROBE: CPT

## 2025-02-26 PROCEDURE — 87591 N.GONORRHOEAE DNA AMP PROB: CPT

## 2025-02-26 PROCEDURE — 81514 NFCT DS BV&VAGINITIS DNA ALG: CPT

## 2025-02-26 PROCEDURE — 99283 EMERGENCY DEPT VISIT LOW MDM: CPT

## 2025-02-26 RX ORDER — FLUCONAZOLE 150 MG/1
150 TABLET ORAL ONCE
Qty: 2 TABLET | Refills: 0 | Status: SHIPPED | OUTPATIENT
Start: 2025-02-26 | End: 2025-02-26

## 2025-02-26 NOTE — ED PROVIDER NOTES
"Time reflects when diagnosis was documented in both MDM as applicable and the Disposition within this note       Time User Action Codes Description Comment    2/26/2025  6:32 PM Kiana Webb Add [N76.0] Acute vaginitis     2/26/2025  6:35 PM Kiana Webb Modify [N76.0] Acute vaginitis           ED Disposition       ED Disposition   Discharge    Condition   Stable    Date/Time   Wed Feb 26, 2025  6:32 PM    Comment   Janine Stafford discharge to home/self care.                   Assessment & Plan       Medical Decision Making  Patient is a 43 y/o female PMH of diabetes, asthma, chronic liver disease presenting with vaginal discharge and \"bumps on the outside\" for almost 2 weeks. Reports it started with one bump, that one went away and there are a couple new ones. Intermittently painful and itchy, no drainage. Admits to thick, white discharge as well. Denies new sexual partners, but she would like STD testing to \"make sure.\" Urine preg negative. Sent out urine G/C and molecular vaginal panel, informed patient she will get a call if it results positive. Prescribed Diflucan, suspect yeast infection. Discussed supportive care and warm compresses for cysts. Reassured patient, answered questions, discussed follow up with gynecology. Patient was agreeable to plan and was discharged home.     Amount and/or Complexity of Data Reviewed  Labs: ordered.    Risk  Prescription drug management.             Medications - No data to display    ED Risk Strat Scores                                                History of Present Illness       Chief Complaint   Patient presents with    Vaginal Pain     Reports pain and bumps to vagina; first noted one bump about 2 weeks ago and now it is more bumps. Didn't go to work today because of it       Past Medical History:   Diagnosis Date    Asthma     Dental abscess     Diabetes mellitus (HCC)     High cholesterol     Hypertension       Past Surgical History:   Procedure Laterality " "Date    BREAST CYST INCISION AND DRAINAGE Right 4/12/2024    Procedure: INCISION AND DRAINAGE (I&D) BREAST;  Surgeon: Danny Seymour MD;  Location:  MAIN OR;  Service: General    CHOLECYSTECTOMY      ECTOPIC PREGNANCY SURGERY      INCISION AND DRAINAGE INTRA ORAL ABSCESS Left 3/21/2016    Procedure: INCISION AND DRAINAGE  (I&D) WOUND ORAL Left  Space;  Surgeon: Henok Parrish MD;  Location: [facility];  Service:     MULTIPLE TOOTH EXTRACTIONS N/A 3/21/2016    Procedure: EXTRACTION TEETH MULTIPLE; 17,18,19,11,12,4,5,28,32;  Surgeon: Henok Parrish MD;  Location: [facility];  Service:     SALPINGECTOMY        Family History   Problem Relation Age of Onset    Diabetes Mother     Heart failure Mother     Cancer Neg Hx       Social History     Tobacco Use    Smoking status: Every Day     Current packs/day: 0.50     Average packs/day: 0.5 packs/day for 10.0 years (5.0 ttl pk-yrs)     Types: Cigarettes    Smokeless tobacco: Never    Tobacco comments:     PT was educated on smoking and the effect is has on her asthma.   Vaping Use    Vaping status: Never Used   Substance Use Topics    Alcohol use: Yes     Comment: Social    Drug use: No      E-Cigarette/Vaping    E-Cigarette Use Never User       E-Cigarette/Vaping Substances      I have reviewed and agree with the history as documented.     Patient is a 45 y/o female PMH of diabetes, asthma, chronic liver disease presenting with vaginal discharge and \"bumps on the outside\" for almost 2 weeks. Reports it started with one bump, that one went away and there are a couple new ones. Intermittently painful and itchy, no drainage. Admits to thick, white discharge as well. Denies new sexual partners, but she would like STD testing to \"make sure.\"       Vaginal Pain  Associated symptoms: no abdominal pain, no chest pain, no congestion, no cough, no diarrhea, no ear pain, no fever, no headaches, no nausea, no rash, no rhinorrhea, no shortness of breath, no sore " throat and no vomiting        Review of Systems   Constitutional:  Negative for chills and fever.   HENT:  Negative for congestion, ear pain, rhinorrhea and sore throat.    Eyes:  Negative for pain and visual disturbance.   Respiratory:  Negative for cough and shortness of breath.    Cardiovascular:  Negative for chest pain and palpitations.   Gastrointestinal:  Negative for abdominal pain, diarrhea, nausea and vomiting.   Genitourinary:  Positive for vaginal discharge and vaginal pain. Negative for dysuria and hematuria.   Musculoskeletal:  Negative for arthralgias and back pain.   Skin:  Negative for color change and rash.   Neurological:  Negative for dizziness, seizures, syncope, light-headedness and headaches.   All other systems reviewed and are negative.      Objective       ED Triage Vitals [02/26/25 1818]   Temperature Pulse Blood Pressure Respirations SpO2 Patient Position - Orthostatic VS   98.2 °F (36.8 °C) 103 124/82 18 99 % Sitting      Temp Source Heart Rate Source BP Location FiO2 (%) Pain Score    Oral Monitor Left arm -- --      Vitals      Date and Time Temp Pulse SpO2 Resp BP Pain Score FACES Pain Rating User   02/26/25 1818 98.2 °F (36.8 °C) 103 99 % 18 124/82 -- -- KR            Physical Exam  Vitals and nursing note reviewed.   Constitutional:       General: She is not in acute distress.     Appearance: Normal appearance. She is not ill-appearing, toxic-appearing or diaphoretic.   HENT:      Head: Normocephalic.   Cardiovascular:      Rate and Rhythm: Normal rate and regular rhythm.      Pulses: Normal pulses.      Heart sounds: Normal heart sounds.   Pulmonary:      Effort: Pulmonary effort is normal.      Breath sounds: Normal breath sounds.   Abdominal:      General: There is no distension.      Palpations: Abdomen is soft.      Tenderness: There is no abdominal tenderness. There is no guarding or rebound.   Genitourinary:     Vagina: Vaginal discharge (white, creamy discharge) present.           Comments: Small, skin-colored, firm 0.5 cm cysts. No erythema, drainage, induration.   Musculoskeletal:         General: Normal range of motion.      Cervical back: Normal range of motion and neck supple.   Skin:     General: Skin is warm.      Capillary Refill: Capillary refill takes less than 2 seconds.   Neurological:      General: No focal deficit present.      Mental Status: She is alert and oriented to person, place, and time.   Psychiatric:         Mood and Affect: Mood normal.         Behavior: Behavior normal.         Thought Content: Thought content normal.         Judgment: Judgment normal.         Results Reviewed       Procedure Component Value Units Date/Time    Chlamydia/GC amplified DNA by PCR [[id number]] Collected: 02/26/25 1847    Lab Status: In process Specimen: Urine, Other Updated: 02/26/25 1855    POCT pregnancy, urine [[id number]]  (Normal) Collected: 02/26/25 1854    Lab Status: Final result Updated: 02/26/25 1854     EXT Preg Test, Ur Negative     Control Valid    Molecular Vaginal Panel [[id number]] Collected: 02/26/25 1847    Lab Status: In process Specimen: Genital from Vaginal Updated: 02/26/25 1851            No orders to display       Procedures    ED Medication and Procedure Management   Prior to Admission Medications   Prescriptions Last Dose Informant Patient Reported? Taking?   Insulin Pen Needle (BD Pen Needle Matilda 2nd Gen) 32G X 4 MM MISC   No No   Sig: For use with insulin pen. Pharmacy may dispense brand covered by insurance.   acetaminophen (TYLENOL) 500 mg tablet   No No   Sig: Take 1 tablet (500 mg total) by mouth every 6 (six) hours as needed for mild pain   albuterol (2.5 mg/3 mL) 0.083 % nebulizer solution   No No   Sig: Take 3 mL (2.5 mg total) by nebulization every 6 (six) hours as needed for wheezing or shortness of breath   albuterol (PROVENTIL HFA,VENTOLIN HFA) 90 mcg/act inhaler   No No   Sig: Inhale 2 puffs every 6 (six) hours as needed for wheezing or  shortness of breath   amLODIPine (NORVASC) 10 mg tablet   No No   Sig: Take 1 tablet (10 mg total) by mouth daily   atorvastatin (LIPITOR) 20 mg tablet   Yes No   Sig: Take 20 mg by mouth daily   cetirizine (ZyrTEC) 10 mg tablet   Yes No   Sig: Take 10 mg by mouth daily   doxycycline monohydrate (MONODOX) 100 mg capsule   No No   Sig: Take 1 capsule (100 mg total) by mouth 2 (two) times a day for 7 days   erythromycin (ILOTYCIN) ophthalmic ointment   No No   Sig: Place a 1/2 inch ribbon of ointment into the left lower eyelid.   glipiZIDE-metFORMIN (METAGLIP) 2.5-500 MG per tablet   Yes No   Sig: Take 2 tablets by mouth 2 (two) times a day   ibuprofen (MOTRIN) 600 mg tablet   No No   Sig: Take 1 tablet (600 mg total) by mouth every 6 (six) hours as needed for moderate pain   ibuprofen (MOTRIN) 800 mg tablet   No No   Sig: Take 1 tablet (800 mg total) by mouth 3 (three) times a day With food   insulin glargine (LANTUS) 100 units/mL subcutaneous injection   No No   Sig: Inject 40 Units under the skin daily   insulin lispro (HumaLOG KwikPen) 100 units/mL injection pen   No No   Sig: Inject 12 Units under the skin 3 (three) times a day with meals   ipratropium-albuterol (DUO-NEB) 0.5-2.5 mg/3 mL nebulizer solution   Yes No   Sig: INHALE THE CONTENTS OF 1 VIAL VIA NEBULIZER 4 (FOUR) TIMES A DAY AS NEEDED FOR WHEEZING.   metroNIDAZOLE (METROGEL) 0.75 % gel   No No   Sig: Apply topically 2 (two) times a day for 7 days   ondansetron (ZOFRAN-ODT) 4 mg disintegrating tablet   No No   Sig: Take 1 tablet (4 mg total) by mouth every 8 (eight) hours as needed for nausea for up to 10 days      Facility-Administered Medications: None     Discharge Medication List as of 2/26/2025  6:35 PM        START taking these medications    Details   fluconazole (DIFLUCAN) 150 mg tablet Take 1 tablet (150 mg total) by mouth once for 1 dose Take second dose 3 days after the first., Starting Wed 2/26/2025, Normal           CONTINUE these  medications which have NOT CHANGED    Details   acetaminophen (TYLENOL) 500 mg tablet Take 1 tablet (500 mg total) by mouth every 6 (six) hours as needed for mild pain, Starting Thu 1/9/2025, Normal      albuterol (2.5 mg/3 mL) 0.083 % nebulizer solution Take 3 mL (2.5 mg total) by nebulization every 6 (six) hours as needed for wheezing or shortness of breath, Starting Mon 10/11/2021, Normal      albuterol (PROVENTIL HFA,VENTOLIN HFA) 90 mcg/act inhaler Inhale 2 puffs every 6 (six) hours as needed for wheezing or shortness of breath, Starting Mon 10/11/2021, Normal      amLODIPine (NORVASC) 10 mg tablet Take 1 tablet (10 mg total) by mouth daily, Starting Mon 10/11/2021, Normal      atorvastatin (LIPITOR) 20 mg tablet Take 20 mg by mouth daily, Starting Wed 8/3/2022, Historical Med      cetirizine (ZyrTEC) 10 mg tablet Take 10 mg by mouth daily, Starting Fri 5/13/2022, Until Sat 5/13/2023, Historical Med      doxycycline monohydrate (MONODOX) 100 mg capsule Take 1 capsule (100 mg total) by mouth 2 (two) times a day for 7 days, Starting Mon 2/24/2025, Until Mon 3/3/2025, Normal      erythromycin (ILOTYCIN) ophthalmic ointment Place a 1/2 inch ribbon of ointment into the left lower eyelid., Normal      glipiZIDE-metFORMIN (METAGLIP) 2.5-500 MG per tablet Take 2 tablets by mouth 2 (two) times a day, Starting Fri 9/23/2022, Until Tue 4/9/2024, Historical Med      !! ibuprofen (MOTRIN) 600 mg tablet Take 1 tablet (600 mg total) by mouth every 6 (six) hours as needed for moderate pain, Starting Sun 10/20/2024, Normal      !! ibuprofen (MOTRIN) 800 mg tablet Take 1 tablet (800 mg total) by mouth 3 (three) times a day With food, Starting Mon 2/24/2025, Normal      insulin glargine (LANTUS) 100 units/mL subcutaneous injection Inject 40 Units under the skin daily, Starting Thu 4/18/2024, Normal      insulin lispro (HumaLOG KwikPen) 100 units/mL injection pen Inject 12 Units under the skin 3 (three) times a day with meals,  Starting u 4/18/2024, Normal      Insulin Pen Needle (BD Pen Needle Matilda 2nd Gen) 32G X 4 MM MISC For use with insulin pen. Pharmacy may dispense brand covered by insurance., Normal      ipratropium-albuterol (DUO-NEB) 0.5-2.5 mg/3 mL nebulizer solution INHALE THE CONTENTS OF 1 VIAL VIA NEBULIZER 4 (FOUR) TIMES A DAY AS NEEDED FOR WHEEZING., Historical Med      metroNIDAZOLE (METROGEL) 0.75 % gel Apply topically 2 (two) times a day for 7 days, Starting u 1/9/2025, Until u 1/16/2025, Normal      ondansetron (ZOFRAN-ODT) 4 mg disintegrating tablet Take 1 tablet (4 mg total) by mouth every 8 (eight) hours as needed for nausea for up to 10 days, Starting u 1/9/2025, Until Sun 1/19/2025 at 2359, Normal       !! - Potential duplicate medications found. Please discuss with provider.        No discharge procedures on file.  ED SEPSIS DOCUMENTATION   Time reflects when diagnosis was documented in both MDM as applicable and the Disposition within this note       Time User Action Codes Description Comment    2/26/2025  6:32 PM Kiana Webb Add [N76.0] Acute vaginitis     2/26/2025  6:35 PM Kiana Webb Modify [N76.0] Acute vaginitis                  Kiana Webb PA-C  02/26/25 1928

## 2025-02-26 NOTE — Clinical Note
Janine Stafford was seen and treated in our emergency department on 2/26/2025.                Diagnosis:     Janine  may return to work on return date.    She may return on this date: 03/03/2025         If you have any questions or concerns, please don't hesitate to call.      Kiana Webb PA-C    ______________________________           _______________          _______________  Hospital Representative                              Date                                Time

## 2025-02-26 NOTE — DISCHARGE INSTRUCTIONS
Take 1 tablet of Diflucan today and take the second dose 3 days later. Follow up with gynecologist.

## 2025-02-27 ENCOUNTER — RESULTS FOLLOW-UP (OUTPATIENT)
Dept: EMERGENCY DEPT | Facility: HOSPITAL | Age: 45
End: 2025-02-27

## 2025-02-27 LAB
C GLABRATA DNA VAG QL NAA+PROBE: NEGATIVE
C KRUSEI DNA VAG QL NAA+PROBE: NEGATIVE
C TRACH DNA SPEC QL NAA+PROBE: NEGATIVE
CANDIDA SP 6 PNL VAG NAA+PROBE: POSITIVE
N GONORRHOEA DNA SPEC QL NAA+PROBE: NEGATIVE
T VAGINALIS DNA VAG QL NAA+PROBE: POSITIVE
VAGINOSIS/ITIS DNA PNL VAG PROBE+SIG AMP: NEGATIVE

## 2025-02-27 RX ORDER — METRONIDAZOLE 500 MG/1
500 TABLET ORAL EVERY 12 HOURS SCHEDULED
Qty: 14 TABLET | Refills: 0 | Status: SHIPPED | OUTPATIENT
Start: 2025-02-27 | End: 2025-03-06

## 2025-03-07 DIAGNOSIS — H00.015 HORDEOLUM EXTERNUM OF LEFT LOWER EYELID: ICD-10-CM

## 2025-03-07 RX ORDER — ERYTHROMYCIN 5 MG/G
OINTMENT OPHTHALMIC
Qty: 3.5 G | Refills: 0 | OUTPATIENT
Start: 2025-03-07

## 2025-03-20 NOTE — SEPSIS NOTE
Sepsis Note   Janine Stafford 43 y.o. female MRN: [id number]  Unit/Bed#: ED 03 Encounter: [id number]       Initial Sepsis Screening       Row Name 04/09/24 1759 04/09/24 1734             Is the patient's history suggestive of a new or worsening infection? Yes (Proceed)  -KK Yes (Proceed)  -AC       Suspected source of infection soft tissue  -KK soft tissue  -AC       Indicate SIRS criteria Tachycardia > 90 bpm;Leukocytosis (WBC > 82086 IJL) OR Leukopenia (WBC <4000 IJL) OR Bandemia (WBC >10% bands)  -KK Tachycardia > 90 bpm;Leukocytosis (WBC > 17638 IJL) OR Leukopenia (WBC <4000 IJL) OR Bandemia (WBC >10% bands)  -AC       Are two or more of the above signs & symptoms of infection both present and new to the patient? Yes (Proceed)  -KK Yes (Proceed)  -AC       Assess for evidence of organ dysfunction: Are any of the below criteria present within 6 hours of suspected infection and SIRS criteria that are NOT considered to be chronic conditions? -- --                 User Key  (r) = Recorded By, (t) = Taken By, (c) = Cosigned By      Initials Name Provider Type    AC Kt Avendano DO Physician    KK Daly Austin MD Physician                        There is no height or weight on file to calculate BMI.  Wt Readings from Last 1 Encounters:   10/26/23 132 kg (290 lb 12.8 oz)        Ideal body weight: 57 kg (125 lb 10.6 oz)  Adjusted ideal body weight: 87 kg (191 lb 11.5 oz)     Addended by: CARMEN HORTA on: 3/20/2025 06:55 AM     Modules accepted: Orders

## 2025-05-27 ENCOUNTER — HOSPITAL ENCOUNTER (EMERGENCY)
Facility: HOSPITAL | Age: 45
Discharge: HOME | End: 2025-05-27
Attending: EMERGENCY MEDICINE | Admitting: EMERGENCY MEDICINE
Payer: COMMERCIAL

## 2025-05-27 ENCOUNTER — APPOINTMENT (EMERGENCY)
Dept: RADIOLOGY | Facility: HOSPITAL | Age: 45
End: 2025-05-27
Payer: COMMERCIAL

## 2025-05-27 VITALS
DIASTOLIC BLOOD PRESSURE: 73 MMHG | OXYGEN SATURATION: 99 % | WEIGHT: 293 LBS | TEMPERATURE: 98.5 F | BODY MASS INDEX: 41.7 KG/M2 | HEART RATE: 93 BPM | RESPIRATION RATE: 22 BRPM | SYSTOLIC BLOOD PRESSURE: 145 MMHG

## 2025-05-27 DIAGNOSIS — J45.901 ACUTE ASTHMA EXACERBATION: Primary | ICD-10-CM

## 2025-05-27 DIAGNOSIS — J40 BRONCHITIS: ICD-10-CM

## 2025-05-27 LAB
ANION GAP SERPL CALCULATED.3IONS-SCNC: 10 MMOL/L (ref 4–13)
BASOPHILS # BLD AUTO: 0.04 THOUSANDS/ÂΜL (ref 0–0.1)
BASOPHILS NFR BLD AUTO: 1 % (ref 0–1)
BUN SERPL-MCNC: 8 MG/DL (ref 5–25)
CALCIUM SERPL-MCNC: 9.2 MG/DL (ref 8.4–10.2)
CHLORIDE SERPL-SCNC: 101 MMOL/L (ref 96–108)
CO2 SERPL-SCNC: 26 MMOL/L (ref 21–32)
CREAT SERPL-MCNC: 0.61 MG/DL (ref 0.6–1.3)
EOSINOPHIL # BLD AUTO: 0.34 THOUSAND/ÂΜL (ref 0–0.61)
EOSINOPHIL NFR BLD AUTO: 6 % (ref 0–6)
ERYTHROCYTE [DISTWIDTH] IN BLOOD BY AUTOMATED COUNT: 13.1 % (ref 11.6–15.1)
FLUAV AG UPPER RESP QL IA.RAPID: NEGATIVE
FLUBV AG UPPER RESP QL IA.RAPID: NEGATIVE
GFR SERPL CREATININE-BSD FRML MDRD: 109 ML/MIN/1.73SQ M
GLUCOSE SERPL-MCNC: 289 MG/DL (ref 65–140)
HCT VFR BLD AUTO: 40.3 % (ref 34.8–46.1)
HGB BLD-MCNC: 13.5 G/DL (ref 11.5–15.4)
IMM GRANULOCYTES # BLD AUTO: 0.05 THOUSAND/UL (ref 0–0.2)
IMM GRANULOCYTES NFR BLD AUTO: 1 % (ref 0–2)
LYMPHOCYTES # BLD AUTO: 1.44 THOUSANDS/ÂΜL (ref 0.6–4.47)
LYMPHOCYTES NFR BLD AUTO: 25 % (ref 14–44)
MCH RBC QN AUTO: 31.5 PG (ref 26.8–34.3)
MCHC RBC AUTO-ENTMCNC: 33.5 G/DL (ref 31.4–37.4)
MCV RBC AUTO: 94 FL (ref 82–98)
MONOCYTES # BLD AUTO: 0.34 THOUSAND/ÂΜL (ref 0.17–1.22)
MONOCYTES NFR BLD AUTO: 6 % (ref 4–12)
NEUTROPHILS # BLD AUTO: 3.67 THOUSANDS/ÂΜL (ref 1.85–7.62)
NEUTS SEG NFR BLD AUTO: 61 % (ref 43–75)
NRBC BLD AUTO-RTO: 0 /100 WBCS
PLATELET # BLD AUTO: 276 THOUSANDS/UL (ref 149–390)
PMV BLD AUTO: 9.9 FL (ref 8.9–12.7)
POTASSIUM SERPL-SCNC: 3.8 MMOL/L (ref 3.5–5.3)
RBC # BLD AUTO: 4.28 MILLION/UL (ref 3.81–5.12)
SARS-COV+SARS-COV-2 AG RESP QL IA.RAPID: NEGATIVE
SODIUM SERPL-SCNC: 137 MMOL/L (ref 135–147)
WBC # BLD AUTO: 5.88 THOUSAND/UL (ref 4.31–10.16)

## 2025-05-27 PROCEDURE — 87804 INFLUENZA ASSAY W/OPTIC: CPT

## 2025-05-27 PROCEDURE — 94640 AIRWAY INHALATION TREATMENT: CPT

## 2025-05-27 PROCEDURE — 94644 CONT INHLJ TX 1ST HOUR: CPT

## 2025-05-27 PROCEDURE — 94760 N-INVAS EAR/PLS OXIMETRY 1: CPT

## 2025-05-27 PROCEDURE — 99284 EMERGENCY DEPT VISIT MOD MDM: CPT

## 2025-05-27 PROCEDURE — 99285 EMERGENCY DEPT VISIT HI MDM: CPT

## 2025-05-27 PROCEDURE — 85025 COMPLETE CBC W/AUTO DIFF WBC: CPT

## 2025-05-27 PROCEDURE — 71046 X-RAY EXAM CHEST 2 VIEWS: CPT

## 2025-05-27 PROCEDURE — 96361 HYDRATE IV INFUSION ADD-ON: CPT

## 2025-05-27 PROCEDURE — 36415 COLL VENOUS BLD VENIPUNCTURE: CPT

## 2025-05-27 PROCEDURE — 96374 THER/PROPH/DIAG INJ IV PUSH: CPT

## 2025-05-27 PROCEDURE — 80048 BASIC METABOLIC PNL TOTAL CA: CPT

## 2025-05-27 PROCEDURE — 87811 SARS-COV-2 COVID19 W/OPTIC: CPT

## 2025-05-27 RX ORDER — ALBUTEROL SULFATE 0.83 MG/ML
2.5 SOLUTION RESPIRATORY (INHALATION) EVERY 6 HOURS PRN
Qty: 75 ML | Refills: 0 | Status: SHIPPED | OUTPATIENT
Start: 2025-05-27

## 2025-05-27 RX ORDER — IPRATROPIUM BROMIDE AND ALBUTEROL SULFATE 2.5; .5 MG/3ML; MG/3ML
SOLUTION RESPIRATORY (INHALATION)
Status: COMPLETED
Start: 2025-05-27 | End: 2025-05-27

## 2025-05-27 RX ORDER — IPRATROPIUM BROMIDE AND ALBUTEROL SULFATE 2.5; .5 MG/3ML; MG/3ML
3 SOLUTION RESPIRATORY (INHALATION) ONCE
Status: COMPLETED | OUTPATIENT
Start: 2025-05-27 | End: 2025-05-27

## 2025-05-27 RX ORDER — ACETAMINOPHEN 325 MG/1
650 TABLET ORAL ONCE
Status: COMPLETED | OUTPATIENT
Start: 2025-05-27 | End: 2025-05-27

## 2025-05-27 RX ORDER — PREDNISONE 20 MG/1
40 TABLET ORAL DAILY
Qty: 10 TABLET | Refills: 0 | Status: SHIPPED | OUTPATIENT
Start: 2025-05-27 | End: 2025-06-01

## 2025-05-27 RX ORDER — METHYLPREDNISOLONE SODIUM SUCCINATE 125 MG/2ML
125 INJECTION, POWDER, LYOPHILIZED, FOR SOLUTION INTRAMUSCULAR; INTRAVENOUS ONCE
Status: COMPLETED | OUTPATIENT
Start: 2025-05-27 | End: 2025-05-27

## 2025-05-27 RX ORDER — IPRATROPIUM BROMIDE AND ALBUTEROL SULFATE 2.5; .5 MG/3ML; MG/3ML
3 SOLUTION RESPIRATORY (INHALATION) 4 TIMES DAILY
Qty: 90 ML | Refills: 0 | Status: SHIPPED | OUTPATIENT
Start: 2025-05-27

## 2025-05-27 RX ORDER — ALBUTEROL SULFATE 5 MG/ML
10 SOLUTION RESPIRATORY (INHALATION) ONCE
Status: COMPLETED | OUTPATIENT
Start: 2025-05-27 | End: 2025-05-27

## 2025-05-27 RX ORDER — AZITHROMYCIN 250 MG/1
TABLET, FILM COATED ORAL
Qty: 6 TABLET | Refills: 0 | Status: SHIPPED | OUTPATIENT
Start: 2025-05-27 | End: 2025-05-31

## 2025-05-27 RX ORDER — SODIUM CHLORIDE FOR INHALATION 0.9 %
12 VIAL, NEBULIZER (ML) INHALATION ONCE
Status: COMPLETED | OUTPATIENT
Start: 2025-05-27 | End: 2025-05-27

## 2025-05-27 RX ADMIN — ALBUTEROL SULFATE 10 MG: 2.5 SOLUTION RESPIRATORY (INHALATION) at 13:09

## 2025-05-27 RX ADMIN — IPRATROPIUM BROMIDE AND ALBUTEROL SULFATE 3 ML: 2.5; .5 SOLUTION RESPIRATORY (INHALATION) at 12:36

## 2025-05-27 RX ADMIN — ACETAMINOPHEN 650 MG: 325 TABLET, FILM COATED ORAL at 13:53

## 2025-05-27 RX ADMIN — METHYLPREDNISOLONE SODIUM SUCCINATE 125 MG: 125 INJECTION, POWDER, FOR SOLUTION INTRAMUSCULAR; INTRAVENOUS at 12:52

## 2025-05-27 RX ADMIN — SODIUM CHLORIDE 1000 ML: 0.9 INJECTION, SOLUTION INTRAVENOUS at 13:55

## 2025-05-27 RX ADMIN — ISODIUM CHLORIDE 12 ML: 0.03 SOLUTION RESPIRATORY (INHALATION) at 13:08

## 2025-05-27 NOTE — ED PROVIDER NOTES
"Time reflects when diagnosis was documented in both MDM as applicable and the Disposition within this note       Time User Action Codes Description Comment    5/27/2025  2:03 PM Chele Gifford [J45.901] Acute asthma exacerbation     5/27/2025  2:03 PM Chele Gifford [J40] Bronchitis           ED Disposition       ED Disposition   Discharge    Condition   Stable    Date/Time   Tue May 27, 2025  2:02 PM    Comment   Janine Stafford discharge to home/self care.                   Assessment & Plan       Medical Decision Making  44 year old female presenting today with concerns of SOB.  Wheezing and rhonchi on exam.  Congestion.   Labs reassuring aside from hyperglycemia, consistent with DMII.  After medications, patient feeling much better.  Wheezing improved. Still coughing. Suspect bronchitis superimposed on asthma exacerbation.  Will refill medications and start with Azithromycin + Prednisone.  ------------------------------------------------------------  Strict return precautions discussed. Patient at time of discharge well-appearing in no acute distress, all questions answered. Patient agreeable to plan.  Patient's vitals, lab/imaging results, diagnosis, and treatment plan were discussed with the patient. All new/changed medications were discussed with patient, specifically, route of administration, how often and when to take, and where they can be picked up. Strict return precautions as well as close follow up with PCP was discussed with the patient and the patient was agreeable to my recommendations.  Patient verbally acknowledged understanding of the above communications. All labs reviewed and utilized in the medical decision making process (if labs were ordered). Portions of the record may have been created with voice recognition software.  Occasional wrong word or \"sound a like\" substitutions may have occurred due to the inherent limitations of voice recognition software.  Read the chart carefully and " recognize, using context, where substitutions have occurred.      Amount and/or Complexity of Data Reviewed  Labs: ordered.  Radiology: ordered and independent interpretation performed.    Risk  OTC drugs.  Prescription drug management.             Medications   sodium chloride 0.9 % bolus 1,000 mL (1,000 mL Intravenous New Bag 5/27/25 1355)   ipratropium-albuterol (DUO-NEB) 0.5-2.5 mg/3 mL inhalation solution 3 mL (3 mL Nebulization Given 5/27/25 1236)   ipratropium-albuterol (DUO-NEB) 0.5-2.5 mg/3 mL inhalation solution 3 mL (3 mL Nebulization Given 5/27/25 1236)   methylPREDNISolone sodium succinate (Solu-MEDROL) injection 125 mg (125 mg Intravenous Given 5/27/25 1252)   albuterol inhalation solution 10 mg (10 mg Nebulization Given 5/27/25 1309)   sodium chloride 0.9 % inhalation solution 12 mL (12 mL Nebulization Given 5/27/25 1308)   acetaminophen (TYLENOL) tablet 650 mg (650 mg Oral Given 5/27/25 1353)       ED Risk Strat Scores                    No data recorded        SBIRT 20yo+      Flowsheet Row Most Recent Value   Initial Alcohol Screen: US AUDIT-C     1. How often do you have a drink containing alcohol? 0 Filed at: 05/27/2025 1234   2. How many drinks containing alcohol do you have on a typical day you are drinking?  0 Filed at: 05/27/2025 1234   3b. FEMALE Any Age, or MALE 65+: How often do you have 4 or more drinks on one occassion? 0 Filed at: 05/27/2025 1234   Audit-C Score 0 Filed at: 05/27/2025 1234   DAVY: How many times in the past year have you...    Used an illegal drug or used a prescription medication for non-medical reasons? Never Filed at: 05/27/2025 1234                            History of Present Illness       Chief Complaint   Patient presents with    Asthma     SOB, stuffy nose, coughing, sore throat and body aches x 2 days. States she has been using her inhalers and neb txs without relief.        Past Medical History[1]   Past Surgical History[2]   Family History[3]   Social  History[4]   E-Cigarette/Vaping    E-Cigarette Use Never User       E-Cigarette/Vaping Substances      I have reviewed and agree with the history as documented.     44 year old female presenting today with shortness of breath.  History of asthma, but has been feeling ill, and has been using her medication at home without much relief.  She denies any fever or chills. Wet cough without blood. States it feels just like asthma, but the weather change has made it much worse. No injuries. No leg swelling. No N/V/D/C.        Review of Systems   Constitutional:  Negative for chills and fever.   HENT:  Negative for ear pain and sore throat.    Eyes:  Negative for pain and visual disturbance.   Respiratory:  Positive for cough, chest tightness, shortness of breath and wheezing. Negative for apnea, choking and stridor.    Cardiovascular:  Negative for chest pain and palpitations.   Gastrointestinal:  Negative for abdominal pain, constipation, nausea and vomiting.   Genitourinary:  Negative for dysuria and hematuria.   Musculoskeletal:  Negative for arthralgias and back pain.   Skin:  Negative for color change and rash.   Neurological:  Negative for seizures and syncope.   All other systems reviewed and are negative.          Objective       ED Triage Vitals   Temperature Pulse Blood Pressure Respirations SpO2 Patient Position - Orthostatic VS   05/27/25 1233 05/27/25 1233 05/27/25 1233 05/27/25 1233 05/27/25 1233 05/27/25 1233   98.5 °F (36.9 °C) 95 158/60 (!) 26 100 % Lying      Temp Source Heart Rate Source BP Location FiO2 (%) Pain Score    05/27/25 1233 05/27/25 1356 05/27/25 1233 -- --    Oral Monitor Left arm        Vitals      Date and Time Temp Pulse SpO2 Resp BP Pain Score FACES Pain Rating User   05/27/25 1356 -- 93 99 % 22 145/73 -- -- JR   05/27/25 1300 -- -- 100 % -- -- -- -- RF   05/27/25 1233 98.5 °F (36.9 °C) 95 100 % 26 158/60 -- -- JR            Physical Exam  Vitals and nursing note reviewed.    Constitutional:       General: She is not in acute distress.     Appearance: She is well-developed. She is ill-appearing.   HENT:      Head: Normocephalic and atraumatic.      Nose: Congestion present.     Eyes:      Conjunctiva/sclera: Conjunctivae normal.       Cardiovascular:      Rate and Rhythm: Normal rate and regular rhythm.      Pulses: Normal pulses.      Heart sounds: Normal heart sounds. No murmur heard.     No friction rub.   Pulmonary:      Effort: Pulmonary effort is normal. No respiratory distress.      Breath sounds: No stridor. Wheezing and rhonchi present. No rales.   Chest:      Chest wall: No tenderness.   Abdominal:      Palpations: Abdomen is soft.      Tenderness: There is no abdominal tenderness.     Musculoskeletal:         General: No swelling.      Cervical back: Neck supple.      Right lower leg: No edema.      Left lower leg: No edema.     Skin:     General: Skin is warm and dry.      Capillary Refill: Capillary refill takes less than 2 seconds.     Neurological:      Mental Status: She is alert.     Psychiatric:         Mood and Affect: Mood normal.         Results Reviewed       Procedure Component Value Units Date/Time    FLU/COVID Rapid Antigen (30 min. TAT) - Preferred screening test in ED [[id number]]  (Normal) Collected: 05/27/25 1247    Lab Status: Final result Specimen: Nares from Nose Updated: 05/27/25 1309     SARS COV Rapid Antigen Negative     Influenza A Rapid Antigen Negative     Influenza B Rapid Antigen Negative    Narrative:      This test has been performed using the Quidel Rahel 2 FLU+SARS Antigen test under the Emergency Use Authorization (EUA). This test has been validated by the  and verified by the performing laboratory. The Rahel uses lateral flow immunofluorescent sandwich assay to detect SARS-COV, Influenza A and Influenza B Antigen.     The Quidel Rahel 2 SARS Antigen test does not differentiate between SARS-CoV and SARS-CoV-2.     Negative  results are presumptive and may be confirmed with a molecular assay, if necessary, for patient management. Negative results do not rule out SARS-CoV-2 or influenza infection and should not be used as the sole basis for treatment or patient management decisions. A negative test result may occur if the level of antigen in a sample is below the limit of detection of this test.     Positive results are indicative of the presence of viral antigens, but do not rule out bacterial infection or co-infection with other viruses.     All test results should be used as an adjunct to clinical observations and other information available to the provider.    FOR PEDIATRIC PATIENTS - copy/paste COVID Guidelines URL to browser: [email or web address]    Basic metabolic panel [[id number]]  (Abnormal) Collected: 05/27/25 1247    Lab Status: Final result Specimen: Blood from Arm, Left Updated: 05/27/25 1307     Sodium 137 mmol/L      Potassium 3.8 mmol/L      Chloride 101 mmol/L      CO2 26 mmol/L      ANION GAP 10 mmol/L      BUN 8 mg/dL      Creatinine 0.61 mg/dL      Glucose 289 mg/dL      Calcium 9.2 mg/dL      eGFR 109 ml/min/1.73sq m     Narrative:      National Kid[employer] Disease Foundation guidelines for Chronic Kidney Disease (CKD):     Stage 1 with normal or high GFR (GFR > 90 mL/min/1.73 square meters)    Stage 2 Mild CKD (GFR = 60-89 mL/min/1.73 square meters)    Stage 3A Moderate CKD (GFR = 45-59 mL/min/1.73 square meters)    Stage 3B Moderate CKD (GFR = 30-44 mL/min/1.73 square meters)    Stage 4 Severe CKD (GFR = 15-29 mL/min/1.73 square meters)    Stage 5 End Stage CKD (GFR <15 mL/min/1.73 square meters)  Note: GFR calculation is accurate only with a steady state creatinine    CBC and differential [[id number]] Collected: 05/27/25 1247    Lab Status: Final result Specimen: Blood from Arm, Left Updated: 05/27/25 1253     WBC 5.88 Thousand/uL      RBC 4.28 Million/uL       Hemoglobin 13.5 g/dL      Hematocrit 40.3 %      MCV 94 fL      MCH 31.5 pg      MCHC 33.5 g/dL      RDW 13.1 %      MPV 9.9 fL      Platelets 276 Thousands/uL      nRBC 0 /100 WBCs      Segmented % 61 %      Immature Grans % 1 %      Lymphocytes % 25 %      Monocytes % 6 %      Eosinophils Relative 6 %      Basophils Relative 1 %      Absolute Neutrophils 3.67 Thousands/µL      Absolute Immature Grans 0.05 Thousand/uL      Absolute Lymphocytes 1.44 Thousands/µL      Absolute Monocytes 0.34 Thousand/µL      Eosinophils Absolute 0.34 Thousand/µL      Basophils Absolute 0.04 Thousands/µL             XR chest 2 views   ED Interpretation by Chele Gifford PA-C (05/27 7066)   No acute cardiopulmonary process          Procedures    ED Medication and Procedure Management   Prior to Admission Medications   Prescriptions Last Dose Informant Patient Reported? Taking?   Insulin Pen Needle (BD Pen Needle Matilda 2nd Gen) 32G X 4 MM MISC   No No   Sig: For use with insulin pen. Pharmacy may dispense brand covered by insurance.   acetaminophen (TYLENOL) 500 mg tablet   No No   Sig: Take 1 tablet (500 mg total) by mouth every 6 (six) hours as needed for mild pain   albuterol (2.5 mg/3 mL) 0.083 % nebulizer solution   No No   Sig: Take 3 mL (2.5 mg total) by nebulization every 6 (six) hours as needed for wheezing or shortness of breath   albuterol (PROVENTIL HFA,VENTOLIN HFA) 90 mcg/act inhaler   No No   Sig: Inhale 2 puffs every 6 (six) hours as needed for wheezing or shortness of breath   amLODIPine (NORVASC) 10 mg tablet   No No   Sig: Take 1 tablet (10 mg total) by mouth daily   atorvastatin (LIPITOR) 20 mg tablet   Yes No   Sig: Take 20 mg by mouth daily   cetirizine (ZyrTEC) 10 mg tablet   Yes No   Sig: Take 10 mg by mouth daily   erythromycin (ILOTYCIN) ophthalmic ointment   No No   Sig: Place a 1/2 inch ribbon of ointment into the left lower eyelid.   glipiZIDE-metFORMIN (METAGLIP) 2.5-500 MG per tablet   Yes No   Sig: Take  2 tablets by mouth 2 (two) times a day   ibuprofen (MOTRIN) 600 mg tablet   No No   Sig: Take 1 tablet (600 mg total) by mouth every 6 (six) hours as needed for moderate pain   ibuprofen (MOTRIN) 800 mg tablet   No No   Sig: Take 1 tablet (800 mg total) by mouth 3 (three) times a day With food   insulin glargine (LANTUS) 100 units/mL subcutaneous injection   No No   Sig: Inject 40 Units under the skin daily   insulin lispro (HumaLOG KwikPen) 100 units/mL injection pen   No No   Sig: Inject 12 Units under the skin 3 (three) times a day with meals   ipratropium-albuterol (DUO-NEB) 0.5-2.5 mg/3 mL nebulizer solution   Yes No   Sig: INHALE THE CONTENTS OF 1 VIAL VIA NEBULIZER 4 (FOUR) TIMES A DAY AS NEEDED FOR WHEEZING.   metroNIDAZOLE (METROGEL) 0.75 % gel   No No   Sig: Apply topically 2 (two) times a day for 7 days   ondansetron (ZOFRAN-ODT) 4 mg disintegrating tablet   No No   Sig: Take 1 tablet (4 mg total) by mouth every 8 (eight) hours as needed for nausea for up to 10 days      Facility-Administered Medications: None     Patient's Medications   Discharge Prescriptions    ALBUTEROL (2.5 MG/3 ML) 0.083 % NEBULIZER SOLUTION    Take 3 mL (2.5 mg total) by nebulization every 6 (six) hours as needed for wheezing or shortness of breath       Start Date: 5/27/2025 End Date: --       Order Dose: 2.5 mg       Quantity: 75 mL    Refills: 0    AZITHROMYCIN (ZITHROMAX) 250 MG TABLET    Take 2 tablets today then 1 tablet daily x 4 days       Start Date: 5/27/2025 End Date: 5/31/2025       Order Dose: --       Quantity: 6 tablet    Refills: 0    PREDNISONE 20 MG TABLET    Take 2 tablets (40 mg total) by mouth daily for 5 days       Start Date: 5/27/2025 End Date: 6/1/2025       Order Dose: 40 mg       Quantity: 10 tablet    Refills: 0     No discharge procedures on file.  ED SEPSIS DOCUMENTATION   Time reflects when diagnosis was documented in both MDM as applicable and the Disposition within this note       Time User Action  Codes Description Comment    5/27/2025  2:03 PM Chele Gifford [J45.901] Acute asthma exacerbation     5/27/2025  2:03 PM Chele Gifford [J40] Bronchitis                      [1]   Past Medical History:  Diagnosis Date    Asthma     Dental abscess     Diabetes mellitus (HCC)     High cholesterol     Hypertension    [2]   Past Surgical History:  Procedure Laterality Date    BREAST CYST INCISION AND DRAINAGE Right 4/12/2024    Procedure: INCISION AND DRAINAGE (I&D) BREAST;  Surgeon: Danny Seymour MD;  Location:  MAIN OR;  Service: General    CHOLECYSTECTOMY      ECTOPIC PREGNANCY SURGERY      INCISION AND DRAINAGE INTRA ORAL ABSCESS Left 3/21/2016    Procedure: INCISION AND DRAINAGE  (I&D) WOUND ORAL Left  Space;  Surgeon: Henok Parrish MD;  Location:  MAIN OR;  Service:     MULTIPLE TOOTH EXTRACTIONS N/A 3/21/2016    Procedure: EXTRACTION TEETH MULTIPLE; 17,18,19,11,12,4,5,28,32;  Surgeon: Henok Parrish MD;  Location:  MAIN OR;  Service:     SALPINGECTOMY     [3]   Family History  Problem Relation Name Age of Onset    Diabetes Mother      Heart failure Mother      Cancer Neg Hx     [4]   Social History  Tobacco Use    Smoking status: Every Day     Current packs/day: 0.50     Average packs/day: 0.5 packs/day for 10.0 years (5.0 ttl pk-yrs)     Types: Cigarettes    Smokeless tobacco: Never    Tobacco comments:     PT was educated on smoking and the effect is has on her asthma.   Vaping Use    Vaping status: Never Used   Substance Use Topics    Alcohol use: Yes     Comment: Social    Drug use: No        Chele Gifford PA-C  05/27/25 1506

## 2025-05-27 NOTE — Clinical Note
Janine Stafford was seen and treated in our emergency department on 5/27/2025.    No restrictions            Diagnosis:     Janine  may return to work on return date, is off the rest of the shift today.    She may return on this date: 05/30/2025         If you have any questions or concerns, please don't hesitate to call.      Chele Gifford PA-C    ______________________________           _______________          _______________  Hospital Representative                              Date                                Time

## 2025-06-29 ENCOUNTER — HOSPITAL ENCOUNTER (EMERGENCY)
Facility: HOSPITAL | Age: 45
Discharge: HOME | End: 2025-06-29
Attending: EMERGENCY MEDICINE | Admitting: EMERGENCY MEDICINE
Payer: COMMERCIAL

## 2025-06-29 VITALS
HEART RATE: 96 BPM | OXYGEN SATURATION: 99 % | TEMPERATURE: 98.8 F | BODY MASS INDEX: 39.94 KG/M2 | WEIGHT: 286.38 LBS | RESPIRATION RATE: 22 BRPM | SYSTOLIC BLOOD PRESSURE: 155 MMHG | DIASTOLIC BLOOD PRESSURE: 84 MMHG

## 2025-06-29 DIAGNOSIS — N64.4 BREAST PAIN, RIGHT: Primary | ICD-10-CM

## 2025-06-29 DIAGNOSIS — N61.1 BREAST ABSCESS: ICD-10-CM

## 2025-06-29 PROCEDURE — 99283 EMERGENCY DEPT VISIT LOW MDM: CPT

## 2025-06-29 PROCEDURE — 96372 THER/PROPH/DIAG INJ SC/IM: CPT

## 2025-06-29 PROCEDURE — 99284 EMERGENCY DEPT VISIT MOD MDM: CPT

## 2025-06-29 RX ORDER — OXYCODONE HYDROCHLORIDE 5 MG/1
5 TABLET ORAL ONCE
Refills: 0 | Status: COMPLETED | OUTPATIENT
Start: 2025-06-29 | End: 2025-06-29

## 2025-06-29 RX ORDER — NAPROXEN 500 MG/1
500 TABLET ORAL 2 TIMES DAILY WITH MEALS
Qty: 30 TABLET | Refills: 0 | Status: SHIPPED | OUTPATIENT
Start: 2025-06-29

## 2025-06-29 RX ORDER — DOXYCYCLINE 100 MG/1
100 CAPSULE ORAL 2 TIMES DAILY
Qty: 14 CAPSULE | Refills: 0 | Status: SHIPPED | OUTPATIENT
Start: 2025-06-29 | End: 2025-07-06

## 2025-06-29 RX ORDER — ACETAMINOPHEN 325 MG/1
650 TABLET ORAL ONCE
Status: COMPLETED | OUTPATIENT
Start: 2025-06-29 | End: 2025-06-29

## 2025-06-29 RX ORDER — KETOROLAC TROMETHAMINE 30 MG/ML
15 INJECTION, SOLUTION INTRAMUSCULAR; INTRAVENOUS ONCE
Status: COMPLETED | OUTPATIENT
Start: 2025-06-29 | End: 2025-06-29

## 2025-06-29 RX ORDER — DOXYCYCLINE 100 MG/1
100 CAPSULE ORAL ONCE
Status: COMPLETED | OUTPATIENT
Start: 2025-06-29 | End: 2025-06-29

## 2025-06-29 RX ORDER — ACETAMINOPHEN 500 MG
1000 TABLET ORAL EVERY 6 HOURS PRN
Qty: 60 TABLET | Refills: 0 | Status: SHIPPED | OUTPATIENT
Start: 2025-06-29

## 2025-06-29 RX ORDER — CEPHALEXIN 500 MG/1
500 CAPSULE ORAL EVERY 6 HOURS SCHEDULED
Qty: 28 CAPSULE | Refills: 0 | Status: SHIPPED | OUTPATIENT
Start: 2025-06-29 | End: 2025-07-06

## 2025-06-29 RX ORDER — OXYCODONE HYDROCHLORIDE 5 MG/1
5 TABLET ORAL EVERY 4 HOURS PRN
Qty: 10 TABLET | Refills: 0 | Status: SHIPPED | OUTPATIENT
Start: 2025-06-29 | End: 2025-07-09

## 2025-06-29 RX ADMIN — ACETAMINOPHEN 650 MG: 325 TABLET ORAL at 08:40

## 2025-06-29 RX ADMIN — OXYCODONE HYDROCHLORIDE 5 MG: 5 TABLET ORAL at 08:40

## 2025-06-29 RX ADMIN — KETOROLAC TROMETHAMINE 15 MG: 30 INJECTION, SOLUTION INTRAMUSCULAR; INTRAVENOUS at 08:40

## 2025-06-29 RX ADMIN — DOXYCYCLINE 100 MG: 100 CAPSULE ORAL at 08:40

## 2025-06-29 NOTE — ED PROVIDER NOTES
"Time reflects when diagnosis was documented in both MDM as applicable and the Disposition within this note       Time User Action Codes Description Comment    6/29/2025  8:36 AM Chele Gifford [N64.4] Breast pain, right     6/29/2025  8:37 AM Chele Gifford [N61.1] Breast abscess           ED Disposition       ED Disposition   Discharge    Condition   Stable    Date/Time   Sun Jun 29, 2025  8:36 AM    Comment   Janine Stafford discharge to home/self care.                   Assessment & Plan       Medical Decision Making  44-year-old female presenting today with concerns of breast pain.  On exam, consistent with cellulitis versus abscess.  However cannot rule out breast mass..  Will refer to OB GYN.  Treat with antibiotics.  ------------------------------------------------------------  Strict return precautions discussed. Patient at time of discharge well-appearing in no acute distress, all questions answered. Patient agreeable to plan.  Patient's vitals, lab/imaging results, diagnosis, and treatment plan were discussed with the patient. All new/changed medications were discussed with patient, specifically, route of administration, how often and when to take, and where they can be picked up. Strict return precautions as well as close follow up with PCP was discussed with the patient and the patient was agreeable to my recommendations.  Patient verbally acknowledged understanding of the above communications. All labs reviewed and utilized in the medical decision making process (if labs were ordered). Portions of the record may have been created with voice recognition software.  Occasional wrong word or \"sound a like\" substitutions may have occurred due to the inherent limitations of voice recognition software.  Read the chart carefully and recognize, using context, where substitutions have occurred.      Risk  OTC drugs.  Prescription drug management.             Medications   oxyCODONE (ROXICODONE) IR tablet " 5 mg (5 mg Oral Given 6/29/25 0840)   ketorolac (TORADOL) injection 15 mg (15 mg Intramuscular Given 6/29/25 0840)   acetaminophen (TYLENOL) tablet 650 mg (650 mg Oral Given 6/29/25 0840)   doxycycline hyclate (VIBRAMYCIN) capsule 100 mg (100 mg Oral Given 6/29/25 0840)       ED Risk Strat Scores                    No data recorded                            History of Present Illness       Chief Complaint   Patient presents with    Breast Pain     Pt with right side breast pain x 1 week, pt has had issues like this in the past and has fluid drained from it in the past. Pt reports putting warm compresses on the breast with little relief, reports skin opening and releasing pus to the interior aspect of right nipple        Past Medical History[1]   Past Surgical History[2]   Family History[3]   Social History[4]   E-Cigarette/Vaping    E-Cigarette Use Never User       E-Cigarette/Vaping Substances      I have reviewed and agree with the history as documented.     44 year old female presenting today with concerns of breast pain, right-sided.  She has had this before, in similar area.  States that it is pretty painful and it feels swollen.  States that she had a mammogram last time she had this, and they said it was okay however that was several years ago and she has not followed up since.  She denies any nausea or vomiting.  No chest pain other than the breast pain.  No other symptoms        Review of Systems   Constitutional:  Negative for chills and fever.   HENT:  Negative for ear pain and sore throat.    Eyes:  Negative for pain and visual disturbance.   Respiratory:  Negative for cough and shortness of breath.    Cardiovascular:  Negative for chest pain and palpitations.   Gastrointestinal:  Negative for abdominal pain, constipation, diarrhea, nausea and vomiting.   Genitourinary:  Negative for dysuria, flank pain and hematuria.   Musculoskeletal:  Negative for arthralgias and back pain.        Breast pain      Skin:  Negative for color change and rash.   Neurological:  Negative for seizures and syncope.   All other systems reviewed and are negative.          Objective       ED Triage Vitals   Temperature Pulse Blood Pressure Respirations SpO2 Patient Position - Orthostatic VS   06/29/25 0810 06/29/25 0810 06/29/25 0810 06/29/25 0810 06/29/25 0810 --   98.8 °F (37.1 °C) 96 155/84 22 99 %       Temp Source Heart Rate Source BP Location FiO2 (%) Pain Score    06/29/25 0810 06/29/25 0810 -- -- 06/29/25 0840    Oral Monitor   10 - Worst Possible Pain      Vitals      Date and Time Temp Pulse SpO2 Resp BP Pain Score FACES Pain Rating User   06/29/25 0840 -- -- -- -- -- 10 - Worst Possible Pain -- SM   06/29/25 0810 98.8 °F (37.1 °C) 96 99 % 22 155/84 -- --             Physical Exam  Vitals and nursing note reviewed.   Constitutional:       General: She is not in acute distress.     Appearance: She is well-developed.   HENT:      Head: Normocephalic and atraumatic.     Eyes:      Conjunctiva/sclera: Conjunctivae normal.       Cardiovascular:      Rate and Rhythm: Normal rate and regular rhythm.      Heart sounds: No murmur heard.  Pulmonary:      Effort: Pulmonary effort is normal. No respiratory distress.      Breath sounds: Normal breath sounds.   Chest:      Chest wall: Tenderness (Purulent, nonbloody discharge from the right nipple.  Edema to the anterior right nipple and breast.  No obvious cellulitis.  No fluctuant mass) present.   Abdominal:      Palpations: Abdomen is soft.      Tenderness: There is no abdominal tenderness.     Musculoskeletal:         General: No swelling.      Cervical back: Neck supple.     Skin:     General: Skin is warm and dry.      Capillary Refill: Capillary refill takes less than 2 seconds.     Neurological:      Mental Status: She is alert.     Psychiatric:         Mood and Affect: Mood normal.         Results Reviewed       None            No orders to display       Procedures    ED  Medication and Procedure Management   Prior to Admission Medications   Prescriptions Last Dose Informant Patient Reported? Taking?   Insulin Pen Needle (BD Pen Needle Matilda 2nd Gen) 32G X 4 MM MISC   No No   Sig: For use with insulin pen. Pharmacy may dispense brand covered by insurance.   acetaminophen (TYLENOL) 500 mg tablet   No No   Sig: Take 1 tablet (500 mg total) by mouth every 6 (six) hours as needed for mild pain   albuterol (2.5 mg/3 mL) 0.083 % nebulizer solution   No No   Sig: Take 3 mL (2.5 mg total) by nebulization every 6 (six) hours as needed for wheezing or shortness of breath   albuterol (2.5 mg/3 mL) 0.083 % nebulizer solution   No No   Sig: Take 3 mL (2.5 mg total) by nebulization every 6 (six) hours as needed for wheezing or shortness of breath   albuterol (PROVENTIL HFA,VENTOLIN HFA) 90 mcg/act inhaler   No No   Sig: Inhale 2 puffs every 6 (six) hours as needed for wheezing or shortness of breath   amLODIPine (NORVASC) 10 mg tablet   No No   Sig: Take 1 tablet (10 mg total) by mouth daily   atorvastatin (LIPITOR) 20 mg tablet   Yes No   Sig: Take 20 mg by mouth daily   cetirizine (ZyrTEC) 10 mg tablet   Yes No   Sig: Take 10 mg by mouth daily   erythromycin (ILOTYCIN) ophthalmic ointment   No No   Sig: Place a 1/2 inch ribbon of ointment into the left lower eyelid.   glipiZIDE-metFORMIN (METAGLIP) 2.5-500 MG per tablet   Yes No   Sig: Take 2 tablets by mouth 2 (two) times a day   ibuprofen (MOTRIN) 600 mg tablet   No No   Sig: Take 1 tablet (600 mg total) by mouth every 6 (six) hours as needed for moderate pain   ibuprofen (MOTRIN) 800 mg tablet   No No   Sig: Take 1 tablet (800 mg total) by mouth 3 (three) times a day With food   insulin glargine (LANTUS) 100 units/mL subcutaneous injection   No No   Sig: Inject 40 Units under the skin daily   insulin lispro (HumaLOG KwikPen) 100 units/mL injection pen   No No   Sig: Inject 12 Units under the skin 3 (three) times a day with meals    ipratropium-albuterol (DUO-NEB) 0.5-2.5 mg/3 mL nebulizer solution   Yes No   Sig: INHALE THE CONTENTS OF 1 VIAL VIA NEBULIZER 4 (FOUR) TIMES A DAY AS NEEDED FOR WHEEZING.   ipratropium-albuterol (DUO-NEB) 0.5-2.5 mg/3 mL nebulizer solution   No No   Sig: Take 3 mL by nebulization 4 (four) times a day   metroNIDAZOLE (METROGEL) 0.75 % gel   No No   Sig: Apply topically 2 (two) times a day for 7 days   ondansetron (ZOFRAN-ODT) 4 mg disintegrating tablet   No No   Sig: Take 1 tablet (4 mg total) by mouth every 8 (eight) hours as needed for nausea for up to 10 days      Facility-Administered Medications: None     Discharge Medication List as of 6/29/2025  8:58 AM        START taking these medications    Details   !! acetaminophen (TYLENOL) 500 mg tablet Take 2 tablets (1,000 mg total) by mouth every 6 (six) hours as needed for mild pain, Starting Sun 6/29/2025, Normal      cephalexin (KEFLEX) 500 mg capsule Take 1 capsule (500 mg total) by mouth every 6 (six) hours for 7 days, Starting Sun 6/29/2025, Until Sun 7/6/2025, Normal      doxycycline hyclate (VIBRAMYCIN) 100 mg capsule Take 1 capsule (100 mg total) by mouth 2 (two) times a day for 7 days, Starting Sun 6/29/2025, Until Sun 7/6/2025, Normal      naproxen (Naprosyn) 500 mg tablet Take 1 tablet (500 mg total) by mouth 2 (two) times a day with meals, Starting Sun 6/29/2025, Normal      oxyCODONE (Roxicodone) 5 immediate release tablet Take 1 tablet (5 mg total) by mouth every 4 (four) hours as needed for moderate pain for up to 10 days Max Daily Amount: 30 mg, Starting Sun 6/29/2025, Until Wed 7/9/2025 at 2359, Normal       !! - Potential duplicate medications found. Please discuss with provider.        CONTINUE these medications which have NOT CHANGED    Details   !! acetaminophen (TYLENOL) 500 mg tablet Take 1 tablet (500 mg total) by mouth every 6 (six) hours as needed for mild pain, Starting Thu 1/9/2025, Normal      !! albuterol (2.5 mg/3 mL) 0.083 %  nebulizer solution Take 3 mL (2.5 mg total) by nebulization every 6 (six) hours as needed for wheezing or shortness of breath, Starting Mon 10/11/2021, Normal      !! albuterol (2.5 mg/3 mL) 0.083 % nebulizer solution Take 3 mL (2.5 mg total) by nebulization every 6 (six) hours as needed for wheezing or shortness of breath, Starting Tue 5/27/2025, Normal      albuterol (PROVENTIL HFA,VENTOLIN HFA) 90 mcg/act inhaler Inhale 2 puffs every 6 (six) hours as needed for wheezing or shortness of breath, Starting Mon 10/11/2021, Normal      amLODIPine (NORVASC) 10 mg tablet Take 1 tablet (10 mg total) by mouth daily, Starting Mon 10/11/2021, Normal      atorvastatin (LIPITOR) 20 mg tablet Take 20 mg by mouth daily, Starting Wed 8/3/2022, Historical Med      cetirizine (ZyrTEC) 10 mg tablet Take 10 mg by mouth daily, Starting Fri 5/13/2022, Until Sat 5/13/2023, Historical Med      erythromycin (ILOTYCIN) ophthalmic ointment Place a 1/2 inch ribbon of ointment into the left lower eyelid., Normal      glipiZIDE-metFORMIN (METAGLIP) 2.5-500 MG per tablet Take 2 tablets by mouth 2 (two) times a day, Starting Fri 9/23/2022, Until Tue 4/9/2024, Historical Med      !! ibuprofen (MOTRIN) 600 mg tablet Take 1 tablet (600 mg total) by mouth every 6 (six) hours as needed for moderate pain, Starting Sun 10/20/2024, Normal      !! ibuprofen (MOTRIN) 800 mg tablet Take 1 tablet (800 mg total) by mouth 3 (three) times a day With food, Starting Mon 2/24/2025, Normal      insulin glargine (LANTUS) 100 units/mL subcutaneous injection Inject 40 Units under the skin daily, Starting Thu 4/18/2024, Normal      insulin lispro (HumaLOG KwikPen) 100 units/mL injection pen Inject 12 Units under the skin 3 (three) times a day with meals, Starting Thu 4/18/2024, Normal      Insulin Pen Needle (BD Pen Needle Matilda 2nd Gen) 32G X 4 MM MISC For use with insulin pen. Pharmacy may dispense brand covered by insurance., Normal      !! ipratropium-albuterol  (DUO-NEB) 0.5-2.5 mg/3 mL nebulizer solution INHALE THE CONTENTS OF 1 VIAL VIA NEBULIZER 4 (FOUR) TIMES A DAY AS NEEDED FOR WHEEZING., Historical Med      !! ipratropium-albuterol (DUO-NEB) 0.5-2.5 mg/3 mL nebulizer solution Take 3 mL by nebulization 4 (four) times a day, Starting Tue 5/27/2025, Normal      metroNIDAZOLE (METROGEL) 0.75 % gel Apply topically 2 (two) times a day for 7 days, Starting Thu 1/9/2025, Until Thu 1/16/2025, Normal      ondansetron (ZOFRAN-ODT) 4 mg disintegrating tablet Take 1 tablet (4 mg total) by mouth every 8 (eight) hours as needed for nausea for up to 10 days, Starting Thu 1/9/2025, Until Sun 1/19/2025 at 2359, Normal       !! - Potential duplicate medications found. Please discuss with provider.          ED SEPSIS DOCUMENTATION   Time reflects when diagnosis was documented in both MDM as applicable and the Disposition within this note       Time User Action Codes Description Comment    6/29/2025  8:36 AM Chele Gifford [N64.4] Breast pain, right     6/29/2025  8:37 AM Chele Gifford [N61.1] Breast abscess                      [1]   Past Medical History:  Diagnosis Date    Asthma     Dental abscess     Diabetes mellitus (HCC)     High cholesterol     Hypertension    [2]   Past Surgical History:  Procedure Laterality Date    BREAST CYST INCISION AND DRAINAGE Right 4/12/2024    Procedure: INCISION AND DRAINAGE (I&D) BREAST;  Surgeon: Danny Seymour MD;  Location:  MAIN OR;  Service: General    CHOLECYSTECTOMY      ECTOPIC PREGNANCY SURGERY      INCISION AND DRAINAGE INTRA ORAL ABSCESS Left 3/21/2016    Procedure: INCISION AND DRAINAGE  (I&D) WOUND ORAL Left  Space;  Surgeon: Henok Parrish MD;  Location:  MAIN OR;  Service:     MULTIPLE TOOTH EXTRACTIONS N/A 3/21/2016    Procedure: EXTRACTION TEETH MULTIPLE; 17,18,19,11,12,4,5,28,32;  Surgeon: Henok Parrish MD;  Location:  MAIN OR;  Service:     SALPINGECTOMY     [3]   Family History  Problem Relation  Name Age of Onset    Diabetes Mother      Heart failure Mother      Cancer Neg Hx     [4]   Social History  Tobacco Use    Smoking status: Every Day     Current packs/day: 0.50     Average packs/day: 0.5 packs/day for 10.0 years (5.0 ttl pk-yrs)     Types: Cigarettes    Smokeless tobacco: Never    Tobacco comments:     PT was educated on smoking and the effect is has on her asthma.   Vaping Use    Vaping status: Never Used   Substance Use Topics    Alcohol use: Yes     Comment: Social    Drug use: No        Chele Gifford PA-C  06/29/25 1810

## 2025-07-17 ENCOUNTER — HOSPITAL ENCOUNTER (EMERGENCY)
Facility: HOSPITAL | Age: 45
Discharge: HOME | End: 2025-07-17
Attending: EMERGENCY MEDICINE
Payer: COMMERCIAL

## 2025-07-17 VITALS
RESPIRATION RATE: 20 BRPM | HEART RATE: 104 BPM | DIASTOLIC BLOOD PRESSURE: 82 MMHG | OXYGEN SATURATION: 98 % | SYSTOLIC BLOOD PRESSURE: 145 MMHG | WEIGHT: 285.06 LBS | TEMPERATURE: 98.9 F | BODY MASS INDEX: 39.76 KG/M2

## 2025-07-17 DIAGNOSIS — L72.9 CYST OF SKIN: Primary | ICD-10-CM

## 2025-07-17 LAB
EXT PREGNANCY TEST URINE: NEGATIVE
EXT. CONTROL: NORMAL

## 2025-07-17 PROCEDURE — 96372 THER/PROPH/DIAG INJ SC/IM: CPT

## 2025-07-17 PROCEDURE — 99283 EMERGENCY DEPT VISIT LOW MDM: CPT

## 2025-07-17 PROCEDURE — 99285 EMERGENCY DEPT VISIT HI MDM: CPT | Performed by: EMERGENCY MEDICINE

## 2025-07-17 PROCEDURE — 81025 URINE PREGNANCY TEST: CPT

## 2025-07-17 RX ORDER — NAPROXEN 500 MG/1
500 TABLET ORAL 2 TIMES DAILY WITH MEALS
Qty: 10 TABLET | Refills: 0 | Status: SHIPPED | OUTPATIENT
Start: 2025-07-17 | End: 2026-07-17

## 2025-07-17 RX ORDER — OXYCODONE HYDROCHLORIDE 5 MG/1
5 TABLET ORAL EVERY 6 HOURS PRN
Qty: 10 TABLET | Refills: 0 | Status: SHIPPED | OUTPATIENT
Start: 2025-07-17 | End: 2025-07-20

## 2025-07-17 RX ORDER — KETOROLAC TROMETHAMINE 30 MG/ML
15 INJECTION, SOLUTION INTRAMUSCULAR; INTRAVENOUS ONCE
Status: COMPLETED | OUTPATIENT
Start: 2025-07-17 | End: 2025-07-17

## 2025-07-17 RX ORDER — OXYCODONE HYDROCHLORIDE 5 MG/1
5 TABLET ORAL ONCE
Refills: 0 | Status: COMPLETED | OUTPATIENT
Start: 2025-07-17 | End: 2025-07-17

## 2025-07-17 RX ADMIN — OXYCODONE HYDROCHLORIDE 5 MG: 5 TABLET ORAL at 13:39

## 2025-07-17 RX ADMIN — KETOROLAC TROMETHAMINE 15 MG: 30 INJECTION, SOLUTION INTRAMUSCULAR; INTRAVENOUS at 13:39

## 2025-07-17 NOTE — Clinical Note
Janine Stafford was seen and treated in our emergency department on 7/17/2025.                Diagnosis:     Janine  .    She may return on this date: 07/21/2025         If you have any questions or concerns, please don't hesitate to call.      Laine Chang PA-C    ______________________________           _______________          _______________  Hospital Representative                              Date                                Time

## 2025-07-17 NOTE — DISCHARGE INSTRUCTIONS
Follow up with General Surgeon to discuss possible excision. Return to ED sooner for new or worsening symptoms as discussed.

## 2025-07-17 NOTE — ED ATTENDING ATTESTATION
7/17/2025  I, Marciano Gibson MD, saw and evaluated the patient. I have discussed the patient with the resident/non-physician practitioner and agree with the resident's/non-physician practitioner's findings, Plan of Care, and MDM as documented in the resident's/non-physician practitioner's note, except where noted. All available labs and Radiology studies were reviewed.  I was present for key portions of any procedure(s) performed by the resident/non-physician practitioner and I was immediately available to provide assistance.       At this point I agree with the current assessment done in the Emergency Department.  I have conducted an independent evaluation of this patient a history and physical is as follows:    44-year-old female presents to the emergency department for evaluation of painful lump on the back of her neck.  She states it has been there for some time, but over the past few days she believes it has grown in size and has also become increasingly painful.  No drainage or bleeding from the area.  No fevers or chills.    On exam, patient was comfortably in bed in no acute distress, head is normal cephalic atraumatic, pupils equal round and reactive, heart is regular rate and rhythm with the distal pulses, no increased work of breathing, respiratory distress, or stridor.  2 cm x 1.5 cm palpable mass over the midline of the cervical spine, no fluctuance or active drainage.  No overlying skin changes.  Is tender to palpation.  No crepitus.    I suspect symptomatology likely secondary to a cyst, no signs of abscess or cellulitis.  Bedside ultrasound performed which confirms to centimeter by 1.5 cm cystic lesion.  No vascularity.  Given the location, I do not feel comfortable performing I&D at this time.  As there is no signs of overlying infection or abscess do not believe antibiotics are indicated at this time.  Will treat symptomatically and refer to general surgery for possible removal or  biopsy.        ED Course         Critical Care Time  Procedures

## 2025-07-17 NOTE — ED PROVIDER NOTES
"Time reflects when diagnosis was documented in both MDM as applicable and the Disposition within this note       Time User Action Codes Description Comment    7/17/2025  1:16 PM Laine Chang Add [L72.9] Cyst of skin     7/17/2025  1:16 PM Laine Chang Modify [L72.9] Cyst of skin posterior neck           ED Disposition       ED Disposition   Discharge    Condition   Stable    Date/Time   Thu Jul 17, 2025  1:41 PM    Comment   Janine BATISTA Rivera discharge to home/self care.                   Assessment & Plan       Medical Decision Making  Differential diagnosis includes but is not limited to abscess, cyst, infected cyst, lymphadenopathy,    Afebrile.  No focal neurodeficits. bedSide ultrasound performed performed by Dr. Gibson without findings concerning for cellulitis or abscess, and consistent with simple cyst.  No vascularity.  Due to location plan for pain control.  No overt indication for antibiotic at this time.  However.  As the cyst is causing pain, impairment with daily activities of living, referred to general surgery for possible removal.    All imaging and/or lab testing discussed with patient, strict return to ED precautions discussed. Patient recommended to follow up promptly with appropriate outpatient provider and risk of morbidity/mortality if patient does not follow up as recommended was discussed. Patient and/or family members verbalizes understanding and agrees with plan. Patient and/or family members were given opportunity to ask questions, all questions were answered at this time. Patient is stable for discharge.     Portions of the record may have been created with voice recognition software. Occasional wrong word or \"sound a like\" substitutions may have occurred due to the inherent limitations of voice recognition software. Read the chart carefully and recognize, using context, where substitutions have occurred.       Amount and/or Complexity of Data Reviewed  Labs: ordered.    Risk  Prescription " drug management.        ED Course as of 07/17/25 2015   Thu Jul 17, 2025   1312 Via shared decision making will not perform cystectomy in ED, will refer to Surgery.        Medications   ketorolac (TORADOL) injection 15 mg (15 mg Intramuscular Given 7/17/25 1339)   oxyCODONE (ROXICODONE) IR tablet 5 mg (5 mg Oral Given 7/17/25 1339)       ED Risk Strat Scores                    No data recorded        SBIRT 20yo+      Flowsheet Row Most Recent Value   Initial Alcohol Screen: US AUDIT-C     1. How often do you have a drink containing alcohol? 0 Filed at: 07/17/2025 1233   2. How many drinks containing alcohol do you have on a typical day you are drinking?  0 Filed at: 07/17/2025 1233   3b. FEMALE Any Age, or MALE 65+: How often do you have 4 or more drinks on one occassion? 0 Filed at: 07/17/2025 1233   Audit-C Score 0 Filed at: 07/17/2025 1233   DAVY: How many times in the past year have you...    Used an illegal drug or used a prescription medication for non-medical reasons? Never Filed at: 07/17/2025 1233                            History of Present Illness       Chief Complaint   Patient presents with    Neck Pain     Reports lump on back of neck for a few years but recently has grown and starting to cause neck and shoulder pain - reports area is tender - tylenol 0600       Past Medical History[1]   Past Surgical History[2]   Family History[3]   Social History[4]   E-Cigarette/Vaping    E-Cigarette Use Never User       E-Cigarette/Vaping Substances      I have reviewed and agree with the history as documented.     44-year-old female with past medical history of diabetes mellitus, hypertension, hyperlipidemia, asthma, breast abscess presents to emergency department complaining of neck lump over the past few days and neck pain.  States that she has had a lump on the back of the neck that was small and has been present for as long as she can remember.  States it is not painful.  Reports a few days ago it started to  "grow in size and became painful.  Pain worse with neck extension.  Denies fever, drainage, numbness, weakness, tingling, shortness of breath, urinary incontinence, fecal incontinence, saddle anesthesia, rashes, anorexia, myalgias, diaphoresis, dysphagia, sore throat.       History provided by:  Patient  Neck Pain  Associated symptoms: no fever, no numbness and no weakness        Review of Systems   Constitutional:  Negative for chills and fever.   Eyes:  Negative for visual disturbance.   Respiratory:  Negative for shortness of breath.    Musculoskeletal:  Positive for neck pain.   Skin:         +\"neck lump\"   Neurological:  Negative for dizziness, syncope, weakness and numbness.   All other systems reviewed and are negative.          Objective       ED Triage Vitals   Temperature Pulse Blood Pressure Respirations SpO2 Patient Position - Orthostatic VS   07/17/25 1231 07/17/25 1231 07/17/25 1231 07/17/25 1231 07/17/25 1231 07/17/25 1231   98.9 °F (37.2 °C) 104 145/82 20 98 % Sitting      Temp Source Heart Rate Source BP Location FiO2 (%) Pain Score    07/17/25 1231 -- 07/17/25 1231 -- 07/17/25 1339    Oral  Left arm  10 - Worst Possible Pain      Vitals      Date and Time Temp Pulse SpO2 Resp BP Pain Score FACES Pain Rating User   07/17/25 1339 -- -- -- -- -- 10 - Worst Possible Pain -- MB   07/17/25 1231 98.9 °F (37.2 °C) 104 98 % 20 145/82 -- -- JN            Physical Exam  Vitals and nursing note reviewed.   Constitutional:       General: She is not in acute distress.     Appearance: Normal appearance. She is not ill-appearing or toxic-appearing.   HENT:      Head: Normocephalic and atraumatic.   Neck:        Comments: Full flexion, decreased neck extension. No bony tenderness. Tenderness only over mass.   Cardiovascular:      Rate and Rhythm: Normal rate and regular rhythm.   Pulmonary:      Effort: Pulmonary effort is normal. No respiratory distress.     Musculoskeletal:      Cervical back: No edema or " erythema.     Skin:     General: Skin is warm and dry.      Findings: No abscess, erythema or rash.     Neurological:      Mental Status: She is alert.      Sensory: No sensory deficit.      Motor: No weakness.      Gait: Gait normal.         Results Reviewed       Procedure Component Value Units Date/Time    POCT pregnancy, urine [[id number]]  (Normal) Collected: 07/17/25 1336    Lab Status: Final result Updated: 07/17/25 1336     EXT Preg Test, Ur Negative     Control Valid            No orders to display       Procedures    ED Medication and Procedure Management   Prior to Admission Medications   Prescriptions Last Dose Informant Patient Reported? Taking?   Insulin Pen Needle (BD Pen Needle Matilda 2nd Gen) 32G X 4 MM MISC   No No   Sig: For use with insulin pen. Pharmacy may dispense brand covered by insurance.   acetaminophen (TYLENOL) 500 mg tablet   No No   Sig: Take 1 tablet (500 mg total) by mouth every 6 (six) hours as needed for mild pain   acetaminophen (TYLENOL) 500 mg tablet   No No   Sig: Take 2 tablets (1,000 mg total) by mouth every 6 (six) hours as needed for mild pain   albuterol (2.5 mg/3 mL) 0.083 % nebulizer solution   No No   Sig: Take 3 mL (2.5 mg total) by nebulization every 6 (six) hours as needed for wheezing or shortness of breath   albuterol (2.5 mg/3 mL) 0.083 % nebulizer solution   No No   Sig: Take 3 mL (2.5 mg total) by nebulization every 6 (six) hours as needed for wheezing or shortness of breath   albuterol (PROVENTIL HFA,VENTOLIN HFA) 90 mcg/act inhaler   No No   Sig: Inhale 2 puffs every 6 (six) hours as needed for wheezing or shortness of breath   amLODIPine (NORVASC) 10 mg tablet   No No   Sig: Take 1 tablet (10 mg total) by mouth daily   atorvastatin (LIPITOR) 20 mg tablet   Yes No   Sig: Take 20 mg by mouth daily   cetirizine (ZyrTEC) 10 mg tablet   Yes No   Sig: Take 10 mg by mouth daily   erythromycin (ILOTYCIN) ophthalmic ointment   No No   Sig: Place a 1/2 inch ribbon of  ointment into the left lower eyelid.   glipiZIDE-metFORMIN (METAGLIP) 2.5-500 MG per tablet   Yes No   Sig: Take 2 tablets by mouth 2 (two) times a day   ibuprofen (MOTRIN) 600 mg tablet   No No   Sig: Take 1 tablet (600 mg total) by mouth every 6 (six) hours as needed for moderate pain   ibuprofen (MOTRIN) 800 mg tablet   No No   Sig: Take 1 tablet (800 mg total) by mouth 3 (three) times a day With food   insulin glargine (LANTUS) 100 units/mL subcutaneous injection   No No   Sig: Inject 40 Units under the skin daily   insulin lispro (HumaLOG KwikPen) 100 units/mL injection pen   No No   Sig: Inject 12 Units under the skin 3 (three) times a day with meals   ipratropium-albuterol (DUO-NEB) 0.5-2.5 mg/3 mL nebulizer solution   Yes No   Sig: INHALE THE CONTENTS OF 1 VIAL VIA NEBULIZER 4 (FOUR) TIMES A DAY AS NEEDED FOR WHEEZING.   ipratropium-albuterol (DUO-NEB) 0.5-2.5 mg/3 mL nebulizer solution   No No   Sig: Take 3 mL by nebulization 4 (four) times a day   metroNIDAZOLE (METROGEL) 0.75 % gel   No No   Sig: Apply topically 2 (two) times a day for 7 days   naproxen (Naprosyn) 500 mg tablet   No No   Sig: Take 1 tablet (500 mg total) by mouth 2 (two) times a day with meals   ondansetron (ZOFRAN-ODT) 4 mg disintegrating tablet   No No   Sig: Take 1 tablet (4 mg total) by mouth every 8 (eight) hours as needed for nausea for up to 10 days      Facility-Administered Medications: None     Discharge Medication List as of 7/17/2025  2:02 PM        START taking these medications    Details   naproxen (EC NAPROSYN) 500 MG EC tablet Take 1 tablet (500 mg total) by mouth 2 (two) times a day with meals, Starting Thu 7/17/2025, Until Fri 7/17/2026, Normal      oxyCODONE (Roxicodone) 5 immediate release tablet Take 1 tablet (5 mg total) by mouth every 6 (six) hours as needed for severe pain for up to 3 days Max Daily Amount: 20 mg, Starting Thu 7/17/2025, Until Sun 7/20/2025 at 2359, Normal           CONTINUE these medications  which have NOT CHANGED    Details   acetaminophen (TYLENOL) 500 mg tablet Take 2 tablets (1,000 mg total) by mouth every 6 (six) hours as needed for mild pain, Starting Sun 6/29/2025, Normal      !! albuterol (2.5 mg/3 mL) 0.083 % nebulizer solution Take 3 mL (2.5 mg total) by nebulization every 6 (six) hours as needed for wheezing or shortness of breath, Starting Mon 10/11/2021, Normal      !! albuterol (2.5 mg/3 mL) 0.083 % nebulizer solution Take 3 mL (2.5 mg total) by nebulization every 6 (six) hours as needed for wheezing or shortness of breath, Starting Tue 5/27/2025, Normal      albuterol (PROVENTIL HFA,VENTOLIN HFA) 90 mcg/act inhaler Inhale 2 puffs every 6 (six) hours as needed for wheezing or shortness of breath, Starting Mon 10/11/2021, Normal      amLODIPine (NORVASC) 10 mg tablet Take 1 tablet (10 mg total) by mouth daily, Starting Mon 10/11/2021, Normal      atorvastatin (LIPITOR) 20 mg tablet Take 20 mg by mouth daily, Starting Wed 8/3/2022, Historical Med      cetirizine (ZyrTEC) 10 mg tablet Take 10 mg by mouth daily, Starting Fri 5/13/2022, Until Sat 5/13/2023, Historical Med      erythromycin (ILOTYCIN) ophthalmic ointment Place a 1/2 inch ribbon of ointment into the left lower eyelid., Normal      glipiZIDE-metFORMIN (METAGLIP) 2.5-500 MG per tablet Take 2 tablets by mouth 2 (two) times a day, Starting Fri 9/23/2022, Until Tue 4/9/2024, Historical Med      insulin glargine (LANTUS) 100 units/mL subcutaneous injection Inject 40 Units under the skin daily, Starting Thu 4/18/2024, Normal      insulin lispro (HumaLOG KwikPen) 100 units/mL injection pen Inject 12 Units under the skin 3 (three) times a day with meals, Starting Thu 4/18/2024, Normal      Insulin Pen Needle (BD Pen Needle Matilda 2nd Gen) 32G X 4 MM MISC For use with insulin pen. Pharmacy may dispense brand covered by insurance., Normal      !! ipratropium-albuterol (DUO-NEB) 0.5-2.5 mg/3 mL nebulizer solution INHALE THE CONTENTS OF 1 VIAL  VIA NEBULIZER 4 (FOUR) TIMES A DAY AS NEEDED FOR WHEEZING., Historical Med      !! ipratropium-albuterol (DUO-NEB) 0.5-2.5 mg/3 mL nebulizer solution Take 3 mL by nebulization 4 (four) times a day, Starting Tue 5/27/2025, Normal      metroNIDAZOLE (METROGEL) 0.75 % gel Apply topically 2 (two) times a day for 7 days, Starting Thu 1/9/2025, Until Thu 1/16/2025, Normal      ondansetron (ZOFRAN-ODT) 4 mg disintegrating tablet Take 1 tablet (4 mg total) by mouth every 8 (eight) hours as needed for nausea for up to 10 days, Starting u 1/9/2025, Until Sun 1/19/2025 at 2359, Normal       !! - Potential duplicate medications found. Please discuss with provider.        STOP taking these medications       ibuprofen (MOTRIN) 600 mg tablet Comments:   Reason for Stopping:         ibuprofen (MOTRIN) 800 mg tablet Comments:   Reason for Stopping:         naproxen (Naprosyn) 500 mg tablet Comments:   Reason for Stopping:               ED SEPSIS DOCUMENTATION   Time reflects when diagnosis was documented in both MDM as applicable and the Disposition within this note       Time User Action Codes Description Comment    7/17/2025  1:16 PM Laine Chang Add [L72.9] Cyst of skin     7/17/2025  1:16 PM Laine Chang Modify [L72.9] Cyst of skin posterior neck                      [1]   Past Medical History:  Diagnosis Date    Asthma     Dental abscess     Diabetes mellitus (HCC)     High cholesterol     Hypertension    [2]   Past Surgical History:  Procedure Laterality Date    BREAST CYST INCISION AND DRAINAGE Right 4/12/2024    Procedure: INCISION AND DRAINAGE (I&D) BREAST;  Surgeon: Danny Seymour MD;  Location:  MAIN OR;  Service: General    CHOLECYSTECTOMY      ECTOPIC PREGNANCY SURGERY      INCISION AND DRAINAGE INTRA ORAL ABSCESS Left 3/21/2016    Procedure: INCISION AND DRAINAGE  (I&D) WOUND ORAL Left  Space;  Surgeon: Henok Parrish MD;  Location:  MAIN OR;  Service:     MULTIPLE TOOTH EXTRACTIONS N/A 3/21/2016     Procedure: EXTRACTION TEETH MULTIPLE; 17,18,19,11,12,4,5,28,32;  Surgeon: Henok Parrish MD;  Location: [facility];  Service:     SALPINGECTOMY     [3]   Family History  Problem Relation Name Age of Onset    Diabetes Mother      Heart failure Mother      Cancer Neg Hx     [4]   Social History  Tobacco Use    Smoking status: Every Day     Current packs/day: 0.50     Average packs/day: 0.5 packs/day for 10.0 years (5.0 ttl pk-yrs)     Types: Cigarettes    Smokeless tobacco: Never    Tobacco comments:     PT was educated on smoking and the effect is has on her asthma.   Vaping Use    Vaping status: Never Used   Substance Use Topics    Alcohol use: Yes     Comment: Social    Drug use: No        Laine Chang PA-C  07/17/25 2015     Odomzo Pregnancy And Lactation Text: This medication is Pregnancy Category X and is absolutely contraindicated during pregnancy. It is unknown if it is excreted in breast milk. Minoxidil Pregnancy And Lactation Text: This medication has not been assigned a Pregnancy Risk Category but animal studies failed to show danger with the topical medication. It is unknown if the medication is excreted in breast milk. Azithromycin Counseling:  I discussed with the patient the risks of azithromycin including but not limited to GI upset, allergic reaction, drug rash, diarrhea, and yeast infections. Dupixent Pregnancy And Lactation Text: This medication likely crosses the placenta but the risk for the fetus is uncertain. This medication is excreted in breast milk. Simponi Pregnancy And Lactation Text: The risk during pregnancy and breastfeeding is uncertain with this medication. Cimetidine Pregnancy And Lactation Text: This medication is Pregnancy Category B and is considered safe during pregnancy. It is also excreted in breast milk and breast feeding isn't recommended. Cyclophosphamide Counseling:  I discussed with the patient the risks of cyclophosphamide including but not limited to hair loss, hormonal abnormalities, decreased fertility, abdominal pain, diarrhea, nausea and vomiting, bone marrow suppression and infection. The patient understands that monitoring is required while taking this medication. Azithromycin Pregnancy And Lactation Text: This medication is considered safe during pregnancy and is also secreted in breast milk. Picato Counseling:  I discussed with the patient the risks of Picato including but not limited to erythema, scaling, itching, weeping, crusting, and pain. Rifampin Pregnancy And Lactation Text: This medication is Pregnancy Category C and it isn't know if it is safe during pregnancy. It is also excreted in breast milk and should not be used if you are breast feeding. Otezla Counseling: Parag Florence Counseling: The side effects of Parag Florence were discussed with the patient, including but not limited to worsening or new depression, weight loss, diarrhea, nausea, upper respiratory tract infection, and headache. Patient instructed to call the office should any adverse effect occur. The patient verbalized understanding of the proper use and possible adverse effects of Otezla. All the patient's questions and concerns were addressed. Benzoyl Peroxide Counseling: Patient counseled that medicine may cause skin irritation and bleach clothing. In the event of skin irritation, the patient was advised to reduce the amount of the drug applied or use it less frequently. The patient verbalized understanding of the proper use and possible adverse effects of benzoyl peroxide. All of the patient's questions and concerns were addressed. Enbrel Counseling:  I discussed with the patient the risks of etanercept including but not limited to myelosuppression, immunosuppression, autoimmune hepatitis, demyelinating diseases, lymphoma, and infections. The patient understands that monitoring is required including a PPD at baseline and must alert us or the primary physician if symptoms of infection or other concerning signs are noted. Cyclophosphamide Pregnancy And Lactation Text: This medication is Pregnancy Category D and it isn't considered safe during pregnancy. This medication is excreted in breast milk. Clofazimine Counseling:  I discussed with the patient the risks of clofazimine including but not limited to skin and eye pigmentation, liver damage, nausea/vomiting, gastrointestinal bleeding and allergy. Doxepin Counseling:  Patient advised that the medication is sedating and not to drive a car after taking this medication. Patient informed of potential adverse effects including but not limited to dry mouth, urinary retention, and blurry vision. The patient verbalized understanding of the proper use and possible adverse effects of doxepin. All of the patient's questions and concerns were addressed. Picato Pregnancy And Lactation Text: This medication is Pregnancy Category C. It is unknown if this medication is excreted in breast milk. Skyrizi Counseling: I discussed with the patient the risks of risankizumab-rzaa including but not limited to immunosuppression, and serious infections. The patient understands that monitoring is required including a PPD at baseline and must alert us or the primary physician if symptoms of infection or other concerning signs are noted. Benzoyl Peroxide Pregnancy And Lactation Text: This medication is Pregnancy Category C. It is unknown if benzoyl peroxide is excreted in breast milk. Sarecycline Counseling: Patient advised regarding possible photosensitivity and discoloration of the teeth, skin, lips, tongue and gums. Patient instructed to avoid sunlight, if possible. When exposed to sunlight, patients should wear protective clothing, sunglasses, and sunscreen. The patient was instructed to call the office immediately if the following severe adverse effects occur:  hearing changes, easy bruising/bleeding, severe headache, or vision changes. The patient verbalized understanding of the proper use and possible adverse effects of sarecycline. All of the patient's questions and concerns were addressed. Otezla Pregnancy And Lactation Text: This medication is Pregnancy Category C and it isn't known if it is safe during pregnancy. It is unknown if it is excreted in breast milk. Enbrel Pregnancy And Lactation Text: This medication is Pregnancy Category B and is considered safe during pregnancy. It is unknown if this medication is excreted in breast milk. Clofazimine Pregnancy And Lactation Text: This medication is Pregnancy Category C and isn't considered safe during pregnancy. It is excreted in breast milk. Bactrim Counseling:  I discussed with the patient the risks of sulfa antibiotics including but not limited to GI upset, allergic reaction, drug rash, diarrhea, dizziness, photosensitivity, and yeast infections. Rarely, more serious reactions can occur including but not limited to aplastic anemia, agranulocytosis, methemoglobinemia, blood dyscrasias, liver or kidney failure, lung infiltrates or desquamative/blistering drug rashes. Cyclosporine Counseling:  I discussed with the patient the risks of cyclosporine including but not limited to hypertension, gingival hyperplasia,myelosuppression, immunosuppression, liver damage, kidney damage, neurotoxicity, lymphoma, and serious infections. The patient understands that monitoring is required including baseline blood pressure, CBC, CMP, lipid panel and uric acid, and then 1-2 times monthly CMP and blood pressure. Protopic Counseling: Patient may experience a mild burning sensation during topical application. Protopic is not approved in children less than 3years of age. There have been case reports of hematologic and skin malignancies in patients using topical calcineurin inhibitors although causality is questionable. Doxepin Pregnancy And Lactation Text: This medication is Pregnancy Category C and it isn't known if it is safe during pregnancy. It is also excreted in breast milk and breast feeding isn't recommended. Oxybutynin Counseling:  I discussed with the patient the risks of oxybutynin including but not limited to skin rash, drowsiness, dry mouth, difficulty urinating, and blurred vision. Sarecycline Pregnancy And Lactation Text: This medication is Pregnancy Category D and not consider safe during pregnancy. It is also excreted in breast milk. Carac Counseling:  I discussed with the patient the risks of Carac including but not limited to erythema, scaling, itching, weeping, crusting, and pain. Humira Counseling:  I discussed with the patient the risks of adalimumab including but not limited to myelosuppression, immunosuppression, autoimmune hepatitis, demyelinating diseases, lymphoma, and serious infections. The patient understands that monitoring is required including a PPD at baseline and must alert us or the primary physician if symptoms of infection or other concerning signs are noted. Cyclosporine Pregnancy And Lactation Text: This medication is Pregnancy Category C and it isn't know if it is safe during pregnancy. This medication is excreted in breast milk. Bactrim Pregnancy And Lactation Text: This medication is Pregnancy Category D and is known to cause fetal risk. It is also excreted in breast milk. Colchicine Counseling:  Patient counseled regarding adverse effects including but not limited to stomach upset (nausea, vomiting, stomach pain, or diarrhea). Patient instructed to limit alcohol consumption while taking this medication. Colchicine may reduce blood counts especially with prolonged use. The patient understands that monitoring of kidney function and blood counts may be required, especially at baseline. The patient verbalized understanding of the proper use and possible adverse effects of colchicine. All of the patient's questions and concerns were addressed. Hydroxyzine Counseling: Patient advised that the medication is sedating and not to drive a car after taking this medication. Patient informed of potential adverse effects including but not limited to dry mouth, urinary retention, and blurry vision. The patient verbalized understanding of the proper use and possible adverse effects of hydroxyzine. All of the patient's questions and concerns were addressed. Protopic Pregnancy And Lactation Text: This medication is Pregnancy Category C. It is unknown if this medication is excreted in breast milk when applied topically. Stelara Counseling:  I discussed with the patient the risks of ustekinumab including but not limited to immunosuppression, malignancy, posterior leukoencephalopathy syndrome, and serious infections. The patient understands that monitoring is required including a PPD at baseline and must alert us or the primary physician if symptoms of infection or other concerning signs are noted. Tetracycline Counseling: Patient counseled regarding possible photosensitivity and increased risk for sunburn. Patient instructed to avoid sunlight, if possible. When exposed to sunlight, patients should wear protective clothing, sunglasses, and sunscreen. The patient was instructed to call the office immediately if the following severe adverse effects occur:  hearing changes, easy bruising/bleeding, severe headache, or vision changes. The patient verbalized understanding of the proper use and possible adverse effects of tetracycline. All of the patient's questions and concerns were addressed. Patient understands to avoid pregnancy while on therapy due to potential birth defects. Oxybutynin Pregnancy And Lactation Text: This medication is Pregnancy Category B and is considered safe during pregnancy. It is unknown if it is excreted in breast milk. Carac Pregnancy And Lactation Text: This medication is Pregnancy Category X and contraindicated in pregnancy and in women who may become pregnant. It is unknown if this medication is excreted in breast milk. Cephalexin Counseling: I counseled the patient regarding use of cephalexin as an antibiotic for prophylactic and/or therapeutic purposes. Cephalexin (commonly prescribed under brand name Keflex) is a cephalosporin antibiotic which is active against numerous classes of bacteria, including most skin bacteria. Side effects may include nausea, diarrhea, gastrointestinal upset, rash, hives, yeast infections, and in rare cases, hepatitis, kidney disease, seizures, fever, confusion, neurologic symptoms, and others. Patients with severe allergies to penicillin medications are cautioned that there is about a 10% incidence of cross-reactivity with cephalosporins. When possible, patients with penicillin allergies should use alternatives to cephalosporins for antibiotic therapy. Methotrexate Counseling:  Patient counseled regarding adverse effects of methotrexate including but not limited to nausea, vomiting, abnormalities in liver function tests. Patients may develop mouth sores, rash, diarrhea, and abnormalities in blood counts. The patient understands that monitoring is required including LFT's and blood counts. There is a rare possibility of scarring of the liver and lung problems that can occur when taking methotrexate. Persistent nausea, loss of appetite, pale stools, dark urine, cough, and shortness of breath should be reported immediately. Patient advised to discontinue methotrexate treatment at least three months before attempting to become pregnant. I discussed the need for folate supplements while taking methotrexate. These supplements can decrease side effects during methotrexate treatment. The patient verbalized understanding of the proper use and possible adverse effects of methotrexate. All of the patient's questions and concerns were addressed. Solaraze Counseling:  I discussed with the patient the risks of Solaraze including but not limited to erythema, scaling, itching, weeping, crusting, and pain. Hydroxyzine Pregnancy And Lactation Text: This medication is not safe during pregnancy and should not be taken. It is also excreted in breast milk and breast feeding isn't recommended. 5-Fu Counseling: 5-Fluorouracil Counseling:  I discussed with the patient the risks of 5-fluorouracil including but not limited to erythema, scaling, itching, weeping, crusting, and pain. Birth Control Pills Counseling: Birth Control Pill Counseling: I discussed with the patient the potential side effects of OCPs including but not limited to increased risk of stroke, heart attack, thrombophlebitis, deep venous thrombosis, hepatic adenomas, breast changes, GI upset, headaches, and depression. The patient verbalized understanding of the proper use and possible adverse effects of OCPs. All of the patient's questions and concerns were addressed. Ilumya Counseling: I discussed with the patient the risks of tildrakizumab including but not limited to immunosuppression, malignancy, posterior leukoencephalopathy syndrome, and serious infections. The patient understands that monitoring is required including a PPD at baseline and must alert us or the primary physician if symptoms of infection or other concerning signs are noted. Dapsone Counseling: I discussed with the patient the risks of dapsone including but not limited to hemolytic anemia, agranulocytosis, rashes, methemoglobinemia, kidney failure, peripheral neuropathy, headaches, GI upset, and liver toxicity. Patients who start dapsone require monitoring including baseline LFTs and weekly CBCs for the first month, then every month thereafter. The patient verbalized understanding of the proper use and possible adverse effects of dapsone. All of the patient's questions and concerns were addressed. Cephalexin Pregnancy And Lactation Text: This medication is Pregnancy Category B and considered safe during pregnancy. It is also excreted in breast milk but can be used safely for shorter doses. Methotrexate Pregnancy And Lactation Text: This medication is Pregnancy Category X and is known to cause fetal harm. This medication is excreted in breast milk. Solaraze Pregnancy And Lactation Text: This medication is Pregnancy Category B and is considered safe. There is some data to suggest avoiding during the third trimester. It is unknown if this medication is excreted in breast milk. Detail Level: Detailed Birth Control Pills Pregnancy And Lactation Text: This medication should be avoided if pregnant and for the first 30 days post-partum. Taltz Counseling: I discussed with the patient the risks of ixekizumab including but not limited to immunosuppression, serious infections, worsening of inflammatory bowel disease and drug reactions. The patient understands that monitoring is required including a PPD at baseline and must alert us or the primary physician if symptoms of infection or other concerning signs are noted. Dapsone Pregnancy And Lactation Text: This medication is Pregnancy Category C and is not considered safe during pregnancy or breast feeding. Prednisone Counseling:  I discussed with the patient the risks of prolonged use of prednisone including but not limited to weight gain, insomnia, osteoporosis, mood changes, diabetes, susceptibility to infection, glaucoma and high blood pressure. In cases where prednisone use is prolonged, patients should be monitored with blood pressure checks, serum glucose levels and an eye exam.  Additionally, the patient may need to be placed on GI prophylaxis, PCP prophylaxis, and calcium and vitamin D supplementation and/or a bisphosphonate. The patient verbalized understanding of the proper use and the possible adverse effects of prednisone. All of the patient's questions and concerns were addressed. Clindamycin Counseling: I counseled the patient regarding use of clindamycin as an antibiotic for prophylactic and/or therapeutic purposes. Clindamycin is active against numerous classes of bacteria, including skin bacteria. Side effects may include nausea, diarrhea, gastrointestinal upset, rash, hives, yeast infections, and in rare cases, colitis. Albendazole Counseling:  I discussed with the patient the risks of albendazole including but not limited to cytopenia, kidney damage, nausea/vomiting and severe allergy. The patient understands that this medication is being used in an off-label manner. Topical Retinoid counseling:  Patient advised to apply a pea-sized amount only at bedtime and wait 30 minutes after washing their face before applying. If too drying, patient may add a non-comedogenic moisturizer. The patient verbalized understanding of the proper use and possible adverse effects of retinoids. All of the patient's questions and concerns were addressed. Fluconazole Counseling:  Patient counseled regarding adverse effects of fluconazole including but not limited to headache, diarrhea, nausea, upset stomach, liver function test abnormalities, taste disturbance, and stomach pain. There is a rare possibility of liver failure that can occur when taking fluconazole. The patient understands that monitoring of LFTs and kidney function test may be required, especially at baseline. The patient verbalized understanding of the proper use and possible adverse effects of fluconazole. All of the patient's questions and concerns were addressed. Drysol Counseling:  I discussed with the patient the risks of drysol/aluminum chloride including but not limited to skin rash, itching, irritation, burning. Spironolactone Counseling: Patient advised regarding risks of diarrhea, abdominal pain, hyperkalemia, birth defects (for female patients), liver toxicity and renal toxicity. The patient may need blood work to monitor liver and kidney function and potassium levels while on therapy. The patient verbalized understanding of the proper use and possible adverse effects of spironolactone. All of the patient's questions and concerns were addressed. Clindamycin Pregnancy And Lactation Text: This medication can be used in pregnancy if certain situations. Clindamycin is also present in breast milk. Erivedge Counseling- I discussed with the patient the risks of Erivedge including but not limited to nausea, vomiting, diarrhea, constipation, weight loss, changes in the sense of taste, decreased appetite, muscle spasms, and hair loss. The patient verbalized understanding of the proper use and possible adverse effects of Erivedge. All of the patient's questions and concerns were addressed. Tremfya Counseling: I discussed with the patient the risks of guselkumab including but not limited to immunosuppression, serious infections, and drug reactions. The patient understands that monitoring is required including a PPD at baseline and must alert us or the primary physician if symptoms of infection or other concerning signs are noted. Drysol Pregnancy And Lactation Text: This medication is considered safe during pregnancy and breast feeding. Fluconazole Pregnancy And Lactation Text: This medication is Pregnancy Category C and it isn't know if it is safe during pregnancy. It is also excreted in breast milk. Albendazole Pregnancy And Lactation Text: This medication is Pregnancy Category C and it isn't known if it is safe during pregnancy. It is also excreted in breast milk. Spironolactone Pregnancy And Lactation Text: This medication can cause feminization of the male fetus and should be avoided during pregnancy. The active metabolite is also found in breast milk. Doxycycline Counseling:  Patient counseled regarding possible photosensitivity and increased risk for sunburn. Patient instructed to avoid sunlight, if possible. When exposed to sunlight, patients should wear protective clothing, sunglasses, and sunscreen. The patient was instructed to call the office immediately if the following severe adverse effects occur:  hearing changes, easy bruising/bleeding, severe headache, or vision changes. The patient verbalized understanding of the proper use and possible adverse effects of doxycycline. All of the patient's questions and concerns were addressed. Ivermectin Counseling:  Patient instructed to take medication on an empty stomach with a full glass of water. Patient informed of potential adverse effects including but not limited to nausea, diarrhea, dizziness, itching, and swelling of the extremities or lymph nodes. The patient verbalized understanding of the proper use and possible adverse effects of ivermectin. All of the patient's questions and concerns were addressed. Doxycycline Pregnancy And Lactation Text: This medication is Pregnancy Category D and not consider safe during pregnancy. It is also excreted in breast milk but is considered safe for shorter treatment courses. Tazorac Counseling:  Patient advised that medication is irritating and drying. Patient may need to apply sparingly and wash off after an hour before eventually leaving it on overnight. The patient verbalized understanding of the proper use and possible adverse effects of tazorac. All of the patient's questions and concerns were addressed. Elidel Counseling: Patient may experience a mild burning sensation during topical application. Elidel is not approved in children less than 3years of age. There have been case reports of hematologic and skin malignancies in patients using topical calcineurin inhibitors although causality is questionable. Griseofulvin Counseling:  I discussed with the patient the risks of griseofulvin including but not limited to photosensitivity, cytopenia, liver damage, nausea/vomiting and severe allergy. The patient understands that this medication is best absorbed when taken with a fatty meal (e.g., ice cream or french fries). SSKI Counseling:  I discussed with the patient the risks of SSKI including but not limited to thyroid abnormalities, metallic taste, GI upset, fever, headache, acne, arthralgias, paraesthesias, lymphadenopathy, easy bleeding, arrhythmias, and allergic reaction. Include Pregnancy/Lactation Warning?: No Acitretin Counseling:  I discussed with the patient the risks of acitretin including but not limited to hair loss, dry lips/skin/eyes, liver damage, hyperlipidemia, depression/suicidal ideation, photosensitivity. Serious rare side effects can include but are not limited to pancreatitis, pseudotumor cerebri, bony changes, clot formation/stroke/heart attack. Patient understands that alcohol is contraindicated since it can result in liver toxicity and significantly prolong the elimination of the drug by many years. Gabapentin Counseling: I discussed with the patient the risks of gabapentin including but not limited to dizziness, somnolence, fatigue and ataxia. Xeljanz Counseling: Sarah Na Counseling: I discussed with the patient the risks of Sarah Na therapy including increased risk of infection, liver issues, headache, diarrhea, or cold symptoms. Live vaccines should be avoided. They were instructed to call if they have any problems. Tazorac Pregnancy And Lactation Text: This medication is not safe during pregnancy. It is unknown if this medication is excreted in breast milk. Sski Pregnancy And Lactation Text: This medication is Pregnancy Category D and isn't considered safe during pregnancy. It is excreted in breast milk. Griseofulvin Pregnancy And Lactation Text: This medication is Pregnancy Category X and is known to cause serious birth defects. It is unknown if this medication is excreted in breast milk but breast feeding should be avoided. Erythromycin Counseling:  I discussed with the patient the risks of erythromycin including but not limited to GI upset, allergic reaction, drug rash, diarrhea, increase in liver enzymes, and yeast infections. Acitretin Pregnancy And Lactation Text: This medication is Pregnancy Category X and should not be given to women who are pregnant or may become pregnant in the future. This medication is excreted in breast milk. Xelpatitoz Pregnancy And Lactation Text: This medication is Pregnancy Category D and is not considered safe during pregnancy. The risk during breast feeding is also uncertain. Thalidomide Counseling: I discussed with the patient the risks of thalidomide including but not limited to birth defects, anxiety, weakness, chest pain, dizziness, cough and severe allergy. Topical Clindamycin Counseling: Patient counseled that this medication may cause skin irritation or allergic reactions. In the event of skin irritation, the patient was advised to reduce the amount of the drug applied or use it less frequently. The patient verbalized understanding of the proper use and possible adverse effects of clindamycin. All of the patient's questions and concerns were addressed. Itraconazole Counseling:  I discussed with the patient the risks of itraconazole including but not limited to liver damage, nausea/vomiting, neuropathy, and severe allergy. The patient understands that this medication is best absorbed when taken with acidic beverages such as non-diet cola or ginger ale. The patient understands that monitoring is required including baseline LFTs and repeat LFTs at intervals. The patient understands that they are to contact us or the primary physician if concerning signs are noted. Eucrisa Counseling: Patient may experience a mild burning sensation during topical application. Adelbert Coma is not approved in children less than 3years of age. Erythromycin Pregnancy And Lactation Text: This medication is Pregnancy Category B and is considered safe during pregnancy. It is also excreted in breast milk. Glycopyrrolate Counseling:  I discussed with the patient the risks of glycopyrrolate including but not limited to skin rash, drowsiness, dry mouth, difficulty urinating, and blurred vision. Bexarotene Counseling:  I discussed with the patient the risks of bexarotene including but not limited to hair loss, dry lips/skin/eyes, liver abnormalities, hyperlipidemia, pancreatitis, depression/suicidal ideation, photosensitivity, drug rash/allergic reactions, hypothyroidism, anemia, leukopenia, infection, cataracts, and teratogenicity. Patient understands that they will need regular blood tests to check lipid profile, liver function tests, white blood cell count, thyroid function tests and pregnancy test if applicable. Xolair Counseling:  Patient informed of potential adverse effects including but not limited to fever, muscle aches, rash and allergic reactions. The patient verbalized understanding of the proper use and possible adverse effects of Xolair. All of the patient's questions and concerns were addressed. Metronidazole Counseling:  I discussed with the patient the risks of metronidazole including but not limited to seizures, nausea/vomiting, a metallic taste in the mouth, nausea/vomiting and severe allergy. Infliximab Counseling:  I discussed with the patient the risks of infliximab including but not limited to myelosuppression, immunosuppression, autoimmune hepatitis, demyelinating diseases, lymphoma, and serious infections. The patient understands that monitoring is required including a PPD at baseline and must alert us or the primary physician if symptoms of infection or other concerning signs are noted. Bexarotene Pregnancy And Lactation Text: This medication is Pregnancy Category X and should not be given to women who are pregnant or may become pregnant. This medication should not be used if you are breast feeding. Glycopyrrolate Pregnancy And Lactation Text: This medication is Pregnancy Category B and is considered safe during pregnancy. It is unknown if it is excreted breast milk. Topical Sulfur Applications Counseling: Topical Sulfur Counseling: Patient counseled that this medication may cause skin irritation or allergic reactions. In the event of skin irritation, the patient was advised to reduce the amount of the drug applied or use it less frequently. The patient verbalized understanding of the proper use and possible adverse effects of topical sulfur application. All of the patient's questions and concerns were addressed. Hydroquinone Counseling:  Patient advised that medication may result in skin irritation, lightening (hypopigmentation), dryness, and burning. In the event of skin irritation, the patient was advised to reduce the amount of the drug applied or use it less frequently. Rarely, spots that are treated with hydroquinone can become darker (pseudoochronosis). Should this occur, patient instructed to stop medication and call the office. The patient verbalized understanding of the proper use and possible adverse effects of hydroquinone. All of the patient's questions and concerns were addressed. Xolair Pregnancy And Lactation Text: This medication is Pregnancy Category B and is considered safe during pregnancy. This medication is excreted in breast milk. Ketoconazole Counseling:   Patient counseled regarding improving absorption with orange juice. Adverse effects include but are not limited to breast enlargement, headache, diarrhea, nausea, upset stomach, liver function test abnormalities, taste disturbance, and stomach pain. There is a rare possibility of liver failure that can occur when taking ketoconazole. The patient understands that monitoring of LFTs may be required, especially at baseline. The patient verbalized understanding of the proper use and possible adverse effects of ketoconazole. All of the patient's questions and concerns were addressed. Valtrex Counseling: I discussed with the patient the risks of valacyclovir including but not limited to kidney damage, nausea, vomiting and severe allergy. The patient understands that if the infection seems to be worsening or is not improving, they are to call. Hydroxychloroquine Counseling:  I discussed with the patient that a baseline ophthalmologic exam is needed at the start of therapy and every year thereafter while on therapy. A CBC may also be warranted for monitoring. The side effects of this medication were discussed with the patient, including but not limited to agranulocytosis, aplastic anemia, seizures, rashes, retinopathy, and liver toxicity. Patient instructed to call the office should any adverse effect occur. The patient verbalized understanding of the proper use and possible adverse effects of Plaquenil. All the patient's questions and concerns were addressed. Metronidazole Pregnancy And Lactation Text: This medication is Pregnancy Category B and considered safe during pregnancy. It is also excreted in breast milk. Isotretinoin Counseling: Patient should get monthly blood tests, not donate blood, not drive at night if vision affected, not share medication, and not undergo elective surgery for 6 months after tx completed. Side effects reviewed, pt to contact office should one occur. Topical Sulfur Applications Pregnancy And Lactation Text: This medication is Pregnancy Category C and has an unknown safety profile during pregnancy. It is unknown if this topical medication is excreted in breast milk. Cimzia Counseling:  I discussed with the patient the risks of Cimzia including but not limited to immunosuppression, allergic reactions and infections. The patient understands that monitoring is required including a PPD at baseline and must alert us or the primary physician if symptoms of infection or other concerning signs are noted. Valtrex Pregnancy And Lactation Text: this medication is Pregnancy Category B and is considered safe during pregnancy. This medication is not directly found in breast milk but it's metabolite acyclovir is present. Ketoconazole Pregnancy And Lactation Text: This medication is Pregnancy Category C and it isn't know if it is safe during pregnancy. It is also excreted in breast milk and breast feeding isn't recommended. Rituxan Counseling:  I discussed with the patient the risks of Rituxan infusions. Side effects can include infusion reactions, severe drug rashes including mucocutaneous reactions, reactivation of latent hepatitis and other infections and rarely progressive multifocal leukoencephalopathy. All of the patient's questions and concerns were addressed. Isotretinoin Pregnancy And Lactation Text: This medication is Pregnancy Category X and is considered extremely dangerous during pregnancy. It is unknown if it is excreted in breast milk. Minocycline Counseling: Patient advised regarding possible photosensitivity and discoloration of the teeth, skin, lips, tongue and gums. Patient instructed to avoid sunlight, if possible. When exposed to sunlight, patients should wear protective clothing, sunglasses, and sunscreen. The patient was instructed to call the office immediately if the following severe adverse effects occur:  hearing changes, easy bruising/bleeding, severe headache, or vision changes. The patient verbalized understanding of the proper use and possible adverse effects of minocycline. All of the patient's questions and concerns were addressed. Cimzia Pregnancy And Lactation Text: This medication crosses the placenta but can be considered safe in certain situations. Cimzia may be excreted in breast milk. Hydroxychloroquine Pregnancy And Lactation Text: This medication has been shown to cause fetal harm but it isn't assigned a Pregnancy Risk Category. There are small amounts excreted in breast milk. Azathioprine Counseling:  I discussed with the patient the risks of azathioprine including but not limited to myelosuppression, immunosuppression, hepatotoxicity, lymphoma, and infections. The patient understands that monitoring is required including baseline LFTs, Creatinine, possible TPMP genotyping and weekly CBCs for the first month and then every 2 weeks thereafter. The patient verbalized understanding of the proper use and possible adverse effects of azathioprine. All of the patient's questions and concerns were addressed. Zyclara Counseling:  I discussed with the patient the risks of imiquimod including but not limited to erythema, scaling, itching, weeping, crusting, and pain. Patient understands that the inflammatory response to imiquimod is variable from person to person and was educated regarded proper titration schedule. If flu-like symptoms develop, patient knows to discontinue the medication and contact us. Imiquimod Counseling:  I discussed with the patient the risks of imiquimod including but not limited to erythema, scaling, itching, weeping, crusting, and pain. Patient understands that the inflammatory response to imiquimod is variable from person to person and was educated regarded proper titration schedule. If flu-like symptoms develop, patient knows to discontinue the medication and contact us. Terbinafine Counseling: Patient counseling regarding adverse effects of terbinafine including but not limited to headache, diarrhea, rash, upset stomach, liver function test abnormalities, itching, taste/smell disturbance, nausea, abdominal pain, and flatulence. There is a rare possibility of liver failure that can occur when taking terbinafine. The patient understands that a baseline LFT and kidney function test may be required. The patient verbalized understanding of the proper use and possible adverse effects of terbinafine. All of the patient's questions and concerns were addressed. Rituxan Pregnancy And Lactation Text: This medication is Pregnancy Category C and it isn't know if it is safe during pregnancy. It is unknown if this medication is excreted in breast milk but similar antibodies are known to be excreted. High Dose Vitamin A Counseling: Side effects reviewed, pt to contact office should one occur. Nsaids Counseling: NSAID Counseling: I discussed with the patient that NSAIDs should be taken with food. Prolonged use of NSAIDs can result in the development of stomach ulcers. Patient advised to stop taking NSAIDs if abdominal pain occurs. The patient verbalized understanding of the proper use and possible adverse effects of NSAIDs. All of the patient's questions and concerns were addressed. Azathioprine Pregnancy And Lactation Text: This medication is Pregnancy Category D and isn't considered safe during pregnancy. It is unknown if this medication is excreted in breast milk. Cosentyx Counseling:  I discussed with the patient the risks of Cosentyx including but not limited to worsening of Crohn's disease, immunosuppression, allergic reactions and infections. The patient understands that monitoring is required including a PPD at baseline and must alert us or the primary physician if symptoms of infection or other concerning signs are noted. Siliq Counseling:  I discussed with the patient the risks of Siliq including but not limited to new or worsening depression, suicidal thoughts and behavior, immunosuppression, malignancy, posterior leukoencephalopathy syndrome, and serious infections. The patient understands that monitoring is required including a PPD at baseline and must alert us or the primary physician if symptoms of infection or other concerning signs are noted. There is also a special program designed to monitor depression which is required with Siliq. Quinolones Counseling:  I discussed with the patient the risks of fluoroquinolones including but not limited to GI upset, allergic reaction, drug rash, diarrhea, dizziness, photosensitivity, yeast infections, liver function test abnormalities, tendonitis/tendon rupture. High Dose Vitamin A Pregnancy And Lactation Text: High dose vitamin A therapy is contraindicated during pregnancy and breast feeding. Cellcept Counseling:  I discussed with the patient the risks of mycophenolate mofetil including but not limited to infection/immunosuppression, GI upset, hypokalemia, hypercholesterolemia, bone marrow suppression, lymphoproliferative disorders, malignancy, GI ulceration/bleed/perforation, colitis, interstitial lung disease, kidney failure, progressive multifocal leukoencephalopathy, and birth defects. The patient understands that monitoring is required including a baseline creatinine and regular CBC testing. In addition, patient must alert us immediately if symptoms of infection or other concerning signs are noted. Nsaids Pregnancy And Lactation Text: These medications are considered safe up to 30 weeks gestation. It is excreted in breast milk. Minoxidil Counseling: Minoxidil is a topical medication which can increase blood flow where it is applied. It is uncertain how this medication increases hair growth. Side effects are uncommon and include stinging and allergic reactions. Odomzo Counseling- I discussed with the patient the risks of Odomzo including but not limited to nausea, vomiting, diarrhea, constipation, weight loss, changes in the sense of taste, decreased appetite, muscle spasms, and hair loss. The patient verbalized understanding of the proper use and possible adverse effects of Odomzo. All of the patient's questions and concerns were addressed. Dupixent Counseling: I discussed with the patient the risks of dupilumab including but not limited to eye infection and irritation, cold sores, injection site reactions, worsening of asthma, allergic reactions and increased risk of parasitic infection. Live vaccines should be avoided while taking dupilumab. Dupilumab will also interact with certain medications such as warfarin and cyclosporine. The patient understands that monitoring is required and they must alert us or the primary physician if symptoms of infection or other concerning signs are noted. Arava Counseling:  Patient counseled regarding adverse effects of Arava including but not limited to nausea, vomiting, abnormalities in liver function tests. Patients may develop mouth sores, rash, diarrhea, and abnormalities in blood counts. The patient understands that monitoring is required including LFTs and blood counts. There is a rare possibility of scarring of the liver and lung problems that can occur when taking methotrexate. Persistent nausea, loss of appetite, pale stools, dark urine, cough, and shortness of breath should be reported immediately. Patient advised to discontinue Arava treatment and consult with a physician prior to attempting conception. The patient will have to undergo a treatment to eliminate Arava from the body prior to conception. Simponi Counseling:  I discussed with the patient the risks of golimumab including but not limited to myelosuppression, immunosuppression, autoimmune hepatitis, demyelinating diseases, lymphoma, and serious infections. The patient understands that monitoring is required including a PPD at baseline and must alert us or the primary physician if symptoms of infection or other concerning signs are noted. Rifampin Counseling: I discussed with the patient the risks of rifampin including but not limited to liver damage, kidney damage, red-orange body fluids, nausea/vomiting and severe allergy. Cimetidine Counseling:  I discussed with the patient the risks of Cimetidine including but not limited to gynecomastia, headache, diarrhea, nausea, drowsiness, arrhythmias, pancreatitis, skin rashes, psychosis, bone marrow suppression and kidney toxicity.

## 2025-07-23 ENCOUNTER — TELEPHONE (OUTPATIENT)
Age: 45
End: 2025-07-23

## 2025-07-23 NOTE — TELEPHONE ENCOUNTER
PT had an appt today at 4:30 and got stuck in traffic.  Pt called to let us know and states she was told to still come to the appt and we would see her if she got there before 5:00.  Pt got to the office at 5:00 and no one was there and the doors were locked.  I apologized and tried calling the office but they were closed.  I explained that we typically only wait 15 mins.  Pt states she was told it was ok and they would wait.  I apologized and offered to r/s her but she said she would call back when she has her schedule for next week

## (undated) DEVICE — SCD SEQUENTIAL COMPRESSION COMFORT SLEEVE MEDIUM KNEE LENGTH: Brand: KENDALL SCD

## (undated) DEVICE — PREMIUM DRY TRAY LF: Brand: MEDLINE INDUSTRIES, INC.

## (undated) DEVICE — PENROSE DRAIN, 18 X 3 8: Brand: CARDINAL HEALTH

## (undated) DEVICE — BASIC SINGLE BASIN-LF: Brand: MEDLINE INDUSTRIES, INC.

## (undated) DEVICE — CULTURE TUBE ANAEROBIC

## (undated) DEVICE — LIGHT GLOVE GREEN

## (undated) DEVICE — INTENDED FOR TISSUE SEPARATION, AND OTHER PROCEDURES THAT REQUIRE A SHARP SURGICAL BLADE TO PUNCTURE OR CUT.: Brand: BARD-PARKER SAFETY BLADES SIZE 15, STERILE

## (undated) DEVICE — BASIC PACK: Brand: CONVERTORS

## (undated) DEVICE — MINOR PROCEDURE DRAPE: Brand: CONVERTORS

## (undated) DEVICE — 1820 FOAM BLOCK NEEDLE COUNTER: Brand: DEVON

## (undated) DEVICE — CULTURE TUBE AEROBIC

## (undated) DEVICE — BULB SYRINGE,IRRIGATION WITH PROTECTIVE CAP: Brand: DOVER

## (undated) DEVICE — PENCIL ELECTROSURG E-Z CLEAN -0035H

## (undated) DEVICE — POV-IOD SOLUTION 4OZ BT

## (undated) DEVICE — SYRINGE 10ML LL CONTROL TOP

## (undated) DEVICE — NEEDLE BLUNT 18 G X 1 1/2IN

## (undated) DEVICE — ABDOMINAL PAD: Brand: DERMACEA

## (undated) DEVICE — STERILE POLYISOPRENE POWDER-FREE SURGICAL GLOVES: Brand: PROTEXIS

## (undated) DEVICE — GAUZE SPONGES,16 PLY: Brand: CURITY

## (undated) DEVICE — SPONGE LAP 18 X 4 IN STRL RFD

## (undated) DEVICE — SUT ETHILON 3-0 PS-1 18 IN 1663H

## (undated) DEVICE — NEEDLE 25G X 1 1/2

## (undated) DEVICE — SUPER SPONGES,MEDIUM: Brand: KERLIX

## (undated) DEVICE — DISPOSABLE OR TOWEL: Brand: CARDINAL HEALTH